# Patient Record
Sex: FEMALE | Race: WHITE | NOT HISPANIC OR LATINO | Employment: FULL TIME | URBAN - METROPOLITAN AREA
[De-identification: names, ages, dates, MRNs, and addresses within clinical notes are randomized per-mention and may not be internally consistent; named-entity substitution may affect disease eponyms.]

---

## 2018-01-28 ENCOUNTER — HOSPITAL ENCOUNTER (EMERGENCY)
Facility: HOSPITAL | Age: 48
Discharge: HOME | End: 2018-01-28
Payer: MEDICAID

## 2018-01-28 VITALS
RESPIRATION RATE: 20 BRPM | TEMPERATURE: 98 F | HEART RATE: 67 BPM | OXYGEN SATURATION: 96 % | DIASTOLIC BLOOD PRESSURE: 66 MMHG | WEIGHT: 120 LBS | SYSTOLIC BLOOD PRESSURE: 135 MMHG

## 2018-01-28 DIAGNOSIS — H10.9 CONJUNCTIVITIS OF LEFT EYE, UNSPECIFIED CONJUNCTIVITIS TYPE: Primary | ICD-10-CM

## 2018-01-28 PROCEDURE — 25000003 PHARM REV CODE 250: Performed by: NURSE PRACTITIONER

## 2018-01-28 PROCEDURE — 99283 EMERGENCY DEPT VISIT LOW MDM: CPT

## 2018-01-28 RX ORDER — PROPARACAINE HYDROCHLORIDE 5 MG/ML
1 SOLUTION/ DROPS OPHTHALMIC
Status: COMPLETED | OUTPATIENT
Start: 2018-01-28 | End: 2018-01-28

## 2018-01-28 RX ORDER — TOBRAMYCIN 3 MG/ML
1 SOLUTION/ DROPS OPHTHALMIC EVERY 4 HOURS
Qty: 5 ML | Refills: 0 | Status: SHIPPED | OUTPATIENT
Start: 2018-01-28 | End: 2019-09-03

## 2018-01-28 RX ADMIN — FLUORESCEIN SODIUM 1 STRIP: 1 STRIP OPHTHALMIC at 08:01

## 2018-01-28 RX ADMIN — PROPARACAINE HYDROCHLORIDE 1 DROP: 5 SOLUTION/ DROPS OPHTHALMIC at 08:01

## 2018-01-29 NOTE — ED PROVIDER NOTES
Encounter Date: 1/28/2018       History     Chief Complaint   Patient presents with    Eye Pain     left eye pain that started today     50-year-old female presents to emergency room with left eye pain since yesterday.  Patient reports irritation to the left and increase watering.  Denies any injuries.  States she woke up with redness of the sclera and eye watering.  Denies any blurred vision or vision loss.          Review of patient's allergies indicates:  No Known Allergies  History reviewed. No pertinent past medical history.  Past Surgical History:   Procedure Laterality Date    TUBAL LIGATION       History reviewed. No pertinent family history.  Social History   Substance Use Topics    Smoking status: Current Every Day Smoker     Packs/day: 0.50    Smokeless tobacco: Not on file    Alcohol use No     Review of Systems   Constitutional: Negative for fever.   HENT: Negative for sore throat.    Eyes: Positive for pain, discharge, redness and itching. Negative for photophobia and visual disturbance.   Respiratory: Negative for shortness of breath.    Cardiovascular: Negative for chest pain.   Gastrointestinal: Negative for nausea.   Genitourinary: Negative for dysuria.   Musculoskeletal: Negative for back pain.   Skin: Negative for rash.   Neurological: Negative for weakness.   Hematological: Does not bruise/bleed easily.   All other systems reviewed and are negative.      Physical Exam     Initial Vitals [01/28/18 1924]   BP Pulse Resp Temp SpO2   128/74 74 20 98.4 °F (36.9 °C) 100 %      MAP       92         Physical Exam    Nursing note and vitals reviewed.  Constitutional: She appears well-developed and well-nourished. No distress.   HENT:   Head: Normocephalic.   Eyes: Pupils are equal, round, and reactive to light. Right eye exhibits no discharge. Left eye exhibits discharge. Left eye exhibits no exudate. No foreign body present in the left eye. Right conjunctiva is not injected. Left conjunctiva is  injected. Right eye exhibits no nystagmus. Left eye exhibits no nystagmus.   Slit lamp exam:       The left eye shows no corneal abrasion, no foreign body, no fluorescein uptake and no anterior chamber bulge.   Left ocular pressure is 14   Neck: Normal range of motion. Neck supple.   Cardiovascular: Normal rate and regular rhythm.   Pulmonary/Chest: No respiratory distress.   Abdominal: She exhibits no distension and no abdominal bruit. There is no tenderness. No hernia.   Neurological: She is alert and oriented to person, place, and time.   Skin: Skin is warm and dry. Capillary refill takes less than 2 seconds.   Psychiatric: She has a normal mood and affect. Her behavior is normal. Judgment and thought content normal.         ED Course   Procedures  Labs Reviewed - No data to display          Medical Decision Making:   Initial Assessment:   50-year-old female presents to emergency room with left eye pain since yesterday.  Patient reports irritation to the left and increase watering.  Denies any injuries.  States she woke up with redness of the sclera and eye watering.  Denies any blurred vision or vision loss.  I examined the patient's eye using proparacaine and fluorescein no visible foreign body or abrasion.  Ocular pressure was 14 in the left eye.  Differential Diagnosis:   Foreign body, corneal abrasion, conjunctivitis  ED Management:  I'll discharge patient with antibiotic eyedrops.  Instructed to follow-up with ophthalmologist if irritation continue.  Patient verbalized understanding.                   ED Course      Clinical Impression:   The encounter diagnosis was Conjunctivitis of left eye, unspecified conjunctivitis type.                           Gemma Whipple NP  02/02/18 2000

## 2019-08-09 ENCOUNTER — TELEPHONE (OUTPATIENT)
Dept: SURGERY | Facility: CLINIC | Age: 49
End: 2019-08-09

## 2019-08-14 ENCOUNTER — TELEPHONE (OUTPATIENT)
Dept: SURGERY | Facility: CLINIC | Age: 49
End: 2019-08-14

## 2019-08-16 ENCOUNTER — TELEPHONE (OUTPATIENT)
Dept: SURGERY | Facility: CLINIC | Age: 49
End: 2019-08-16

## 2019-08-21 ENCOUNTER — TELEPHONE (OUTPATIENT)
Dept: SURGERY | Facility: CLINIC | Age: 49
End: 2019-08-21

## 2019-08-21 NOTE — TELEPHONE ENCOUNTER
Spoke to Aayush aguirre referring doctors office to let her know we have not been able to reach the pt. She verified the 2 numbers I have. She provided a number for the Pt's mother Amanda [phone]. Left voicemail for mother as well. Sent letter to home address.

## 2019-08-30 NOTE — PROGRESS NOTES
ADDENDUM 09/10/2019     Patient's estradiol levels are not consistent with menopause. I don't suspect Faslodex is altering levels significantly since she hasn't had a dose since April per her report. Discussed with patient and will change to pre-menopausal therapy - Ribociclib 600 mg daily days 1-21 every 28 days with Arimidex/ovarian suppression.  Patient will discontinue Ibrance/Arimidex. She won't start Ribo until I see her back next week.     History:     Reason For Consultation:   Metastatic ER positive breast cancer.     Referring Provider:   Eula Gutierrez  No address on file    Records Obtained: Records of the patients history including those obtained from the referring provider were reviewed and summarized in detail.    HPI:   Angeles Wright presents for consultation breast cancer. She is at least perimenopausal - she's not real clear about when her last period was, but thinks it was over a year ago. Her oncologic history is detailed below. Today, she notes that she recently moved to [place]. She has not seen an oncologist since April. Last dose of Fulvestrant was April; remained on Ibrance but with irregular dosing. Hasn't taken in past two weeks. No new pains. Significant fatigue.     Oncologic History:  Presentation  - 1/2019 - presented for palpable mass left breast. Of note, patient had prior abnormal mammograms in 2016 for both breasts and was called back for additional images but didn't return.    - 1/2019 - Diagnostic mammogram and US showed 4.5 cm left breast mass with abnormal LN; 1.3 cm right breast mass  - 1/28/19 - Biopsy left breast mass - Grade 2 IDC, estrogen receptor 96%, progesterone receptor 95%, Her2 elaine neg, Ki67 30%.   - 2/2019 CT scans showed known left breast mass with axillary adenopathy, metastatic disease in chest and bones (ribs, sternum, vertebra, pelvis)  - 3/7/19 started Ibrance/Fulvsetrant. Dose reduced of Ibrance in 4/2019.   - 3/14/19 - 3/27/19 3000 cGt T4-T8     -  4/4/19 - Genetics Gene[facility] Breast/Gyn panel negative  [facility] oncology consultation on 9/3/19        Past Medical   Past Medical History:   Diagnosis Date    Breast cancer    There is no problem list on file for this patient.    Social History   Social History     Socioeconomic History    Marital status: Unknown     Spouse name: Not on file    Number of children: Not on file    Years of education: Not on file    Highest education level: Not on file   Occupational History    Not on file   Social Needs    Financial resource strain: Not on file    Food insecurity:     Worry: Not on file     Inability: Not on file    Transportation needs:     Medical: Not on file     Non-medical: Not on file   Tobacco Use    Smoking status: Current Every Day Smoker     Packs/day: 0.50     Years: 25.00     Pack years: 12.50     Start date: 9/3/1994    Smokeless tobacco: Never Used   Substance and Sexual Activity    Alcohol use: Yes     Comment: occasionally    Drug use: Not Currently    Sexual activity: Yes     Partners: Male     Birth control/protection: None   Lifestyle    Physical activity:     Days per week: Not on file     Minutes per session: Not on file    Stress: Not on file   Relationships    Social connections:     Talks on phone: Not on file     Gets together: Not on file     Attends Druze service: Not on file     Active member of club or organization: Not on file     Attends meetings of clubs or organizations: Not on file     Relationship status: Not on file   Other Topics Concern    Not on file   Social History Narrative    Not on file     Family History  Family History   Problem Relation Age of Onset    Lung cancer Mother     Heart attack Father     Suicide Brother     No Known Problems Maternal Aunt     No Known Problems Maternal Uncle     No Known Problems Paternal Aunt     No Known Problems Paternal Uncle     No Known Problems Maternal Grandmother     No Known Problems Maternal  "Grandfather     No Known Problems Paternal Grandmother     No Known Problems Paternal Grandfather     No Known Problems Son     No Known Problems Son     No Known Problems Daughter      Medications    Current Outpatient Medications:     palbociclib (IBRANCE) 100 mg Cap, Take 100 mg by mouth., Disp: , Rfl:   Allergies  Review of patient's allergies indicates:  No Known Allergies  Review of Systems  Review of Systems   Constitutional: Positive for fatigue. Negative for activity change, appetite change, chills, fever and unexpected weight change.   HENT: Negative for congestion, hearing loss, nosebleeds, sinus pressure and trouble swallowing.    Eyes: Negative for pain, discharge and itching.   Respiratory: Negative for cough, chest tightness and shortness of breath.    Cardiovascular: Negative for chest pain, palpitations and leg swelling.   Gastrointestinal: Negative for abdominal distention, abdominal pain, blood in stool, diarrhea, nausea, rectal pain and vomiting.   Endocrine: Negative for heat intolerance and polydipsia.   Genitourinary: Negative for difficulty urinating, dysuria, flank pain, frequency, hematuria and urgency.   Musculoskeletal: Negative for arthralgias, back pain and neck pain.   Skin: Negative for color change, pallor and rash.   Neurological: Negative for dizziness, numbness and headaches.   Hematological: Negative for adenopathy. Does not bruise/bleed easily.   Psychiatric/Behavioral: Negative for confusion and decreased concentration. The patient is not nervous/anxious.         Objective     Objective:     Vitals:    09/03/19 0837   BP: 115/61   BP Location: Right arm   Patient Position: Sitting   Pulse: 68   Resp: 20   Temp: 98.2 °F (36.8 °C)   TempSrc: Oral   Weight: 56.6 kg (124 lb 12.5 oz)   Height: 5' 4" (1.626 m)     Body surface area is 1.6 meters squared.  Physical Exam   Constitutional: She is oriented to person, place, and time. She appears well-developed and well-nourished. " No distress.   HENT:   Head: Normocephalic and atraumatic.   Mouth/Throat: Oropharynx is clear and moist and mucous membranes are normal. No oral lesions.   Eyes: Conjunctivae and EOM are normal. Right eye exhibits no discharge. Left eye exhibits no discharge. No scleral icterus.   Neck: Normal range of motion. Neck supple. No thyroid mass and no thyromegaly present.   Cardiovascular: Normal rate, regular rhythm, S1 normal, S2 normal and normal heart sounds.   Pulmonary/Chest: Effort normal and breath sounds normal. No respiratory distress. She has no wheezes. She has no rhonchi. She has no rales. Chest wall is not dull to percussion.   Large left breast mass, superior to nipple, 8 cm x 7 cm. + freely mobile axillary LN  Right mediport   Abdominal: Soft. Normal appearance and bowel sounds are normal. There is no hepatosplenomegaly. There is no tenderness.   Lymphadenopathy:     She has no cervical adenopathy.     She has axillary adenopathy.        Right: No supraclavicular adenopathy present.        Left: No supraclavicular adenopathy present.   Neurological: She is alert and oriented to person, place, and time. She has normal strength.   Skin: Skin is warm, dry and intact. No pallor.   Psychiatric: Her behavior is normal. Thought content normal.         Labs and Imaging  No results found for this or any previous visit.        Breast pathology and imaging as above.     Assessment     Assessment:     1. Stage IV (T3N2M2) invasive ductal carcinoma of left breast,  quadrant, ER 96%, DE 94%, Her2 neg, Grade 2, Ki67 30%   * Genetics negative  * 2/2019 Presented as de alem metastatic disease with bone mets and asymptomatic lung mets  * 3/7/19 started Ibrance/Fulvsetrant. Dose reduced of Ibrance in 4/2019 due to neutropenia. Non-compliance starting April 2019. Last dose of fulvestrant 4/2019.   * 3/14/19 - 3/27/19 3000 cGy T4-T8    * We discussed the prognosis for her breast cancer, particularly in terms of risks of local  and distant recurrences. We reviewed treatment recommendations as detailed below.    * She's had non-compliance since her diagnosis, thus I think we need to restage and restart treatment. If scans with progression, I wouldn't call her an Ibrance failure since she hasn't been taking it or Faslodex regularly.     2. Bone mets   * Start Zometa - eval'd by dentist. Needs teeth pulled, but they are not painful. Will plan to dose every 3 months.     3. Lung mets      Plan:     1. STRATA  2. Rescan for new baselines.   3. Change Faslodex to Arimidex (hopeful for better compliance) as long as menopausal labs c/w menopause  4. Restart Ibrance 100 mg days 1-21 every 28 days. (day 1 - 9/3  5. Zometa with next visit through port      Follow-up in 2 weeks with repeat labs - mid-cycle labs, Zometa and scan review. I asked the patient to call for any questions, concerns, or new symptoms.    I spent 60 minutes with patient and family with 50 spent face to face counseling on diagnosis, goals of therapy, prognosis.     Advance Care Planning     Power of   I initiated the process of advance care planning today and explained the importance of this process to the patient.  I introduced the concept of advance directives to the patient, as well. Then the patient received detailed information about the importance of designating a Health Care Power of  (HCPOA). She was also instructed to communicate with this person about their wishes for future healthcare, should she become sick and lose decision-making capacity. The patient has not previously appointed a HCPOA. After our discussion, the patient has decided to complete a HCPOA and will return to form.

## 2019-09-03 ENCOUNTER — OFFICE VISIT (OUTPATIENT)
Dept: HEMATOLOGY/ONCOLOGY | Facility: CLINIC | Age: 49
End: 2019-09-03
Payer: MEDICAID

## 2019-09-03 ENCOUNTER — TELEPHONE (OUTPATIENT)
Dept: HEMATOLOGY/ONCOLOGY | Facility: CLINIC | Age: 49
End: 2019-09-03

## 2019-09-03 ENCOUNTER — LAB VISIT (OUTPATIENT)
Dept: LAB | Facility: HOSPITAL | Age: 49
End: 2019-09-03
Attending: INTERNAL MEDICINE
Payer: MEDICAID

## 2019-09-03 VITALS
RESPIRATION RATE: 20 BRPM | BODY MASS INDEX: 21.3 KG/M2 | HEIGHT: 64 IN | WEIGHT: 124.75 LBS | HEART RATE: 68 BPM | DIASTOLIC BLOOD PRESSURE: 61 MMHG | SYSTOLIC BLOOD PRESSURE: 115 MMHG | TEMPERATURE: 98 F

## 2019-09-03 DIAGNOSIS — C50.112 MALIGNANT NEOPLASM OF CENTRAL PORTION OF LEFT BREAST IN FEMALE, ESTROGEN RECEPTOR POSITIVE: Primary | ICD-10-CM

## 2019-09-03 DIAGNOSIS — Z17.0 MALIGNANT NEOPLASM OF CENTRAL PORTION OF LEFT BREAST IN FEMALE, ESTROGEN RECEPTOR POSITIVE: Primary | ICD-10-CM

## 2019-09-03 DIAGNOSIS — C78.00 MALIGNANT NEOPLASM METASTATIC TO LUNG, UNSPECIFIED LATERALITY: ICD-10-CM

## 2019-09-03 DIAGNOSIS — C50.112 MALIGNANT NEOPLASM OF CENTRAL PORTION OF LEFT BREAST IN FEMALE, ESTROGEN RECEPTOR POSITIVE: ICD-10-CM

## 2019-09-03 DIAGNOSIS — C79.51 BONE METASTASES: ICD-10-CM

## 2019-09-03 DIAGNOSIS — Z17.0 MALIGNANT NEOPLASM OF CENTRAL PORTION OF LEFT BREAST IN FEMALE, ESTROGEN RECEPTOR POSITIVE: ICD-10-CM

## 2019-09-03 PROCEDURE — 99205 PR OFFICE/OUTPT VISIT, NEW, LEVL V, 60-74 MIN: ICD-10-PCS | Mod: S$PBB,,, | Performed by: INTERNAL MEDICINE

## 2019-09-03 PROCEDURE — 99999 PR PBB SHADOW E&M-EST. PATIENT-LVL III: ICD-10-PCS | Mod: PBBFAC,,, | Performed by: INTERNAL MEDICINE

## 2019-09-03 PROCEDURE — 99999 PR PBB SHADOW E&M-EST. PATIENT-LVL III: CPT | Mod: PBBFAC,,, | Performed by: INTERNAL MEDICINE

## 2019-09-03 PROCEDURE — 99213 OFFICE O/P EST LOW 20 MIN: CPT | Mod: PBBFAC | Performed by: INTERNAL MEDICINE

## 2019-09-03 PROCEDURE — 99205 OFFICE O/P NEW HI 60 MIN: CPT | Mod: S$PBB,,, | Performed by: INTERNAL MEDICINE

## 2019-09-03 RX ORDER — SODIUM CHLORIDE 0.9 % (FLUSH) 0.9 %
10 SYRINGE (ML) INJECTION
Status: CANCELLED | OUTPATIENT
Start: 2019-09-17

## 2019-09-03 RX ORDER — ANASTROZOLE 1 MG/1
1 TABLET ORAL DAILY
Qty: 90 TABLET | Refills: 1 | Status: SHIPPED | OUTPATIENT
Start: 2019-09-03 | End: 2019-09-10

## 2019-09-03 RX ORDER — HEPARIN 100 UNIT/ML
500 SYRINGE INTRAVENOUS
Status: CANCELLED | OUTPATIENT
Start: 2019-09-17

## 2019-09-03 NOTE — TELEPHONE ENCOUNTER
FERNY faxed to Glenny Rivero.     ----- Message from Melania Mcgovern MD sent at 9/3/2019  8:52 AM CDT -----  Neena peterson

## 2019-09-03 NOTE — Clinical Note
1. MD in 2 weeks with CT scans/bone scan a few days before - same day labs - cbc, cmp, mg, phos, Zometa2. Labs today - cbc, cmp, fsh, estradiol, ca 15-3

## 2019-09-03 NOTE — PLAN OF CARE
START ON PATHWAY REGIMEN - Breast    [id number]        Anastrozole (Arimidex(R))       Palbociclib (Ibrance(R))           Additional Orders: Avoid concomitant use of strong and moderate CYP3A   inhibitors and inducers. If interaction unavoidable, see palbociclib prescribing   information for recommended dosing adjustments. CBC recommended on day 1 of   each cycle, and day 14 of first two cycles, then as clinically indicated.       Ref: Rod et al. Lancet Oncol. 2015; 16: 25.  [Extrapolated; custom request]    **Always confirm dose/schedule in your pharmacy ordering system**    Patient Characteristics:  Distant Metastases or Locoregional Recurrent Disease - Unresected, HER2   Negative/Unknown/Equivocal, ER Positive, Endocrine Therapy, Postmenopausal,   First Line, No Prior Endocrine Therapy Adjuvantly, Candidate for a CDK4/6   Inhibitor  Therapeutic Status: Distant Metastases  BRCA Mutation Status: Absent  ER Status: Positive (+)  HER2 Status: Negative (-)  CT Status: Positive (+)  Menopausal Status: Postmenopausal  Line of Therapy: First Line  Previous Therapy: No Prior Endocrine Therapy Adjuvantly  Intent of Therapy:  Non-Curative / Palliative Intent, Discussed with Patient

## 2019-09-04 ENCOUNTER — TELEPHONE (OUTPATIENT)
Dept: HEMATOLOGY/ONCOLOGY | Facility: CLINIC | Age: 49
End: 2019-09-04

## 2019-09-04 NOTE — TELEPHONE ENCOUNTER
Called patient to give her the dates for the appointments that are scheduled for Monday 9/16 & 9/18. Also gave the address for the lab location in [place] on Friday 9/6.    Mailed appt  Slips.        ----- Message from Magdalena Sparks sent at 9/3/2019  4:27 PM CDT -----  Pt thought labs were for another time not today- r/s pt to [place] lab on Friday per pt request    ----- Message -----  From: Christina Hilton  Sent: 9/3/2019   4:23 PM  To: Staff Froilan Nichols,  Did you talk to patient about labs?      ----- Message -----  From: Melania Mcgovern MD  Sent: 9/3/2019  11:05 AM  To: Christina Hilton, Magdalena Nichols - looks like she didn't stay for labs today - can we get her rescheduled for labs?    OK to do on [facility] since she lives there.     Thanks, Melania

## 2019-09-06 LAB
ALBUMIN SERPL BCP-MCNC: 4.1 G/DL (ref 3.5–5.2)
ALP SERPL-CCNC: 74 U/L (ref 55–135)
ALT SERPL W/O P-5'-P-CCNC: 8 U/L (ref 10–44)
ANION GAP SERPL CALC-SCNC: 10 MMOL/L (ref 8–16)
AST SERPL-CCNC: 14 U/L (ref 10–40)
BILIRUB SERPL-MCNC: 0.4 MG/DL (ref 0.1–1)
BUN SERPL-MCNC: 10 MG/DL (ref 6–20)
CALCIUM SERPL-MCNC: 9.2 MG/DL (ref 8.7–10.5)
CHLORIDE SERPL-SCNC: 105 MMOL/L (ref 95–110)
CO2 SERPL-SCNC: 25 MMOL/L (ref 23–29)
CREAT SERPL-MCNC: 0.8 MG/DL (ref 0.5–1.4)
ERYTHROCYTE [DISTWIDTH] IN BLOOD BY AUTOMATED COUNT: 13.8 % (ref 11.5–14.5)
EST. GFR  (AFRICAN AMERICAN): >60 ML/MIN/1.73 M^2
EST. GFR  (NON AFRICAN AMERICAN): >60 ML/MIN/1.73 M^2
ESTRADIOL SERPL-MCNC: 229 PG/ML
FSH SERPL-ACNC: 23.6 MIU/ML
GLUCOSE SERPL-MCNC: 92 MG/DL (ref 70–110)
HCT VFR BLD AUTO: 47.2 % (ref 37–48.5)
HGB BLD-MCNC: 15.5 G/DL (ref 12–16)
IMM GRANULOCYTES # BLD AUTO: ABNORMAL K/UL (ref 0–0.04)
MCH RBC QN AUTO: 32 PG (ref 27–31)
MCHC RBC AUTO-ENTMCNC: 32.8 G/DL (ref 32–36)
MCV RBC AUTO: 98 FL (ref 82–98)
NEUTROPHILS # BLD AUTO: 3.4 K/UL (ref 1.8–7.7)
PLATELET # BLD AUTO: 251 K/UL (ref 150–350)
PMV BLD AUTO: 9.6 FL (ref 9.2–12.9)
POTASSIUM SERPL-SCNC: 4 MMOL/L (ref 3.5–5.1)
PROT SERPL-MCNC: 6.8 G/DL (ref 6–8.4)
RBC # BLD AUTO: 4.84 M/UL (ref 4–5.4)
SODIUM SERPL-SCNC: 140 MMOL/L (ref 136–145)
WBC # BLD AUTO: 4.94 K/UL (ref 3.9–12.7)

## 2019-09-06 PROCEDURE — 83001 ASSAY OF GONADOTROPIN (FSH): CPT

## 2019-09-06 PROCEDURE — 85027 COMPLETE CBC AUTOMATED: CPT | Mod: PO

## 2019-09-06 PROCEDURE — 36415 COLL VENOUS BLD VENIPUNCTURE: CPT

## 2019-09-06 PROCEDURE — 86300 IMMUNOASSAY TUMOR CA 15-3: CPT

## 2019-09-06 PROCEDURE — 82670 ASSAY OF TOTAL ESTRADIOL: CPT

## 2019-09-06 PROCEDURE — 80053 COMPREHEN METABOLIC PANEL: CPT

## 2019-09-09 LAB — CANCER AG15-3 SERPL-ACNC: 488 U/ML (ref 0–31)

## 2019-09-10 RX ORDER — TAMOXIFEN CITRATE 20 MG/1
20 TABLET ORAL DAILY
Qty: 90 TABLET | Refills: 1 | Status: SHIPPED | OUTPATIENT
Start: 2019-09-10 | End: 2019-09-18

## 2019-09-10 NOTE — PLAN OF CARE
DISCONTINUE ON PATHWAY REGIMEN - Breast    [id number]        Anastrozole (Arimidex(R))       Palbociclib (Ibrance(R))           Additional Orders: Avoid concomitant use of strong and moderate CYP3A   inhibitors and inducers. If interaction unavoidable, see palbociclib prescribing   information for recommended dosing adjustments. CBC recommended on day 1 of   each cycle, and day 14 of first two cycles, then as clinically indicated.       Ref: Rod et al. Lancet Oncol. 2015; 16: 25.  [Extrapolated; custom request]    **Always confirm dose/schedule in your pharmacy ordering system**    REASON: Other Reason  PRIOR TREATMENT: [id number]: Palbociclib 125 mg D1-21 + Anastrozole 1 mg D1-28 q28   Days Until Progression or Unacceptable Toxicity  TREATMENT RESPONSE: N/A - Adjuvant Therapy    START OFF PATHWAY REGIMEN - Breast            Letrozole (Femara(R))       Ribociclib (Kisqali(R))           Additional Orders: Monitor QTcF and electrolytes - see PI for details.   Do not initiate ribociclib in patients with QTcF ? 450 msec. In patients with   baseline hepatic impairment (Child-Velasquez class B or C): reduce initial ribociclib   dose to 400 mg/day. See prescribing information for ribociclib dose   adjustments/treatment interruptions due to drug interactions and/or toxicity.   Ref: Gus et al; N Engl J Med 2016;375:1738-48., Ribociclib [prescribing   information]. [place], [place]: [employer]; 2017    **Always confirm dose/schedule in your pharmacy ordering system**    Administration Notes: Ribo/monroe    Patient Characteristics:  Distant Metastases or Locoregional Recurrent Disease - Unresected, HER2   Negative/Unknown/Equivocal, ER Positive, Endocrine Therapy, Premenopausal, First   Line, Candidate for a CDK4/6 Inhibitor  Therapeutic Status: Distant Metastases  BRCA Mutation Status: Absent  ER Status: Positive (+)  HER2 Status: Negative (-)  OH Status: Positive (+)  Menopausal Status: Premenopausal  Line of Therapy: First  Line  Intent of Therapy:  Curative Intent, Discussed with Patient

## 2019-09-11 ENCOUNTER — TELEPHONE (OUTPATIENT)
Dept: PHARMACY | Facility: CLINIC | Age: 49
End: 2019-09-11

## 2019-09-11 NOTE — TELEPHONE ENCOUNTER
Informed Patient  that [facility] received prescription for Kisqali and prior authorization is required.  [facility] will be back in touch once insurance determination is received.

## 2019-09-12 NOTE — TELEPHONE ENCOUNTER
DOCUMENTATION ONLY:  Prior authorization for Kisqali 600mg/day #63/28 approved from 09/12/2019 to 09/12/2020  Case ID: [id number]    Co-pay: $0.00    Patient Assistance IS NOT required. Sending to clinical pharmacist for  and shipment. - HUSSEIN

## 2019-09-16 ENCOUNTER — HOSPITAL ENCOUNTER (OUTPATIENT)
Dept: RADIOLOGY | Facility: HOSPITAL | Age: 49
Discharge: HOME | End: 2019-09-16
Attending: INTERNAL MEDICINE
Payer: MEDICAID

## 2019-09-16 ENCOUNTER — HOSPITAL ENCOUNTER (OUTPATIENT)
Dept: CARDIOLOGY | Facility: CLINIC | Age: 49
Discharge: HOME | End: 2019-09-16
Payer: MEDICAID

## 2019-09-16 DIAGNOSIS — C50.112 MALIGNANT NEOPLASM OF CENTRAL PORTION OF LEFT BREAST IN FEMALE, ESTROGEN RECEPTOR POSITIVE: ICD-10-CM

## 2019-09-16 DIAGNOSIS — Z17.0 MALIGNANT NEOPLASM OF CENTRAL PORTION OF LEFT BREAST IN FEMALE, ESTROGEN RECEPTOR POSITIVE: ICD-10-CM

## 2019-09-16 PROCEDURE — 74177 CT ABD & PELVIS W/CONTRAST: CPT | Mod: 26,,, | Performed by: RADIOLOGY

## 2019-09-16 PROCEDURE — 74177 CT CHEST ABDOMEN PELVIS WITH CONTRAST (XPD): ICD-10-PCS | Mod: 26,,, | Performed by: RADIOLOGY

## 2019-09-16 PROCEDURE — 93010 ELECTROCARDIOGRAM REPORT: CPT | Mod: S$PBB,,, | Performed by: INTERNAL MEDICINE

## 2019-09-16 PROCEDURE — 78306 BONE IMAGING WHOLE BODY: CPT | Mod: 26,,, | Performed by: RADIOLOGY

## 2019-09-16 PROCEDURE — 71260 CT CHEST ABDOMEN PELVIS WITH CONTRAST (XPD): ICD-10-PCS | Mod: 26,,, | Performed by: RADIOLOGY

## 2019-09-16 PROCEDURE — 93005 ELECTROCARDIOGRAM TRACING: CPT | Mod: PBBFAC | Performed by: INTERNAL MEDICINE

## 2019-09-16 PROCEDURE — 93010 EKG 12-LEAD: ICD-10-PCS | Mod: S$PBB,,, | Performed by: INTERNAL MEDICINE

## 2019-09-16 PROCEDURE — 74177 CT ABD & PELVIS W/CONTRAST: CPT | Mod: TC

## 2019-09-16 PROCEDURE — 71260 CT THORAX DX C+: CPT | Mod: 26,,, | Performed by: RADIOLOGY

## 2019-09-16 PROCEDURE — A9503 TC99M MEDRONATE: HCPCS

## 2019-09-16 PROCEDURE — 78306 NM BONE SCAN WHOLE BODY: ICD-10-PCS | Mod: 26,,, | Performed by: RADIOLOGY

## 2019-09-16 PROCEDURE — 25500020 PHARM REV CODE 255: Performed by: INTERNAL MEDICINE

## 2019-09-16 RX ADMIN — IOHEXOL 15 ML: 350 INJECTION, SOLUTION INTRAVENOUS at 11:09

## 2019-09-16 RX ADMIN — IOHEXOL 75 ML: 350 INJECTION, SOLUTION INTRAVENOUS at 12:09

## 2019-09-16 NOTE — TELEPHONE ENCOUNTER
The patient returned call in regards to initial Kisqali 600 mg consult on . Kisqali 600mg will be shipped on  to arrive at patient's home on  via [employer]. $ 0 copay. Patient will start Kisqali on . Address confirmed. Confirmed 2 patient identifiers - name and . Therapy Appropriate. She was previously on Ibrance and tolerated well with minimal side effects.    Patient was counseled on the administration directions for Kisqali 600 mg:  -Take 3 tablets (600 mg total) by mouth once daily for 21 days, then 7 days off. Take at same time each day, preferably in the morning.  -Reviewed PO chemo handling precautions.  -Store at room temp.    Patient was counseled on the following possible side effects, which include, but are not limited to:   Peripheral edema, fatigue (fine at baseline - exhausted today d/t several appts), skin rash (HC1% prn - avoid applying to open skin/wounds), pruritis, N/V (no antiemetic), diarrhea/constipation (Imodium/docusate respectively), increased risk of infection (importance of lab monitoring). Patient will receive HC1% cream for rash with first fill only.    DDIs: Medication list reviewed, patient only takes Kisqali - no other medications. Discussed DDI with tamoxifen; Dr. Mcgovern will likely switch tamoxifen to an AI.    Patient was given 2 patient education handouts on how to handle oral chemotherapy and specific recommendations- do's and don'ts. We reviewed [facility] background including hours, RPh on call, [employer] shipping - no signature required, refill process - no auto refills, Welcome Packet, insurance benefits explanation upon request, charting system and communication with provider's offices.    Patient confirmed understanding. Patient did not have additional questions.  Patient will start Kisqali on . Consultation included: indication; goals of treatment; administration; storage and handling; side effects; how to handle side effects; the importance of compliance; how to  handle missed doses; the importance of laboratory monitoring; the importance of keeping all follow up appointments.  Patient understands to report any medication changes to [facility] and provider. All questions answered and addressed to patients satisfaction. I will f/u with patient in 1 week from start, [facility] to contact patient in 3 weeks for refills.

## 2019-09-17 NOTE — PROGRESS NOTES
History:     Reason For Consultation:   Metastatic ER positive breast cancer.     HPI:   Angeles Wright presents for follow up of metastatic breast cancer. She has been noncompliant with therapy and just recently transferred care. Labs showed her to be pre-menopausal thus we are changing therapy to Ribo/Zoladex and Arimidex. Scans earlier this week detailed below. She feels well today without complaints.        Oncologic History:  Presentation  - 1/2019 - presented for palpable mass left breast. Of note, patient had prior abnormal mammograms in 2016 for both breasts and was called back for additional images but didn't return.    - 1/2019 - Diagnostic mammogram and US showed 4.5 cm left breast mass with abnormal LN; 1.3 cm right breast mass  - 1/28/19 - Biopsy left breast mass - Grade 2 IDC, estrogen receptor 96%, progesterone receptor 95%, Her2 elaine neg, Ki67 30%.   - 2/2019 CT scans showed known left breast mass with axillary adenopathy, metastatic disease in chest and bones (ribs, sternum, vertebra, pelvis)  - 3/7/19 started Ibrance/Fulvsetrant. Dose reduced of Ibrance in 4/2019.   - 3/14/19 - 3/27/19 3000 cGt T4-T8     - 4/4/19 - Genetics [facility] Breast/Gyn panel negative  [facility] Medical oncology consultation on 9/3/19   - 9/2019 - Labs show pre-menopausal. Change therapy to Ribociclib, Zoladex and Arimidex.    - 9/2019 new baseline scans with diffuse osseous metastatic disease throughout spine, sternum, ribs, iliac wings, femoral bones. T5 with possible extension into spinal canal; left femur lesion at risk of pathologic fracture    Medications    Current Outpatient Medications:     anastrozole (ARIMIDEX) 1 mg Tab, Take 1 mg by mouth once daily., Disp: , Rfl:     ondansetron (ZOFRAN) 8 MG tablet, Take 1 tablet (8 mg total) by mouth every 8 (eight) hours as needed for Nausea., Disp: 30 tablet, Rfl: 2    ribociclib (KISQALI) 600 mg/day (200 mg x 3) Tab, Take 3 tablets (600 mg) by mouth once daily For 21 days,  then off for 7 days., Disp: 63 tablet, Rfl: 2  Allergies  Review of patient's allergies indicates:  No Known Allergies  Review of Systems  Review of Systems   Constitutional: Positive for fatigue. Negative for activity change, appetite change, chills, fever and unexpected weight change.   HENT: Negative for congestion, hearing loss, nosebleeds, sinus pressure and trouble swallowing.    Eyes: Negative for pain, discharge and itching.   Respiratory: Negative for cough, chest tightness and shortness of breath.    Cardiovascular: Negative for chest pain, palpitations and leg swelling.   Gastrointestinal: Negative for abdominal distention, abdominal pain, blood in stool, diarrhea, nausea, rectal pain and vomiting.   Endocrine: Negative for heat intolerance and polydipsia.   Genitourinary: Negative for difficulty urinating, dysuria, flank pain, frequency, hematuria and urgency.   Musculoskeletal: Negative for arthralgias, back pain and neck pain.   Skin: Negative for color change, pallor and rash.   Neurological: Negative for dizziness, numbness and headaches.   Hematological: Negative for adenopathy. Does not bruise/bleed easily.   Psychiatric/Behavioral: Negative for confusion and decreased concentration. The patient is not nervous/anxious.         Objective     Objective:     Vitals:    09/18/19 1036   BP: 128/79   BP Location: Right arm   Patient Position: Sitting   Pulse: 75   Resp: 20   Temp: 98.2 °F (36.8 °C)   TempSrc: Oral   Weight: 55.8 kg (123 lb 0.3 oz)     Body surface area is 1.59 meters squared.  Physical Exam   Constitutional: She is oriented to person, place, and time. She appears well-developed and well-nourished. No distress.   HENT:   Head: Normocephalic and atraumatic.   Mouth/Throat: Oropharynx is clear and moist and mucous membranes are normal. No oral lesions.   Eyes: Conjunctivae and EOM are normal. Right eye exhibits no discharge. Left eye exhibits no discharge. No scleral icterus.   Neck: Normal  range of motion. Neck supple. No thyroid mass and no thyromegaly present.   Cardiovascular: Normal rate, regular rhythm, S1 normal, S2 normal and normal heart sounds.   Pulmonary/Chest: Effort normal and breath sounds normal. No respiratory distress. She has no wheezes. She has no rhonchi. She has no rales. Chest wall is not dull to percussion.   Large left breast mass, superior to nipple, 8 cm x 7 cm. + freely mobile axillary LN  Right mediport   Abdominal: Soft. Normal appearance and bowel sounds are normal. There is no hepatosplenomegaly. There is no tenderness.   Lymphadenopathy:     She has no cervical adenopathy.     She has axillary adenopathy.        Right: No supraclavicular adenopathy present.        Left: No supraclavicular adenopathy present.   Neurological: She is alert and oriented to person, place, and time. She has normal strength.   Skin: Skin is warm, dry and intact. No pallor.   Psychiatric: Her behavior is normal. Thought content normal.         Labs and Imaging  Results for orders placed or performed in visit on 09/18/19   CBC Oncology   Result Value Ref Range    WBC 3.66 (L) 3.90 - 12.70 K/uL    RBC 4.94 4.00 - 5.40 M/uL    Hemoglobin 15.7 12.0 - 16.0 g/dL    Hematocrit 46.9 37.0 - 48.5 %    Mean Corpuscular Volume 95 82 - 98 fL    Mean Corpuscular Hemoglobin 31.8 (H) 27.0 - 31.0 pg    Mean Corpuscular Hemoglobin Conc 33.5 32.0 - 36.0 g/dL    RDW 14.5 11.5 - 14.5 %    Platelets 250 150 - 350 K/uL    MPV 9.2 9.2 - 12.9 fL    Gran # (ANC) 2.2 1.8 - 7.7 K/uL    Immature Grans (Abs) 0.01 0.00 - 0.04 K/uL     I have personally reviewed imaging results and agree with assessment as detailed below in dictation. Discussed with radiologist as well.     CT Chest Abdomen Pelvis With Contrast  Narrative: EXAMINATION:  CT CHEST ABDOMEN PELVIS WITH CONTRAST (XPD)    CLINICAL HISTORY:  breast cancer; Malignant neoplasm of central portion of left female breast    TECHNIQUE:  Low dose axial images, sagittal and  coronal reformations were obtained from the thoracic inlet to the pubic symphysis following the IV administration of 75 mL of Omnipaque 350 and the oral administration of 30 ml of Omnipaque 350.    COMPARISON:  None    FINDINGS:  Neck base:Remarkable.    Chest wall/axilla:Right-sided chest port with catheter tip in the SVC.  Several soft tissue opacities within both breasts.  The largest is within the left breast demonstrating internal hypodensity, possibly necrosis.  This measures 3.7 cm (series 2, image 33).    Lungs/pleura/tabitha: Several bilateral pulmonary soft tissue nodules, largest within the left fissure, measuring 0.9 cm.  Innumerable pleural based nodules/thickening, predominantly involving the diaphragmatic surface.  There do appear to be some small lung nodules though several are pleural based.  No pleural fluid.  No pneumothorax.    Heart/pericardium/mediastinum: Normal in size.  No pericardial effusion.    Abdominal wall: Unremarkable.    Liver: Normal in size and contour.  No focal lesion.    Gallbladder/bile ducts: No radiopaque gallstone.  No intra or extrahepatic biliary ductal dilatation.    Spleen and pancreas are unremarkable.    Adrenal glands: 0.6 cm nodule in the right adrenal gland, indeterminate.  Left adrenal is unremarkable.    Kidneys/ureters: Normal in size and location.  1.1 cm soft tissue nodule abutting the interpolar region the left kidney.  No hydronephrosis or ureteral dilatation.    Urinary bladder: Distended with smooth contour.    Reproductive: Postsurgical changes of tubal ligation.  Uterus is anteverted.    Bowel/mesentery: No evidence of bowel obstruction or inflammation.  Irregular thickening of the right ascending colon (series 2, image 142), possibly related to under distention. Colon Mass cannot be excluded, clinical correlation with colonoscopy.  The appendix is not definitely identified though no convincing soft tissue stranding to indicate  appendicitis..    Peritoneal/retroperitoneum: No free intraperitoneal air or fluid.    Vasculature: 3 vessels left aortic arch.  Aorta is normal in course and caliber without aneurysmal dilatation.  Celiac, superior and inferior mesenteric arteries are patent.  Portal, splenic and superior mesenteric veins are patent.    Bones: Innumerable lytic and blastic lesions throughout the spine, sternum, ribs, iliac wings and femoral bones.  Noting lytic lesion involving the posterior cortex of T5 vertebral body with possible extension into the spinal canal and lesion in the left femur neck, placing patient at risk of pathological fracture.  Impression: In this patient with history of breast cancer, there are several soft tissue opacities within both breasts.  Largest within the left breast, possibly representing patient primary cancer.  There are several pulmonary soft tissue opacities, pleural based nodules and several osseous lytic lesions, concerning for metastases.    0.6 cm nodule in the right adrenal gland, indeterminate.  Attention on follow-up.    Soft tissue opacity abutting the interpolar region of the left kidney.  This could represent exophytic renal lesion, renal cyst with proteinaceous or hemorrhagic content, or retroperitoneal metastases.  Attention on follow-up.    Irregular thickening of the right ascending colon, possibly related to under distension.  Colon mass cannot be entirely excluded.  Clinical correlation with colonoscopy.    Several osseous metastases involving the left femur neck, placing patient at risk of pathologic fracture and posterior aspect of T5 abutting/impinging upon the spinal canal.  Orthopedics consultation is recommended.    Additional findings as above.    COMMUNICATION  This critical result was discovered/received at 01:55 pm.  The critical information above was relayed directly by Dr. Jeong By telephone to Magdalena Whatley RN on 09/16/2019 at 2:10 pm.    Electronically signed by  resident: Gianfranco Jeong  Date:    09/16/2019  Time:    13:23    Electronically signed by: Trent Bello MD  Date:    09/16/2019  Time:    15:09  NM Bone Scan Whole Body  Narrative: EXAMINATION:  NM BONE SCAN WHOLE BODY    CLINICAL HISTORY:  Malignant neoplasm of central portion of left female breastdiagnosis associated with order;    TECHNIQUE:  Exam was performed with 19.1mCi of technetium-99m labeled MDP.    COMPARISON:  None.    FINDINGS:  There is increased uptake in the lateral T6 and T10 ribs, posterior left T8 rib, right ischium, left ilium and femoral neck, and sternum, corresponding to lucent lesions on CT.  Physiologic  activity is present.  Impression: Multiple foci of increased uptake in the axial skeleton, corresponding to lucent lesions on CT 09/16/2019 and concerning for diffuse metastatic disease.    I, Trent Martínez MD, attest that I reviewed and interpreted the images.    Electronically signed by resident: Cami Gomez  Date:    09/16/2019  Time:    14:38    Electronically signed by: Trent Martínez  Date:    09/16/2019  Time:    14:58        Assessment     Assessment:     1. Stage IV (T3N2M2) invasive ductal carcinoma of left breast,  quadrant, ER 96%, MA 94%, Her2 neg, Grade 2, Ki67 30%   * Genetics negative  * 2/2019 Presented as de alem metastatic disease with bone mets and asymptomatic lung mets  * 3/7/19 outside oncologist - started Ibrance/Fulvsetrant. Dose reduced of Ibrance in 4/2019 due to neutropenia. Non-compliance starting April 2019. Last dose of fulvestrant 4/2019.   * 3/14/19 - 3/27/19 3000 cGy T4-T8    * 9/2019 presented to [facility] as new patient. She's had non-compliance since her diagnosis. Labs showed pre-menopausal (not felt to be abnormal due to Faslodex since it had been 4-5 months since last dose)   * 9/2019 new baseline scans with diffuse osseous metastatic disease throughout spine, sternum, ribs, iliac wings, femoral bones. T5 with possible extension into spinal canal; left  femur lesion at risk of pathologic fracture   * 9/2019: Start Ribociclib, Zoladex and Arimidex. Reviewed side effects of ribociclib including cytopenias, n/v/d/c, QT prolongation among others. Patient asked questions which I answered and voiced understanding.     2. Bone mets   * Start Zometa - eval'd by dentist.   * Refer to ortho due to increased fracture risk of femur due to metastatic involvement.    * Has received prior RT to T5 lesion. Discussed signs/symptoms of cord compression which she would need to seek emergent attention should they develop.     3. Lung mets  4. Mild leukopenia. Monitor.       Plan:     1. STRATA results pending  2. Refer to ortho  due to increased fracture risk of femur due to metastatic involvement  3. Start Zoladex, Ribociclib and Arimidex 9/18/19.   4. Zometa today and then again in mid-December.       Follow-up in 2 weeks with repeat labs - mid-cycle labs. I asked the patient to call for any questions, concerns, or new symptoms.  Understands goals are palliative.       ACP done.     Future Appointments   Date Time Provider Department Center   9/18/2019 11:30 AM Melania Mcgovern MD [facility] HEM ONC [facility]   9/18/2019  1:30 PM NOM, CHEMO [facility]   10/30/2019  2:00 PM Lorne Johnson MD [facility] [place]

## 2019-09-18 ENCOUNTER — OFFICE VISIT (OUTPATIENT)
Dept: HEMATOLOGY/ONCOLOGY | Facility: CLINIC | Age: 49
End: 2019-09-18
Payer: MEDICAID

## 2019-09-18 ENCOUNTER — INFUSION (OUTPATIENT)
Dept: INFUSION THERAPY | Facility: HOSPITAL | Age: 49
End: 2019-09-18
Attending: INTERNAL MEDICINE
Payer: MEDICAID

## 2019-09-18 VITALS
RESPIRATION RATE: 18 BRPM | TEMPERATURE: 98 F | SYSTOLIC BLOOD PRESSURE: 126 MMHG | HEART RATE: 59 BPM | DIASTOLIC BLOOD PRESSURE: 68 MMHG

## 2019-09-18 VITALS
DIASTOLIC BLOOD PRESSURE: 79 MMHG | BODY MASS INDEX: 21.12 KG/M2 | TEMPERATURE: 98 F | RESPIRATION RATE: 20 BRPM | HEART RATE: 75 BPM | WEIGHT: 123 LBS | SYSTOLIC BLOOD PRESSURE: 128 MMHG

## 2019-09-18 DIAGNOSIS — Z17.0 MALIGNANT NEOPLASM OF CENTRAL PORTION OF LEFT BREAST IN FEMALE, ESTROGEN RECEPTOR POSITIVE: Primary | ICD-10-CM

## 2019-09-18 DIAGNOSIS — C50.112 MALIGNANT NEOPLASM OF CENTRAL PORTION OF LEFT BREAST IN FEMALE, ESTROGEN RECEPTOR POSITIVE: Primary | ICD-10-CM

## 2019-09-18 DIAGNOSIS — C78.00 MALIGNANT NEOPLASM METASTATIC TO LUNG, UNSPECIFIED LATERALITY: ICD-10-CM

## 2019-09-18 DIAGNOSIS — D72.819 LEUKOPENIA, UNSPECIFIED TYPE: ICD-10-CM

## 2019-09-18 DIAGNOSIS — C79.51 BONE METASTASES: ICD-10-CM

## 2019-09-18 DIAGNOSIS — Z79.899 ENCOUNTER FOR LONG-TERM (CURRENT) USE OF HIGH-RISK MEDICATION: ICD-10-CM

## 2019-09-18 PROCEDURE — 96365 THER/PROPH/DIAG IV INF INIT: CPT

## 2019-09-18 PROCEDURE — 99215 PR OFFICE/OUTPT VISIT, EST, LEVL V, 40-54 MIN: ICD-10-PCS | Mod: S$PBB,,, | Performed by: INTERNAL MEDICINE

## 2019-09-18 PROCEDURE — 99213 OFFICE O/P EST LOW 20 MIN: CPT | Mod: PBBFAC,25 | Performed by: INTERNAL MEDICINE

## 2019-09-18 PROCEDURE — 99999 PR PBB SHADOW E&M-EST. PATIENT-LVL III: ICD-10-PCS | Mod: PBBFAC,,, | Performed by: INTERNAL MEDICINE

## 2019-09-18 PROCEDURE — 63600175 PHARM REV CODE 636 W HCPCS: Performed by: INTERNAL MEDICINE

## 2019-09-18 PROCEDURE — 99215 OFFICE O/P EST HI 40 MIN: CPT | Mod: S$PBB,,, | Performed by: INTERNAL MEDICINE

## 2019-09-18 PROCEDURE — 96402 CHEMO HORMON ANTINEOPL SQ/IM: CPT

## 2019-09-18 PROCEDURE — 99999 PR PBB SHADOW E&M-EST. PATIENT-LVL III: CPT | Mod: PBBFAC,,, | Performed by: INTERNAL MEDICINE

## 2019-09-18 RX ORDER — ONDANSETRON HYDROCHLORIDE 8 MG/1
8 TABLET, FILM COATED ORAL EVERY 8 HOURS PRN
Qty: 30 TABLET | Refills: 2 | Status: SHIPPED | OUTPATIENT
Start: 2019-09-18 | End: 2020-09-17

## 2019-09-18 RX ORDER — ANASTROZOLE 1 MG/1
1 TABLET ORAL DAILY
COMMUNITY
End: 2020-05-27 | Stop reason: SDUPTHER

## 2019-09-18 RX ORDER — SODIUM CHLORIDE 0.9 % (FLUSH) 0.9 %
10 SYRINGE (ML) INJECTION
Status: CANCELLED | OUTPATIENT
Start: 2019-10-01

## 2019-09-18 RX ORDER — SODIUM CHLORIDE 0.9 % (FLUSH) 0.9 %
10 SYRINGE (ML) INJECTION
Status: DISCONTINUED | OUTPATIENT
Start: 2019-09-18 | End: 2019-09-18 | Stop reason: HOSPADM

## 2019-09-18 RX ORDER — HEPARIN 100 UNIT/ML
500 SYRINGE INTRAVENOUS
Status: CANCELLED | OUTPATIENT
Start: 2019-10-01

## 2019-09-18 RX ADMIN — GOSERELIN ACETATE 3.6 MG: 3.6 IMPLANT SUBCUTANEOUS at 01:09

## 2019-09-18 RX ADMIN — SODIUM CHLORIDE 4 MG: 9 INJECTION, SOLUTION INTRAVENOUS at 01:09

## 2019-09-18 NOTE — Clinical Note
- Refer to ortho for high risk for fracture- Zoladex and Zometa today in infusion- will you confirm that they'll give both- MD in 2 with cbc, cmp- MD in 4 weeks with cbc, cmp, ca 15-3, Zoladex

## 2019-09-18 NOTE — PLAN OF CARE
Problem: Adult Inpatient Plan of Care  Goal: Plan of Care Review  Outcome: Outcome(s) achieved Date Met: 09/18/19  Patient in clinic today for Zoladex injection. Injection administered SQ into abdominal tissue. Injection tolerated well. Patient denies any pain or discomfort. Educated on medication side effects (headaches, hot flashes, & fatigue) and when to seek medical attention.     Patient also received first dose of Zometa. Patient denies any recent dental work or any future plans. Patient instructed to take a Calcium & Vit. D supplement. VS stable. Port flushed, heparin locked, and de-accessed prior to discharge. AVS provided. Discharged home.

## 2019-09-18 NOTE — DISCHARGE INSTRUCTIONS
oral calcium supplements of 500 mg and a multiple vitamin  containing 400 international units of vitamin D daily.

## 2019-09-26 ENCOUNTER — TELEPHONE (OUTPATIENT)
Dept: HEMATOLOGY/ONCOLOGY | Facility: CLINIC | Age: 49
End: 2019-09-26

## 2019-10-07 NOTE — TELEPHONE ENCOUNTER
The patient contacted [facility] in regards to review management of missed doses for Kisqali/Arimidex - additionally completed a 7 day touchbase. The patient confirmed initiating on 9/23 - adjusting refill activity accordingly.    Administration: Patient confirms taking medication as prescribed: 3 tablets once daily around 10am-12pm. Proper chemo handling procedures are not followed. Medication is stored appropriately at room temp.  Adherence: Patient confirms one missed doses of her Kisqali and Arimidex yesterday. She went fishing overnight and slept through the majority of yesterday (including dose). She does not currently use any adherence tools. Recommended she use a phone alarm reminder for a consistent time of day to assist with adherence - patient will try this.  Side effects/disease state management: Patient denies any notable side effects such as nausea, diarrhea, constipation or lower energy levels. She has noticed small bumps on her shoulder and arms that resemble pimples. Not pruritic. She confirmed recently switching laundry detergents - may be related to that. Discussed Kisqali may cause a rash - may apply HC1% cream prn avoiding open wounds or broken skin. She has also noticed some light vaginal spotting. Recently started menopause - recommended she monitor and alert provider if worsens or does not resolve.    The patient overall seems to be tolerating Kisqali well. RPh will continue to f/u every three cycles or as clinically appropriate. Therapy appropriate. Encouraged patient to contact [facility] with any questions/concerns.

## 2019-10-08 DIAGNOSIS — Z17.0 MALIGNANT NEOPLASM OF CENTRAL PORTION OF LEFT BREAST IN FEMALE, ESTROGEN RECEPTOR POSITIVE: ICD-10-CM

## 2019-10-08 DIAGNOSIS — C50.112 MALIGNANT NEOPLASM OF CENTRAL PORTION OF LEFT BREAST IN FEMALE, ESTROGEN RECEPTOR POSITIVE: ICD-10-CM

## 2019-10-15 ENCOUNTER — INFUSION (OUTPATIENT)
Dept: INFUSION THERAPY | Facility: HOSPITAL | Age: 49
End: 2019-10-15
Attending: INTERNAL MEDICINE
Payer: MEDICAID

## 2019-10-15 ENCOUNTER — OFFICE VISIT (OUTPATIENT)
Dept: HEMATOLOGY/ONCOLOGY | Facility: CLINIC | Age: 49
End: 2019-10-15
Payer: MEDICAID

## 2019-10-15 ENCOUNTER — TELEPHONE (OUTPATIENT)
Dept: PHARMACY | Facility: CLINIC | Age: 49
End: 2019-10-15

## 2019-10-15 VITALS
RESPIRATION RATE: 18 BRPM | WEIGHT: 121.25 LBS | HEART RATE: 77 BPM | TEMPERATURE: 99 F | DIASTOLIC BLOOD PRESSURE: 67 MMHG | HEIGHT: 64 IN | OXYGEN SATURATION: 98 % | SYSTOLIC BLOOD PRESSURE: 125 MMHG | BODY MASS INDEX: 20.7 KG/M2

## 2019-10-15 DIAGNOSIS — Z17.0 MALIGNANT NEOPLASM OF CENTRAL PORTION OF LEFT BREAST IN FEMALE, ESTROGEN RECEPTOR POSITIVE: Primary | ICD-10-CM

## 2019-10-15 DIAGNOSIS — C50.112 MALIGNANT NEOPLASM OF CENTRAL PORTION OF LEFT BREAST IN FEMALE, ESTROGEN RECEPTOR POSITIVE: Primary | ICD-10-CM

## 2019-10-15 DIAGNOSIS — C79.51 BONE METASTASES: ICD-10-CM

## 2019-10-15 DIAGNOSIS — C78.00 MALIGNANT NEOPLASM METASTATIC TO LUNG, UNSPECIFIED LATERALITY: ICD-10-CM

## 2019-10-15 LAB
ALBUMIN SERPL BCP-MCNC: 4 G/DL (ref 3.5–5.2)
ALP SERPL-CCNC: 82 U/L (ref 55–135)
ALT SERPL W/O P-5'-P-CCNC: 9 U/L (ref 10–44)
ANION GAP SERPL CALC-SCNC: 9 MMOL/L (ref 8–16)
AST SERPL-CCNC: 15 U/L (ref 10–40)
BILIRUB SERPL-MCNC: 0.4 MG/DL (ref 0.1–1)
BUN SERPL-MCNC: 5 MG/DL (ref 6–20)
CALCIUM SERPL-MCNC: 8.6 MG/DL (ref 8.7–10.5)
CHLORIDE SERPL-SCNC: 107 MMOL/L (ref 95–110)
CO2 SERPL-SCNC: 24 MMOL/L (ref 23–29)
CREAT SERPL-MCNC: 0.8 MG/DL (ref 0.5–1.4)
ERYTHROCYTE [DISTWIDTH] IN BLOOD BY AUTOMATED COUNT: 16.5 % (ref 11.5–14.5)
EST. GFR  (AFRICAN AMERICAN): >60 ML/MIN/1.73 M^2
EST. GFR  (NON AFRICAN AMERICAN): >60 ML/MIN/1.73 M^2
GLUCOSE SERPL-MCNC: 94 MG/DL (ref 70–110)
HCT VFR BLD AUTO: 40.9 % (ref 37–48.5)
HGB BLD-MCNC: 13.9 G/DL (ref 12–16)
IMM GRANULOCYTES # BLD AUTO: 0.02 K/UL (ref 0–0.04)
MCH RBC QN AUTO: 32.9 PG (ref 27–31)
MCHC RBC AUTO-ENTMCNC: 34 G/DL (ref 32–36)
MCV RBC AUTO: 97 FL (ref 82–98)
NEUTROPHILS # BLD AUTO: 1 K/UL (ref 1.8–7.7)
PLATELET # BLD AUTO: 209 K/UL (ref 150–350)
PMV BLD AUTO: 9.3 FL (ref 9.2–12.9)
POTASSIUM SERPL-SCNC: 3.8 MMOL/L (ref 3.5–5.1)
PROT SERPL-MCNC: 6.8 G/DL (ref 6–8.4)
RBC # BLD AUTO: 4.22 M/UL (ref 4–5.4)
SODIUM SERPL-SCNC: 140 MMOL/L (ref 136–145)
WBC # BLD AUTO: 2 K/UL (ref 3.9–12.7)

## 2019-10-15 PROCEDURE — 63600175 PHARM REV CODE 636 W HCPCS: Mod: JG | Performed by: INTERNAL MEDICINE

## 2019-10-15 PROCEDURE — 36415 COLL VENOUS BLD VENIPUNCTURE: CPT

## 2019-10-15 PROCEDURE — A4216 STERILE WATER/SALINE, 10 ML: HCPCS | Performed by: INTERNAL MEDICINE

## 2019-10-15 PROCEDURE — 63600175 PHARM REV CODE 636 W HCPCS: Performed by: INTERNAL MEDICINE

## 2019-10-15 PROCEDURE — 96402 CHEMO HORMON ANTINEOPL SQ/IM: CPT

## 2019-10-15 PROCEDURE — 99213 OFFICE O/P EST LOW 20 MIN: CPT | Mod: PBBFAC,25 | Performed by: INTERNAL MEDICINE

## 2019-10-15 PROCEDURE — 99215 PR OFFICE/OUTPT VISIT, EST, LEVL V, 40-54 MIN: ICD-10-PCS | Mod: S$PBB,,, | Performed by: INTERNAL MEDICINE

## 2019-10-15 PROCEDURE — 86300 IMMUNOASSAY TUMOR CA 15-3: CPT

## 2019-10-15 PROCEDURE — 99999 PR PBB SHADOW E&M-EST. PATIENT-LVL III: ICD-10-PCS | Mod: PBBFAC,,, | Performed by: INTERNAL MEDICINE

## 2019-10-15 PROCEDURE — 25000003 PHARM REV CODE 250: Performed by: INTERNAL MEDICINE

## 2019-10-15 PROCEDURE — 36593 DECLOT VASCULAR DEVICE: CPT

## 2019-10-15 PROCEDURE — 80053 COMPREHEN METABOLIC PANEL: CPT

## 2019-10-15 PROCEDURE — 99999 PR PBB SHADOW E&M-EST. PATIENT-LVL III: CPT | Mod: PBBFAC,,, | Performed by: INTERNAL MEDICINE

## 2019-10-15 PROCEDURE — 85027 COMPLETE CBC AUTOMATED: CPT

## 2019-10-15 PROCEDURE — 99215 OFFICE O/P EST HI 40 MIN: CPT | Mod: S$PBB,,, | Performed by: INTERNAL MEDICINE

## 2019-10-15 RX ORDER — SODIUM CHLORIDE 0.9 % (FLUSH) 0.9 %
10 SYRINGE (ML) INJECTION
Status: CANCELLED | OUTPATIENT
Start: 2020-01-01

## 2019-10-15 RX ORDER — HEPARIN 100 UNIT/ML
500 SYRINGE INTRAVENOUS
Status: CANCELLED | OUTPATIENT
Start: 2020-01-01

## 2019-10-15 RX ORDER — SODIUM CHLORIDE 0.9 % (FLUSH) 0.9 %
10 SYRINGE (ML) INJECTION
Status: DISCONTINUED | OUTPATIENT
Start: 2019-10-15 | End: 2019-10-15 | Stop reason: HOSPADM

## 2019-10-15 RX ORDER — HEPARIN 100 UNIT/ML
500 SYRINGE INTRAVENOUS
Status: DISCONTINUED | OUTPATIENT
Start: 2019-10-15 | End: 2019-10-15 | Stop reason: HOSPADM

## 2019-10-15 RX ADMIN — Medication 10 ML: at 10:10

## 2019-10-15 RX ADMIN — HEPARIN 500 UNITS: 100 SYRINGE at 10:10

## 2019-10-15 RX ADMIN — GOSERELIN ACETATE 3.6 MG: 3.6 IMPLANT SUBCUTANEOUS at 12:10

## 2019-10-15 RX ADMIN — ALTEPLASE 2 MG: 2.2 INJECTION, POWDER, LYOPHILIZED, FOR SOLUTION INTRAVENOUS at 11:10

## 2019-10-15 NOTE — PROGRESS NOTES
History:     Reason For Consultation:   Metastatic ER positive breast cancer.     HPI:   Angeles Wright presents for follow up of metastatic breast cancer. She has been noncompliant with therapy and transferred care to [facility] in 9/2019. Labs showed her to be pre-menopausal thus we changed therapy to Ribo/Zoladex and Arimidex. She has missed a few doses of Ribo. Tolerating Zoladex and Arimidex well. No new bony pain. Ambulating without difficulty. No difficulty urinating. Nehemias accompanies her today.      Oncologic History:  Presentation  - 1/2019 - presented for palpable mass left breast. Of note, patient had prior abnormal mammograms in 2016 for both breasts and was called back for additional images but didn't return.    - 1/2019 - Diagnostic mammogram and US showed 4.5 cm left breast mass with abnormal LN; 1.3 cm right breast mass  - 1/28/19 - Biopsy left breast mass - Grade 2 IDC, estrogen receptor 96%, progesterone receptor 95%, Her2 elaine neg, Ki67 30%.   - 2/2019 CT scans showed known left breast mass with axillary adenopathy, metastatic disease in chest and bones (ribs, sternum, vertebra, pelvis)  - 3/7/19 started Ibrance/Fulvsetrant. Dose reduced of Ibrance in 4/2019.   - 3/14/19 - 3/27/19 3000 cGt T4-T8     - 4/4/19 - Genetics GeneDx Breast/Gyn panel negative  [facility] oncology consultation on 9/3/19   - 9/2019 - Labs show pre-menopausal. Change therapy to Ribociclib, Zoladex and Arimidex.    - 9/2019 new baseline scans with diffuse osseous metastatic disease throughout spine, sternum, ribs, iliac wings, femoral bones. T5 with possible extension into spinal canal; left femur lesion at risk of pathologic fracture    Medications    Current Outpatient Medications:     anastrozole (ARIMIDEX) 1 mg Tab, Take 1 mg by mouth once daily., Disp: , Rfl:     ribociclib (KISQALI) 600 mg/day (200 mg x 3) Tab, Take 3 tablets (600 mg) by mouth once daily For 21 days, then off for 7 days., Disp: 63 tablet, Rfl: 2     "ondansetron (ZOFRAN) 8 MG tablet, Take 1 tablet (8 mg total) by mouth every 8 (eight) hours as needed for Nausea. (Patient not taking: Reported on 10/15/2019), Disp: 30 tablet, Rfl: 2  No current facility-administered medications for this visit.   Allergies  Review of patient's allergies indicates:  No Known Allergies  Review of Systems  Review of Systems   Constitutional: Positive for fatigue. Negative for activity change, appetite change, chills, fever and unexpected weight change.   HENT: Negative for congestion, hearing loss, nosebleeds, sinus pressure and trouble swallowing.    Eyes: Negative for pain, discharge and itching.   Respiratory: Negative for cough, chest tightness and shortness of breath.    Cardiovascular: Negative for chest pain, palpitations and leg swelling.   Gastrointestinal: Negative for abdominal distention, abdominal pain, blood in stool, diarrhea, nausea, rectal pain and vomiting.   Endocrine: Negative for heat intolerance and polydipsia.   Genitourinary: Negative for difficulty urinating, dysuria, flank pain, frequency, hematuria and urgency.   Musculoskeletal: Negative for arthralgias, back pain and neck pain.   Skin: Negative for color change, pallor and rash.   Neurological: Negative for dizziness, numbness and headaches.   Hematological: Negative for adenopathy. Does not bruise/bleed easily.   Psychiatric/Behavioral: Negative for confusion and decreased concentration. The patient is not nervous/anxious.         Objective     Objective:     Vitals:    10/15/19 1124   BP: 125/67   BP Location: Right arm   Patient Position: Sitting   BP Method: Large (Automatic)   Pulse: 77   Resp: 18   Temp: 98.5 °F (36.9 °C)   TempSrc: Oral   SpO2: 98%   Weight: 55 kg (121 lb 4.1 oz)   Height: 5' 4" (1.626 m)     Body surface area is 1.58 meters squared.  Physical Exam   Constitutional: She is oriented to person, place, and time. She appears well-developed and well-nourished. No distress.   HENT: "   Head: Normocephalic and atraumatic.   Mouth/Throat: Oropharynx is clear and moist and mucous membranes are normal. No oral lesions.   Eyes: Conjunctivae and EOM are normal. Right eye exhibits no discharge. Left eye exhibits no discharge. No scleral icterus.   Neck: Normal range of motion. Neck supple. No thyroid mass and no thyromegaly present.   Cardiovascular: Normal rate, regular rhythm, S1 normal, S2 normal and normal heart sounds.   Pulmonary/Chest: Effort normal and breath sounds normal. No respiratory distress. She has no wheezes. She has no rhonchi. She has no rales. Chest wall is not dull to percussion.   Large left breast mass, superior to nipple, 5 cm x 7 cm. + freely mobile axillary LN  Right mediport   Abdominal: Soft. Normal appearance and bowel sounds are normal. There is no hepatosplenomegaly. There is no tenderness.   Lymphadenopathy:     She has no cervical adenopathy.     She has axillary adenopathy.        Right: No supraclavicular adenopathy present.        Left: No supraclavicular adenopathy present.   Neurological: She is alert and oriented to person, place, and time. She has normal strength.   Skin: Skin is warm, dry and intact. No pallor.   Psychiatric: Her behavior is normal. Thought content normal.         Labs and Imaging  Results for orders placed or performed in visit on 10/15/19   CBC Oncology   Result Value Ref Range    WBC 2.00 (L) 3.90 - 12.70 K/uL    RBC 4.22 4.00 - 5.40 M/uL    Hemoglobin 13.9 12.0 - 16.0 g/dL    Hematocrit 40.9 37.0 - 48.5 %    Mean Corpuscular Volume 97 82 - 98 fL    Mean Corpuscular Hemoglobin 32.9 (H) 27.0 - 31.0 pg    Mean Corpuscular Hemoglobin Conc 34.0 32.0 - 36.0 g/dL    RDW 16.5 (H) 11.5 - 14.5 %    Platelets 209 150 - 350 K/uL    MPV 9.3 9.2 - 12.9 fL    Gran # (ANC) 1.0 (L) 1.8 - 7.7 K/uL    Immature Grans (Abs) 0.02 0.00 - 0.04 K/uL   Comprehensive metabolic panel   Result Value Ref Range    Sodium 140 136 - 145 mmol/L    Potassium 3.8 3.5 - 5.1  mmol/L    Chloride 107 95 - 110 mmol/L    CO2 24 23 - 29 mmol/L    Glucose 94 70 - 110 mg/dL    BUN, Bld 5 (L) 6 - 20 mg/dL    Creatinine 0.8 0.5 - 1.4 mg/dL    Calcium 8.6 (L) 8.7 - 10.5 mg/dL    Total Protein 6.8 6.0 - 8.4 g/dL    Albumin 4.0 3.5 - 5.2 g/dL    Total Bilirubin 0.4 0.1 - 1.0 mg/dL    Alkaline Phosphatase 82 55 - 135 U/L    AST 15 10 - 40 U/L    ALT 9 (L) 10 - 44 U/L    Anion Gap 9 8 - 16 mmol/L    eGFR if African American >60.0 >60 mL/min/1.73 m^2    eGFR if non African American >60.0 >60 mL/min/1.73 m^2         Assessment     Assessment:     1. Stage IV (T3N2M2) invasive ductal carcinoma of left breast,  quadrant, ER 96%, IA 94%, Her2 neg, Grade 2, Ki67 30%   * Genetics negative  * 2/2019 Presented as de alem metastatic disease with bone mets and asymptomatic lung mets  * 3/7/19 outside oncologist - started Ibrance/Fulvsetrant. Dose reduced of Ibrance in 4/2019 due to neutropenia. Non-compliance starting April 2019. Last dose of fulvestrant 4/2019.   * 3/14/19 - 3/27/19 3000 cGy T4-T8    * 9/2019 presented to [facility] as new patient. She's had non-compliance since her diagnosis. Labs showed pre-menopausal (not felt to be abnormal due to Faslodex since it had been 4-5 months since last dose)   * 9/2019 new baseline scans with diffuse osseous metastatic disease throughout spine, sternum, ribs, iliac wings, femoral bones. T5 with possible extension into spinal canal; left femur lesion at risk of pathologic fracture. Referred to ortho   * 9/2019: Start Ribociclib, Zoladex and Arimidex.   * Cycle 2 Ribo/Zoladex/Arimidex due to startt 10/21. Will get Zoladex today.     2. Bone mets   * Start Zometa - eval'd by dentist.   * Refer to ortho due to increased fracture risk of femur due to metastatic involvement. Appt 10/30   * Has received prior RT to T5 lesion. Discussed signs/symptoms of cord compression which she would need to seek emergent attention should they develop.     3. Lung mets  4. Drug related  neutropenia.     * Expected; monitor.       Plan:     1. Ortho appt later this month  2. Continue Zoladex, Ribociclib and Arimidex; cycle 2 due 10/21.   3. Zometa due mid-December.   4. RTC in 5 weeks for cycle 3.       Follow-up in 5 weeks to start cycle 3 ribo and receive zoladex.  I asked the patient to call for any questions, concerns, or new symptoms.    Understands goals are palliative. Discussed goals of care and complications of spinal disease and femoral disease with letitia today. He asked questions which I answered.       ACP done.     Future Appointments   Date Time Provider Department Center   10/15/2019  1:00 PM INJECTION[facility]   10/30/2019  2:00 PM Lorne Johnson MD [facility] SPORTS [place]   11/19/2019 10:30 AM INJECTION[facility]   11/19/2019 11:30 AM Melania Mcgovern MD [facility] HEM ONC [facility]   11/19/2019  1:00 PM INJECTION[facility]

## 2019-10-15 NOTE — NURSING
Patient's PAC accessed.  Flushed easily but no blood return noted.  Activase applied.  Labs drawn peripherally to L.AC.  Patient ambulated off unit to see MD then will return for port check and possible injection.

## 2019-10-15 NOTE — TELEPHONE ENCOUNTER
Refill call regarding Kisqali at [facility]. Will prepare for shipment on 10/17 to arrive 10/18 to [place] address with pt consent. Copay 0.00. Patient has not reported any new allergies/ side effects. Pt taking medication(s) as directed with no questions or concerns for RPH. Also no new medications. and Address verified.   ~cycle resumes on 10/20

## 2019-10-15 NOTE — NURSING
Patient's PAC giving good blood return without issue after Activase instillation.  Zoladex given in ABD.  Patient tolerated well.

## 2019-10-17 LAB — CANCER AG15-3 SERPL-ACNC: 625 U/ML (ref 0–31)

## 2019-10-21 ENCOUNTER — DOCUMENTATION ONLY (OUTPATIENT)
Dept: HEMATOLOGY/ONCOLOGY | Facility: CLINIC | Age: 49
End: 2019-10-21

## 2019-10-29 ENCOUNTER — TELEPHONE (OUTPATIENT)
Dept: SPORTS MEDICINE | Facility: CLINIC | Age: 49
End: 2019-10-29

## 2019-10-31 ENCOUNTER — TELEPHONE (OUTPATIENT)
Dept: SPORTS MEDICINE | Facility: CLINIC | Age: 49
End: 2019-10-31

## 2019-10-31 ENCOUNTER — TELEPHONE (OUTPATIENT)
Dept: HEMATOLOGY/ONCOLOGY | Facility: CLINIC | Age: 49
End: 2019-10-31

## 2019-10-31 NOTE — TELEPHONE ENCOUNTER
Returned call to pt.   Pt stated that she was scheduled to see sports medicine in error and needs to be scheduled with orthopedics to evaluate spot on femur per Dr. Mcgovern.   Informed pt would fwd message to  to get set up.   Pt stated gets transportation on Mondays and Tuesdays.   Pt verbalized understanding.     Message fwd.         ----- Message from Stacey Rawls sent at 10/31/2019  4:25 PM CDT -----  Contact: Pt   Pt called to speak with nurse have some question   Callback#[phone]   Thank You  ROMARIO Rawls

## 2019-10-31 NOTE — TELEPHONE ENCOUNTER
Patient wanted to be seen for bone mass in femur. I referred patient to [facility].    ----- Message from Aleida Fletcher MA sent at 10/31/2019 12:35 PM CDT -----  Contact: Self       ----- Message -----  From: Justus Dover  Sent: 10/31/2019   8:57 AM CDT  To: Alex WHALEN Staff    Pt would like a call back in regards to her appt.     [phone]

## 2019-11-01 DIAGNOSIS — C79.51 BONE METASTASES: Primary | ICD-10-CM

## 2019-11-04 ENCOUNTER — TELEPHONE (OUTPATIENT)
Dept: HEMATOLOGY/ONCOLOGY | Facility: CLINIC | Age: 49
End: 2019-11-04

## 2019-11-04 DIAGNOSIS — C79.51 BONE METASTASES: Primary | ICD-10-CM

## 2019-11-04 NOTE — TELEPHONE ENCOUNTER
Referral faxed to Dr. Frederic Rahman's office with attached relevant medical records.       Amita Rivera LPN  P Staff Froilan Nichols -     Our Oncological Orthopedic Surgeon, Dr. Soham Beavers, began teaching at [employer] full time at the beginning of this year and no longer practices at [facility]. Referrals are being faxed to Dr. Frederic Rahman/[facility] until another Oncological Orthopedic Surgeon joins our staff.     Dr Rahman's office requests that, in order to obtain soonest appt, please fax referral to:   [facility] Orthopedic Department     P: [phone]   F: [phone]     ATTN: Beth/Dr. Frederic Rahman   FOR: Patient's Name,  and BEST contact number     Please attach relevant medical records to include:   Last Clinical Note   Last Xray/MRI/CT or imaging reports   Last Pathology Report     Patient will receive a call from Dr. Rahman's office to schedule an appt upon receipt and review of referral.     Patient will need to obtain imaging disk with most recent studies to present at time of appt. Patient can call above number to check on status of appt, once referral has been sent.     Dr. Lon Woods/[facility]   also sees Oncological Orthopedic patients. Adult and Pediatric   Contact number: [phone]     Please keep these instructions for reference and call me if you have further questions.   Thank you,   Amita   Ortho Clinic Triage  [facility]   P. [phone]    F. [phone]

## 2019-11-12 ENCOUNTER — TELEPHONE (OUTPATIENT)
Dept: PHARMACY | Facility: CLINIC | Age: 49
End: 2019-11-12

## 2019-11-12 NOTE — TELEPHONE ENCOUNTER
Refill call regarding Kisqali at [facility]. Will prepare for shipment on  to arrive 11/15 with pt consent. Copay 0.00. Patient has not reported any new allergies/ side effects. Pt taking medication(s) as directed with no questions or concerns for RPH. Also no new medications. and Address verified.   ~cycle resumes

## 2019-11-18 ENCOUNTER — TELEPHONE (OUTPATIENT)
Dept: HEMATOLOGY/ONCOLOGY | Facility: CLINIC | Age: 49
End: 2019-11-18

## 2019-11-19 ENCOUNTER — TELEPHONE (OUTPATIENT)
Dept: HEMATOLOGY/ONCOLOGY | Facility: CLINIC | Age: 49
End: 2019-11-19

## 2019-11-19 NOTE — TELEPHONE ENCOUNTER
Returned call to pt.   Assisted pt with r/s appt- pt requesting appt next week.   Pt verbalized understanding.        ----- Message from Ana Malone sent at 11/19/2019 10:28 AM CST -----  Contact: Pt/[phone]  Patient is requesting a call back in regards to rescheduling Appt     Please call    Phone [phone]

## 2019-11-22 ENCOUNTER — TELEPHONE (OUTPATIENT)
Dept: HEMATOLOGY/ONCOLOGY | Facility: CLINIC | Age: 49
End: 2019-11-22

## 2019-11-25 ENCOUNTER — OFFICE VISIT (OUTPATIENT)
Dept: HEMATOLOGY/ONCOLOGY | Facility: CLINIC | Age: 49
End: 2019-11-25
Payer: MEDICAID

## 2019-11-25 ENCOUNTER — INFUSION (OUTPATIENT)
Dept: INFUSION THERAPY | Facility: HOSPITAL | Age: 49
End: 2019-11-25
Attending: INTERNAL MEDICINE
Payer: MEDICAID

## 2019-11-25 VITALS
DIASTOLIC BLOOD PRESSURE: 67 MMHG | SYSTOLIC BLOOD PRESSURE: 148 MMHG | RESPIRATION RATE: 18 BRPM | HEIGHT: 64 IN | WEIGHT: 118.63 LBS | OXYGEN SATURATION: 98 % | TEMPERATURE: 98 F | BODY MASS INDEX: 20.25 KG/M2 | HEART RATE: 79 BPM

## 2019-11-25 VITALS
SYSTOLIC BLOOD PRESSURE: 141 MMHG | HEART RATE: 77 BPM | DIASTOLIC BLOOD PRESSURE: 64 MMHG | RESPIRATION RATE: 18 BRPM | TEMPERATURE: 98 F

## 2019-11-25 DIAGNOSIS — C50.112 MALIGNANT NEOPLASM OF CENTRAL PORTION OF LEFT BREAST IN FEMALE, ESTROGEN RECEPTOR POSITIVE: Primary | ICD-10-CM

## 2019-11-25 DIAGNOSIS — C78.00 MALIGNANT NEOPLASM METASTATIC TO LUNG, UNSPECIFIED LATERALITY: ICD-10-CM

## 2019-11-25 DIAGNOSIS — Z17.0 MALIGNANT NEOPLASM OF CENTRAL PORTION OF LEFT BREAST IN FEMALE, ESTROGEN RECEPTOR POSITIVE: Primary | ICD-10-CM

## 2019-11-25 DIAGNOSIS — C79.51 BONE METASTASES: ICD-10-CM

## 2019-11-25 LAB
ALBUMIN SERPL BCP-MCNC: 4.2 G/DL (ref 3.5–5.2)
ALP SERPL-CCNC: 85 U/L (ref 55–135)
ALT SERPL W/O P-5'-P-CCNC: 11 U/L (ref 10–44)
ANION GAP SERPL CALC-SCNC: 10 MMOL/L (ref 8–16)
AST SERPL-CCNC: 17 U/L (ref 10–40)
BILIRUB SERPL-MCNC: 0.3 MG/DL (ref 0.1–1)
BUN SERPL-MCNC: 7 MG/DL (ref 6–20)
CALCIUM SERPL-MCNC: 8.8 MG/DL (ref 8.7–10.5)
CHLORIDE SERPL-SCNC: 106 MMOL/L (ref 95–110)
CO2 SERPL-SCNC: 23 MMOL/L (ref 23–29)
CREAT SERPL-MCNC: 0.8 MG/DL (ref 0.5–1.4)
ERYTHROCYTE [DISTWIDTH] IN BLOOD BY AUTOMATED COUNT: 21.8 % (ref 11.5–14.5)
EST. GFR  (AFRICAN AMERICAN): >60 ML/MIN/1.73 M^2
EST. GFR  (NON AFRICAN AMERICAN): >60 ML/MIN/1.73 M^2
GLUCOSE SERPL-MCNC: 91 MG/DL (ref 70–110)
HCT VFR BLD AUTO: 40.1 % (ref 37–48.5)
HGB BLD-MCNC: 13.7 G/DL (ref 12–16)
IMM GRANULOCYTES # BLD AUTO: 0 K/UL (ref 0–0.04)
MCH RBC QN AUTO: 34.6 PG (ref 27–31)
MCHC RBC AUTO-ENTMCNC: 34.2 G/DL (ref 32–36)
MCV RBC AUTO: 101 FL (ref 82–98)
NEUTROPHILS # BLD AUTO: 2.1 K/UL (ref 1.8–7.7)
PLATELET # BLD AUTO: 229 K/UL (ref 150–350)
PMV BLD AUTO: 8.8 FL (ref 9.2–12.9)
POTASSIUM SERPL-SCNC: 3.8 MMOL/L (ref 3.5–5.1)
PROT SERPL-MCNC: 7 G/DL (ref 6–8.4)
RBC # BLD AUTO: 3.96 M/UL (ref 4–5.4)
SODIUM SERPL-SCNC: 139 MMOL/L (ref 136–145)
WBC # BLD AUTO: 4.03 K/UL (ref 3.9–12.7)

## 2019-11-25 PROCEDURE — 63600175 PHARM REV CODE 636 W HCPCS: Performed by: INTERNAL MEDICINE

## 2019-11-25 PROCEDURE — 96402 CHEMO HORMON ANTINEOPL SQ/IM: CPT

## 2019-11-25 PROCEDURE — 99999 PR PBB SHADOW E&M-EST. PATIENT-LVL III: CPT | Mod: PBBFAC,,, | Performed by: INTERNAL MEDICINE

## 2019-11-25 PROCEDURE — 99213 OFFICE O/P EST LOW 20 MIN: CPT | Mod: PBBFAC,25 | Performed by: INTERNAL MEDICINE

## 2019-11-25 PROCEDURE — 99214 OFFICE O/P EST MOD 30 MIN: CPT | Mod: S$PBB,,, | Performed by: INTERNAL MEDICINE

## 2019-11-25 PROCEDURE — 86300 IMMUNOASSAY TUMOR CA 15-3: CPT

## 2019-11-25 PROCEDURE — 80053 COMPREHEN METABOLIC PANEL: CPT

## 2019-11-25 PROCEDURE — 25000003 PHARM REV CODE 250: Performed by: INTERNAL MEDICINE

## 2019-11-25 PROCEDURE — A4216 STERILE WATER/SALINE, 10 ML: HCPCS | Performed by: INTERNAL MEDICINE

## 2019-11-25 PROCEDURE — 99214 PR OFFICE/OUTPT VISIT, EST, LEVL IV, 30-39 MIN: ICD-10-PCS | Mod: S$PBB,,, | Performed by: INTERNAL MEDICINE

## 2019-11-25 PROCEDURE — 36591 DRAW BLOOD OFF VENOUS DEVICE: CPT

## 2019-11-25 PROCEDURE — 99999 PR PBB SHADOW E&M-EST. PATIENT-LVL III: ICD-10-PCS | Mod: PBBFAC,,, | Performed by: INTERNAL MEDICINE

## 2019-11-25 PROCEDURE — 85027 COMPLETE CBC AUTOMATED: CPT

## 2019-11-25 RX ORDER — HEPARIN 100 UNIT/ML
500 SYRINGE INTRAVENOUS
Status: CANCELLED | OUTPATIENT
Start: 2020-01-01

## 2019-11-25 RX ORDER — SODIUM CHLORIDE 0.9 % (FLUSH) 0.9 %
10 SYRINGE (ML) INJECTION
Status: CANCELLED | OUTPATIENT
Start: 2020-01-01

## 2019-11-25 RX ORDER — HEPARIN 100 UNIT/ML
500 SYRINGE INTRAVENOUS
Status: COMPLETED | OUTPATIENT
Start: 2019-11-25 | End: 2019-11-25

## 2019-11-25 RX ORDER — SODIUM CHLORIDE 0.9 % (FLUSH) 0.9 %
10 SYRINGE (ML) INJECTION
Status: DISCONTINUED | OUTPATIENT
Start: 2019-11-25 | End: 2019-11-25 | Stop reason: HOSPADM

## 2019-11-25 RX ADMIN — HEPARIN 500 UNITS: 100 SYRINGE at 03:11

## 2019-11-25 RX ADMIN — GOSERELIN ACETATE 3.6 MG: 3.6 IMPLANT SUBCUTANEOUS at 03:11

## 2019-11-25 RX ADMIN — SODIUM CHLORIDE, PRESERVATIVE FREE 10 ML: 5 INJECTION INTRAVENOUS at 03:11

## 2019-11-25 NOTE — PROGRESS NOTES
History:     Reason For Consultation:   Metastatic ER positive breast cancer.     HPI:   Angeles Wright presents for follow up of metastatic breast cancer. She has been noncompliant with [facility] and [facility]. Labs showed her to be pre-menopausal thus we changed therapy to Ribo/Zoladex and Arimidex. Tolerating Zoladex and Arimidex well. No new concerns. She missed her ortho appt. Pain controlled.  No urinary or bowel symptoms. No weakness.      Oncologic History:  Presentation  - 1/2019 - presented for palpable mass left breast. Of note, patient had prior abnormal mammograms in 2016 for both breasts and was called back for additional images but didn't return.    - 1/2019 - Diagnostic mammogram and US showed 4.5 cm left breast mass with abnormal LN; 1.3 cm right breast mass  - 1/28/19 - Biopsy left breast mass - Grade 2 IDC, estrogen receptor 96%, progesterone receptor 95%, Her2 elaine neg, Ki67 30%.   - 2/2019 CT scans showed known left breast mass with axillary adenopathy, metastatic disease in chest and bones (ribs, sternum, vertebra, pelvis)  - 3/7/19 started Ibrance/Fulvsetrant. Dose reduced of Ibrance in 4/2019.   - 3/14/19 - 3/27/19 3000 cGt T4-T8     - 4/4/19 - Genetics GeneDx Breast/Gyn panel negative  [facility] oncology consultation on 9/3/19   - 9/2019 - Labs show pre-menopausal. Change therapy to Ribociclib, Zoladex and Arimidex.    - 9/2019 new baseline scans with diffuse osseous metastatic disease throughout spine, sternum, ribs, iliac wings, femoral bones. T5 with possible extension into spinal canal; left femur lesion at risk of pathologic fracture    Medications    Current Outpatient Medications:     anastrozole (ARIMIDEX) 1 mg Tab, Take 1 mg by mouth once daily., Disp: , Rfl:     ribociclib (KISQALI) 600 mg/day (200 mg x 3) Tab, Take 3 tablets (600 mg) by mouth once daily For 21 days, then off for 7 days., Disp: 63 tablet, Rfl: 2    ondansetron  "(ZOFRAN) 8 MG tablet, Take 1 tablet (8 mg total) by mouth every 8 (eight) hours as needed for Nausea. (Patient not taking: Reported on 10/15/2019), Disp: 30 tablet, Rfl: 2  No current facility-administered medications for this visit.     Facility-Administered Medications Ordered in Other Visits:     sodium chloride 0.9% flush 10 mL, 10 mL, Intravenous, PRN, Melania Mcgovern MD, 10 mL at 11/25/19 3971  Allergies  Review of patient's allergies indicates:  No Known Allergies  Review of Systems  Review of Systems   Constitutional: Positive for fatigue. Negative for activity change, appetite change, chills, fever and unexpected weight change.   HENT: Negative for congestion, hearing loss, nosebleeds, sinus pressure and trouble swallowing.    Eyes: Negative for pain, discharge and itching.   Respiratory: Negative for cough, chest tightness and shortness of breath.    Cardiovascular: Negative for chest pain, palpitations and leg swelling.   Gastrointestinal: Negative for abdominal distention, abdominal pain, blood in stool, diarrhea, nausea, rectal pain and vomiting.   Endocrine: Negative for heat intolerance and polydipsia.   Genitourinary: Negative for difficulty urinating, dysuria, flank pain, frequency, hematuria and urgency.   Musculoskeletal: Negative for arthralgias, back pain and neck pain.   Skin: Negative for color change, pallor and rash.   Neurological: Negative for dizziness, numbness and headaches.   Hematological: Negative for adenopathy. Does not bruise/bleed easily.   Psychiatric/Behavioral: Negative for confusion and decreased concentration. The patient is not nervous/anxious.         Objective     Objective:     Vitals:    11/25/19 1602   BP: (!) 148/67   BP Location: Right arm   Patient Position: Sitting   BP Method: Large (Automatic)   Pulse: 79   Resp: 18   Temp: 98.2 °F (36.8 °C)   TempSrc: Oral   SpO2: 98%   Weight: 53.8 kg (118 lb 9.7 oz)   Height: 5' 4" (1.626 m)     Body surface area is 1.56 " meters squared.  Physical Exam   Constitutional: She is oriented to person, place, and time. She appears well-developed and well-nourished. No distress.   HENT:   Head: Normocephalic and atraumatic.   Mouth/Throat: Oropharynx is clear and moist and mucous membranes are normal. No oral lesions.   Eyes: Conjunctivae and EOM are normal. Right eye exhibits no discharge. Left eye exhibits no discharge. No scleral icterus.   Neck: Normal range of motion. Neck supple. No thyroid mass and no thyromegaly present.   Cardiovascular: Normal rate, regular rhythm, S1 normal, S2 normal and normal heart sounds.   Pulmonary/Chest: Effort normal and breath sounds normal. No respiratory distress. She has no wheezes. She has no rhonchi. She has no rales. Chest wall is not dull to percussion.   Large left breast mass, superior to nipple, 5 cm x 7 cm. + freely mobile axillary LN  Right mediport   Abdominal: Soft. Normal appearance and bowel sounds are normal. There is no hepatosplenomegaly. There is no tenderness.   Lymphadenopathy:     She has no cervical adenopathy.     She has axillary adenopathy.        Right: No supraclavicular adenopathy present.        Left: No supraclavicular adenopathy present.   Neurological: She is alert and oriented to person, place, and time. She has normal strength.   Skin: Skin is warm, dry and intact. No pallor.   Psychiatric: Her behavior is normal. Thought content normal.         Labs and Imaging  Results for orders placed or performed in visit on 11/25/19   CBC Oncology   Result Value Ref Range    WBC 4.03 3.90 - 12.70 K/uL    RBC 3.96 (L) 4.00 - 5.40 M/uL    Hemoglobin 13.7 12.0 - 16.0 g/dL    Hematocrit 40.1 37.0 - 48.5 %    Mean Corpuscular Volume 101 (H) 82 - 98 fL    Mean Corpuscular Hemoglobin 34.6 (H) 27.0 - 31.0 pg    Mean Corpuscular Hemoglobin Conc 34.2 32.0 - 36.0 g/dL    RDW 21.8 (H) 11.5 - 14.5 %    Platelets 229 150 - 350 K/uL    MPV 8.8 (L) 9.2 - 12.9 fL    Gran # (ANC) 2.1 1.8 - 7.7  K/uL    Immature Grans (Abs) 0.00 0.00 - 0.04 K/uL         Assessment     Assessment:     1. Stage IV (T3N2M2) invasive ductal carcinoma of left breast,  quadrant, ER 96%, NJ 94%, Her2 neg, Grade 2, Ki67 30%   * Genetics negative  * 2/2019 Presented as de alem metastatic disease with bone mets and asymptomatic lung mets  * 3/7/19 outside oncologist - started Ibrance/Fulvsetrant. Dose reduced of Ibrance in 4/2019 due to neutropenia. Non-compliance starting April 2019. Last dose of fulvestrant 4/2019.   * 3/14/19 - 3/27/19 3000 cGy T4-T8    * 9/2019 presented to [facility] as new patient. She's had non-compliance since her diagnosis. Labs showed pre-menopausal (not felt to be abnormal due to Faslodex since it had been 4-5 months since last dose)   * 9/2019 new baseline scans with diffuse osseous metastatic disease throughout spine, sternum, ribs, iliac wings, femoral bones. T5 with possible extension into spinal canal; left femur lesion at risk of pathologic fracture. Referred to ortho   * 9/2019: Start Ribociclib, Zoladex and Arimidex.   * Cycle 3 Ribo/Zoladex/Arimidex due to start 11/25. Zoladex today. Tolerating well.     2. Bone mets   * Continue Zometa - eval'd by dentist.   * Refer to ortho due to increased fracture risk of femur due to metastatic involvement. Appt 10/30 but patient didn't show up.    * Has received prior RT to T5 lesion. Discussed signs/symptoms of cord compression which she would need to seek emergent attention should they develop.     3. Lung mets  4. Drug related neutropenia.     * Expected; monitor.       Plan:     1. Reschedule ortho appt - concern for risk for pathologic fracture - patient didn't show up for scheduled appt on 10/30  2. Continue Zoladex, Ribociclib and Arimidex; cycle 3 due 11/25.   3. Zometa due mid-December.   4. RTC in 4 weeks with MD visit, cbc, cmp, ca 15-3, mg, phos, zoladex, Zometa for cycle 4 Ribo/Arimidex with CT scans, bone scan a few days before.        I asked the  patient to call for any questions, concerns, or new symptoms.    ACP done.     No future appointments.

## 2019-11-25 NOTE — NURSING
1530  Pt here for lab draw, Zoladex injection, no complaints or concerns at present; OK per MD Mcgovern to give Zoladex prior to MD visit at 1600; labs drawn, pt tolerated well; injection to RUAQ, tolerated well; pt instructed to contact MD for any needs or concerns; declined AVS, verbalized understanding of all discussed and to report for MD visit at 1600.

## 2019-11-25 NOTE — Clinical Note
RTC in 4 weeks with MD visit, cbc, cmp, ca 15-3, mg, phos, zoladex, Zometa for cycle 4 Ribo/Arimidex with CT scans, bone scan a few days before.  Please reach out to orthopedics to reschedule - she is high risk for pathologic fracture of femur.

## 2019-11-26 ENCOUNTER — TELEPHONE (OUTPATIENT)
Dept: HEMATOLOGY/ONCOLOGY | Facility: CLINIC | Age: 49
End: 2019-11-26

## 2019-11-26 NOTE — TELEPHONE ENCOUNTER
Spoke with PT. Scheduled Ortho appointment. Also provided appointment details for other appointments.

## 2019-11-26 NOTE — TELEPHONE ENCOUNTER
----- Message from Chava Hallman sent at 11/26/2019  1:38 PM CST -----  Contact: Patient  Returning a call     Caller name:: Pt    Who Left Message for Patient:: Deb    Communication preference:: [phone]    Additional info::

## 2019-11-26 NOTE — TELEPHONE ENCOUNTER
----- Message from Deb Alarcon sent at 11/25/2019  4:58 PM CST -----  Regarding: Bone & CT  Message   Received: Today   Message Contents   MD Shanice Rizo         RTC in 4 weeks with MD visit, cbc, cmp, ca 15-3, mg, phos, zoladex, Zometa for cycle 4 Ribo/Arimidex with CT scans, bone scan a few days before.     Please reach out to orthopedics to reschedule - she is high risk for pathologic fracture of femur.

## 2019-11-28 LAB — CANCER AG15-3 SERPL-ACNC: 284 U/ML (ref 0–31)

## 2019-12-11 ENCOUNTER — TELEPHONE (OUTPATIENT)
Dept: PHARMACY | Facility: CLINIC | Age: 49
End: 2019-12-11

## 2019-12-16 NOTE — TELEPHONE ENCOUNTER
The patient contacted [facility] in regards to Kisqali refill. She estimated she has less than one week on hand. Will ship 12/18 for her to receive 12/19. $0 copay, address confirmed. No changes in other medications, allergies, health conditions and one missed dose of Kisqali. She stated her fiance typically reminds her to take Kisqali but she missed due to forgetfulness. Reviewed a medication alarm reminder may be helpful in the future for adherence. She verbalized understanding.

## 2019-12-20 DIAGNOSIS — C79.51 BONE METASTASES: ICD-10-CM

## 2019-12-20 DIAGNOSIS — D72.819 LEUKOPENIA, UNSPECIFIED TYPE: ICD-10-CM

## 2019-12-20 DIAGNOSIS — C50.112 MALIGNANT NEOPLASM OF CENTRAL PORTION OF LEFT BREAST IN FEMALE, ESTROGEN RECEPTOR POSITIVE: Primary | ICD-10-CM

## 2019-12-20 DIAGNOSIS — Z17.0 MALIGNANT NEOPLASM OF CENTRAL PORTION OF LEFT BREAST IN FEMALE, ESTROGEN RECEPTOR POSITIVE: Primary | ICD-10-CM

## 2019-12-30 ENCOUNTER — TELEPHONE (OUTPATIENT)
Dept: PHARMACY | Facility: CLINIC | Age: 49
End: 2019-12-30

## 2020-01-08 DIAGNOSIS — Z17.0 MALIGNANT NEOPLASM OF CENTRAL PORTION OF LEFT BREAST IN FEMALE, ESTROGEN RECEPTOR POSITIVE: ICD-10-CM

## 2020-01-08 DIAGNOSIS — C50.112 MALIGNANT NEOPLASM OF CENTRAL PORTION OF LEFT BREAST IN FEMALE, ESTROGEN RECEPTOR POSITIVE: ICD-10-CM

## 2020-01-09 ENCOUNTER — TELEPHONE (OUTPATIENT)
Dept: HEMATOLOGY/ONCOLOGY | Facility: CLINIC | Age: 50
End: 2020-01-09

## 2020-01-09 NOTE — TELEPHONE ENCOUNTER
Call to pt.  Pt unavailable.   Left message for pt informing of 1/20 appts and that rx will be refilled at this visit.   Callback number provided if any questions/concerns.     ----- Message from Melania Mcgovern MD sent at 1/8/2020  8:18 PM CST -----  Regarding: Needs appt  Please schedule patient for an appointment with me, cbc, cmp, ca 15-3, and Zoladex within next two weeks.     Magdalena -     Will you let her know that I cannot refill her medication without seeing her? She missed her appt in December and needs to be receiving Zoladex for the medication to work.    Thanks, Melania

## 2020-01-10 NOTE — TELEPHONE ENCOUNTER
"Kisqali follow-up:  Patient reached for follow-up.  Confirmed that patient is aware of her appointments on 1/20/2020.  Notified patient that we are not able to refill the medication until she sees Dr. Mcgovern on 1/20/2020.  We will arrange refill after she sees provider and new prescription is sent.    Dosing, how taking: Taking around lunch time every day with food.  Denies missed doses but thinks she may have "a week or two" at home.  Based on last refill, should only have enough through 1/19/2020.    Storage: Cool, dry place at room temperature.     Handling: Handles the pills directly, but washes hands after to remove all residue of the medication.    Side effects: Denies any issues with side effects such as swelling, fatigue,  HA, rash, N/V/D.  She reports feeling "great".    Recent infections: No visits to the urgent care or ER, but does currently have a "cold".  No fever.  No bleeding or bruising.    Pain: No pain at this time.    Appetite: Good - eating 3 meals daily.      Energy, fatigue: Stable.  Has not see any changes or decline to her activity level.      Health, mood, QOL: She feels "great".   Does not seem to have any concerns or reservations about treatment.      ED/UC visits: None     Next clinical follow up: 3 months.      Medication reconciliation complete.  No new medications.  Patient continues to take only Kisqali and ondansetron as needed.      Labs from 11/25/19 reviewed - WBC 4.03, ANC 2.1 and platelets 229K.  Scr 0.8, BUN 7 and eGFR >60 mL/min/1.73m2, LFTs all within normal limits.  No dose adjustment necessary.  Patient did miss appointments and labs in December 2019 and has been rescheduled for 1/20/2020.      "

## 2020-01-20 ENCOUNTER — TELEPHONE (OUTPATIENT)
Dept: HEMATOLOGY/ONCOLOGY | Facility: CLINIC | Age: 50
End: 2020-01-20

## 2020-01-20 NOTE — TELEPHONE ENCOUNTER
Call attempt #1: LVM for patient to see if she has rescheduled her appt with Dr. Mcgovern. Refill for Chelsie denies as MD would like patient to reschedule appt before providing additional refills.

## 2020-01-20 NOTE — TELEPHONE ENCOUNTER
Call to pt x2.   Pt unavailable- call went straight to .  Left  for pt to call office back if needed assistance r/s appts.     Call to pt emergency contact.   Emergency contact unavailable- call went straight to .

## 2020-01-23 NOTE — TELEPHONE ENCOUNTER
Call attempt #2: LVM to encourage patient to reschedule her appt with Dr. Mcgovern. Refill for Kisqali denied as MD would like patient to reschedule appt before providing additional refills.

## 2020-01-24 NOTE — TELEPHONE ENCOUNTER
Call attempt #3: LVM again with patient to discuss rescheduling her appt with Dr. Mcgovern in order for her to refill the Kisqali.

## 2020-02-04 ENCOUNTER — OFFICE VISIT (OUTPATIENT)
Dept: ORTHOPEDICS | Facility: CLINIC | Age: 50
End: 2020-02-04
Payer: MEDICAID

## 2020-02-04 ENCOUNTER — TELEPHONE (OUTPATIENT)
Dept: ORTHOPEDICS | Facility: CLINIC | Age: 50
End: 2020-02-04

## 2020-02-04 VITALS — HEIGHT: 64 IN | WEIGHT: 118 LBS | BODY MASS INDEX: 20.14 KG/M2

## 2020-02-04 DIAGNOSIS — C79.51 BONE METASTASES: ICD-10-CM

## 2020-02-04 PROCEDURE — 99213 OFFICE O/P EST LOW 20 MIN: CPT | Mod: PBBFAC,PN | Performed by: ORTHOPAEDIC SURGERY

## 2020-02-04 PROCEDURE — 99204 OFFICE O/P NEW MOD 45 MIN: CPT | Mod: S$PBB,,, | Performed by: ORTHOPAEDIC SURGERY

## 2020-02-04 PROCEDURE — 99999 PR PBB SHADOW E&M-EST. PATIENT-LVL III: CPT | Mod: PBBFAC,,, | Performed by: ORTHOPAEDIC SURGERY

## 2020-02-04 PROCEDURE — 99204 PR OFFICE/OUTPT VISIT, NEW, LEVL IV, 45-59 MIN: ICD-10-PCS | Mod: S$PBB,,, | Performed by: ORTHOPAEDIC SURGERY

## 2020-02-04 PROCEDURE — 99999 PR PBB SHADOW E&M-EST. PATIENT-LVL III: ICD-10-PCS | Mod: PBBFAC,,, | Performed by: ORTHOPAEDIC SURGERY

## 2020-02-04 NOTE — LETTER
February 4, 2020      Melania Mcgovern MD  [address] [facility] [address]           [facility]  [address] , [place] [address]  [facility] [address]  Phone: [phone]  Fax: [phone]          Patient: Angeles Wright   MR Number: [id number]   YOB: 1970   Date of Visit: 2/4/2020       Dear Dr. Melania Mcgovern:    Thank you for referring Angeles Wright to me for evaluation. Attached you will find relevant portions of my assessment and plan of care.    If you have questions, please do not hesitate to call me. I look forward to following Angeles Wright along with you.    Sincerely,    Edmond Alexandre MD    Enclosure  CC:  No Recipients    If you would like to receive this communication electronically, please contact [email or web address] or [phone] to request more information on [employer] access.    For providers and/or their staff who would like to refer a patient to [facility], please contact us through our one-stop-shop provider referral line, [facility], at [phone].    If you feel you have received this communication in error or would no longer like to receive these types of communications, please e-mail [email or web address]

## 2020-02-04 NOTE — PROGRESS NOTES
Subjective:      Patient ID: Angeles Wright is a 49 y.o. female.    Chief Complaint: Pain of the Left Femur    HPI  The patient is referred by Oncology because of imaging findings.  She has a history of metastatic breast cancer being treated with radiation and chemotherapy.  The patient denies ever having had pain with weight-bearing in her lower extremities.  She indicates that since she initiated chemotherapy her primary tumor has shrunken and that in general she feels well.            Review of Systems   Constitution: Negative for fever and weight loss.   HENT: Negative for congestion.    Eyes: Negative for visual disturbance.   Cardiovascular: Negative for chest pain.   Respiratory: Negative for shortness of breath.    Hematologic/Lymphatic: Negative for bleeding problem. Does not bruise/bleed easily.   Skin: Negative for poor wound healing.   Gastrointestinal: Negative for abdominal pain.   Genitourinary: Negative for dysuria.   Neurological: Negative for seizures.   Psychiatric/Behavioral: Negative for altered mental status.   Allergic/Immunologic: Negative for persistent infections.         Objective:      Ortho/SPM Exam      Left hip    The patient is not in acute distress.   Body habitus is:normal.   Sclerae normal  The patient walks without a limp.   Respiratory distress:  none  The skin over the hip is:intact.   There is no local tenderness.   Range of motion- Flexion full, External rotation full, internal rotation full.  Resisted SLR negative.  Pain with rotation negative  Sciatic tension findings negative.  Shortening/lengthening compared to the contralateral side absent.  Pulses DP present, PT present.  Motor normal 5/5 strength in all tested muscle groups.   Sensory normal.      I reviewed the relevant radiographic images for the patient's condition:  I reviewed bone scan and CT scan of the relevant area. There is an area of subtle uptake on bone scan in the left proximal femur. CT scan shows small  lytic areas in the subcapital area of both hips without cortical thinning              Assessment:       Encounter Diagnosis   Name Primary?    Bone metastases         Given the patient's subjective status and physical findings, the metastatic disease appears to be responding to the current regimen.  There is no objective evidence to suggest imminent fracture, but this could develop in the future.          Plan:       Angeles was seen today for pain.    Diagnoses and all orders for this visit:    Bone metastases  -     Ambulatory referral/consult to Orthopedics          I explained my diagnostic impression and the reasoning behind it in detail, using layman's terms.     Given the fact that the patient lives several hours away, I explained that she should be observant for any increasing pain with weight-bearing.  If this occurs she should get x-rays at the nearest available facility and contact me for further evaluation and care. I further emphasize thatit would be desirable to fix any impending fracture prophylactically rather than the fracture.     In the absence of such symptoms, no further intervention would be recommended.

## 2020-02-04 NOTE — TELEPHONE ENCOUNTER
----- Message from Maday Wright sent at 2/4/2020  1:08 PM CST -----  Contact: self / [phone]  Will be 15 minutes late can you still see her?  Please advise

## 2020-05-01 ENCOUNTER — TELEPHONE (OUTPATIENT)
Dept: HEMATOLOGY/ONCOLOGY | Facility: CLINIC | Age: 50
End: 2020-05-01

## 2020-05-01 NOTE — TELEPHONE ENCOUNTER
----- Message from Chava Hallman sent at 5/1/2020  9:25 AM CDT -----  Contact: Patient  Scheduling request    Scheduling appt :: f/u     Treating provider:: Froilan    Do you feel you need to be seen today:: No    Contact:: [phone]    Additional info:: Pt is staying at the [facility], ask to speak with Ms Alexander. Any appt date Mon- Thurs is fine, except Thurs after 12 and/or Fridays. If unable to reach Catherine, leave appt schedule on voicemail.

## 2020-05-14 ENCOUNTER — TELEPHONE (OUTPATIENT)
Dept: HEMATOLOGY/ONCOLOGY | Facility: CLINIC | Age: 50
End: 2020-05-14

## 2020-05-14 NOTE — TELEPHONE ENCOUNTER
Message fwd to .         ----- Message from Tonya Graf sent at 5/14/2020  1:53 PM CDT -----  Contact: Pt   Pt is requesting an appt as soon as possible     Pt can be reached at [phone]

## 2020-05-15 ENCOUNTER — TELEPHONE (OUTPATIENT)
Dept: HEMATOLOGY/ONCOLOGY | Facility: CLINIC | Age: 50
End: 2020-05-15

## 2020-05-15 NOTE — PROGRESS NOTES
History:     Reason For Consultation:   Metastatic ER positive breast cancer.     HPI:   Angeles Wright presents for follow up of metastatic breast cancer. She has been noncompliant with therapy initially at [facility] and transferred care to [facility] in 9/2019. Labs showed her to be pre-menopausal thus we changed therapy to Ribo/Zoladex and Arimidex. Unfortunately she continues to be non-compliant and has been off all therapy recently. I last saw her 6 months ago.  Her last injection of zoladex was in late November.  She denies any pain.  She has experienced hot flashes and has not had a period in a very long time but is unable to quantify how long.  She is living in the [facility] right now. Saw ortho.    Oncologic History:  Presentation  - 1/2019 - presented for palpable mass left breast. Of note, patient had prior abnormal mammograms in 2016 for both breasts and was called back for additional images but didn't return.    - 1/2019 - Diagnostic mammogram and US showed 4.5 cm left breast mass with abnormal LN; 1.3 cm right breast mass  - 1/28/19 - Biopsy left breast mass - Grade 2 IDC, estrogen receptor 96%, progesterone receptor 95%, Her2 elaine neg, Ki67 30%.   - 2/2019 CT scans showed known left breast mass with axillary adenopathy, metastatic disease in chest and bones (ribs, sternum, vertebra, pelvis)  - 3/7/19 started Ibrance/Fulvsetrant. Dose reduced of Ibrance in 4/2019.   - 3/14/19 - 3/27/19 3000 cGt T4-T8     - 4/4/19 - Genetics GeneDx Breast/Gyn panel negative  [facility] oncology consultation on 9/3/19   - 9/2019 - Labs show pre-menopausal. Change therapy to Ribociclib, Zoladex and Arimidex.    - 9/2019 new baseline scans with diffuse osseous metastatic disease throughout spine, sternum, ribs, iliac wings, femoral bones. T5 with possible extension into spinal canal; left femur lesion at risk of pathologic fracture    Medications    Current Outpatient Medications:     anastrozole (ARIMIDEX) 1 mg Tab, Take 1 mg by  mouth once daily., Disp: , Rfl:     ondansetron (ZOFRAN) 8 MG tablet, Take 1 tablet (8 mg total) by mouth every 8 (eight) hours as needed for Nausea. (Patient not taking: Reported on 10/15/2019), Disp: 30 tablet, Rfl: 2    ribociclib (KISQALI) 600 mg/day (200 mg x 3) Tab, Take 3 tablets (600 mg) by mouth once daily For 21 days, then off for 7 days., Disp: 63 tablet, Rfl: 2  Allergies  Review of patient's allergies indicates:  No Known Allergies  Review of Systems  Review of Systems   Constitutional: Negative for activity change, appetite change, chills, fatigue, fever and unexpected weight change.   HENT: Negative for congestion, hearing loss, nosebleeds, sinus pressure and trouble swallowing.    Eyes: Negative for pain, discharge and itching.   Respiratory: Negative for cough, chest tightness and shortness of breath.    Cardiovascular: Negative for chest pain, palpitations and leg swelling.   Gastrointestinal: Negative for abdominal distention, abdominal pain, blood in stool, diarrhea, nausea, rectal pain and vomiting.   Endocrine: Negative for heat intolerance and polydipsia.   Genitourinary: Negative for difficulty urinating, dysuria, flank pain, frequency, hematuria and urgency.   Musculoskeletal: Negative for arthralgias, back pain and neck pain.   Skin: Negative for color change, pallor and rash.   Neurological: Negative for dizziness, numbness and headaches.   Hematological: Negative for adenopathy. Does not bruise/bleed easily.   Psychiatric/Behavioral: Negative for confusion and decreased concentration. The patient is not nervous/anxious.         Objective     Objective:     There were no vitals filed for this visit.  There is no height or weight on file to calculate BSA.  Physical Exam   Constitutional: She is oriented to person, place, and time. She appears well-developed and well-nourished. No distress.   HENT:   Head: Normocephalic and atraumatic.   Mouth/Throat: Oropharynx is clear and moist and  mucous membranes are normal. No oral lesions.   Eyes: Conjunctivae and EOM are normal. Right eye exhibits no discharge. Left eye exhibits no discharge. No scleral icterus.   Neck: Normal range of motion. Neck supple. No thyroid mass and no thyromegaly present.   Cardiovascular: Normal rate, regular rhythm, S1 normal, S2 normal and normal heart sounds.   Pulmonary/Chest: Effort normal and breath sounds normal. No respiratory distress. She has no wheezes. She has no rhonchi. She has no rales. Chest wall is not dull to percussion.   Large left breast mass, superior to nipple, 5 cm x 5 cm. No palpable node  Right mediport   Abdominal: Soft. Normal appearance and bowel sounds are normal. There is no hepatosplenomegaly. There is no tenderness.   Lymphadenopathy:     She has no cervical adenopathy.     She has no axillary adenopathy.        Right: No supraclavicular adenopathy present.        Left: No supraclavicular adenopathy present.   Neurological: She is alert and oriented to person, place, and time. She has normal strength.   Skin: Skin is warm, dry and intact. No pallor.   Psychiatric: Her behavior is normal. Thought content normal.         Labs and Imaging  Results for orders placed or performed in visit on 11/25/19   CBC Oncology   Result Value Ref Range    WBC 4.03 3.90 - 12.70 K/uL    RBC 3.96 (L) 4.00 - 5.40 M/uL    Hemoglobin 13.7 12.0 - 16.0 g/dL    Hematocrit 40.1 37.0 - 48.5 %    Mean Corpuscular Volume 101 (H) 82 - 98 fL    Mean Corpuscular Hemoglobin 34.6 (H) 27.0 - 31.0 pg    Mean Corpuscular Hemoglobin Conc 34.2 32.0 - 36.0 g/dL    RDW 21.8 (H) 11.5 - 14.5 %    Platelets 229 150 - 350 K/uL    MPV 8.8 (L) 9.2 - 12.9 fL    Gran # (ANC) 2.1 1.8 - 7.7 K/uL    Immature Grans (Abs) 0.00 0.00 - 0.04 K/uL   Comprehensive metabolic panel   Result Value Ref Range    Sodium 139 136 - 145 mmol/L    Potassium 3.8 3.5 - 5.1 mmol/L    Chloride 106 95 - 110 mmol/L    CO2 23 23 - 29 mmol/L    Glucose 91 70 - 110 mg/dL     BUN, Bld 7 6 - 20 mg/dL    Creatinine 0.8 0.5 - 1.4 mg/dL    Calcium 8.8 8.7 - 10.5 mg/dL    Total Protein 7.0 6.0 - 8.4 g/dL    Albumin 4.2 3.5 - 5.2 g/dL    Total Bilirubin 0.3 0.1 - 1.0 mg/dL    Alkaline Phosphatase 85 55 - 135 U/L    AST 17 10 - 40 U/L    ALT 11 10 - 44 U/L    Anion Gap 10 8 - 16 mmol/L    eGFR if African American >60.0 >60 mL/min/1.73 m^2    eGFR if non African American >60.0 >60 mL/min/1.73 m^2   Cancer antigen 15-3   Result Value Ref Range    CA 15-3 284 (H) 0 - 31 U/mL         Assessment     Assessment:     1. Stage IV (T3N2M2) invasive ductal carcinoma of left breast,  quadrant, ER 96%, NC 94%, Her2 neg, Grade 2, Ki67 30%   * Genetics negative  * 2/2019 Presented as de alem metastatic disease with bone mets and asymptomatic lung mets  * 3/7/19 outside oncologist - started Ibrance/Fulvsetrant. Dose reduced of Ibrance in 4/2019 due to neutropenia. Non-compliance starting April 2019. Last dose of fulvestrant 4/2019.   * 3/14/19 - 3/27/19 3000 cGy T4-T8    * 9/2019 presented to [facility] as new patient. She's had non-compliance since her diagnosis. Labs showed pre-menopausal (not felt to be abnormal due to Faslodex since it had been 4-5 months since last dose)   * 9/2019 new baseline scans with diffuse osseous metastatic disease throughout spine, sternum, ribs, iliac wings, femoral bones. T5 with possible extension into spinal canal; left femur lesion at risk of pathologic fracture. Referred to ortho - saw them 2/2020 and not felt to have imminent fracture   * 9/2019: Started Ribociclib, Zoladex and Arimidex but was then non-compliant. Only took for two months.     * 5/18/2020 - Represented -need to re-image; eval menopausal status.     2. Bone mets   * Hold zometa as having dental disease/jaw pain. Seeing dentist again.    * Refer to ortho due to increased fracture risk of femur due to metastatic involvement. Appt 10/30 but patient didn't show up; saw them 2/2020 and not felt to have imminent  fracture   * Has received prior RT to T5 lesion. Discussed signs/symptoms of cord compression which she would need to seek emergent attention should they develop.     3. Lung mets  4. Drug related neutropenia.     * check labs today - off medication.       Plan:     1. Imaging ASAP with CT C/A/P and bone scan  2. Labs today including menopausal labs. She hasn't had a zoladex injection since November. Will go on and give zoladex today after her labs are done.   3. RTC in 1.5 - 2 weeks to finalize treatment plan based on menopausal labs and imaging. She has not progressed on any therapy, but simply has had non-compliance.         Future Appointments   Date Time Provider Department Center   5/18/2020  7:00 AM COVID TESTING, [facility] HEMONC [facility] HEM ONC [facility]   5/18/2020  7:30 AM Melania Mcgovern MD [facility] HEM ONC [facility]   5/18/2020  8:30 AM INJECTION, [facility] INFUSION [facility] CHEMO Arie Cisneros

## 2020-05-18 ENCOUNTER — OFFICE VISIT (OUTPATIENT)
Dept: HEMATOLOGY/ONCOLOGY | Facility: CLINIC | Age: 50
End: 2020-05-18
Payer: MEDICAID

## 2020-05-18 ENCOUNTER — INFUSION (OUTPATIENT)
Dept: INFUSION THERAPY | Facility: HOSPITAL | Age: 50
End: 2020-05-18
Attending: INTERNAL MEDICINE
Payer: MEDICAID

## 2020-05-18 ENCOUNTER — CLINICAL SUPPORT (OUTPATIENT)
Dept: HEMATOLOGY/ONCOLOGY | Facility: CLINIC | Age: 50
End: 2020-05-18
Payer: MEDICAID

## 2020-05-18 ENCOUNTER — TELEPHONE (OUTPATIENT)
Dept: HEMATOLOGY/ONCOLOGY | Facility: CLINIC | Age: 50
End: 2020-05-18

## 2020-05-18 VITALS
HEIGHT: 64 IN | RESPIRATION RATE: 18 BRPM | OXYGEN SATURATION: 98 % | SYSTOLIC BLOOD PRESSURE: 135 MMHG | WEIGHT: 125 LBS | BODY MASS INDEX: 21.34 KG/M2 | HEART RATE: 64 BPM | DIASTOLIC BLOOD PRESSURE: 68 MMHG

## 2020-05-18 DIAGNOSIS — C50.112 MALIGNANT NEOPLASM OF CENTRAL PORTION OF LEFT BREAST IN FEMALE, ESTROGEN RECEPTOR POSITIVE: Primary | ICD-10-CM

## 2020-05-18 DIAGNOSIS — Z17.0 MALIGNANT NEOPLASM OF CENTRAL PORTION OF LEFT BREAST IN FEMALE, ESTROGEN RECEPTOR POSITIVE: Primary | ICD-10-CM

## 2020-05-18 DIAGNOSIS — C78.00 MALIGNANT NEOPLASM METASTATIC TO LUNG, UNSPECIFIED LATERALITY: ICD-10-CM

## 2020-05-18 DIAGNOSIS — Z51.11 ENCOUNTER FOR CHEMOTHERAPY MANAGEMENT: Primary | ICD-10-CM

## 2020-05-18 DIAGNOSIS — C79.51 BONE METASTASES: ICD-10-CM

## 2020-05-18 DIAGNOSIS — Z13.9 SCREENING FOR UNSPECIFIED CONDITION: ICD-10-CM

## 2020-05-18 LAB
ALBUMIN SERPL BCP-MCNC: 3.7 G/DL (ref 3.5–5.2)
ALP SERPL-CCNC: 84 U/L (ref 55–135)
ALT SERPL W/O P-5'-P-CCNC: 21 U/L (ref 10–44)
ANION GAP SERPL CALC-SCNC: 7 MMOL/L (ref 8–16)
AST SERPL-CCNC: 21 U/L (ref 10–40)
BILIRUB SERPL-MCNC: 0.3 MG/DL (ref 0.1–1)
BUN SERPL-MCNC: 11 MG/DL (ref 6–20)
CALCIUM SERPL-MCNC: 8.5 MG/DL (ref 8.7–10.5)
CHLORIDE SERPL-SCNC: 106 MMOL/L (ref 95–110)
CO2 SERPL-SCNC: 27 MMOL/L (ref 23–29)
CREAT SERPL-MCNC: 0.7 MG/DL (ref 0.5–1.4)
ERYTHROCYTE [DISTWIDTH] IN BLOOD BY AUTOMATED COUNT: 13 % (ref 11.5–14.5)
EST. GFR  (AFRICAN AMERICAN): >60 ML/MIN/1.73 M^2
EST. GFR  (NON AFRICAN AMERICAN): >60 ML/MIN/1.73 M^2
ESTRADIOL SERPL-MCNC: 76 PG/ML
GLUCOSE SERPL-MCNC: 93 MG/DL (ref 70–110)
HCT VFR BLD AUTO: 40.8 % (ref 37–48.5)
HGB BLD-MCNC: 13.2 G/DL (ref 12–16)
IMM GRANULOCYTES # BLD AUTO: 0.01 K/UL (ref 0–0.04)
LH SERPL-ACNC: 27.1 MIU/ML
MCH RBC QN AUTO: 32.2 PG (ref 27–31)
MCHC RBC AUTO-ENTMCNC: 32.4 G/DL (ref 32–36)
MCV RBC AUTO: 100 FL (ref 82–98)
NEUTROPHILS # BLD AUTO: 3.3 K/UL (ref 1.8–7.7)
PLATELET # BLD AUTO: 280 K/UL (ref 150–350)
PMV BLD AUTO: 9.4 FL (ref 9.2–12.9)
POTASSIUM SERPL-SCNC: 4.2 MMOL/L (ref 3.5–5.1)
PROT SERPL-MCNC: 6.2 G/DL (ref 6–8.4)
RBC # BLD AUTO: 4.1 M/UL (ref 4–5.4)
SARS-COV-2 RDRP RESP QL NAA+PROBE: NEGATIVE
SODIUM SERPL-SCNC: 140 MMOL/L (ref 136–145)
WBC # BLD AUTO: 5.6 K/UL (ref 3.9–12.7)

## 2020-05-18 PROCEDURE — 36415 COLL VENOUS BLD VENIPUNCTURE: CPT

## 2020-05-18 PROCEDURE — 80053 COMPREHEN METABOLIC PANEL: CPT

## 2020-05-18 PROCEDURE — 96402 CHEMO HORMON ANTINEOPL SQ/IM: CPT

## 2020-05-18 PROCEDURE — 99213 OFFICE O/P EST LOW 20 MIN: CPT | Mod: PBBFAC,25 | Performed by: INTERNAL MEDICINE

## 2020-05-18 PROCEDURE — 63600175 PHARM REV CODE 636 W HCPCS: Mod: JG | Performed by: INTERNAL MEDICINE

## 2020-05-18 PROCEDURE — 82670 ASSAY OF TOTAL ESTRADIOL: CPT

## 2020-05-18 PROCEDURE — 83002 ASSAY OF GONADOTROPIN (LH): CPT

## 2020-05-18 PROCEDURE — 99214 PR OFFICE/OUTPT VISIT, EST, LEVL IV, 30-39 MIN: ICD-10-PCS | Mod: S$PBB,,, | Performed by: INTERNAL MEDICINE

## 2020-05-18 PROCEDURE — 96523 IRRIG DRUG DELIVERY DEVICE: CPT

## 2020-05-18 PROCEDURE — 99999 PR PBB SHADOW E&M-EST. PATIENT-LVL III: ICD-10-PCS | Mod: PBBFAC,,, | Performed by: INTERNAL MEDICINE

## 2020-05-18 PROCEDURE — 99999 PR PBB SHADOW E&M-EST. PATIENT-LVL III: CPT | Mod: PBBFAC,,, | Performed by: INTERNAL MEDICINE

## 2020-05-18 PROCEDURE — 86300 IMMUNOASSAY TUMOR CA 15-3: CPT

## 2020-05-18 PROCEDURE — A4216 STERILE WATER/SALINE, 10 ML: HCPCS | Performed by: INTERNAL MEDICINE

## 2020-05-18 PROCEDURE — U0002 COVID-19 LAB TEST NON-CDC: HCPCS

## 2020-05-18 PROCEDURE — 99214 OFFICE O/P EST MOD 30 MIN: CPT | Mod: S$PBB,,, | Performed by: INTERNAL MEDICINE

## 2020-05-18 PROCEDURE — 85027 COMPLETE CBC AUTOMATED: CPT

## 2020-05-18 PROCEDURE — 25000003 PHARM REV CODE 250: Performed by: INTERNAL MEDICINE

## 2020-05-18 RX ORDER — SODIUM CHLORIDE 0.9 % (FLUSH) 0.9 %
10 SYRINGE (ML) INJECTION
Status: CANCELLED | OUTPATIENT
Start: 2020-05-18

## 2020-05-18 RX ORDER — HEPARIN 100 UNIT/ML
500 SYRINGE INTRAVENOUS
Status: CANCELLED | OUTPATIENT
Start: 2020-05-18

## 2020-05-18 RX ORDER — SODIUM CHLORIDE 0.9 % (FLUSH) 0.9 %
10 SYRINGE (ML) INJECTION
Status: COMPLETED | OUTPATIENT
Start: 2020-05-18 | End: 2020-05-18

## 2020-05-18 RX ORDER — HEPARIN 100 UNIT/ML
500 SYRINGE INTRAVENOUS
Status: COMPLETED | OUTPATIENT
Start: 2020-05-18 | End: 2020-05-18

## 2020-05-18 RX ADMIN — GOSERELIN ACETATE 3.6 MG: 3.6 IMPLANT SUBCUTANEOUS at 09:05

## 2020-05-18 RX ADMIN — HEPARIN 500 UNITS: 100 SYRINGE at 09:05

## 2020-05-18 RX ADMIN — Medication 10 ML: at 09:05

## 2020-05-18 NOTE — Clinical Note
Labs today - cbc, cmp, ca 15-3, lh,fsh,estradiolCT C/A/P and bone scan ASAP with appt with me in 1.5 to 2 weeks. Please go on and schedule for patient now since she has minimal access to phone/mail, etc

## 2020-05-18 NOTE — NURSING
Pt arrived from [facility] for zoladex and labs from port.  Zoladex given SQ to RLA.  Port accessed for labs, no blood return, flushes easily.  Port flushed and de accessed.  Labs drawn from [facility] and sent to lab.  Pt discharged to home.

## 2020-05-18 NOTE — TELEPHONE ENCOUNTER
----- Message from Ricardo Reza DNP sent at 5/18/2020  8:38 AM CDT -----  The patient's COVID test came back negative, and she can therefore proceed with her appointments as scheduled unless otherwise contraindicated.  This result and accompanying note were forwarded to Dr. Mcgovern's staff, and it appears that the patient has already checked in for today's injection appointment.

## 2020-05-18 NOTE — PROGRESS NOTES
The patient's COVID test came back negative, and she can therefore proceed with her appointments as scheduled unless otherwise contraindicated.  This result and accompanying note were forwarded to Dr. Mcgovern's staff, and it appears that the patient has already checked in for today's injection appointment.

## 2020-05-19 LAB — CANCER AG15-3 SERPL-ACNC: 55 U/ML (ref 0–31)

## 2020-05-26 ENCOUNTER — HOSPITAL ENCOUNTER (OUTPATIENT)
Dept: RADIOLOGY | Facility: HOSPITAL | Age: 50
Discharge: HOME | End: 2020-05-26
Attending: INTERNAL MEDICINE
Payer: MEDICAID

## 2020-05-26 ENCOUNTER — TELEPHONE (OUTPATIENT)
Dept: HEMATOLOGY/ONCOLOGY | Facility: CLINIC | Age: 50
End: 2020-05-26

## 2020-05-26 DIAGNOSIS — C50.112 MALIGNANT NEOPLASM OF CENTRAL PORTION OF LEFT BREAST IN FEMALE, ESTROGEN RECEPTOR POSITIVE: ICD-10-CM

## 2020-05-26 DIAGNOSIS — C78.00 MALIGNANT NEOPLASM METASTATIC TO LUNG, UNSPECIFIED LATERALITY: ICD-10-CM

## 2020-05-26 DIAGNOSIS — Z17.0 MALIGNANT NEOPLASM OF CENTRAL PORTION OF LEFT BREAST IN FEMALE, ESTROGEN RECEPTOR POSITIVE: ICD-10-CM

## 2020-05-26 DIAGNOSIS — C79.51 BONE METASTASES: ICD-10-CM

## 2020-05-26 PROCEDURE — 74177 CT ABD & PELVIS W/CONTRAST: CPT | Mod: TC

## 2020-05-26 PROCEDURE — 74177 CT ABD & PELVIS W/CONTRAST: CPT | Mod: 26,,, | Performed by: RADIOLOGY

## 2020-05-26 PROCEDURE — 71260 CT CHEST ABDOMEN PELVIS WITH CONTRAST (XPD): ICD-10-PCS | Mod: 26,,, | Performed by: RADIOLOGY

## 2020-05-26 PROCEDURE — A9503 TC99M MEDRONATE: HCPCS

## 2020-05-26 PROCEDURE — 74177 CT CHEST ABDOMEN PELVIS WITH CONTRAST (XPD): ICD-10-PCS | Mod: 26,,, | Performed by: RADIOLOGY

## 2020-05-26 PROCEDURE — 25500020 PHARM REV CODE 255: Performed by: INTERNAL MEDICINE

## 2020-05-26 PROCEDURE — 78306 NM BONE SCAN WHOLE BODY: ICD-10-PCS | Mod: 26,,, | Performed by: RADIOLOGY

## 2020-05-26 PROCEDURE — 78306 BONE IMAGING WHOLE BODY: CPT | Mod: 26,,, | Performed by: RADIOLOGY

## 2020-05-26 PROCEDURE — 71260 CT THORAX DX C+: CPT | Mod: 26,,, | Performed by: RADIOLOGY

## 2020-05-26 RX ADMIN — IOHEXOL 15 ML: 350 INJECTION, SOLUTION INTRAVENOUS at 09:05

## 2020-05-26 RX ADMIN — IOHEXOL 15 ML: 350 INJECTION, SOLUTION INTRAVENOUS at 10:05

## 2020-05-26 RX ADMIN — IOHEXOL 75 ML: 350 INJECTION, SOLUTION INTRAVENOUS at 11:05

## 2020-05-26 NOTE — PROGRESS NOTES
History:     Reason For Consultation:   Metastatic ER positive breast cancer.     HPI:   Angeles Wright presents for follow up of metastatic breast cancer. She has been noncompliant with therapy initially at [facility] and transferred care to [facility] in 9/2019. Labs showed her to be pre-menopausal thus we changed therapy to Ribo/Zoladex and Arimidex but she unfortunately was noncompliant and off therapy from November to May 2020.     5/27/2020 - CT scans with improved disease. Labs not consistent with post-menopause. Feeling well. No significant pain.     Oncologic History:  Presentation  - 1/2019 - presented for palpable mass left breast. Of note, patient had prior abnormal mammograms in 2016 for both breasts and was called back for additional images but didn't return.    - 1/2019 - Diagnostic mammogram and US showed 4.5 cm left breast mass with abnormal LN; 1.3 cm right breast mass  - 1/28/19 - Biopsy left breast mass - Grade 2 IDC, estrogen receptor 96%, progesterone receptor 95%, Her2 elaine neg, Ki67 30%.   - 2/2019 CT scans showed known left breast mass with axillary adenopathy, metastatic disease in chest and bones (ribs, sternum, vertebra, pelvis)  - 3/7/19 started Ibrance/Fulvsetrant. Dose reduced of Ibrance in 4/2019.   - 3/14/19 - 3/27/19 3000 cGt T4-T8     - 4/4/19 - Genetics [facility] Breast/Gyn panel negative  [facility] Medical oncology consultation on 9/3/19   - 9/2019 - Labs show pre-menopausal. Change therapy to Ribociclib, Zoladex and Arimidex.    - 9/2019 new baseline scans with diffuse osseous metastatic disease throughout spine, sternum, ribs, iliac wings, femoral bones. T5 with possible extension into spinal canal; left femur lesion at risk of pathologic fracture.    - 5/2020 - non-complaince - off treatment from 11/2019 - 5/2020. Saw ortho though and surgery not felt to be needed at time of consult (2/2020). CT scans with improved disease. Labs still perimenopausal. Continued  Arimidex/ribociclib/zoladex.    Medications    Current Outpatient Medications:     anastrozole (ARIMIDEX) 1 mg Tab, Take 1 tablet (1 mg total) by mouth once daily., Disp: 90 tablet, Rfl: 1    ondansetron (ZOFRAN) 8 MG tablet, Take 1 tablet (8 mg total) by mouth every 8 (eight) hours as needed for Nausea., Disp: 30 tablet, Rfl: 2    ribociclib (KISQALI) 600 mg/day (200 mg x 3) Tab, Take 600 mg by mouth once daily., Disp: 63 tablet, Rfl: 6  No current facility-administered medications for this visit.   Allergies  Review of patient's allergies indicates:  No Known Allergies  Review of Systems  Review of Systems   Constitutional: Negative for activity change, appetite change, chills, fatigue, fever and unexpected weight change.   HENT: Negative for congestion, hearing loss, nosebleeds, sinus pressure and trouble swallowing.    Eyes: Negative for pain, discharge and itching.   Respiratory: Negative for cough, chest tightness and shortness of breath.    Cardiovascular: Negative for chest pain, palpitations and leg swelling.   Gastrointestinal: Negative for abdominal distention, abdominal pain, blood in stool, diarrhea, nausea, rectal pain and vomiting.   Endocrine: Negative for heat intolerance and polydipsia.   Genitourinary: Negative for difficulty urinating, dysuria, flank pain, frequency, hematuria and urgency.   Musculoskeletal: Negative for arthralgias, back pain and neck pain.   Skin: Negative for color change, pallor and rash.   Neurological: Negative for dizziness, numbness and headaches.   Hematological: Negative for adenopathy. Does not bruise/bleed easily.   Psychiatric/Behavioral: Negative for confusion and decreased concentration. The patient is not nervous/anxious.         Objective     Objective:     Vitals:    05/27/20 0756   BP: (!) 92/52   BP Location: Right arm   Patient Position: Sitting   BP Method: Large (Automatic)   Pulse: 63   Resp: 18   SpO2: 100%   Weight: 58.1 kg (128 lb 1.4 oz)   Height:  "5' 4" (1.626 m)     Body surface area is 1.62 meters squared.  Physical Exam   Constitutional: She is oriented to person, place, and time. She appears well-developed and well-nourished. No distress.   HENT:   Head: Normocephalic and atraumatic.   Mouth/Throat: Oropharynx is clear and moist and mucous membranes are normal. No oral lesions.   Eyes: Conjunctivae and EOM are normal. Right eye exhibits no discharge. Left eye exhibits no discharge. No scleral icterus.   Neck: Normal range of motion. Neck supple. No thyroid mass and no thyromegaly present.   Cardiovascular: Normal rate, regular rhythm, S1 normal, S2 normal and normal heart sounds.   Pulmonary/Chest: Effort normal and breath sounds normal. No respiratory distress. She has no wheezes. She has no rhonchi. She has no rales. Chest wall is not dull to percussion.   Large left breast mass, superior to nipple, 5 cm x 5 cm. No palpable node  Right mediport   Abdominal: Soft. Normal appearance and bowel sounds are normal. There is no hepatosplenomegaly. There is no tenderness.   Lymphadenopathy:     She has no cervical adenopathy.     She has no axillary adenopathy.        Right: No supraclavicular adenopathy present.        Left: No supraclavicular adenopathy present.   Neurological: She is alert and oriented to person, place, and time. She has normal strength.   Skin: Skin is warm, dry and intact. No pallor.   Psychiatric: Her behavior is normal. Thought content normal.         Labs and Imaging  Results for orders placed or performed in visit on 05/18/20   CBC Oncology   Result Value Ref Range    WBC 5.60 3.90 - 12.70 K/uL    RBC 4.10 4.00 - 5.40 M/uL    Hemoglobin 13.2 12.0 - 16.0 g/dL    Hematocrit 40.8 37.0 - 48.5 %    Mean Corpuscular Volume 100 (H) 82 - 98 fL    Mean Corpuscular Hemoglobin 32.2 (H) 27.0 - 31.0 pg    Mean Corpuscular Hemoglobin Conc 32.4 32.0 - 36.0 g/dL    RDW 13.0 11.5 - 14.5 %    Platelets 280 150 - 350 K/uL    MPV 9.4 9.2 - 12.9 fL    Gran " # (ANC) 3.3 1.8 - 7.7 K/uL    Immature Grans (Abs) 0.01 0.00 - 0.04 K/uL   Comprehensive metabolic panel   Result Value Ref Range    Sodium 140 136 - 145 mmol/L    Potassium 4.2 3.5 - 5.1 mmol/L    Chloride 106 95 - 110 mmol/L    CO2 27 23 - 29 mmol/L    Glucose 93 70 - 110 mg/dL    BUN, Bld 11 6 - 20 mg/dL    Creatinine 0.7 0.5 - 1.4 mg/dL    Calcium 8.5 (L) 8.7 - 10.5 mg/dL    Total Protein 6.2 6.0 - 8.4 g/dL    Albumin 3.7 3.5 - 5.2 g/dL    Total Bilirubin 0.3 0.1 - 1.0 mg/dL    Alkaline Phosphatase 84 55 - 135 U/L    AST 21 10 - 40 U/L    ALT 21 10 - 44 U/L    Anion Gap 7 (L) 8 - 16 mmol/L    eGFR if African American >60.0 >60 mL/min/1.73 m^2    eGFR if non African American >60.0 >60 mL/min/1.73 m^2   Cancer antigen 15-3   Result Value Ref Range    CA 15-3 55 (H) 0 - 31 U/mL   Estradiol   Result Value Ref Range    Estradiol 76 See Text pg/mL   Luteinizing hormone   Result Value Ref Range    LH 27.1 See Text mIU/mL     I have personally reviewed imaging results and agree with assessment as detailed below in dictation.     NM Bone Scan Whole Body  Narrative: EXAMINATION:  NM BONE SCAN WHOLE BODY    CLINICAL HISTORY:  diagnosis associated with order;  Malignant neoplasm of central portion of left female breast    TECHNIQUE:  Approximately 2-3 hours following the IV administration of 24.9 mCi of Tc-99m-MDP, anterior and posterior delayed whole body images were acquired.  Lateral spot images of the skull and thorax were also acquired.    COMPARISON:  NM bone scan whole body 09/16/2019    FINDINGS:  There is increased uptake in the posterior left 8th rib, right ischium, left femoral neck, and sternum similar to prior exam.  Previously described uptake involving the lateral ribs has decreased since prior exam.  There is new focal uptake within the left aspect of the mandibular body and left lateral tibial plateau.    There is normal uptake in the genitourinary system and soft tissues.  Impression: Multiple foci of  increased uptake in the axial skeleton, similar to the prior exam on 09/16/2019 and compatible with diffuse metastatic disease.    New uptake in the mandible and left knee are equivocal for new metastatic lesions vs. medication related osteonecrosis of the jaw and degenerative uptake, respectively.    I, Trent Martínez MD, attest that I reviewed and interpreted the images.    Electronically signed by resident: Zoë Graham  Date:    05/26/2020  Time:    13:32    Electronically signed by: Trent Martínez  Date:    05/26/2020  Time:    13:59  CT Chest Abdomen Pelvis With Contrast  Narrative: EXAMINATION:  CT CHEST ABDOMEN PELVIS WITH CONTRAST (XPD)    CLINICAL HISTORY:  breast ca; met; Malignant neoplasm of central portion of left female breast    TECHNIQUE:  Low dose axial images, sagittal and coronal reformations were obtained from the thoracic inlet to the pubic symphysis following the IV administration of 75 mL of Omnipaque 350 and oral contrast (15 cc of Omni 350) diluted in water was also given to the patient.    COMPARISON:  CT scan of the chest abdomen and pelvis 09/16/2019    FINDINGS:  CT scan of the chest:    There is a right-sided subclavian port that extends into the superior vena cava.  There are again several small soft tissue nodules in the breasts bilaterally.  The largest mass in the left breast measures approximately 1.8 cm in its maximum diameter (previously 3.7 cm).  This mass has significantly decreased in size.  There is some spiculation associated with this mass.  The left axillary and large node seen on the previous examination (image 31 series 2) now has significantly decreased in size and measures less than 1 cm in diameter (image 35 series 2).  There is no significant supraclavicular adenopathy.  Uniform and symmetric enhancement of the thyroid gland bilaterally.    There is no significant mediastinal adenopathy.  The ascending aorta and the descending thoracic aorta demonstrate no aneurysmal  dilatation.  Normal 3 vessel aortic arch without significant atherosclerotic disease.  Heart size is within normal limits.  There is no pericardial effusion.    There are multiple scattered soft tissue pulmonary nodules bilaterally, more pronounced in the lower lung zones.  These nodules have significantly decreased in size when compared to the previous study.  Most of these nodules measure less than 7 mm in diameter on the current study.  There is no pulmonary consolidations or pleural effusions.    The metastatic lesion involving the T5 vertebral body with erosion of the posterior cortex is again visualized and remains without significant change from the previous study.  Encroachment on the spinal canal not excluded.  Also lytic lesion involving the T4 and C7 and osteoblastic lesion involving the T7 on the right and a lytic lesion involving T10, L2 noted.  There is also multiple rib metastatic lesions and sternal lesions there were seen on the previous study.  The iliac bone lesion mainly on the left are again visualized and remain without significant change.  The lesion in the neck of the left femur also stable and unchanged when compared to the previous study.    CT scan of the abdomen and pelvis:    Liver is normal in its position and contour and size.  No focal hepatic mass lesions.  There are no filling defects in the portal vein.  No intrahepatic biliary ductal dilatation.    The spleen and the pancreas and the gallbladder demonstrate no significant abnormality.  The tiny nodule in the right adrenal gland seen on the previous study is barely visualized on the current study and likely much smaller.    Both kidneys enhance symmetrically and are within normal limits.  Normal concentration and excretion of the contrast from the kidneys.  There is no hydronephrosis or renal calculi.  The 1 cm nodule adjacent to the midpole of the left kidney anteriorly as significantly decreased in size when compared to the  previous study.    No gross abnormalities of the urinary bladder.  There is an anteverted uterus.  No adnexal masses are noted.    No gross abnormalities of the bowel.  The slight irregularity in the ascending colon seen on the previous study is not visualized on the current study.  There is a much better distension of the ascending colon on the current examination.    There is no mesenteric or retroperitoneal adenopathy.  No iliac or any significant inguinal adenopathy.  Impression: 1. Significant decrease in the size of the breast nodules, especially the left breast mass that now measures approximately 1.8 cm (previously 3.7 cm).  2. Significant decrease in size of the multiple bilateral pulmonary nodules.  3. Extensive osseous metastatic disease stable and unchanged when compared to the previous study.  4. Significant decrease in size of the perinephric nodule anterior to the left kidney when compared with the previous study.  5. Rest of the findings as above.    Electronically signed by: Arden Sheriff MD  Date:    05/26/2020  Time:    13:44        Assessment     Assessment:     1. Stage IV (T3N2M2) invasive ductal carcinoma of left breast,  quadrant, ER 96%, MT 94%, Her2 neg, Grade 2, Ki67 30%   * Genetics negative  * 2/2019 Presented as de alem metastatic disease with bone mets and asymptomatic lung mets  * 3/7/19 outside oncologist - started Ibrance/Fulvsetrant. Dose reduced of Ibrance in 4/2019 due to neutropenia. Non-compliance starting April 2019. Last dose of fulvestrant 4/2019.   * 3/14/19 - 3/27/19 3000 cGy T4-T8    * 9/2019 presented to [facility] as new patient. She's had non-compliance since her diagnosis. Labs showed pre-menopausal (not felt to be abnormal due to Faslodex since it had been 4-5 months since last dose)   * 9/2019 new baseline scans with diffuse osseous metastatic disease throughout spine, sternum, ribs, iliac wings, femoral bones. T5 with possible extension into spinal canal; left femur  lesion at risk of pathologic fracture. Referred to ortho - saw them 2/2020 and not felt to have imminent fracture   * 9/2019: Started Ribociclib, Zoladex and Arimidex but was then non-compliant. Only took for two months.     * 5/18/2020 - Represented -Imaging with improved disease despite non-compliance. Labs not yet menopausal. Continue Zoladex/Ribo/Arimidex.     2. Bone mets   * No zometa due to prior jaw pain   * Referred to ortho due to increased fracture risk of femur due to metastatic involvement. Appt 10/30 but patient didn't show up; saw them 2/2020 and not felt to have imminent fracture   * Has received prior RT to T5 lesion. Discussed signs/symptoms of cord compression which she would need to seek emergent attention should they develop. Also knows to notify us or ortho if worsening leg pain as she would be a candidate for prophy fixation for femur.    3. Lung mets  4. 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 No Zometa. Asymptomatic. Has dentist    Plan:     1. Continue Zoladex (will give 3 mo dose next visit due to her non-compliance), Arimidex and ribociclib. She will restart ribo in 1 wk thus I will see her back in 5 wks for the start of the next cycle.     MD in 5 wks with cbc, cmp, ca 15-3  Zoladex 6/15 (3 mo dose)    No future appointments.

## 2020-05-27 ENCOUNTER — OFFICE VISIT (OUTPATIENT)
Dept: HEMATOLOGY/ONCOLOGY | Facility: CLINIC | Age: 50
End: 2020-05-27
Payer: MEDICAID

## 2020-05-27 ENCOUNTER — TELEPHONE (OUTPATIENT)
Dept: PHARMACY | Facility: CLINIC | Age: 50
End: 2020-05-27

## 2020-05-27 VITALS
DIASTOLIC BLOOD PRESSURE: 52 MMHG | HEART RATE: 63 BPM | SYSTOLIC BLOOD PRESSURE: 92 MMHG | BODY MASS INDEX: 21.86 KG/M2 | RESPIRATION RATE: 18 BRPM | HEIGHT: 64 IN | OXYGEN SATURATION: 100 % | WEIGHT: 128.06 LBS

## 2020-05-27 DIAGNOSIS — C50.112 MALIGNANT NEOPLASM OF CENTRAL PORTION OF LEFT BREAST IN FEMALE, ESTROGEN RECEPTOR POSITIVE: Primary | ICD-10-CM

## 2020-05-27 DIAGNOSIS — Z17.0 MALIGNANT NEOPLASM OF CENTRAL PORTION OF LEFT BREAST IN FEMALE, ESTROGEN RECEPTOR POSITIVE: Primary | ICD-10-CM

## 2020-05-27 DIAGNOSIS — C79.51 BONE METASTASES: ICD-10-CM

## 2020-05-27 DIAGNOSIS — C78.00 MALIGNANT NEOPLASM METASTATIC TO LUNG, UNSPECIFIED LATERALITY: ICD-10-CM

## 2020-05-27 PROCEDURE — 99215 OFFICE O/P EST HI 40 MIN: CPT | Mod: S$PBB,,, | Performed by: INTERNAL MEDICINE

## 2020-05-27 PROCEDURE — 99213 OFFICE O/P EST LOW 20 MIN: CPT | Mod: PBBFAC | Performed by: INTERNAL MEDICINE

## 2020-05-27 PROCEDURE — 99999 PR PBB SHADOW E&M-EST. PATIENT-LVL III: CPT | Mod: PBBFAC,,, | Performed by: INTERNAL MEDICINE

## 2020-05-27 PROCEDURE — 99215 PR OFFICE/OUTPT VISIT, EST, LEVL V, 40-54 MIN: ICD-10-PCS | Mod: S$PBB,,, | Performed by: INTERNAL MEDICINE

## 2020-05-27 PROCEDURE — 99999 PR PBB SHADOW E&M-EST. PATIENT-LVL III: ICD-10-PCS | Mod: PBBFAC,,, | Performed by: INTERNAL MEDICINE

## 2020-05-27 RX ORDER — ANASTROZOLE 1 MG/1
1 TABLET ORAL DAILY
Qty: 90 TABLET | Refills: 1 | Status: SHIPPED | OUTPATIENT
Start: 2020-05-27 | End: 2020-05-27

## 2020-05-27 RX ORDER — ANASTROZOLE 1 MG/1
1 TABLET ORAL DAILY
Qty: 90 TABLET | Refills: 1 | Status: SHIPPED | OUTPATIENT
Start: 2020-05-27 | End: 2020-08-05 | Stop reason: SDUPTHER

## 2020-05-27 NOTE — TELEPHONE ENCOUNTER
No answer/VM to inform patient that [facility] received prescription for Kisqali and benefits investigation is required.  [facility] will be back in touch once insurance determination is received.

## 2020-05-28 ENCOUNTER — TELEPHONE (OUTPATIENT)
Dept: PHARMACY | Facility: CLINIC | Age: 50
End: 2020-05-28

## 2020-05-28 ENCOUNTER — TELEPHONE (OUTPATIENT)
Dept: HEMATOLOGY/ONCOLOGY | Facility: CLINIC | Age: 50
End: 2020-05-28

## 2020-05-28 NOTE — TELEPHONE ENCOUNTER
Initial Kisqali 600 mg consult completed on  . Kisqali 600 mg will be shipped on  to arrive at patient's home on  via [employer]. $ 0.00 copay. Patient will start Kisqali on . Address confirmed. Confirmed 2 patient identifiers - name and . Therapy Appropriate.      Patient was counseled on the administration directions for Kisqali 600 mg:  -Take 3 tablets (600 mg total) by mouth once daily for 21 days, then 7 days off. Take at same time each day, preferably in the morning.   -Do not chew, crush, or break the tablets.   If possible, patient was instructed tip the tablets from the RX bottle to the cap, and take directly from the cap to the mouth.  Patient may handle the medication with their hands if they wear with a latex or nitrile glove and wash their hands before and after handling the tablets.    Declined consultation on side effects of Kisqali. Patient is not new to therapy as she was on Kisqali and anastrozole in the past, but was non-adherent. After appt on , confirmed that she will restart. She states that she did not experience any significant side effects while on Kisqali and declined discussing potential side effects at this time. Aware of the importance of her labs and follow up appts with MD. Aware to reach out to [facility] for any questions or concerns.     DDIs: Medication list reviewed. Patient is not really using Zofran.     Patient was given 2 patient education handouts on how to handle oral chemotherapy and specific recommendations- do's and don'ts. Instructed the patient that if they have any remaining oral chemotherapy, not to flush down the toilet or throw away in the trash; The patient or caregiver should return the unused oral chemotherapy to either the clinic or to myself in the Pharmacy where the oral chemotherapy can be disposed of properly.     Patient confirmed understanding. Patient did not have additional questions.       Consultation included: indication; goals of  treatment; administration; storage and handling; side effects; how to handle side effects; the importance of compliance; how to handle missed doses; the importance of laboratory monitoring; the importance of keeping all follow up appointments.  Patient understands to report any medication changes to [facility] and provider. All questions answered and addressed to patients satisfaction. I will f/u with patient in 1 week from start, [facility] to contact patient in 3 weeks for refills.

## 2020-05-28 NOTE — TELEPHONE ENCOUNTER
----- Message from Wendy Lopez, PharmD sent at 5/28/2020 12:57 PM CDT -----  Regarding: Kisqali consultation  Good afternoon,    I was able to reach out to Ms. Wright today concerning her Kisqali. She declined consultation as she has been on the medication in the past. We will ship the medication today for her to receive tomorrow. I confirmed that she has already picked up the anastrozole from [facility] pharmacy as well. Plans to start Kisqali on 5/29 after she receives it.     Thank you,    Wendy Lopez, PharmD  [facility]   [phone]

## 2020-06-05 ENCOUNTER — TELEPHONE (OUTPATIENT)
Dept: HEMATOLOGY/ONCOLOGY | Facility: CLINIC | Age: 50
End: 2020-06-05

## 2020-06-15 ENCOUNTER — INFUSION (OUTPATIENT)
Dept: INFUSION THERAPY | Facility: HOSPITAL | Age: 50
End: 2020-06-15
Attending: INTERNAL MEDICINE
Payer: MEDICAID

## 2020-06-15 DIAGNOSIS — C50.112 MALIGNANT NEOPLASM OF CENTRAL PORTION OF LEFT BREAST IN FEMALE, ESTROGEN RECEPTOR POSITIVE: Primary | ICD-10-CM

## 2020-06-15 DIAGNOSIS — Z17.0 MALIGNANT NEOPLASM OF CENTRAL PORTION OF LEFT BREAST IN FEMALE, ESTROGEN RECEPTOR POSITIVE: Primary | ICD-10-CM

## 2020-06-15 PROCEDURE — 96402 CHEMO HORMON ANTINEOPL SQ/IM: CPT

## 2020-06-15 PROCEDURE — 63600175 PHARM REV CODE 636 W HCPCS: Mod: JG | Performed by: INTERNAL MEDICINE

## 2020-06-15 RX ADMIN — GOSERELIN ACETATE 3.6 MG: 3.6 IMPLANT SUBCUTANEOUS at 09:06

## 2020-06-22 ENCOUNTER — TELEPHONE (OUTPATIENT)
Dept: PHARMACY | Facility: CLINIC | Age: 50
End: 2020-06-22

## 2020-06-26 ENCOUNTER — TELEPHONE (OUTPATIENT)
Dept: HEMATOLOGY/ONCOLOGY | Facility: CLINIC | Age: 50
End: 2020-06-26

## 2020-06-26 NOTE — TELEPHONE ENCOUNTER
Noted  ----- Message from Linda Qiu RN sent at 6/26/2020  1:17 PM CDT -----  Hi   Can you help with getting this patient scheduled in [place]?    Linda  ----- Message -----  From: Fabienne Smith RN  Sent: 6/26/2020   9:49 AM CDT  To: [facility] Navigation      ----- Message -----  From: Wendy Echavarria  Sent: 6/26/2020   9:40 AM CDT  To: Froilan Velázquez ([facility]) Staff    Called to be scheduled with hem/onc in [facility].       She can be reached at [phone]    Thanks  KB

## 2020-06-26 NOTE — TELEPHONE ENCOUNTER
Noted    ----- Message from Amy Pritchard RN sent at 6/26/2020  1:34 PM CDT -----  Regarding: referral  This patient would like to be scheduled in [place].    Thanks  Amy

## 2020-07-07 NOTE — PROGRESS NOTES
History:     Reason For Consultation:   Metastatic ER positive breast cancer.     HPI:   Angeles Wright presents for follow up of metastatic breast cancer. She was noncompliant with therapy initially at [facility] and transferred care to [facility] in 9/2019. Labs showed her to be pre-menopausal thus we changed therapy to Ribo/Zoladex and Arimidex but she unfortunately was noncompliant and off therapy from November to May 2020. In 5.2020 scans showed improvement thus she restarted Arimidex/ribociclib/zoladex.    7/8/20: Moved to [place]. She's been on and off of Ribociclib - missed about 1 wk. Last took them Saturday. No pain. Walking well. No menopausal symptoms.     Oncologic History:  Presentation  - 1/2019 - presented for palpable mass left breast. Of note, patient had prior abnormal mammograms in 2016 for both breasts and was called back for additional images but didn't return.    - 1/2019 - Diagnostic mammogram and US showed 4.5 cm left breast mass with abnormal LN; 1.3 cm right breast mass  - 1/28/19 - Biopsy left breast mass - Grade 2 IDC, estrogen receptor 96%, progesterone receptor 95%, Her2 elaine neg, Ki67 30%.   - 2/2019 CT scans showed known left breast mass with axillary adenopathy, metastatic disease in chest and bones (ribs, sternum, vertebra, pelvis)  - 3/7/19 started Ibrance/Fulvsetrant. Dose reduced of Ibrance in 4/2019.   - 3/14/19 - 3/27/19 3000 cGt T4-T8     - 4/4/19 - Genetics GeneDx Breast/Gyn panel negative  [facility] oncology consultation on 9/3/19   - 9/2019 - Labs show pre-menopausal. Change therapy to Ribociclib, Zoladex and Arimidex.    - 9/2019 new baseline scans with diffuse osseous metastatic disease throughout spine, sternum, ribs, iliac wings, femoral bones. T5 with possible extension into spinal canal; left femur lesion at risk of pathologic fracture.    - 5/2020 - non-complaince - off treatment from 11/2019 - 5/2020. Saw ortho though and surgery not felt to be needed at time of consult  (2/2020). CT scans with improved disease. Labs still perimenopausal. Continued Arimidex/ribociclib/zoladex.    Medications    Current Outpatient Medications:     anastrozole (ARIMIDEX) 1 mg Tab, Take 1 tablet (1 mg total) by mouth once daily., Disp: 90 tablet, Rfl: 1    ondansetron (ZOFRAN) 8 MG tablet, Take 1 tablet (8 mg total) by mouth every 8 (eight) hours as needed for Nausea., Disp: 30 tablet, Rfl: 2    ribociclib (KISQALI) 600 mg/day (200 mg x 3) Tab, Take 600 mg by mouth once daily., Disp: 63 tablet, Rfl: 6  Allergies  Review of patient's allergies indicates:  No Known Allergies  Review of Systems  Review of Systems   Constitutional: Negative for activity change, appetite change, chills, fatigue, fever and unexpected weight change.   HENT: Negative for congestion, hearing loss, nosebleeds, sinus pressure and trouble swallowing.    Eyes: Negative for pain, discharge and itching.   Respiratory: Negative for cough, chest tightness and shortness of breath.    Cardiovascular: Negative for chest pain, palpitations and leg swelling.   Gastrointestinal: Negative for abdominal distention, abdominal pain, blood in stool, diarrhea, nausea, rectal pain and vomiting.   Endocrine: Negative for heat intolerance and polydipsia.   Genitourinary: Negative for difficulty urinating, dysuria, flank pain, frequency, hematuria and urgency.   Musculoskeletal: Negative for arthralgias, back pain and neck pain.   Skin: Negative for color change, pallor and rash.   Neurological: Negative for dizziness, numbness and headaches.   Hematological: Negative for adenopathy. Does not bruise/bleed easily.   Psychiatric/Behavioral: Negative for confusion and decreased concentration. The patient is not nervous/anxious.         Objective     Objective:     Vitals:    07/08/20 0916   BP: (!) 99/58   BP Location: Right arm   Patient Position: Sitting   Pulse: 73   Resp: 12   Temp: 98.1 °F (36.7 °C)   TempSrc: Temporal   SpO2: 99%   Weight: 59 kg  "(130 lb 1.1 oz)   Height: 5' 4" (1.626 m)     Body surface area is 1.63 meters squared.  Physical Exam  Constitutional:       General: She is not in acute distress.     Appearance: Normal appearance. She is well-developed.   HENT:      Head: Normocephalic and atraumatic.      Mouth/Throat:      Mouth: No oral lesions.   Eyes:      General: No scleral icterus.        Right eye: No discharge.         Left eye: No discharge.      Conjunctiva/sclera: Conjunctivae normal.   Neck:      Musculoskeletal: Normal range of motion and neck supple.      Thyroid: No thyroid mass or thyromegaly.   Cardiovascular:      Rate and Rhythm: Normal rate and regular rhythm.      Heart sounds: Normal heart sounds, S1 normal and S2 normal.   Pulmonary:      Effort: Pulmonary effort is normal. No respiratory distress.      Breath sounds: Normal breath sounds. No wheezing, rhonchi or rales.      Comments: No breast exam today  Chest:      Chest wall: There is no dullness to percussion.   Abdominal:      General: Bowel sounds are normal.      Palpations: Abdomen is soft.      Tenderness: There is no abdominal tenderness.   Lymphadenopathy:      Cervical: No cervical adenopathy.      Upper Body:      Right upper body: No supraclavicular adenopathy.      Left upper body: No supraclavicular adenopathy.   Skin:     General: Skin is warm and dry.      Coloration: Skin is not pale.   Neurological:      Mental Status: She is alert and oriented to person, place, and time.   Psychiatric:         Behavior: Behavior normal.         Thought Content: Thought content normal.           Labs and Imaging  Results for orders placed or performed in visit on 07/08/20   CBC Oncology   Result Value Ref Range    WBC 3.33 (L) 3.90 - 12.70 K/uL    RBC 4.19 4.00 - 5.40 M/uL    Hemoglobin 13.7 12.0 - 16.0 g/dL    Hematocrit 41.8 37.0 - 48.5 %    Mean Corpuscular Volume 100 (H) 82 - 98 fL    Mean Corpuscular Hemoglobin 32.7 (H) 27.0 - 31.0 pg    Mean Corpuscular " Hemoglobin Conc 32.8 32.0 - 36.0 g/dL    RDW 13.8 11.5 - 14.5 %    Platelets 327 150 - 350 K/uL    MPV 8.6 (L) 9.2 - 12.9 fL    Gran # (ANC) 1.6 (L) 1.8 - 7.7 K/uL    Immature Grans (Abs) 0.01 0.00 - 0.04 K/uL   Comprehensive metabolic panel   Result Value Ref Range    Sodium 139 136 - 145 mmol/L    Potassium 4.3 3.5 - 5.1 mmol/L    Chloride 107 95 - 110 mmol/L    CO2 28 22 - 31 mmol/L    Glucose 122 (H) 70 - 110 mg/dL    BUN, Bld 17 7 - 18 mg/dL    Creatinine 0.83 0.50 - 1.40 mg/dL    Calcium 9.5 8.4 - 10.2 mg/dL    Total Protein 7.3 6.0 - 8.4 g/dL    Albumin 4.6 3.5 - 5.2 g/dL    Total Bilirubin 0.4 0.2 - 1.3 mg/dL    Alkaline Phosphatase 75 38 - 145 U/L    AST 21 14 - 36 U/L    ALT 10 0 - 35 U/L    Anion Gap 4 (L) 8 - 16 mmol/L    eGFR if African American >60 >60 mL/min/1.73 m^2    eGFR if non African American >60 >60 mL/min/1.73 m^2   Cancer antigen 15-3   Result Value Ref Range    CA 15-3 164.0 (H) 0.0 - 35.0 U/mL         Assessment     Assessment:     1. Malignant neoplasm of central portion of left breast in female, estrogen receptor positive    2. Bone metastases    3. Malignant neoplasm metastatic to lung, unspecified laterality    4. Encounter for long-term (current) use of high-risk medication        1. Stage IV (T3N2M2) invasive ductal carcinoma of left breast,  quadrant, ER 96%, [place] 94%, Her2 neg, Grade 2, Ki67 30%   * See Onc history above.    * Continue Zoladex/Ribo/Arimidex. Contin     2. Bone mets   * No zometa due to prior jaw pain   * Referred to ortho due to increased fracture risk of femur due to metastatic involvement. Appt 10/30 but patient didn't show up; saw them 2/2020 and not felt to have imminent fracture   * Has received prior RT to T5 lesion. Discussed signs/symptoms of cord compression which she would need to seek emergent attention should they develop. Also knows to notify us or ortho if worsening leg pain as she would be a candidate for prophy fixation for femur.    3. Lung mets  4.  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 No Zometa. Asymptomatic. Has dentist    Plan:     1. Continue Zoladex monthly (3 month dose not approved), Arimidex and ribociclib.   1. 7/9/20 - start new cycle of ribo.  2. Tumor marker more elevated - too early to really tell since she just restarted therapy one month ago.     MD in 4 wks with cbc, cmp, ca 15-3  Zoladex 7/13 - will work on getting zoladex on same day as my appointments, but I don't want to delay a restart of ribo in order to do this quite yet. Will do in future.     Future Appointments   Date Time Provider Department Center   7/8/2020  9:30 AM Melania Mcgovern [facility]

## 2020-07-08 ENCOUNTER — INFUSION (OUTPATIENT)
Dept: INFUSION THERAPY | Facility: HOSPITAL | Age: 50
End: 2020-07-08
Attending: INTERNAL MEDICINE
Payer: MEDICAID

## 2020-07-08 ENCOUNTER — OFFICE VISIT (OUTPATIENT)
Dept: HEMATOLOGY/ONCOLOGY | Facility: CLINIC | Age: 50
End: 2020-07-08
Payer: MEDICAID

## 2020-07-08 VITALS
DIASTOLIC BLOOD PRESSURE: 58 MMHG | OXYGEN SATURATION: 99 % | BODY MASS INDEX: 22.2 KG/M2 | HEIGHT: 64 IN | HEART RATE: 73 BPM | SYSTOLIC BLOOD PRESSURE: 99 MMHG | TEMPERATURE: 98 F | RESPIRATION RATE: 12 BRPM | WEIGHT: 130.06 LBS

## 2020-07-08 VITALS
HEART RATE: 73 BPM | DIASTOLIC BLOOD PRESSURE: 58 MMHG | TEMPERATURE: 98 F | OXYGEN SATURATION: 99 % | SYSTOLIC BLOOD PRESSURE: 99 MMHG | RESPIRATION RATE: 12 BRPM

## 2020-07-08 DIAGNOSIS — C79.51 BONE METASTASES: ICD-10-CM

## 2020-07-08 DIAGNOSIS — Z17.0 MALIGNANT NEOPLASM OF CENTRAL PORTION OF LEFT BREAST IN FEMALE, ESTROGEN RECEPTOR POSITIVE: Primary | ICD-10-CM

## 2020-07-08 DIAGNOSIS — Z79.899 ENCOUNTER FOR LONG-TERM (CURRENT) USE OF HIGH-RISK MEDICATION: ICD-10-CM

## 2020-07-08 DIAGNOSIS — C78.00 MALIGNANT NEOPLASM METASTATIC TO LUNG, UNSPECIFIED LATERALITY: ICD-10-CM

## 2020-07-08 DIAGNOSIS — C50.112 MALIGNANT NEOPLASM OF CENTRAL PORTION OF LEFT BREAST IN FEMALE, ESTROGEN RECEPTOR POSITIVE: Primary | ICD-10-CM

## 2020-07-08 PROCEDURE — 63600175 PHARM REV CODE 636 W HCPCS: Mod: JG,PN | Performed by: INTERNAL MEDICINE

## 2020-07-08 PROCEDURE — 96402 CHEMO HORMON ANTINEOPL SQ/IM: CPT | Mod: PN

## 2020-07-08 PROCEDURE — 99213 OFFICE O/P EST LOW 20 MIN: CPT | Mod: PBBFAC,PN,25 | Performed by: INTERNAL MEDICINE

## 2020-07-08 PROCEDURE — 99214 PR OFFICE/OUTPT VISIT, EST, LEVL IV, 30-39 MIN: ICD-10-PCS | Mod: S$PBB,,, | Performed by: INTERNAL MEDICINE

## 2020-07-08 PROCEDURE — 99999 PR PBB SHADOW E&M-EST. PATIENT-LVL III: CPT | Mod: PBBFAC,,, | Performed by: INTERNAL MEDICINE

## 2020-07-08 PROCEDURE — 99999 PR PBB SHADOW E&M-EST. PATIENT-LVL III: ICD-10-PCS | Mod: PBBFAC,,, | Performed by: INTERNAL MEDICINE

## 2020-07-08 PROCEDURE — 99214 OFFICE O/P EST MOD 30 MIN: CPT | Mod: S$PBB,,, | Performed by: INTERNAL MEDICINE

## 2020-07-08 RX ADMIN — GOSERELIN ACETATE 3.6 MG: 3.6 IMPLANT SUBCUTANEOUS at 09:07

## 2020-07-21 ENCOUNTER — TELEPHONE (OUTPATIENT)
Dept: PHARMACY | Facility: CLINIC | Age: 50
End: 2020-07-21

## 2020-07-21 NOTE — TELEPHONE ENCOUNTER
RX incoming call regarding Kisqali @ [facility]. Shipping out  for  arrival with patients consent. Copay of $0.00 @ 004. Address and  confirmed. Patient has 21 tablets on hand at this time. Patient has not started any new medications, has had no missed doses and no side effects present. Patient is currently taking the medication as directed by doctors instruction. Patient states they do not have any questions or concerns at this time.

## 2020-08-04 NOTE — PROGRESS NOTES
History:     Reason For Consultation:   Metastatic ER positive breast cancer.     HPI:   Angeles Wright presents for follow up of metastatic breast cancer. She was noncompliant with [facility] and transferred [place] to [facility] [place]2019. Labs showed her to be pre-menopausal thus we changed therapy to Ribo/Zoladex and Arimidex but she unfortunately was noncompliant and off therapy from November to May 2020. In 5/2020 scans showed improvement thus she restarted Arimidex/ribociclib/zoladex.    8/5/20: Continues on Ribociclib but not completely aware of how she's been taking it - she is on the second week of a blister pack. Perhaps skipped the off week. Hasn't been on Arimidex - lost the bottle.  Feeing well without pain. + hot flashes.     Oncologic History:  Presentation  - 1/2019 - presented for palpable mass left breast. Of note, patient had prior abnormal mammograms in 2016 for both breasts and was called back for additional images but didn't return.    - 1/2019 - Diagnostic mammogram and US showed 4.5 cm left breast mass with abnormal LN; 1.3 cm right breast mass  - 1/28/19 - Biopsy left breast mass - Grade 2 IDC, estrogen receptor 96%, progesterone receptor 95%, Her2 elaine neg, Ki67 30%.   - 2/2019 CT scans showed known left breast mass with axillary adenopathy, metastatic disease in chest and bones (ribs, sternum, vertebra, pelvis)  - 3/7/19 started Ibrance/Fulvsetrant. Dose reduced of Ibrance in 4/2019.   - 3/14/19 - 3/27/19 3000 cGt T4-T8     - 4/4/19 - Genetics GeneDx Breast/Gyn panel negative  [facility] oncology consultation on 9/3/19   - 9/2019 - Labs show pre-menopausal. Change therapy to Ribociclib, Zoladex and Arimidex.    - 9/2019 new baseline scans with diffuse osseous metastatic disease throughout spine, sternum, ribs, iliac wings, femoral bones. T5 with possible extension into spinal canal; left femur lesion at risk of pathologic fracture.    - 5/2020 - non-complaince - off treatment  from 11/2019 - 5/2020. Saw ortho though and surgery not felt to be needed at time of consult (2/2020). CT scans with improved disease. Labs still perimenopausal. Continued Arimidex/ribociclib/zoladex.    Medications    Current Outpatient Medications:     anastrozole (ARIMIDEX) 1 mg Tab, Take 1 tablet (1 mg total) by mouth once daily., Disp: 90 tablet, Rfl: 1    ondansetron (ZOFRAN) 8 MG tablet, Take 1 tablet (8 mg total) by mouth every 8 (eight) hours as needed for Nausea., Disp: 30 tablet, Rfl: 2    ribociclib (KISQALI) 600 mg/day (200 mg x 3) Tab, Take 600 mg by mouth once daily., Disp: 63 tablet, Rfl: 6  Allergies  Review of patient's allergies indicates:  No Known Allergies  Review of Systems  Review of Systems   Constitutional: Negative for activity change, appetite change, chills, fatigue, fever and unexpected weight change.   HENT: Negative for congestion, hearing loss, nosebleeds, sinus pressure and trouble swallowing.    Eyes: Negative for pain, discharge and itching.   Respiratory: Negative for cough, chest tightness and shortness of breath.    Cardiovascular: Negative for chest pain, palpitations and leg swelling.   Gastrointestinal: Negative for abdominal distention, abdominal pain, blood in stool, diarrhea, nausea, rectal pain and vomiting.   Endocrine: Negative for heat intolerance and polydipsia.   Genitourinary: Negative for difficulty urinating, dysuria, flank pain, frequency, hematuria and urgency.   Musculoskeletal: Negative for arthralgias, back pain and neck pain.   Skin: Negative for color change, pallor and rash.   Neurological: Negative for dizziness, numbness and headaches.   Hematological: Negative for adenopathy. Does not bruise/bleed easily.   Psychiatric/Behavioral: Negative for confusion and decreased concentration. The patient is not nervous/anxious.         Objective     Objective:     Vitals:    08/05/20 1012   BP: 106/67   BP Location: Right arm   Patient Position: Sitting   BP  "Method: Medium (Automatic)   Pulse: (!) 56   Resp: 20   Temp: 97 °F (36.1 °C)   TempSrc: Temporal   SpO2: 99%   Weight: 59.4 kg (130 lb 15.3 oz)   Height: 5' 4" (1.626 m)     Body surface area is 1.64 meters squared.  Physical Exam  Constitutional:       General: She is not in acute distress.     Appearance: Normal appearance. She is well-developed.   HENT:      Head: Normocephalic and atraumatic.      Mouth/Throat:      Mouth: No oral lesions.   Eyes:      General: No scleral icterus.        Right eye: No discharge.         Left eye: No discharge.      Conjunctiva/sclera: Conjunctivae normal.   Neck:      Musculoskeletal: Normal range of motion and neck supple.      Thyroid: No thyroid mass or thyromegaly.   Cardiovascular:      Rate and Rhythm: Normal rate and regular rhythm.      Heart sounds: Normal heart sounds, S1 normal and S2 normal.   Pulmonary:      Effort: Pulmonary effort is normal. No respiratory distress.      Breath sounds: Normal breath sounds. No wheezing, rhonchi or rales.      Comments: Left breast 3.5 cm x 3.5 cm mass superior to nipple, no skin changes  Chest:      Chest wall: There is no dullness to percussion.   Abdominal:      General: Bowel sounds are normal.      Palpations: Abdomen is soft.      Tenderness: There is no abdominal tenderness.   Lymphadenopathy:      Cervical: No cervical adenopathy.      Upper Body:      Right upper body: No supraclavicular adenopathy.      Left upper body: No supraclavicular adenopathy.   Skin:     General: Skin is warm and dry.      Coloration: Skin is not pale.   Neurological:      Mental Status: She is alert and oriented to person, place, and time.   Psychiatric:         Behavior: Behavior normal.         Thought Content: Thought content normal.           Labs and Imaging  Results for orders placed or performed in visit on 07/08/20   CBC Oncology   Result Value Ref Range    WBC 3.33 (L) 3.90 - 12.70 K/uL    RBC 4.19 4.00 - 5.40 M/uL    Hemoglobin 13.7 " 12.0 - 16.0 g/dL    Hematocrit 41.8 37.0 - 48.5 %    Mean Corpuscular Volume 100 (H) 82 - 98 fL    Mean Corpuscular Hemoglobin 32.7 (H) 27.0 - 31.0 pg    Mean Corpuscular Hemoglobin Conc 32.8 32.0 - 36.0 g/dL    RDW 13.8 11.5 - 14.5 %    Platelets 327 150 - 350 K/uL    MPV 8.6 (L) 9.2 - 12.9 fL    Gran # (ANC) 1.6 (L) 1.8 - 7.7 K/uL    Immature Grans (Abs) 0.01 0.00 - 0.04 K/uL   Comprehensive metabolic panel   Result Value Ref Range    Sodium 139 136 - 145 mmol/L    Potassium 4.3 3.5 - 5.1 mmol/L    Chloride 107 95 - 110 mmol/L    CO2 28 22 - 31 mmol/L    Glucose 122 (H) 70 - 110 mg/dL    BUN, Bld 17 7 - 18 mg/dL    Creatinine 0.83 0.50 - 1.40 mg/dL    Calcium 9.5 8.4 - 10.2 mg/dL    Total Protein 7.3 6.0 - 8.4 g/dL    Albumin 4.6 3.5 - 5.2 g/dL    Total Bilirubin 0.4 0.2 - 1.3 mg/dL    Alkaline Phosphatase 75 38 - 145 U/L    AST 21 14 - 36 U/L    ALT 10 0 - 35 U/L    Anion Gap 4 (L) 8 - 16 mmol/L    eGFR if African American >60 >60 mL/min/1.73 m^2    eGFR if non African American >60 >60 mL/min/1.73 m^2   Cancer antigen 15-3   Result Value Ref Range    CA 15-3 164.0 (H) 0.0 - 35.0 U/mL         Assessment     Assessment:     1. Malignant neoplasm of central portion of left breast in female, estrogen receptor positive    2. Bone metastases    3. Malignant neoplasm metastatic to lung, unspecified laterality        1. Stage IV (T3N2M2) invasive ductal carcinoma of left breast,  quadrant, ER 96%, [place] 94%, Her2 neg, Grade 2, Ki67 30%   * See Onc history above.    * Continue Zoladex/Ribo/Arimidex. Re-educated to how to take today. Tumor is decreasing on exam.     2. Bone mets   * No zometa due to prior jaw pain   * Referred to ortho due to increased fracture risk of femur due to metastatic involvement. Appt 10/30 but patient didn't show up; saw them 2/2020 and not felt to have imminent fracture   * Has received prior RT to T5 lesion. Discussed signs/symptoms of cord compression which she would need to seek emergent  attention should they develop. Also knows to notify us or ortho if worsening leg pain as she would be a candidate for prophy fixation for femur.    3. Lung mets  4. 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 No Zometa. Asymptomatic. Has dentist    Plan:     1. Continue Zoladex monthly Arimidex and ribociclib.   2. Unclear of when new cycle of ribo will start; When she gets home, she will call to let me know how many more pills in this blister pack that she has and then we can plan our future appts.  - Encouraged compliance with both pills.   3. Tumor marker more elevated last time- too early to really tell since she just restarted therapy one month ago.    MD in 4 wks with cbc, cmp, ca 15-3, zoladex      Future Appointments   Date Time Provider Department Center   8/5/2020 11:00 AM INJECTION SCHEDULE, [facility]   9/2/2020  9:35 AM LAB, [facility]   9/2/2020 10:30 AM Melania Mcgovern MD [facility]   9/2/2020 11:15 AM INJECTION SCHEDULE, [facility] INFUSION [facility] INFUS [facility]

## 2020-08-05 ENCOUNTER — OFFICE VISIT (OUTPATIENT)
Dept: HEMATOLOGY/ONCOLOGY | Facility: CLINIC | Age: 50
End: 2020-08-05
Payer: MEDICAID

## 2020-08-05 ENCOUNTER — INFUSION (OUTPATIENT)
Dept: INFUSION THERAPY | Facility: HOSPITAL | Age: 50
End: 2020-08-05
Attending: INTERNAL MEDICINE
Payer: MEDICAID

## 2020-08-05 VITALS
BODY MASS INDEX: 22.35 KG/M2 | TEMPERATURE: 97 F | HEIGHT: 64 IN | HEART RATE: 56 BPM | OXYGEN SATURATION: 99 % | RESPIRATION RATE: 20 BRPM | SYSTOLIC BLOOD PRESSURE: 106 MMHG | WEIGHT: 130.94 LBS | DIASTOLIC BLOOD PRESSURE: 67 MMHG

## 2020-08-05 VITALS
TEMPERATURE: 97 F | RESPIRATION RATE: 20 BRPM | SYSTOLIC BLOOD PRESSURE: 106 MMHG | DIASTOLIC BLOOD PRESSURE: 67 MMHG | HEART RATE: 56 BPM

## 2020-08-05 DIAGNOSIS — Z17.0 MALIGNANT NEOPLASM OF CENTRAL PORTION OF LEFT BREAST IN FEMALE, ESTROGEN RECEPTOR POSITIVE: Primary | ICD-10-CM

## 2020-08-05 DIAGNOSIS — C79.51 BONE METASTASES: ICD-10-CM

## 2020-08-05 DIAGNOSIS — C50.112 MALIGNANT NEOPLASM OF CENTRAL PORTION OF LEFT BREAST IN FEMALE, ESTROGEN RECEPTOR POSITIVE: Primary | ICD-10-CM

## 2020-08-05 DIAGNOSIS — C78.00 MALIGNANT NEOPLASM METASTATIC TO LUNG, UNSPECIFIED LATERALITY: ICD-10-CM

## 2020-08-05 PROCEDURE — 99999 PR PBB SHADOW E&M-EST. PATIENT-LVL III: CPT | Mod: PBBFAC,,, | Performed by: INTERNAL MEDICINE

## 2020-08-05 PROCEDURE — 99999 PR PBB SHADOW E&M-EST. PATIENT-LVL III: ICD-10-PCS | Mod: PBBFAC,,, | Performed by: INTERNAL MEDICINE

## 2020-08-05 PROCEDURE — 99213 OFFICE O/P EST LOW 20 MIN: CPT | Mod: PBBFAC,PN | Performed by: INTERNAL MEDICINE

## 2020-08-05 PROCEDURE — 96402 CHEMO HORMON ANTINEOPL SQ/IM: CPT | Mod: PN

## 2020-08-05 PROCEDURE — 99215 PR OFFICE/OUTPT VISIT, EST, LEVL V, 40-54 MIN: ICD-10-PCS | Mod: S$PBB,,, | Performed by: INTERNAL MEDICINE

## 2020-08-05 PROCEDURE — 63600175 PHARM REV CODE 636 W HCPCS: Mod: JG,PN | Performed by: INTERNAL MEDICINE

## 2020-08-05 PROCEDURE — 99215 OFFICE O/P EST HI 40 MIN: CPT | Mod: S$PBB,,, | Performed by: INTERNAL MEDICINE

## 2020-08-05 RX ORDER — ANASTROZOLE 1 MG/1
1 TABLET ORAL DAILY
Qty: 90 TABLET | Refills: 1 | Status: SHIPPED | OUTPATIENT
Start: 2020-08-05 | End: 2021-11-15 | Stop reason: SDUPTHER

## 2020-08-05 RX ADMIN — GOSERELIN ACETATE 3.6 MG: 3.6 IMPLANT SUBCUTANEOUS at 11:08

## 2020-08-06 ENCOUNTER — TELEPHONE (OUTPATIENT)
Dept: HEMATOLOGY/ONCOLOGY | Facility: CLINIC | Age: 50
End: 2020-08-06

## 2020-08-06 NOTE — TELEPHONE ENCOUNTER
S/w Angeles re: Kisqali rx. States she started the pack on 7/26.  She is currently on the 2nd week of the pack.  She is due to complete the pack on 8/15.  Will let Dr. Mcgovern know and get recommendations for if f/u labs/MD visit should change.  Pt states ok to give apt information to Aleyda if she is not available

## 2020-08-06 NOTE — TELEPHONE ENCOUNTER
----- Message from Magdalena Sparks sent at 8/6/2020 11:26 AM CDT -----  Contact: Patient    ----- Message -----  From: Chava Hallman  Sent: 8/6/2020  11:21 AM CDT  To: Froilan Velázquez ([facility]) Staff    Patient Advice/Staff Message     Caller name/title: Pt    Provider: Froilan    Reason for call:  Calling to follow up with the doctor, was told to call the office today and speak with the doctor to let her know where she is with her medication.     Do you feel you need to be seen today:: No      Communication preference:: [phone]    Additional info::

## 2020-08-07 NOTE — TELEPHONE ENCOUNTER
S/w Aleyda (with pt's verbal permission)at the home she lives in.  Per Dr. Mcgovern, pt is to keep scheduled f/u apts in September.  When she finishes current dose of Kisqali on 8/15, she is not to start another pack until she sees Dr. Mcgovern with labs on Sept 2nd.  Aleyda verb agreement with this plan.  Also discussed pt may need to be roomed early for MD visit due to she will possibly be dropped off for appointment.

## 2020-08-17 ENCOUNTER — TELEPHONE (OUTPATIENT)
Dept: PHARMACY | Facility: CLINIC | Age: 50
End: 2020-08-17

## 2020-08-25 NOTE — TELEPHONE ENCOUNTER
Spoke with Mrs. Wright regarding ribociclib refill. She reports no changes in allergies, medical conditions or medications. She reports not missing any doses and having about a week left on hand. Confirmed 2 patient identifiers and shipping address.

## 2020-08-27 ENCOUNTER — TELEPHONE (OUTPATIENT)
Dept: PHARMACY | Facility: CLINIC | Age: 50
End: 2020-08-27

## 2020-08-27 NOTE — TELEPHONE ENCOUNTER
Call Attempt #1.  Called patient for Kisqali follow-up.  Patient was a lunch and was not available to take the call at the time.  Left message with  at the home where patient resides.  She will call back when available.

## 2020-08-28 NOTE — TELEPHONE ENCOUNTER
"Kisqali follow-up:  Patient returned call for follow-up of ongoing Kisqali treatment.    Dosing, how taking Kisqali: Taking 3 - 200mg with each dose x 21 days followed by 7 days off.  Taking before bed.  No missed doses.  Storage: Room temperature.    Handling: Washing hands immediately after each dose.  Aware of appropriate handling.  Side effects: Denies any side effects.  Reports some fatigue, but she "pushes through it" and does not feel its necessarily from Kisqali.  Declined to review full list of side effects.  Recent infections: No signs of infection (fever, chills, flu-like symptoms).  No unusual bruising or bleeding.  No visits to the urgent care/ER.  Pain: 0/10  Appetite: Good - eating 3 meals/day.  Gained a little weight, but stable now.  Drinks plenty of water.   Energy, fatigue: She feels more active than she used to be.  Reports "I just feel better".  Continues all ADLs independently.   Health, mood, QOL: Feels really well today.  Pleased that she tolerates Kisqali well.  Rates 2/10 (0 being best).  Has not missed planned activities due to therapy or disease.  Will see Dr. Mcgovern on 9/2/2020 with repeat labs.  Next clinical follow up: 3 months.  Medication list reviewed. No new allergies or health conditions. No DDIs to review.    Labs reviewed from: 8/5/2020 - WBC slightly decreased, but ANC normal, no anemia.  Platelets normal.  Renal function normal, liver function normal.  CA 15-3 decreased from 164 (7/8/2020) to 160 (8/5/2020).  Tolerates well.  Therapy appropriate.           "

## 2020-09-01 ENCOUNTER — TELEPHONE (OUTPATIENT)
Dept: PHARMACY | Facility: CLINIC | Age: 50
End: 2020-09-01

## 2020-09-01 NOTE — PROGRESS NOTES
History:     Reason For Follow Up:   Metastatic ER positive breast cancer.     HPI:   Angeles Wright presents for follow up of metastatic breast cancer. She was noncompliant with therapy initially at [facility] and transferred care to [facility] in 9/2019. Labs showed her to be pre-menopausal thus we changed therapy to Ribo/Zoladex and Arimidex but she unfortunately was noncompliant and off therapy from November to May 2020. In 5/2020 scans showed improvement thus she restarted Arimidex/ribociclib/zoladex.    9/2/2020 - Doing well, on off week of treatment. Will restart Friday. No new pains. Tumor marker decreasing. Hot flashes tolerable. Zoladex due today.   Overall much improved     Oncologic History:  Presentation  - 1/2019 - presented for palpable mass left breast. Of note, patient had prior abnormal mammograms in 2016 for both breasts and was called back for additional images but didn't return.    - 1/2019 - Diagnostic mammogram and US showed 4.5 cm left breast mass with abnormal LN; 1.3 cm right breast mass  - 1/28/19 - Biopsy left breast mass - Grade 2 IDC, estrogen receptor 96%, progesterone receptor 95%, Her2 elaine neg, Ki67 30%.   - 2/2019 CT scans showed known left breast mass with axillary adenopathy, metastatic disease in chest and bones (ribs, sternum, vertebra, pelvis)  - 3/7/19 started Ibrance/Fulvsetrant. Dose reduced of Ibrance in 4/2019.   - 3/14/19 - 3/27/19 3000 cGt T4-T8     - 4/4/19 - Genetics GeneDx Breast/Gyn panel negative  [facility] oncology consultation on 9/3/19   - 9/2019 - Labs show pre-menopausal. Change therapy to Ribociclib, Zoladex and Arimidex.    - 9/2019 new baseline scans with diffuse osseous metastatic disease throughout spine, sternum, ribs, iliac wings, femoral bones. T5 with possible extension into spinal canal; left femur lesion at risk of pathologic fracture.    - 5/2020 - non-complaince - off treatment from 11/2019 - 5/2020. Saw ortho though and surgery not felt to be needed at  time of consult (2/2020). CT scans with improved disease. Labs still perimenopausal. Continued Arimidex/ribociclib/zoladex.    Medications    Current Outpatient Medications:     anastrozole (ARIMIDEX) 1 mg Tab, Take 1 tablet (1 mg total) by mouth once daily., Disp: 90 tablet, Rfl: 1    ondansetron (ZOFRAN) 8 MG tablet, Take 1 tablet (8 mg total) by mouth every 8 (eight) hours as needed for Nausea., Disp: 30 tablet, Rfl: 2    ribociclib (KISQALI) 600 mg/day (200 mg x 3) Tab, Take 600 mg by mouth once daily., Disp: 63 tablet, Rfl: 6  Allergies  Review of patient's allergies indicates:  No Known Allergies  Review of Systems  Review of Systems   Constitutional: Negative for activity change, appetite change, chills, fatigue, fever and unexpected weight change.   HENT: Negative for congestion, hearing loss, nosebleeds, sinus pressure and trouble swallowing.    Eyes: Negative for pain, discharge and itching.   Respiratory: Negative for cough, chest tightness and shortness of breath.    Cardiovascular: Negative for chest pain, palpitations and leg swelling.   Gastrointestinal: Negative for abdominal distention, abdominal pain, blood in stool, diarrhea, nausea, rectal pain and vomiting.   Endocrine: Negative for heat intolerance and polydipsia.   Genitourinary: Negative for difficulty urinating, dysuria, flank pain, frequency, hematuria and urgency.   Musculoskeletal: Negative for arthralgias, back pain and neck pain.   Skin: Negative for color change, pallor and rash.   Neurological: Negative for dizziness, numbness and headaches.   Hematological: Negative for adenopathy. Does not bruise/bleed easily.   Psychiatric/Behavioral: Negative for confusion and decreased concentration. The patient is not nervous/anxious.         Objective     Objective:     Vitals:    09/02/20 1009   BP: 117/64   BP Location: Left arm   Patient Position: Sitting   Pulse: 88   Temp: 98.2 °F (36.8 °C)   TempSrc: Oral   SpO2: 98%   Weight: 58.5 kg  "(128 lb 15.5 oz)   Height: 5' 4" (1.626 m)     Body surface area is 1.63 meters squared.  Physical Exam  Constitutional:       General: She is not in acute distress.     Appearance: Normal appearance. She is well-developed.   HENT:      Head: Normocephalic and atraumatic.      Mouth/Throat:      Mouth: No oral lesions.   Eyes:      General: No scleral icterus.        Right eye: No discharge.         Left eye: No discharge.      Conjunctiva/sclera: Conjunctivae normal.   Neck:      Musculoskeletal: Normal range of motion and neck supple.      Thyroid: No thyroid mass or thyromegaly.   Cardiovascular:      Rate and Rhythm: Normal rate and regular rhythm.      Heart sounds: Normal heart sounds, S1 normal and S2 normal.   Pulmonary:      Effort: Pulmonary effort is normal. No respiratory distress.      Breath sounds: Normal breath sounds. No wheezing, rhonchi or rales.      Comments: Left breast 3.5 cm x 2.5 cm mass superior to nipple, no skin changes  Chest:      Chest wall: There is no dullness to percussion.   Abdominal:      General: Bowel sounds are normal.      Palpations: Abdomen is soft.      Tenderness: There is no abdominal tenderness.   Lymphadenopathy:      Cervical: No cervical adenopathy.      Upper Body:      Right upper body: No supraclavicular adenopathy.      Left upper body: No supraclavicular adenopathy.   Skin:     General: Skin is warm and dry.      Coloration: Skin is not pale.   Neurological:      Mental Status: She is alert and oriented to person, place, and time.   Psychiatric:         Behavior: Behavior normal.         Thought Content: Thought content normal.           Labs and Imaging  Results for orders placed or performed in visit on 09/02/20   CBC Oncology   Result Value Ref Range    WBC 5.38 3.90 - 12.70 K/uL    RBC 4.23 4.00 - 5.40 M/uL    Hemoglobin 14.1 12.0 - 16.0 g/dL    Hematocrit 41.9 37.0 - 48.5 %    Mean Corpuscular Volume 99 (H) 82 - 98 fL    Mean Corpuscular Hemoglobin 33.3 (H) " 27.0 - 31.0 pg    Mean Corpuscular Hemoglobin Conc 33.7 32.0 - 36.0 g/dL    RDW 15.0 (H) 11.5 - 14.5 %    Platelets 327 150 - 350 K/uL    MPV 8.5 (L) 9.2 - 12.9 fL    Gran # (ANC) 3.0 1.8 - 7.7 K/uL    Immature Grans (Abs) 0.02 0.00 - 0.04 K/uL   Comprehensive metabolic panel   Result Value Ref Range    Sodium 142 136 - 145 mmol/L    Potassium 4.3 3.5 - 5.1 mmol/L    Chloride 107 95 - 110 mmol/L    CO2 28 22 - 31 mmol/L    Glucose 98 70 - 110 mg/dL    BUN, Bld 17 7 - 18 mg/dL    Creatinine 0.91 0.50 - 1.40 mg/dL    Calcium 9.6 8.4 - 10.2 mg/dL    Total Protein 7.2 6.0 - 8.4 g/dL    Albumin 4.5 3.5 - 5.2 g/dL    Total Bilirubin 0.3 0.2 - 1.3 mg/dL    Alkaline Phosphatase 80 38 - 145 U/L    AST 24 14 - 36 U/L    ALT 11 0 - 35 U/L    Anion Gap 7 (L) 8 - 16 mmol/L    eGFR if African American >60 >60 mL/min/1.73 m^2    eGFR if non African American >60 >60 mL/min/1.73 m^2   Cancer antigen 15-3   Result Value Ref Range    CA 15-3 121.0 (H) 0.0 - 35.0 U/mL         Assessment     Assessment:     1. Malignant neoplasm of central portion of left breast in female, estrogen receptor positive    2. Bone metastases    3. Malignant neoplasm metastatic to lung, unspecified laterality        1. Stage IV (T3N2M2) invasive ductal carcinoma of left breast,  quadrant, ER 96%, AZ 94%, Her2 neg, Grade 2, Ki67 30%   * See Onc history above.    * Continue Zoladex/Ribo/Arimidex. Tumor is decreasing on exam.     2. Bone mets   * No zometa due to prior jaw pain   * Referred to ortho due to increased fracture risk of femur due to metastatic involvement. Appt 10/30 but patient didn't show up; saw them 2/2020 and not felt to have imminent fracture   * Has received prior RT to T5 lesion. Discussed signs/symptoms of cord compression which she would need to seek emergent attention should they develop. Also knows to notify us or ortho if worsening leg pain as she would be a candidate for prophy fixation for femur.    3. Lung mets  4. Possible  osteonecrosis of jaw. No Zometa. Asymptomatic. Has dentist    Plan:     1. Continue Zoladex monthly Arimidex and ribociclib. Start new cycle 9/4  2. Tumor marker improving now.   3. Repeat imaging in 1 mo (tumor marker improving but before the last month, she had some confusion with how to take medication.)    MD in 4 wks with Dr Aviles cbc, cmp, ca 15-3, zoladex and CT scans      Future Appointments   Date Time Provider Department Center   9/2/2020 11:15 AM INJECTION SCHEDULE, [facility]   9/30/2020 11:15 AM INJECTION SCHEDULE, [facility]

## 2020-09-02 ENCOUNTER — INFUSION (OUTPATIENT)
Dept: INFUSION THERAPY | Facility: HOSPITAL | Age: 50
End: 2020-09-02
Attending: INTERNAL MEDICINE
Payer: MEDICAID

## 2020-09-02 ENCOUNTER — OFFICE VISIT (OUTPATIENT)
Dept: HEMATOLOGY/ONCOLOGY | Facility: CLINIC | Age: 50
End: 2020-09-02
Payer: MEDICAID

## 2020-09-02 VITALS
HEIGHT: 64 IN | SYSTOLIC BLOOD PRESSURE: 117 MMHG | WEIGHT: 129 LBS | BODY MASS INDEX: 22.02 KG/M2 | OXYGEN SATURATION: 98 % | TEMPERATURE: 98 F | DIASTOLIC BLOOD PRESSURE: 64 MMHG | HEART RATE: 88 BPM

## 2020-09-02 VITALS
HEART RATE: 88 BPM | RESPIRATION RATE: 20 BRPM | TEMPERATURE: 98 F | SYSTOLIC BLOOD PRESSURE: 117 MMHG | DIASTOLIC BLOOD PRESSURE: 64 MMHG

## 2020-09-02 DIAGNOSIS — C50.112 MALIGNANT NEOPLASM OF CENTRAL PORTION OF LEFT BREAST IN FEMALE, ESTROGEN RECEPTOR POSITIVE: Primary | ICD-10-CM

## 2020-09-02 DIAGNOSIS — C79.51 BONE METASTASES: ICD-10-CM

## 2020-09-02 DIAGNOSIS — Z17.0 MALIGNANT NEOPLASM OF CENTRAL PORTION OF LEFT BREAST IN FEMALE, ESTROGEN RECEPTOR POSITIVE: Primary | ICD-10-CM

## 2020-09-02 DIAGNOSIS — C78.00 MALIGNANT NEOPLASM METASTATIC TO LUNG, UNSPECIFIED LATERALITY: ICD-10-CM

## 2020-09-02 PROCEDURE — 99214 OFFICE O/P EST MOD 30 MIN: CPT | Mod: S$PBB,,, | Performed by: INTERNAL MEDICINE

## 2020-09-02 PROCEDURE — 99214 OFFICE O/P EST MOD 30 MIN: CPT | Mod: PBBFAC,25,PN | Performed by: INTERNAL MEDICINE

## 2020-09-02 PROCEDURE — 25000003 PHARM REV CODE 250: Mod: PN | Performed by: INTERNAL MEDICINE

## 2020-09-02 PROCEDURE — 99214 PR OFFICE/OUTPT VISIT, EST, LEVL IV, 30-39 MIN: ICD-10-PCS | Mod: S$PBB,,, | Performed by: INTERNAL MEDICINE

## 2020-09-02 PROCEDURE — 96402 CHEMO HORMON ANTINEOPL SQ/IM: CPT | Mod: PN

## 2020-09-02 PROCEDURE — 63600175 PHARM REV CODE 636 W HCPCS: Mod: JG,PN | Performed by: INTERNAL MEDICINE

## 2020-09-02 PROCEDURE — A4216 STERILE WATER/SALINE, 10 ML: HCPCS | Mod: PN | Performed by: INTERNAL MEDICINE

## 2020-09-02 PROCEDURE — 99999 PR PBB SHADOW E&M-EST. PATIENT-LVL IV: CPT | Mod: PBBFAC,,, | Performed by: INTERNAL MEDICINE

## 2020-09-02 PROCEDURE — 99999 PR PBB SHADOW E&M-EST. PATIENT-LVL IV: ICD-10-PCS | Mod: PBBFAC,,, | Performed by: INTERNAL MEDICINE

## 2020-09-02 RX ORDER — SODIUM CHLORIDE 0.9 % (FLUSH) 0.9 %
10 SYRINGE (ML) INJECTION
Status: CANCELLED | OUTPATIENT
Start: 2020-09-02

## 2020-09-02 RX ORDER — HEPARIN 100 UNIT/ML
500 SYRINGE INTRAVENOUS
Status: CANCELLED | OUTPATIENT
Start: 2020-09-02

## 2020-09-02 RX ORDER — HEPARIN 100 UNIT/ML
500 SYRINGE INTRAVENOUS
Status: COMPLETED | OUTPATIENT
Start: 2020-09-02 | End: 2020-09-02

## 2020-09-02 RX ORDER — SODIUM CHLORIDE 0.9 % (FLUSH) 0.9 %
10 SYRINGE (ML) INJECTION
Status: COMPLETED | OUTPATIENT
Start: 2020-09-02 | End: 2020-09-02

## 2020-09-02 RX ADMIN — GOSERELIN ACETATE 3.6 MG: 3.6 IMPLANT SUBCUTANEOUS at 11:09

## 2020-09-02 RX ADMIN — HEPARIN 500 UNITS: 100 SYRINGE at 11:09

## 2020-09-02 RX ADMIN — Medication 10 ML: at 11:09

## 2020-09-02 NOTE — Clinical Note
MD in 4 wks with cbc, cmp, ca 15-3, zoladex and CT scans - Please set up with Dr Thomas. Please move her injection to Friday when she sees Dr Thomas.

## 2020-09-22 ENCOUNTER — TELEPHONE (OUTPATIENT)
Dept: PHARMACY | Facility: CLINIC | Age: 50
End: 2020-09-22

## 2020-09-28 ENCOUNTER — HOSPITAL ENCOUNTER (OUTPATIENT)
Dept: RADIOLOGY | Facility: HOSPITAL | Age: 50
Discharge: HOME | End: 2020-09-28
Attending: INTERNAL MEDICINE
Payer: MEDICAID

## 2020-09-28 DIAGNOSIS — Z17.0 MALIGNANT NEOPLASM OF CENTRAL PORTION OF LEFT BREAST IN FEMALE, ESTROGEN RECEPTOR POSITIVE: ICD-10-CM

## 2020-09-28 DIAGNOSIS — C50.112 MALIGNANT NEOPLASM OF CENTRAL PORTION OF LEFT BREAST IN FEMALE, ESTROGEN RECEPTOR POSITIVE: ICD-10-CM

## 2020-09-28 DIAGNOSIS — C79.51 BONE METASTASES: ICD-10-CM

## 2020-09-28 DIAGNOSIS — C78.00 MALIGNANT NEOPLASM METASTATIC TO LUNG, UNSPECIFIED LATERALITY: ICD-10-CM

## 2020-09-28 PROCEDURE — 74177 CT ABD & PELVIS W/CONTRAST: CPT | Mod: TC,PO

## 2020-09-28 PROCEDURE — 74177 CT ABD & PELVIS W/CONTRAST: CPT | Mod: 26,,, | Performed by: RADIOLOGY

## 2020-09-28 PROCEDURE — 74177 CT CHEST ABDOMEN PELVIS WITH CONTRAST (XPD): ICD-10-PCS | Mod: 26,,, | Performed by: RADIOLOGY

## 2020-09-28 PROCEDURE — 71260 CT CHEST ABDOMEN PELVIS WITH CONTRAST (XPD): ICD-10-PCS | Mod: 26,,, | Performed by: RADIOLOGY

## 2020-09-28 PROCEDURE — 25500020 PHARM REV CODE 255: Mod: PO | Performed by: INTERNAL MEDICINE

## 2020-09-28 PROCEDURE — A9698 NON-RAD CONTRAST MATERIALNOC: HCPCS | Mod: PO | Performed by: INTERNAL MEDICINE

## 2020-09-28 PROCEDURE — 71260 CT THORAX DX C+: CPT | Mod: 26,,, | Performed by: RADIOLOGY

## 2020-09-28 RX ADMIN — IOHEXOL 75 ML: 350 INJECTION, SOLUTION INTRAVENOUS at 09:09

## 2020-09-28 RX ADMIN — IOHEXOL 1000 ML: 9 SOLUTION ORAL at 09:09

## 2020-10-02 ENCOUNTER — INFUSION (OUTPATIENT)
Dept: INFUSION THERAPY | Facility: HOSPITAL | Age: 50
End: 2020-10-02
Attending: INTERNAL MEDICINE
Payer: MEDICAID

## 2020-10-02 ENCOUNTER — OFFICE VISIT (OUTPATIENT)
Dept: HEMATOLOGY/ONCOLOGY | Facility: CLINIC | Age: 50
End: 2020-10-02
Payer: MEDICAID

## 2020-10-02 VITALS
OXYGEN SATURATION: 100 % | WEIGHT: 132.69 LBS | TEMPERATURE: 98 F | DIASTOLIC BLOOD PRESSURE: 48 MMHG | HEART RATE: 76 BPM | BODY MASS INDEX: 22.78 KG/M2 | SYSTOLIC BLOOD PRESSURE: 106 MMHG

## 2020-10-02 VITALS
WEIGHT: 132.69 LBS | TEMPERATURE: 98 F | DIASTOLIC BLOOD PRESSURE: 48 MMHG | RESPIRATION RATE: 18 BRPM | SYSTOLIC BLOOD PRESSURE: 106 MMHG | HEIGHT: 64 IN | HEART RATE: 76 BPM | BODY MASS INDEX: 22.65 KG/M2

## 2020-10-02 DIAGNOSIS — C78.00 MALIGNANT NEOPLASM METASTATIC TO LUNG, UNSPECIFIED LATERALITY: ICD-10-CM

## 2020-10-02 DIAGNOSIS — Z17.0 MALIGNANT NEOPLASM OF CENTRAL PORTION OF LEFT BREAST IN FEMALE, ESTROGEN RECEPTOR POSITIVE: Primary | ICD-10-CM

## 2020-10-02 DIAGNOSIS — C50.112 MALIGNANT NEOPLASM OF CENTRAL PORTION OF LEFT BREAST IN FEMALE, ESTROGEN RECEPTOR POSITIVE: Primary | ICD-10-CM

## 2020-10-02 DIAGNOSIS — R53.0 NEOPLASTIC MALIGNANT RELATED FATIGUE: ICD-10-CM

## 2020-10-02 DIAGNOSIS — R74.01 TRANSAMINITIS: ICD-10-CM

## 2020-10-02 DIAGNOSIS — C79.51 BONE METASTASES: ICD-10-CM

## 2020-10-02 PROCEDURE — 99999 PR PBB SHADOW E&M-EST. PATIENT-LVL III: CPT | Mod: PBBFAC,,, | Performed by: INTERNAL MEDICINE

## 2020-10-02 PROCEDURE — 63600175 PHARM REV CODE 636 W HCPCS: Mod: JG,PN | Performed by: INTERNAL MEDICINE

## 2020-10-02 PROCEDURE — 99215 OFFICE O/P EST HI 40 MIN: CPT | Mod: S$PBB,,, | Performed by: INTERNAL MEDICINE

## 2020-10-02 PROCEDURE — 99213 OFFICE O/P EST LOW 20 MIN: CPT | Mod: PBBFAC,PN | Performed by: INTERNAL MEDICINE

## 2020-10-02 PROCEDURE — 99215 PR OFFICE/OUTPT VISIT, EST, LEVL V, 40-54 MIN: ICD-10-PCS | Mod: S$PBB,,, | Performed by: INTERNAL MEDICINE

## 2020-10-02 PROCEDURE — 99999 PR PBB SHADOW E&M-EST. PATIENT-LVL III: ICD-10-PCS | Mod: PBBFAC,,, | Performed by: INTERNAL MEDICINE

## 2020-10-02 PROCEDURE — 96402 CHEMO HORMON ANTINEOPL SQ/IM: CPT | Mod: PN

## 2020-10-02 RX ADMIN — GOSERELIN ACETATE 3.6 MG: 3.6 IMPLANT SUBCUTANEOUS at 12:10

## 2020-10-02 NOTE — Clinical Note
Ok for zoladex.   Need to schedule bone scan ( has been ordered)   Need a referral to a dentist for clearance prior to zometa.

## 2020-10-02 NOTE — PROGRESS NOTES
PROGRESS NOTE    Subjective:       Patient ID: Angeles Wright is a 50 y.o. female.  MRN: [id number]  : 1970    Chief Complaint: Metastatic ER positive breast cancer.     History of Present Illness:   Angeles Wright is a 50 y.o. female who presents with metastatic breast cancer. She was following up with Dr. Mcgovern and is transferring care to me as Dr. Mcgovern is leaving.     As previously documented by Dr. Mcgovern  she was [facility]compliant with [facility] and transferred care to [facility] [place] [place] in 2019. Labs showed her to be pre-menopausal thus we changed therapy to Ribociclib /Zoladex and Arimidex but she unfortunately was noncompliant and off therapy from November to May 2020. In 2020 scans showed improvement thus she restarted Arimidex/ribociclib/zoladex.     10/2/2020.   She presents today to discuss her symptoms, labs and further recommendations.  She reports tolerating treatment well.  She does report intermittent joint pains, especially in her feet, not too bothersome and self-limited.  She denies any fatigue or hot flashes.  She denies any pain today.  She denies any nausea, vomiting, diarrhea or constipation   This is her week off of Ribociclib and she will start tonight.      Oncology History:  Per Dr. Mcgovern's notes   - 2019 - presented for palpable mass left breast. Of note, patient had prior abnormal mammograms in 2016 for both breasts and was called back for additional images but didn't return.    - 2019 - Diagnostic mammogram and US showed 4.5 cm left breast mass with abnormal LN; 1.3 cm right breast mass  - 19 - Biopsy left breast mass - Grade 2 IDC, estrogen receptor 96%, progesterone receptor 95%, Her2 elaine neg, Ki67 30%.   - 2019 CT scans showed known left breast mass with axillary adenopathy, metastatic disease in chest and bones (ribs, sternum, vertebra, pelvis)  - 3/7/19 started Ibrance/Fulvsetrant. Dose reduced of Ibrance in  4/2019.              - 3/14/19 - 3/27/19 3000 cGt T4-T8                              - 4/4/19 - Genetics GeneDx Breast/Gyn panel negative  [facility] oncology consultation on 9/3/19              - 9/2019 - Labs show pre-menopausal. Change therapy to Ribociclib, Zoladex and Arimidex.               - 9/2019 new baseline scans with diffuse osseous metastatic disease throughout spine, sternum, ribs, iliac wings, femoral bones. T5 with possible extension into spinal canal; left femur lesion at risk of pathologic fracture.               - 5/2020 - non-complaince - off treatment from 11/2019 - 5/2020. Saw ortho though and surgery not felt to be needed at time of consult (2/2020). CT scans with improved disease. Labs still perimenopausal. Continued Arimidex/ribociclib/zoladex.     Oncology History   Malignant neoplasm of central portion of left breast in female, estrogen receptor positive   9/3/2019 Initial Diagnosis    Malignant neoplasm of central portion of left breast in female, estrogen receptor positive     9/3/2019 - 9/3/2019 Chemotherapy    Treatment Summary   Plan Name: OP ANASTROZOLE PALBOCICLIB Q4W  Treatment Goal: Palliative  Status: Inactive  Start Date: 9/3/2019  End Date: 9/3/2019  Provider: Melania Mcgovern MD  Chemotherapy: [No matching medication found in this treatment plan]     9/18/2019 Cancer Staged    Staging form: Breast, AJCC 8th Edition  - Clinical: Stage IV (cT4b, cM1, ER+, CA+, HER2-)         History:  Past Medical History:   Diagnosis Date    Breast cancer       Past Surgical History:   Procedure Laterality Date    TUBAL LIGATION       Family History   Problem Relation Age of Onset    Lung cancer Mother     Heart attack Father     Suicide Brother     No Known Problems Maternal Aunt     No Known Problems Maternal Uncle     No Known Problems Paternal Aunt     No Known Problems Paternal Uncle     No Known Problems Maternal Grandmother     No Known Problems Maternal Grandfather      No Known Problems Paternal Grandmother     No Known Problems Paternal Grandfather     No Known Problems Son     No Known Problems Son     No Known Problems Daughter      Social History     Tobacco Use    Smoking status: Current Every Day Smoker     Packs/day: 0.50     Years: 25.00     Pack years: 12.50     Start date: 9/3/1994    Smokeless tobacco: Never Used   Substance and Sexual Activity    Alcohol use: Yes     Comment: occasionally    Drug use: Not Currently    Sexual activity: Yes     Partners: Male     Birth control/protection: None        ROS:   Review of Systems   Constitutional: Positive for malaise/fatigue (mild). Negative for fever and weight loss.   HENT: Negative for congestion, hearing loss, nosebleeds and sore throat.    Eyes: Negative for double vision and photophobia.   Respiratory: Negative for cough, hemoptysis, sputum production, shortness of breath and wheezing.    Cardiovascular: Negative for chest pain, palpitations, orthopnea and leg swelling.   Gastrointestinal: Negative for abdominal pain, blood in stool, constipation, diarrhea, heartburn, nausea and vomiting.   Genitourinary: Negative for dysuria, hematuria and urgency.   Musculoskeletal: Positive for joint pain (feet, in am). Negative for back pain and myalgias.   Skin: Negative for itching and rash.   Neurological: Negative for dizziness, tingling, seizures, weakness and headaches.   Endo/Heme/Allergies: Negative for polydipsia. Does not bruise/bleed easily.   Psychiatric/Behavioral: Negative for depression and memory loss. The patient is not nervous/anxious and does not have insomnia.         Objective:     Vitals:    10/02/20 1100   BP: (!) 106/48   Pulse: 76   Temp: 97.5 °F (36.4 °C)   TempSrc: Temporal   SpO2: 100%   Weight: 60.2 kg (132 lb 11.5 oz)   PainSc: 0-No pain     Wt Readings from Last 10 Encounters:   10/02/20 60.2 kg (132 lb 11.5 oz)   09/02/20 58.5 kg (128 lb 15.5 oz)   08/05/20 59.4 kg (130 lb 15.3 oz)    07/08/20 59 kg (130 lb 1.1 oz)   05/27/20 58.1 kg (128 lb 1.4 oz)   05/18/20 56.7 kg (125 lb)   02/04/20 53.5 kg (118 lb)   11/25/19 53.8 kg (118 lb 9.7 oz)   10/15/19 55 kg (121 lb 4.1 oz)   09/18/19 55.8 kg (123 lb 0.3 oz)       Physical Examination:   Physical Exam   Constitutional: She is oriented to person, place, and time and well-developed, well-nourished, and in no distress. No distress.   HENT:   Head: Normocephalic.   Mouth/Throat: Oropharynx is clear and moist. No oropharyngeal exudate.   Eyes: Conjunctivae and EOM are normal. No scleral icterus.   Neck: Neck supple. No thyromegaly present.   Cardiovascular: Normal rate, regular rhythm and normal heart sounds.   No murmur heard.  Pulmonary/Chest: Effort normal. No stridor. No respiratory distress. She has no wheezes. She has no rales. She exhibits no tenderness.   Abdominal: Soft. Bowel sounds are normal. She exhibits no distension and no mass. There is no abdominal tenderness. There is no rebound.   Musculoskeletal: Normal range of motion.         General: No tenderness, deformity or edema.   Lymphadenopathy:     She has no cervical adenopathy.   Neurological: She is alert and oriented to person, place, and time. No cranial nerve deficit. Gait normal. Coordination normal.   Skin: Skin is warm and dry. No rash noted. She is not diaphoretic. No erythema.   Psychiatric: Memory, affect and judgment normal.   Nursing note and vitals reviewed.       Diagnostic Tests:  Significant Imaging: I have reviewed and interpreted all pertinent imaging results/findings.  CT Chest Without Contrast No results found for this or any previous visit.  CT Chest With ContrastNo results found for this or any previous visit.  CT Abdomen/Pelvis Without Contrast No results found for this or any previous visit.   CT Abdomen/Pelvis With Contrast No results found for this or any previous visit.  CT Abdomen/Pelvis With Without Contrast No results found for this or any previous visit.    PET Scan No results found for this or any previous visit.    Laboratory Data:  All pertinent labs have been reviewed.  Labs:   Lab Results   Component Value Date    WBC 5.10 09/28/2020    RBC 4.30 09/28/2020    HGB 13.8 09/28/2020    HCT 41.1 09/28/2020    MCV 96 09/28/2020     09/28/2020     09/28/2020     09/28/2020    K 4.5 09/28/2020    BUN 12 09/28/2020    CREATININE 0.9 09/28/2020    AST 46 (H) 09/28/2020    ALT 87 (H) 09/28/2020    BILITOT 0.5 09/28/2020       Assessment/Plan:   Malignant neoplasm of central portion of left breast in female, estrogen receptor positive  Bone metastases  Malignant neoplasm metastatic to lung, unspecified laterality   Stage IV (T3N2M2) invasive ductal carcinoma of left breast,  quadrant, ER 96%, [place] 94%, Her2 neg, Grade 2, Ki67 30%  Recent tumor marker is declining.  CT scans of the chest abdomen and pelvis show stable disease.  Bone scan is pending.  Continue current therapy with Zoladex ribociclib and Arimidex.  Continue till disease progression.    She has not been on Zometa due to poor dental health and possible osteonecrosis of the jaw.  She is agreeable to see a dentist to see if she can have that dental work required to be eligible for Zometa.   Bone metastasis appears stable on recent CT scan, we will also review bone scan once it is done.  History of prior RT to T5 lesion is noted.  She is completely asymptomatic at this time.    Transaminitis  Mild transaminitis is new.  She is instructed to avoid Tylenol, alcohol, over-the-counter or herbal medications.  We will continue to monitor.  No evidence of hepatic metastasis on recent scans.    Neoplastic malignant related fatigue  She is instructed to stay active to contract fatigue     ECOG SCORE    1 - Restricted in strenuous activity-ambulatory and able to carry out work of a light nature           Discussion:   No follow-ups on file.    Plan was discussed with the patient at length, and she  verbalized understanding. Angeles was given an opportunity to ask questions that were answered to her satisfaction, and she was advised to call in the interval if any problems or questions arise.    Electronically signed by Shelby Thomas MD

## 2020-10-07 ENCOUNTER — TELEPHONE (OUTPATIENT)
Dept: HEMATOLOGY/ONCOLOGY | Facility: CLINIC | Age: 50
End: 2020-10-07

## 2020-10-07 DIAGNOSIS — C50.112 MALIGNANT NEOPLASM OF CENTRAL PORTION OF LEFT BREAST IN FEMALE, ESTROGEN RECEPTOR POSITIVE: Primary | ICD-10-CM

## 2020-10-07 DIAGNOSIS — Z13.84 ENCOUNTER FOR SCREENING FOR DENTAL DISORDERS: ICD-10-CM

## 2020-10-07 DIAGNOSIS — Z17.0 MALIGNANT NEOPLASM OF CENTRAL PORTION OF LEFT BREAST IN FEMALE, ESTROGEN RECEPTOR POSITIVE: Primary | ICD-10-CM

## 2020-10-07 DIAGNOSIS — C79.51 BONE METASTASES: ICD-10-CM

## 2020-10-07 NOTE — TELEPHONE ENCOUNTER
S/w pt re: dental referral sent to [facility].  They are to call later today or tomorrow to set up dental apt for clearance to start zometa.  Requested they call office to notify of day of apt.  Discussed other upcoming apt dates/times.   Verb agreement with plan.

## 2020-10-07 NOTE — TELEPHONE ENCOUNTER
4th call attempt for Kisqali refill. Left message with  for call back. [facility] inactive.     [facility] has been attempting to reach Ms. Wright regarding prescription refill for Kisqali.     After numerous unsuccessful attempts and a portal message, we are sending a postcard to the address on file. At this point in time, no further contact will be made from [facility] until patient responds to our mail correspondence.    inBasket sent to prescriber.

## 2020-10-08 ENCOUNTER — TELEPHONE (OUTPATIENT)
Dept: HEMATOLOGY/ONCOLOGY | Facility: CLINIC | Age: 50
End: 2020-10-08

## 2020-10-08 NOTE — TELEPHONE ENCOUNTER
----- Message from Fox Qiu sent at 10/8/2020 10:10 AM CDT -----  Regarding: advice  Contact: nurse  Type: Needs Medical Advice  Who Called:  Aleyda with [facility]   Symptoms (please be specific):    How long has patient had these symptoms:   Pharmacy name and phone #:    Best Call Back Number:  [phone]  Additional Information: the office called stating the patient has a dental appointment 01/06/2021.

## 2020-10-08 NOTE — TELEPHONE ENCOUNTER
noted   [Itching] : itching [Skin Rash] : skin rash [Negative] : Heme/Lymph [de-identified] : tick bite

## 2020-10-12 ENCOUNTER — TELEPHONE (OUTPATIENT)
Dept: HEMATOLOGY/ONCOLOGY | Facility: CLINIC | Age: 50
End: 2020-10-12

## 2020-10-12 NOTE — TELEPHONE ENCOUNTER
----- Message from Remedios Johnston sent at 10/12/2020  4:19 PM CDT -----  Type:  Patient Returning Call    Who Called:  Patient  Who Left Message for Patient:  Payal  Does the patient know what this is regarding?:  yes  Best Call Back Number:    Additional Information:  No response on messenger.

## 2020-10-14 ENCOUNTER — HOSPITAL ENCOUNTER (OUTPATIENT)
Dept: RADIOLOGY | Facility: HOSPITAL | Age: 50
Discharge: HOME | End: 2020-10-14
Attending: INTERNAL MEDICINE
Payer: MEDICAID

## 2020-10-14 DIAGNOSIS — C50.112 MALIGNANT NEOPLASM OF CENTRAL PORTION OF LEFT BREAST IN FEMALE, ESTROGEN RECEPTOR POSITIVE: ICD-10-CM

## 2020-10-14 DIAGNOSIS — Z17.0 MALIGNANT NEOPLASM OF CENTRAL PORTION OF LEFT BREAST IN FEMALE, ESTROGEN RECEPTOR POSITIVE: ICD-10-CM

## 2020-10-14 DIAGNOSIS — C78.00 MALIGNANT NEOPLASM METASTATIC TO LUNG, UNSPECIFIED LATERALITY: ICD-10-CM

## 2020-10-14 DIAGNOSIS — C79.51 BONE METASTASES: ICD-10-CM

## 2020-10-14 PROCEDURE — 78306 NM BONE SCAN WHOLE BODY: ICD-10-PCS | Mod: 26,,, | Performed by: RADIOLOGY

## 2020-10-14 PROCEDURE — A9503 TC99M MEDRONATE: HCPCS | Mod: PO

## 2020-10-14 PROCEDURE — 78306 BONE IMAGING WHOLE BODY: CPT | Mod: 26,,, | Performed by: RADIOLOGY

## 2020-10-30 ENCOUNTER — INFUSION (OUTPATIENT)
Dept: INFUSION THERAPY | Facility: HOSPITAL | Age: 50
End: 2020-10-30
Attending: INTERNAL MEDICINE
Payer: MEDICAID

## 2020-10-30 ENCOUNTER — LAB VISIT (OUTPATIENT)
Dept: LAB | Facility: HOSPITAL | Age: 50
End: 2020-10-30
Attending: INTERNAL MEDICINE
Payer: MEDICAID

## 2020-10-30 ENCOUNTER — TELEPHONE (OUTPATIENT)
Dept: HEMATOLOGY/ONCOLOGY | Facility: CLINIC | Age: 50
End: 2020-10-30

## 2020-10-30 ENCOUNTER — OFFICE VISIT (OUTPATIENT)
Dept: HEMATOLOGY/ONCOLOGY | Facility: CLINIC | Age: 50
End: 2020-10-30
Payer: MEDICAID

## 2020-10-30 VITALS
WEIGHT: 132.25 LBS | HEIGHT: 64 IN | RESPIRATION RATE: 18 BRPM | DIASTOLIC BLOOD PRESSURE: 77 MMHG | OXYGEN SATURATION: 99 % | HEART RATE: 66 BPM | TEMPERATURE: 98 F | BODY MASS INDEX: 22.58 KG/M2 | SYSTOLIC BLOOD PRESSURE: 127 MMHG

## 2020-10-30 VITALS
HEART RATE: 66 BPM | SYSTOLIC BLOOD PRESSURE: 127 MMHG | TEMPERATURE: 98 F | DIASTOLIC BLOOD PRESSURE: 77 MMHG | RESPIRATION RATE: 18 BRPM

## 2020-10-30 DIAGNOSIS — C79.51 BONE METASTASES: ICD-10-CM

## 2020-10-30 DIAGNOSIS — Z17.0 MALIGNANT NEOPLASM OF CENTRAL PORTION OF LEFT BREAST IN FEMALE, ESTROGEN RECEPTOR POSITIVE: Primary | ICD-10-CM

## 2020-10-30 DIAGNOSIS — C50.112 MALIGNANT NEOPLASM OF CENTRAL PORTION OF LEFT BREAST IN FEMALE, ESTROGEN RECEPTOR POSITIVE: Primary | ICD-10-CM

## 2020-10-30 DIAGNOSIS — R74.01 TRANSAMINITIS: ICD-10-CM

## 2020-10-30 DIAGNOSIS — C78.00 MALIGNANT NEOPLASM METASTATIC TO LUNG, UNSPECIFIED LATERALITY: ICD-10-CM

## 2020-10-30 DIAGNOSIS — Z17.0 MALIGNANT NEOPLASM OF CENTRAL PORTION OF LEFT BREAST IN FEMALE, ESTROGEN RECEPTOR POSITIVE: ICD-10-CM

## 2020-10-30 DIAGNOSIS — C50.112 MALIGNANT NEOPLASM OF CENTRAL PORTION OF LEFT BREAST IN FEMALE, ESTROGEN RECEPTOR POSITIVE: ICD-10-CM

## 2020-10-30 LAB
ALBUMIN SERPL BCP-MCNC: 4.6 G/DL (ref 3.5–5.2)
ALP SERPL-CCNC: 105 U/L (ref 55–135)
ALT SERPL W/O P-5'-P-CCNC: 157 U/L (ref 10–44)
ANION GAP SERPL CALC-SCNC: 12 MMOL/L (ref 8–16)
AST SERPL-CCNC: 76 U/L (ref 10–40)
BILIRUB SERPL-MCNC: 0.5 MG/DL (ref 0.1–1)
BUN SERPL-MCNC: 11 MG/DL (ref 6–20)
CALCIUM SERPL-MCNC: 9.1 MG/DL (ref 8.7–10.5)
CHLORIDE SERPL-SCNC: 105 MMOL/L (ref 95–110)
CO2 SERPL-SCNC: 23 MMOL/L (ref 23–29)
CREAT SERPL-MCNC: 0.9 MG/DL (ref 0.5–1.4)
ERYTHROCYTE [DISTWIDTH] IN BLOOD BY AUTOMATED COUNT: 14 % (ref 11.5–14.5)
EST. GFR  (AFRICAN AMERICAN): >60 ML/MIN/1.73 M^2
EST. GFR  (NON AFRICAN AMERICAN): >60 ML/MIN/1.73 M^2
GLUCOSE SERPL-MCNC: 102 MG/DL (ref 70–110)
HCT VFR BLD AUTO: 41.8 % (ref 37–48.5)
HGB BLD-MCNC: 14 G/DL (ref 12–16)
IMM GRANULOCYTES # BLD AUTO: 0.01 K/UL (ref 0–0.04)
MCH RBC QN AUTO: 32.7 PG (ref 27–31)
MCHC RBC AUTO-ENTMCNC: 33.5 G/DL (ref 32–36)
MCV RBC AUTO: 98 FL (ref 82–98)
NEUTROPHILS # BLD AUTO: 3 K/UL (ref 1.8–7.7)
PLATELET # BLD AUTO: 238 K/UL (ref 150–350)
PMV BLD AUTO: 8.2 FL (ref 9.2–12.9)
POTASSIUM SERPL-SCNC: 4.2 MMOL/L (ref 3.5–5.1)
PROT SERPL-MCNC: 7 G/DL (ref 6–8.4)
RBC # BLD AUTO: 4.28 M/UL (ref 4–5.4)
SODIUM SERPL-SCNC: 140 MMOL/L (ref 136–145)
WBC # BLD AUTO: 4.94 K/UL (ref 3.9–12.7)

## 2020-10-30 PROCEDURE — 63600175 PHARM REV CODE 636 W HCPCS: Mod: PN | Performed by: INTERNAL MEDICINE

## 2020-10-30 PROCEDURE — A4216 STERILE WATER/SALINE, 10 ML: HCPCS | Mod: PN | Performed by: INTERNAL MEDICINE

## 2020-10-30 PROCEDURE — 63600175 PHARM REV CODE 636 W HCPCS: Mod: JG,PN | Performed by: INTERNAL MEDICINE

## 2020-10-30 PROCEDURE — 86300 IMMUNOASSAY TUMOR CA 15-3: CPT

## 2020-10-30 PROCEDURE — 80053 COMPREHEN METABOLIC PANEL: CPT | Mod: PO

## 2020-10-30 PROCEDURE — 99999 PR PBB SHADOW E&M-EST. PATIENT-LVL III: CPT | Mod: PBBFAC,,, | Performed by: INTERNAL MEDICINE

## 2020-10-30 PROCEDURE — 99215 OFFICE O/P EST HI 40 MIN: CPT | Mod: S$PBB,,, | Performed by: INTERNAL MEDICINE

## 2020-10-30 PROCEDURE — 85027 COMPLETE CBC AUTOMATED: CPT | Mod: PO

## 2020-10-30 PROCEDURE — 36415 COLL VENOUS BLD VENIPUNCTURE: CPT | Mod: PO

## 2020-10-30 PROCEDURE — 25000003 PHARM REV CODE 250: Mod: PN | Performed by: INTERNAL MEDICINE

## 2020-10-30 PROCEDURE — 96402 CHEMO HORMON ANTINEOPL SQ/IM: CPT | Mod: PN

## 2020-10-30 PROCEDURE — 99999 PR PBB SHADOW E&M-EST. PATIENT-LVL III: ICD-10-PCS | Mod: PBBFAC,,, | Performed by: INTERNAL MEDICINE

## 2020-10-30 PROCEDURE — 99215 PR OFFICE/OUTPT VISIT, EST, LEVL V, 40-54 MIN: ICD-10-PCS | Mod: S$PBB,,, | Performed by: INTERNAL MEDICINE

## 2020-10-30 PROCEDURE — 99213 OFFICE O/P EST LOW 20 MIN: CPT | Mod: PBBFAC,PN,25 | Performed by: INTERNAL MEDICINE

## 2020-10-30 RX ORDER — HEPARIN 100 UNIT/ML
500 SYRINGE INTRAVENOUS
Status: CANCELLED | OUTPATIENT
Start: 2020-10-30

## 2020-10-30 RX ORDER — HEPARIN 100 UNIT/ML
500 SYRINGE INTRAVENOUS
Status: COMPLETED | OUTPATIENT
Start: 2020-10-30 | End: 2020-10-30

## 2020-10-30 RX ORDER — SODIUM CHLORIDE 0.9 % (FLUSH) 0.9 %
10 SYRINGE (ML) INJECTION
Status: CANCELLED | OUTPATIENT
Start: 2020-10-30

## 2020-10-30 RX ORDER — SODIUM CHLORIDE 0.9 % (FLUSH) 0.9 %
10 SYRINGE (ML) INJECTION
Status: COMPLETED | OUTPATIENT
Start: 2020-10-30 | End: 2020-10-30

## 2020-10-30 RX ADMIN — Medication 10 ML: at 12:10

## 2020-10-30 RX ADMIN — GOSERELIN ACETATE 3.6 MG: 3.6 IMPLANT SUBCUTANEOUS at 12:10

## 2020-10-30 RX ADMIN — HEPARIN 500 UNITS: 100 SYRINGE at 12:10

## 2020-10-30 NOTE — PROGRESS NOTES
PROGRESS NOTE    Subjective:       Patient ID: Angeles Wright is a 50 y.o. female.  MRN: [id number]  : 1970    Chief Complaint: Metastatic ER positive breast cancer    History of Present Illness:   Angeles Wright is a 50 y.o. female who presents with metastatic breast cancer.      As previously documented by Dr. Mcgovern  she was noncompliant with therapy initially at [facility] and transferred care to [facility] in 2019. Labs showed her to be pre-menopausal thus we changed therapy to Ribociclib /Zoladex and Arimidex but she unfortunately was noncompliant and off therapy from November to May 2020. In 2020 scans showed improvement thus she restarted Arimidex/ribociclib/zoladex.     10/30/2020.   She presents today to discuss her symptoms, labs and further recommendations.  She reports tolerating treatment well.   she has started her most recent cycle of ribociclib approximately 2 weeks ago.  She has stable and intermittent joint pains.  She denies any fatigue.  She denies any diarrhea, nausea or vomiting.  She reports stable weight, appetite and performance status.         Oncology History:  Per Dr. Mcgovern's notes   - 2019 - presented for palpable mass left breast. Of note, patient had prior abnormal mammograms in 2016 for both breasts and was called back for additional images but didn't return.    - 2019 - Diagnostic mammogram and US showed 4.5 cm left breast mass with abnormal LN; 1.3 cm right breast mass  - 19 - Biopsy left breast mass - Grade 2 IDC, estrogen receptor 96%, progesterone receptor 95%, Her2 elaine neg, Ki67 30%.   - 2019 CT scans showed known left breast mass with axillary adenopathy, metastatic disease in chest and bones (ribs, sternum, vertebra, pelvis)  - 3/7/19 started Ibrance/Fulvsetrant. Dose reduced of Ibrance in 2019.              - 3/14/19 - 3/27/19 3000 cGt T4-T8                              - 19 - Genetics GeneDx Breast/Gyn panel  negative  [facility] onc[facility] consultation on 9/3/19              - 9/2019 - Labs show pre-menopausal. Change therapy to Ribociclib, Zoladex and Arimidex.               - 9/2019 new baseline scans with diffuse osseous metastatic disease throughout spine, sternum, ribs, iliac wings, femoral bones. T5 with possible extension into spinal canal; left femur lesion at risk of pathologic fracture.               - 5/2020 - non-complaince - off treatment from 11/2019 - 5/2020. Saw ortho though and surgery not felt to be needed at time of consult (2/2020). CT scans with improved disease. Labs still perimenopausal. Continued Arimidex/ribociclib/zoladex.       Oncology History:  Oncology History   Malignant neoplasm of central portion of left breast in female, estrogen receptor positive   9/3/2019 Initial Diagnosis    Malignant neoplasm of central portion of left breast in female, estrogen receptor positive     9/3/2019 - 9/3/2019 Chemotherapy    Treatment Summary   Plan Name: OP ANASTROZOLE PALBOCICLIB Q4W  Treatment Goal: Palliative  Status: Inactive  Start Date: 9/3/2019  End Date: 9/3/2019  Provider: Melania Mcgovern MD  Chemotherapy: [No matching medication found in this treatment plan]     9/18/2019 Cancer Staged    Staging form: Breast, AJCC 8th Edition  - Clinical: Stage IV (cT4b, cM1, ER+, ME+, HER2-)         History:  Past Medical History:   Diagnosis Date    Breast cancer       Past Surgical History:   Procedure Laterality Date    TUBAL LIGATION       Family History   Problem Relation Age of Onset    Lung cancer Mother     Heart attack Father     Suicide Brother     No Known Problems Maternal Aunt     No Known Problems Maternal Uncle     No Known Problems Paternal Aunt     No Known Problems Paternal Uncle     No Known Problems Maternal Grandmother     No Known Problems Maternal Grandfather     No Known Problems Paternal Grandmother     No Known Problems Paternal Grandfather     No Known Problems  "Son     No Known Problems Son     No Known Problems Daughter      Social History     Tobacco Use    Smoking status: Current Every Day Smoker     Packs/day: 0.50     Years: 25.00     Pack years: 12.50     Start date: 9/3/1994    Smokeless tobacco: Never Used   Substance and Sexual Activity    Alcohol use: Yes     Comment: occasionally    Drug use: Not Currently    Sexual activity: Yes     Partners: Male     Birth control/protection: None        ROS:   Review of Systems   Constitutional: Positive for malaise/fatigue. Negative for fever and weight loss.   HENT: Negative for congestion, hearing loss, nosebleeds and sore throat.    Eyes: Negative for double vision and photophobia.   Respiratory: Negative for cough, hemoptysis, sputum production, shortness of breath and wheezing.    Cardiovascular: Negative for chest pain, palpitations, orthopnea and leg swelling.   Gastrointestinal: Negative for abdominal pain, blood in stool, constipation, diarrhea, heartburn, nausea and vomiting.   Genitourinary: Negative for dysuria, hematuria and urgency.   Musculoskeletal: Positive for joint pain. Negative for back pain and myalgias.   Skin: Negative for itching and rash.   Neurological: Negative for dizziness, tingling, seizures, weakness and headaches.   Endo/Heme/Allergies: Negative for polydipsia. Does not bruise/bleed easily.   Psychiatric/Behavioral: Negative for depression and memory loss. The patient is not nervous/anxious and does not have insomnia.         Objective:     Vitals:    10/30/20 1139   BP: 127/77   Pulse: 66   Resp: 18   Temp: 98.4 °F (36.9 °C)   TempSrc: Temporal   SpO2: 99%   Weight: 60 kg (132 lb 4.4 oz)   Height: 5' 4" (1.626 m)   PainSc: 0-No pain     Wt Readings from Last 10 Encounters:   10/30/20 60 kg (132 lb 4.4 oz)   10/02/20 60.2 kg (132 lb 11.5 oz)   10/02/20 60.2 kg (132 lb 11.5 oz)   09/02/20 58.5 kg (128 lb 15.5 oz)   08/05/20 59.4 kg (130 lb 15.3 oz)   07/08/20 59 kg (130 lb 1.1 oz) "   05/27/20 58.1 kg (128 lb 1.4 oz)   05/18/20 56.7 kg (125 lb)   02/04/20 53.5 kg (118 lb)   11/25/19 53.8 kg (118 lb 9.7 oz)       Physical Examination:   Physical Exam   Constitutional: She is oriented to person, place, and time and well-developed, well-nourished, and in no distress. No distress.   HENT:   Head: Normocephalic.   Mouth/Throat: Oropharynx is clear and moist. No oropharyngeal exudate.   Eyes: Conjunctivae and EOM are normal. No scleral icterus.   Neck: Neck supple. No thyromegaly present.   Cardiovascular: Normal rate, regular rhythm and normal heart sounds.   No murmur heard.  Pulmonary/Chest: Effort normal. No stridor. No respiratory distress. She has no wheezes. She has no rales. She exhibits no tenderness.   Abdominal: Soft. Bowel sounds are normal. She exhibits no distension and no mass. There is no abdominal tenderness. There is no rebound.   Musculoskeletal: Normal range of motion.         General: No tenderness, deformity or edema.   Lymphadenopathy:     She has no cervical adenopathy.   Neurological: She is alert and oriented to person, place, and time. No cranial nerve deficit. Gait normal. Coordination normal.   Skin: Skin is warm and dry. No rash noted. She is not diaphoretic. No erythema.   +stable  left breast mass   Psychiatric: Memory, affect and judgment normal.   Nursing note and vitals reviewed.       Diagnostic Tests:  Significant Imaging: I have reviewed and interpreted all pertinent imaging results/findings.  CT Chest Without Contrast No results found for this or any previous visit.  CT Chest With ContrastNo results found for this or any previous visit.  CT Abdomen/Pelvis Without Contrast No results found for this or any previous visit.   CT Abdomen/Pelvis With Contrast No results found for this or any previous visit.  CT Abdomen/Pelvis With Without Contrast No results found for this or any previous visit.   PET Scan No results found for this or any previous visit.    Laboratory  Data:  All pertinent labs have been reviewed.  Labs:   Lab Results   Component Value Date    WBC 4.94 10/30/2020    RBC 4.28 10/30/2020    HGB 14.0 10/30/2020    HCT 41.8 10/30/2020    MCV 98 10/30/2020     10/30/2020     10/30/2020     10/30/2020    K 4.2 10/30/2020    BUN 11 10/30/2020    CREATININE 0.9 10/30/2020    AST 76 (H) 10/30/2020     (H) 10/30/2020    BILITOT 0.5 10/30/2020       Assessment/Plan:   Malignant neoplasm of central portion of left breast in female, estrogen receptor positive  Bone metastases  Malignant neoplasm metastatic to lung, unspecified laterality   Stage IV (T3N2M2) invasive ductal carcinoma of left breast,  quadrant, ER 96%, LA 94%, Her2 neg, Grade 2, Ki67 30%  Recent tumor marker is declining.  CT scans of the chest abdomen and pelvis show stable disease.  Bone scan shows stable disease and some areas of improvement.   Continue current therapy with Zoladex ribociclib and Arimidex.  Continue till disease progression.     She has not been on Zometa due to poor dental health and possible osteonecrosis of the jaw. She declined a follow up dental visit. Given stable bone scan, continue to hold at this time.       Transaminitis  Grade 1 transaminitis is noted.  Likely secondary to ribociclib.  I will repeat her LFTs prior to next cycle and hold Ribociclib if she has persistent elevation.      ECOG SCORE    1 - Restricted in strenuous activity-ambulatory and able to carry out work of a light nature           Discussion:   No follow-ups on file.    Plan was discussed with the patient at length, and she verbalized understanding. Angeles was given an opportunity to ask questions that were answered to her satisfaction, and she was advised to call in the interval if any problems or questions arise.    Electronically signed by Shelby Thomas MD

## 2020-10-30 NOTE — TELEPHONE ENCOUNTER
S/w Aleyda at [facility].  Discussed the pt's Kisqali Rx was delivered on 10/15 and she probably started that date and it should run through to 11/5.  She has a 7 day rest period.  She will need to get labs on 11/12 ([facility] location) and see Dr. Thomas in clinic on 11/13.   Discussed she should accept next delivery but DO NOT START until after she sees the doc.  Verb understanding and will assist pt with timeline of meds/labs/apt.

## 2020-11-03 LAB — CANCER AG15-3 SERPL-ACNC: 86 U/ML (ref 0–31)

## 2020-11-12 ENCOUNTER — LAB VISIT (OUTPATIENT)
Dept: LAB | Facility: HOSPITAL | Age: 50
End: 2020-11-12
Attending: INTERNAL MEDICINE
Payer: MEDICAID

## 2020-11-12 DIAGNOSIS — C50.112 MALIGNANT NEOPLASM OF CENTRAL PORTION OF LEFT BREAST IN FEMALE, ESTROGEN RECEPTOR POSITIVE: ICD-10-CM

## 2020-11-12 DIAGNOSIS — Z17.0 MALIGNANT NEOPLASM OF CENTRAL PORTION OF LEFT BREAST IN FEMALE, ESTROGEN RECEPTOR POSITIVE: ICD-10-CM

## 2020-11-12 LAB
ALBUMIN SERPL BCP-MCNC: 4.1 G/DL (ref 3.5–5.2)
ALP SERPL-CCNC: 113 U/L (ref 55–135)
ALT SERPL W/O P-5'-P-CCNC: 168 U/L (ref 10–44)
ANION GAP SERPL CALC-SCNC: 8 MMOL/L (ref 8–16)
AST SERPL-CCNC: 90 U/L (ref 10–40)
BASOPHILS # BLD AUTO: 0.1 K/UL (ref 0–0.2)
BASOPHILS NFR BLD: 1.9 % (ref 0–1.9)
BILIRUB SERPL-MCNC: 0.3 MG/DL (ref 0.1–1)
BUN SERPL-MCNC: 13 MG/DL (ref 6–20)
CALCIUM SERPL-MCNC: 9.4 MG/DL (ref 8.7–10.5)
CHLORIDE SERPL-SCNC: 105 MMOL/L (ref 95–110)
CO2 SERPL-SCNC: 25 MMOL/L (ref 23–29)
CREAT SERPL-MCNC: 0.9 MG/DL (ref 0.5–1.4)
DIFFERENTIAL METHOD: ABNORMAL
EOSINOPHIL # BLD AUTO: 0.1 K/UL (ref 0–0.5)
EOSINOPHIL NFR BLD: 0.9 % (ref 0–8)
ERYTHROCYTE [DISTWIDTH] IN BLOOD BY AUTOMATED COUNT: 13.6 % (ref 11.5–14.5)
EST. GFR  (AFRICAN AMERICAN): >60 ML/MIN/1.73 M^2
EST. GFR  (NON AFRICAN AMERICAN): >60 ML/MIN/1.73 M^2
GLUCOSE SERPL-MCNC: 90 MG/DL (ref 70–110)
HCT VFR BLD AUTO: 40.1 % (ref 37–48.5)
HGB BLD-MCNC: 13.6 G/DL (ref 12–16)
IMM GRANULOCYTES # BLD AUTO: 0.01 K/UL (ref 0–0.04)
IMM GRANULOCYTES NFR BLD AUTO: 0.2 % (ref 0–0.5)
LYMPHOCYTES # BLD AUTO: 1.5 K/UL (ref 1–4.8)
LYMPHOCYTES NFR BLD: 27.7 % (ref 18–48)
MCH RBC QN AUTO: 33.3 PG (ref 27–31)
MCHC RBC AUTO-ENTMCNC: 33.9 G/DL (ref 32–36)
MCV RBC AUTO: 98 FL (ref 82–98)
MONOCYTES # BLD AUTO: 0.7 K/UL (ref 0.3–1)
MONOCYTES NFR BLD: 12.2 % (ref 4–15)
NEUTROPHILS # BLD AUTO: 3.1 K/UL (ref 1.8–7.7)
NEUTROPHILS NFR BLD: 57.3 % (ref 38–73)
NRBC BLD-RTO: 0 /100 WBC
PLATELET # BLD AUTO: 240 K/UL (ref 150–350)
PMV BLD AUTO: 8.7 FL (ref 9.2–12.9)
POTASSIUM SERPL-SCNC: 4.8 MMOL/L (ref 3.5–5.1)
PROT SERPL-MCNC: 7 G/DL (ref 6–8.4)
RBC # BLD AUTO: 4.09 M/UL (ref 4–5.4)
SODIUM SERPL-SCNC: 138 MMOL/L (ref 136–145)
WBC # BLD AUTO: 5.34 K/UL (ref 3.9–12.7)

## 2020-11-12 PROCEDURE — 36415 COLL VENOUS BLD VENIPUNCTURE: CPT | Mod: PO

## 2020-11-12 PROCEDURE — 80053 COMPREHEN METABOLIC PANEL: CPT

## 2020-11-12 PROCEDURE — 85025 COMPLETE CBC W/AUTO DIFF WBC: CPT | Mod: PO

## 2020-11-13 ENCOUNTER — OFFICE VISIT (OUTPATIENT)
Dept: HEMATOLOGY/ONCOLOGY | Facility: CLINIC | Age: 50
End: 2020-11-13
Payer: MEDICAID

## 2020-11-13 VITALS
TEMPERATURE: 98 F | OXYGEN SATURATION: 99 % | SYSTOLIC BLOOD PRESSURE: 108 MMHG | BODY MASS INDEX: 22.43 KG/M2 | HEIGHT: 64 IN | HEART RATE: 75 BPM | WEIGHT: 131.38 LBS | DIASTOLIC BLOOD PRESSURE: 65 MMHG

## 2020-11-13 DIAGNOSIS — R74.01 TRANSAMINITIS: ICD-10-CM

## 2020-11-13 DIAGNOSIS — C78.00 MALIGNANT NEOPLASM METASTATIC TO LUNG, UNSPECIFIED LATERALITY: ICD-10-CM

## 2020-11-13 DIAGNOSIS — Z17.0 MALIGNANT NEOPLASM OF CENTRAL PORTION OF LEFT BREAST IN FEMALE, ESTROGEN RECEPTOR POSITIVE: Primary | ICD-10-CM

## 2020-11-13 DIAGNOSIS — J00 ACUTE RHINITIS: ICD-10-CM

## 2020-11-13 DIAGNOSIS — C79.51 BONE METASTASES: ICD-10-CM

## 2020-11-13 DIAGNOSIS — C50.112 MALIGNANT NEOPLASM OF CENTRAL PORTION OF LEFT BREAST IN FEMALE, ESTROGEN RECEPTOR POSITIVE: Primary | ICD-10-CM

## 2020-11-13 PROCEDURE — 99215 OFFICE O/P EST HI 40 MIN: CPT | Mod: S$PBB,,, | Performed by: INTERNAL MEDICINE

## 2020-11-13 PROCEDURE — 99999 PR PBB SHADOW E&M-EST. PATIENT-LVL III: CPT | Mod: PBBFAC,,, | Performed by: INTERNAL MEDICINE

## 2020-11-13 PROCEDURE — 99213 OFFICE O/P EST LOW 20 MIN: CPT | Mod: PBBFAC,PN | Performed by: INTERNAL MEDICINE

## 2020-11-13 PROCEDURE — 99999 PR PBB SHADOW E&M-EST. PATIENT-LVL III: ICD-10-PCS | Mod: PBBFAC,,, | Performed by: INTERNAL MEDICINE

## 2020-11-13 PROCEDURE — 99215 PR OFFICE/OUTPT VISIT, EST, LEVL V, 40-54 MIN: ICD-10-PCS | Mod: S$PBB,,, | Performed by: INTERNAL MEDICINE

## 2020-11-13 RX ORDER — GUAIFENESIN 600 MG/1
600 TABLET, EXTENDED RELEASE ORAL 2 TIMES DAILY
Qty: 14 TABLET | Refills: 2 | Status: SHIPPED | OUTPATIENT
Start: 2020-11-13 | End: 2021-03-05

## 2020-11-13 NOTE — PROGRESS NOTES
PROGRESS NOTE    Subjective:       Patient ID: Angeles Wright is a 50 y.o. female.  MRN: [id number]  : 1970    Chief Complaint: Metastatic ER positive breast cancer    History of Present Illness:   Angeles Wright is a 50 y.o. female who presents with metastatic breast cancer.      As previously documented by Dr. Mcgovern  she was noncompliant with therapy initially at [facility] and transferred care to [facility] in 2019. Labs showed her to be pre-menopausal thus we changed therapy to Ribociclib /Zoladex and Arimidex but she unfortunately was noncompliant and off therapy from November to May 2020. In 2020 scans showed improvement thus she restarted Arimidex/ribociclib/zoladex.     20  She presents today to discuss her symptoms, labs and further recommendations.  She reports tolerating treatment well.  she has been off of Ribociclib for 1 week and is due to start her treatment today.      She continues tolerating treatment well.  She reports sinus congestion and rhinorrhea for the past few days.  She denies any fever, chills, chest pain or shortness of breath.  She denies any COVID exposure.  She has stable appetite and performance status.  She denies any pain today.    Recent labs are discussed.  Her LFTs have remained elevated.  I will hold treatment for 2 weeks.    Oncology History:  Per Dr. Mcgovern's notes   - 2019 - presented for palpable mass left breast. Of note, patient had prior abnormal mammograms in 2016 for both breasts and was called back for additional images but didn't return.    - 2019 - Diagnostic mammogram and US showed 4.5 cm left breast mass with abnormal LN; 1.3 cm right breast mass  - 19 - Biopsy left breast mass - Grade 2 IDC, estrogen receptor 96%, progesterone receptor 95%, Her2 elaine neg, Ki67 30%.   - 2019 CT scans showed known left breast mass with axillary adenopathy, metastatic disease in chest and bones (ribs, sternum,  vertebra, pelvis)  - 3/7/19 started Ibrance/Fulvsetrant. Dose reduced of Ibrance in 4/2019.              - 3/14/19 - 3/27/19 3000 cGt T4-T8                              - 4/4/19 - Genetics GeneDx Breast/Gyn panel negative  [facility] oncology consultation on 9/3/19              - 9/2019 - Labs show pre-menopausal. Change therapy to Ribociclib, Zoladex and Arimidex.               - 9/2019 new baseline scans with diffuse osseous metastatic disease throughout spine, sternum, ribs, iliac wings, femoral bones. T5 with possible extension into spinal canal; left femur lesion at risk of pathologic fracture.               - 5/2020 - non-complaince - off treatment from 11/2019 - 5/2020. Saw ortho though and surgery not felt to be needed at time of consult (2/2020). CT scans with improved disease. Labs still perimenopausal. Continued Arimidex/ribociclib/zoladex.  Oncology History   Malignant neoplasm of central portion of left breast in female, estrogen receptor positive   9/3/2019 Initial Diagnosis    Malignant neoplasm of central portion of left breast in female, estrogen receptor positive     9/3/2019 - 9/3/2019 Chemotherapy    Treatment Summary   Plan Name: OP ANASTROZOLE PALBOCICLIB Q4W  Treatment Goal: Palliative  Status: Inactive  Start Date: 9/3/2019  End Date: 9/3/2019  Provider: Melania Mcgovern MD  Chemotherapy: [No matching medication found in this treatment plan]     9/18/2019 Cancer Staged    Staging form: Breast, AJCC 8th Edition  - Clinical: Stage IV (cT4b, cM1, ER+, MO+, HER2-)         History:  Past Medical History:   Diagnosis Date    Breast cancer       Past Surgical History:   Procedure Laterality Date    TUBAL LIGATION       Family History   Problem Relation Age of Onset    Lung cancer Mother     Heart attack Father     Suicide Brother     No Known Problems Maternal Aunt     No Known Problems Maternal Uncle     No Known Problems Paternal Aunt     No Known Problems Paternal Uncle     No  "Known Problems Maternal Grandmother     No Known Problems Maternal Grandfather     No Known Problems Paternal Grandmother     No Known Problems Paternal Grandfather     No Known Problems Son     No Known Problems Son     No Known Problems Daughter      Social History     Tobacco Use    Smoking status: Current Every Day Smoker     Packs/day: 0.50     Years: 25.00     Pack years: 12.50     Start date: 9/3/1994    Smokeless tobacco: Never Used   Substance and Sexual Activity    Alcohol use: Yes     Comment: occasionally    Drug use: Not Currently    Sexual activity: Yes     Partners: Male     Birth control/protection: None        ROS:   Review of Systems   Constitutional: Positive for malaise/fatigue. Negative for fever and weight loss.   HENT: Positive for congestion. Negative for hearing loss, nosebleeds and sore throat.    Eyes: Negative for double vision and photophobia.   Respiratory: Negative for cough, hemoptysis, sputum production, shortness of breath and wheezing.    Cardiovascular: Negative for chest pain, palpitations, orthopnea and leg swelling.   Gastrointestinal: Negative for abdominal pain, blood in stool, constipation, diarrhea, heartburn, nausea and vomiting.   Genitourinary: Negative for dysuria, hematuria and urgency.   Musculoskeletal: Negative for back pain, joint pain and myalgias.   Skin: Negative for itching and rash.   Neurological: Negative for dizziness, tingling, seizures, weakness and headaches.   Endo/Heme/Allergies: Negative for polydipsia. Does not bruise/bleed easily.   Psychiatric/Behavioral: Negative for depression and memory loss. The patient is not nervous/anxious and does not have insomnia.         Objective:     Vitals:    11/13/20 1453   BP: 108/65   Pulse: 75   Temp: 98 °F (36.7 °C)   SpO2: 99%   Weight: 59.6 kg (131 lb 6.3 oz)   Height: 5' 4" (1.626 m)   PainSc: 0-No pain     Wt Readings from Last 10 Encounters:   11/13/20 59.6 kg (131 lb 6.3 oz)   10/30/20 60 kg " (132 lb 4.4 oz)   10/02/20 60.2 kg (132 lb 11.5 oz)   10/02/20 60.2 kg (132 lb 11.5 oz)   09/02/20 58.5 kg (128 lb 15.5 oz)   08/05/20 59.4 kg (130 lb 15.3 oz)   07/08/20 59 kg (130 lb 1.1 oz)   05/27/20 58.1 kg (128 lb 1.4 oz)   05/18/20 56.7 kg (125 lb)   02/04/20 53.5 kg (118 lb)       Physical Examination:   Physical Exam   Constitutional: She is oriented to person, place, and time and well-developed, well-nourished, and in no distress. No distress.   HENT:   Head: Normocephalic.   Mouth/Throat: Oropharynx is clear and moist. No oropharyngeal exudate.   Eyes: Conjunctivae and EOM are normal. No scleral icterus.   Neck: Neck supple. No thyromegaly present.   Cardiovascular: Normal rate, regular rhythm and normal heart sounds.   No murmur heard.  Pulmonary/Chest: Effort normal. No stridor. No respiratory distress. She has no wheezes. She has no rales. She exhibits no tenderness.   Abdominal: Soft. Bowel sounds are normal. She exhibits no distension and no mass. There is no abdominal tenderness. There is no rebound.   Musculoskeletal: Normal range of motion.         General: No tenderness, deformity or edema.   Lymphadenopathy:     She has no cervical adenopathy.   Neurological: She is alert and oriented to person, place, and time. No cranial nerve deficit. Gait normal. Coordination normal.   Skin: Skin is warm and dry. No rash noted. She is not diaphoretic. No erythema.   Psychiatric: Memory, affect and judgment normal.   Nursing note and vitals reviewed.       Diagnostic Tests:  Significant Imaging: I have reviewed and interpreted all pertinent imaging results/findings.  CT Chest Without Contrast No results found for this or any previous visit.  CT Chest With ContrastNo results found for this or any previous visit.  CT Abdomen/Pelvis Without Contrast No results found for this or any previous visit.   CT Abdomen/Pelvis With Contrast No results found for this or any previous visit.  CT Abdomen/Pelvis With Without  Contrast No results found for this or any previous visit.   PET Scan No results found for this or any previous visit.    Laboratory Data:  All pertinent labs have been reviewed.  Labs:   Lab Results   Component Value Date    WBC 5.34 11/12/2020    RBC 4.09 11/12/2020    HGB 13.6 11/12/2020    HCT 40.1 11/12/2020    MCV 98 11/12/2020     11/12/2020    GLU 90 11/12/2020     11/12/2020    K 4.8 11/12/2020    BUN 13 11/12/2020    CREATININE 0.9 11/12/2020    AST 90 (H) 11/12/2020     (H) 11/12/2020    BILITOT 0.3 11/12/2020       Assessment/Plan:   Malignant neoplasm of central portion of left breast in female, estrogen receptor positive  Bone metastases  Malignant neoplasm metastatic to lung, unspecified laterality   Stage IV (T3N2M2) invasive ductal carcinoma of left breast,  quadrant, ER 96%, NJ 94%, Her2 neg, Grade 2, Ki67 30%    Recent tumor marker is stable after initial decline.  CT scans of the chest abdomen and pelvis show stable disease at the end of September.  Bone scan shows stable disease and some areas of improvement.   Continue current therapy with Zoladex ribociclib and Arimidex.  See below for dose adjustment.  Continue till disease progression.     She has not been on Zometa due to poor dental health and possible osteonecrosis of the jaw. She declined a follow up dental visit. Given stable bone scan, continue to hold at this time.      Transaminitis  She is due to start ribociclib today.  She now has grade 2 transaminitis and as per guidelines, I will hold her acyclovir for an additional 2 weeks.  If her LFTs return to normal, I will resume at full dose.  If she has recurrent problems, we will reduce her dose.    Acute rhinitis  Trial of Claritin and Mucinex were discussed.  Report for any worsening symptoms.         ECOG SCORE    1 - Restricted in strenuous activity-ambulatory and able to carry out work of a light nature           Discussion:   Follow up in about 2 weeks (around  11/27/2020) for MD, Lab Results.    Plan was discussed with the patient at length, and she verbalized understanding. Angeles was given an opportunity to ask questions that were answered to her satisfaction, and she was advised to call in the interval if any problems or questions arise.    Electronically signed by Shelby Thomas MD

## 2020-11-13 NOTE — Clinical Note
Hold kisqali for 2 weeks due to increased LFTs. See me in 2 weeks with labs and restart as tolerated.

## 2020-11-18 ENCOUNTER — SPECIALTY PHARMACY (OUTPATIENT)
Dept: PHARMACY | Facility: CLINIC | Age: 50
End: 2020-11-18

## 2020-11-18 DIAGNOSIS — C50.112 MALIGNANT NEOPLASM OF CENTRAL PORTION OF LEFT BREAST IN FEMALE, ESTROGEN RECEPTOR POSITIVE: Primary | ICD-10-CM

## 2020-11-18 DIAGNOSIS — Z17.0 MALIGNANT NEOPLASM OF CENTRAL PORTION OF LEFT BREAST IN FEMALE, ESTROGEN RECEPTOR POSITIVE: Primary | ICD-10-CM

## 2020-11-18 NOTE — TELEPHONE ENCOUNTER
Specialty Pharmacy - Clinical Reassessment    Specialty Medication Orders Linked to Encounter      Most Recent Value   Medication #1  ribociclib (KISQALI) 600 mg/day (200 mg x 3) Tab (Order#[id number], Rx#[id number])        Subjective    Angeles Wright is a 50 y.o. female, who is followed by the specialty pharmacy service for management and education.    Recent Encounters     Date Type Provider Description    11/18/2020 Specialty Pharmacy Wendy Lopez, PharmD Follow-up Clinical Reassessment        Clinical call attempts since last clinical assessment   No call attempts found.     Today she received follow up education for her specialty medication(s).    Current Outpatient Medications   Medication Sig    anastrozole (ARIMIDEX) 1 mg Tab Take 1 tablet (1 mg total) by mouth once daily.    guaiFENesin (MUCINEX) 600 mg 12 hr tablet Take 1 tablet (600 mg total) by mouth 2 (two) times daily.    ribociclib (KISQALI) 600 mg/day (200 mg x 3) Tab Take 600 mg by mouth once daily.   Last reviewed on 11/18/2020  1:01 PM by Wendy Lopez, PharmD    Review of patient's allergies indicates:  No Known AllergiesLast reviewed on  11/18/2020 1:01 PM by Wendy Lopez    Drug Interactions    Drug interactions evaluated: yes  Clinically relevant drug interactions identified: no  Provided the patient with educational material regarding drug interactions: not applicable       Medication Adherence    Patient reported X missed doses in the last month: 0  Any gaps in refill history greater than 2 weeks in the last 3 months: no  Demonstrates understanding of importance of adherence: yes  Informant: patient  Reliability of informant: reliable  Provider-estimated medication adherence level: good  Reasons for non-adherence: no problems identified  Adherence tools used: patient uses a pill box to manage medications  Support network for adherence: family member, healthcare provider  Confirmed plan for next specialty medication refill:  "delivery by pharmacy  Refills needed for supportive medications: not needed       Adverse Effects    *All other systems reviewed and are negative       Assessment Questions - Documented Responses      Most Recent Value   Assessment   Medication Reconciliation completed for patient  Yes   During the past 4 weeks, has patient missed any activities due to condition or medication?  No   During the past 4 weeks, did patient have any of the following urgent care visits?  None   Goals of Therapy Status  Achieving   Welcome packet contents reviewed and discussed with patient?  No   Assesment completed?  Yes   Plan  Therapy continued   Do you need to open a clinical intervention (i-vent)?  No   Do you want to schedule first shipment?  No   Medication #1 Assessment Info   Patient status  Existing medication   Is this medication appropriate for the patient?  Yes   Is this medication effective?  Yes          Objective    She has a past medical history of Breast cancer.    Tried/failed medications: Ibrance/Fulvsetrant    BP Readings from Last 4 Encounters:   11/13/20 108/65   10/30/20 127/77   10/30/20 127/77   10/02/20 (!) 106/48     Ht Readings from Last 4 Encounters:   11/13/20 5' 4" (1.626 m)   10/30/20 5' 4" (1.626 m)   10/02/20 5' 4" (1.626 m)   09/02/20 5' 4" (1.626 m)     Wt Readings from Last 4 Encounters:   11/13/20 59.6 kg (131 lb 6.3 oz)   10/30/20 60 kg (132 lb 4.4 oz)   10/02/20 60.2 kg (132 lb 11.5 oz)   10/02/20 60.2 kg (132 lb 11.5 oz)     Recent Labs   Lab Result Units 11/12/20  1024 10/30/20  1033 09/28/20  0843 09/02/20  0942   RBC M/uL 4.09 4.28 4.30 4.23   Hemoglobin g/dL 13.6 14.0 13.8 14.1   Hematocrit % 40.1 41.8 41.1 41.9   WBC K/uL 5.34 4.94 5.10 5.38   Gran # (ANC) K/uL 3.1 3.0 3.2 3.0   Gran % % 57.3  --   --   --    Platelets K/uL 240 238 232 327   Sodium mmol/L 138 140 141 142   Potassium mmol/L 4.8 4.2 4.5 4.3   Chloride mmol/L 105 105 105 107   Glucose mg/dL 90 102 104 98   BUN mg/dL 13 11 12 17 "   Creatinine mg/dL 0.9 0.9 0.9 0.91   Calcium mg/dL 9.4 9.1 9.4 9.6   Total Protein g/dL 7.0 7.0 7.1 7.2   Albumin g/dL 4.1 4.6 4.5 4.5   Total Bilirubin mg/dL 0.3 0.5 0.5 0.3   Alkaline Phosphatase U/L 113 105 102 80   AST U/L 90 H 76 H 46 H 24   ALT U/L 168 H 157 H 87 H 11     The goals of cancer treatment include:  · Achieving remission of cancer, if possible  · Reducing tumor size and spread of cancer, if remission is not possible  · Minimizing pain and symptoms of the cancer  · Preventing infection and other complications of treatment  · Promoting adequate nutrition  · Encouraging proper hydration  · Improving or maintaining quality of life  · Maintaining optimal therapy adherence  · Minimizing and managing side effects    Goals of Therapy Status: Achieving    Assessment/Plan  Patient plans to continue therapy without changes      Indication, dosage, appropriateness, effectiveness, safety and convenience of her specialty medication(s) were reviewed today.     Patient Counseling    Counseled the patient on the following: doses and administration discussed, safe handling, storage, and disposal discussed, possible adverse effects and management discussed, possible drug and prescription drug interactions discussed, possible drug and OTC drug and food interactions discussed, lab monitoring and follow-up discussed, therapeutic rationale discussed, cost of medications and cost implications discussed, adherence and missed doses discussed, pharmacy contact information discussed       Angeles Wright is a 50 y.o. female, who is followed by the specialty pharmacy service for management and education of her Kisqali.  She has been on therapy with Kisqali and Arimidex for 26 months.  I have reviewed her electronic medical record and current medication list and determined that specialty medication adjustment is not needed at this time.    Patient has not experienced adverse events.  She is adherent reporting 0 missed doses  since last review.  Adherence has been encouraged with the following mechanism(s): pill box.  She is meeting goals of therapy and will continue treatment.    Follow Up Review 11/18/2020   Missed activities? No   Urgent Care? None   Some recent data might be hidden       Therapy is appropriate to continue.    Therapy is effective: Yes  Recommendations: none at this time.  Review Method: Chart Review and Patient Contact    Patient confirmed correct dose, storage, handling and administration of Kisqali. Overall tolerating medication well with no current issues. Medications and allergies reviewed. No DDI. Chart notes indicate that patient has stable disease and some areas of improvement. Labs reviewed from 11/12/20 - no dosage adjustment needed for Kisqali at this time. She confirmed that she has labs again next week. LFTs are elevated. Holding acyclovir and MD is following up with labs to confirm improvement before restarting at full dose or reduced dose.     Wendy Lopez PharmD  [facility]  [address] [place] [address]  Phone: [phone]  Fax: [phone]      Tasks added this encounter   No tasks added.   Tasks due within next 3 months   11/18/2020 - Clinical - Follow Up Assesement (90 day)  12/3/2020 - Refill Call     Wendy Lopez PharmD  [facility]  [address] [place] [id number]  Phone: [phone]  Fax: [phone]

## 2020-11-27 ENCOUNTER — TELEPHONE (OUTPATIENT)
Dept: HEMATOLOGY/ONCOLOGY | Facility: CLINIC | Age: 50
End: 2020-11-27

## 2020-12-01 ENCOUNTER — TELEPHONE (OUTPATIENT)
Dept: HEMATOLOGY/ONCOLOGY | Facility: CLINIC | Age: 50
End: 2020-12-01

## 2020-12-01 NOTE — TELEPHONE ENCOUNTER
I was unable to schedule the appointment due to not having privileges, but I told the pt's caregiver that I would send a message to her nurse and get back with them as soon as I could.

## 2020-12-01 NOTE — TELEPHONE ENCOUNTER
----- Message from Seema Fam sent at 12/1/2020 12:26 PM CST -----  Regarding: appointment  Contact: Siomara/[phone]  Type: Needs Medical Advice  Who Called:  Siomara/[phone]    Additional Information: Needs to get patient rescheduled from her appointment from 11/27/20. Please call with availability,.

## 2020-12-05 ENCOUNTER — SPECIALTY PHARMACY (OUTPATIENT)
Dept: PHARMACY | Facility: CLINIC | Age: 50
End: 2020-12-05

## 2020-12-11 ENCOUNTER — INFUSION (OUTPATIENT)
Dept: INFUSION THERAPY | Facility: HOSPITAL | Age: 50
End: 2020-12-11
Attending: INTERNAL MEDICINE
Payer: MEDICAID

## 2020-12-11 ENCOUNTER — OFFICE VISIT (OUTPATIENT)
Dept: HEMATOLOGY/ONCOLOGY | Facility: CLINIC | Age: 50
End: 2020-12-11
Payer: MEDICAID

## 2020-12-11 VITALS
RESPIRATION RATE: 18 BRPM | HEIGHT: 64 IN | TEMPERATURE: 99 F | BODY MASS INDEX: 22.65 KG/M2 | WEIGHT: 132.69 LBS | OXYGEN SATURATION: 98 % | HEART RATE: 57 BPM | DIASTOLIC BLOOD PRESSURE: 72 MMHG | SYSTOLIC BLOOD PRESSURE: 111 MMHG

## 2020-12-11 VITALS
HEART RATE: 57 BPM | SYSTOLIC BLOOD PRESSURE: 111 MMHG | DIASTOLIC BLOOD PRESSURE: 72 MMHG | RESPIRATION RATE: 18 BRPM | TEMPERATURE: 99 F

## 2020-12-11 DIAGNOSIS — C78.00 MALIGNANT NEOPLASM METASTATIC TO LUNG, UNSPECIFIED LATERALITY: ICD-10-CM

## 2020-12-11 DIAGNOSIS — C79.51 BONE METASTASES: ICD-10-CM

## 2020-12-11 DIAGNOSIS — Z12.89 ENCOUNTER FOR SCREENING FOR MALIGNANT NEOPLASM OF OTHER SITES: ICD-10-CM

## 2020-12-11 DIAGNOSIS — R74.01 TRANSAMINITIS: ICD-10-CM

## 2020-12-11 DIAGNOSIS — Z17.0 MALIGNANT NEOPLASM OF CENTRAL PORTION OF LEFT BREAST IN FEMALE, ESTROGEN RECEPTOR POSITIVE: Primary | ICD-10-CM

## 2020-12-11 DIAGNOSIS — C50.112 MALIGNANT NEOPLASM OF CENTRAL PORTION OF LEFT BREAST IN FEMALE, ESTROGEN RECEPTOR POSITIVE: Primary | ICD-10-CM

## 2020-12-11 PROCEDURE — A4216 STERILE WATER/SALINE, 10 ML: HCPCS | Mod: PN | Performed by: INTERNAL MEDICINE

## 2020-12-11 PROCEDURE — 99213 OFFICE O/P EST LOW 20 MIN: CPT | Mod: PBBFAC,25,PN | Performed by: INTERNAL MEDICINE

## 2020-12-11 PROCEDURE — 25000003 PHARM REV CODE 250: Mod: PN | Performed by: INTERNAL MEDICINE

## 2020-12-11 PROCEDURE — 63600175 PHARM REV CODE 636 W HCPCS: Mod: JG,PN | Performed by: INTERNAL MEDICINE

## 2020-12-11 PROCEDURE — 99215 PR OFFICE/OUTPT VISIT, EST, LEVL V, 40-54 MIN: ICD-10-PCS | Mod: S$PBB,,, | Performed by: INTERNAL MEDICINE

## 2020-12-11 PROCEDURE — 63600175 PHARM REV CODE 636 W HCPCS: Mod: PN | Performed by: INTERNAL MEDICINE

## 2020-12-11 PROCEDURE — 99999 PR PBB SHADOW E&M-EST. PATIENT-LVL III: CPT | Mod: PBBFAC,,, | Performed by: INTERNAL MEDICINE

## 2020-12-11 PROCEDURE — 96402 CHEMO HORMON ANTINEOPL SQ/IM: CPT | Mod: PN

## 2020-12-11 PROCEDURE — 99999 PR PBB SHADOW E&M-EST. PATIENT-LVL III: ICD-10-PCS | Mod: PBBFAC,,, | Performed by: INTERNAL MEDICINE

## 2020-12-11 PROCEDURE — 99215 OFFICE O/P EST HI 40 MIN: CPT | Mod: S$PBB,,, | Performed by: INTERNAL MEDICINE

## 2020-12-11 RX ORDER — SODIUM CHLORIDE 0.9 % (FLUSH) 0.9 %
10 SYRINGE (ML) INJECTION
Status: CANCELLED | OUTPATIENT
Start: 2020-12-11

## 2020-12-11 RX ORDER — HEPARIN 100 UNIT/ML
500 SYRINGE INTRAVENOUS
Status: CANCELLED | OUTPATIENT
Start: 2020-12-11

## 2020-12-11 RX ORDER — SODIUM CHLORIDE 0.9 % (FLUSH) 0.9 %
10 SYRINGE (ML) INJECTION
Status: COMPLETED | OUTPATIENT
Start: 2020-12-11 | End: 2020-12-11

## 2020-12-11 RX ORDER — HEPARIN 100 UNIT/ML
500 SYRINGE INTRAVENOUS
Status: COMPLETED | OUTPATIENT
Start: 2020-12-11 | End: 2020-12-11

## 2020-12-11 RX ADMIN — Medication 10 ML: at 11:12

## 2020-12-11 RX ADMIN — GOSERELIN ACETATE 3.6 MG: 3.6 IMPLANT SUBCUTANEOUS at 11:12

## 2020-12-11 RX ADMIN — HEPARIN 500 UNITS: 100 SYRINGE at 11:12

## 2020-12-11 NOTE — Clinical Note
Her LFTs are continually rising. Please schedule ct abdomen to rule out metastatic disease. I will call the patient. RTC in one week to discuss results.

## 2020-12-11 NOTE — PROGRESS NOTES
PROGRESS NOTE    Subjective:       Patient ID: Angeles Wright is a 50 y.o. female.  MRN: [id number]  : 1970    Chief Complaint: Metastatic ER positive breast cancer       History of Present Illness:   Angeles Wright is a 50 y.o. female who presents with metastatic breast cancer.      As previously documented by Dr. Mcgovern  she was noncompliant with therapy initially at [facility] and transferred [place] [facility] [place] Labs showed her to be pre-menopausal thus we changed therapy to Ribociclib /Zoladex and Arimidex but she unfortunately was noncompliant and off therapy from November to May 2020. In 2020 scans showed improvement thus she restarted Arimidex/ribociclib/zoladex.      20  She presents today to discuss her symptoms, labs and further recommendations.  She has been off of ribociclib for the past 5 weeks due to transaminitis.  Labs are pending for today.  She has remained on Arimidex 1 mg p.o. daily and continues to tolerated well.     She has stable appetite and performance status.  She denies any pain today.      Oncology History:  Oncology History   Malignant neoplasm of central portion of left breast in female, estrogen receptor positive   9/3/2019 Initial Diagnosis    Malignant neoplasm of central portion of left breast in female, estrogen receptor positive     9/3/2019 - 9/3/2019 Chemotherapy    Treatment Summary   Plan Name: OP ANASTROZOLE PALBOCICLIB Q4W  Treatment Goal: Palliative  Status: Inactive  Start Date: 9/3/2019  End Date: 9/3/2019  Provider: Melania Mcgovern MD  Chemotherapy: [No matching medication found in this treatment plan]     2019 Cancer Staged    Staging form: Breast, AJCC 8th Edition  - Clinical: Stage IV (cT4b, cM1, ER+, TX+, HER2-)         History:  Past Medical History:   Diagnosis Date    Breast cancer       Past Surgical History:   Procedure Laterality Date    TUBAL LIGATION       Family History   Problem  Relation Age of Onset    Lung cancer Mother     Heart attack Father     Suicide Brother     No Known Problems Maternal Aunt     No Known Problems Maternal Uncle     No Known Problems Paternal Aunt     No Known Problems Paternal Uncle     No Known Problems Maternal Grandmother     No Known Problems Maternal Grandfather     No Known Problems Paternal Grandmother     No Known Problems Paternal Grandfather     No Known Problems Son     No Known Problems Son     No Known Problems Daughter      Social History     Tobacco Use    Smoking status: Current Every Day Smoker     Packs/day: 0.50     Years: 25.00     Pack years: 12.50     Start date: 9/3/1994    Smokeless tobacco: Never Used   Substance and Sexual Activity    Alcohol use: Yes     Comment: occasionally    Drug use: Not Currently    Sexual activity: Yes     Partners: Male     Birth control/protection: None        ROS:   Review of Systems   Constitutional: Negative for fever, malaise/fatigue and weight loss.   HENT: Negative for congestion, hearing loss, nosebleeds and sore throat.    Eyes: Negative for double vision and photophobia.   Respiratory: Negative for cough, hemoptysis, sputum production, shortness of breath and wheezing.    Cardiovascular: Negative for chest pain, palpitations, orthopnea and leg swelling.   Gastrointestinal: Negative for abdominal pain, blood in stool, constipation, diarrhea, heartburn, nausea and vomiting.   Genitourinary: Negative for dysuria, hematuria and urgency.   Musculoskeletal: Negative for back pain, joint pain and myalgias.   Skin: Negative for itching and rash.   Neurological: Negative for dizziness, tingling, seizures, weakness and headaches.   Endo/Heme/Allergies: Negative for polydipsia. Does not bruise/bleed easily.   Psychiatric/Behavioral: Negative for depression and memory loss. The patient is not nervous/anxious and does not have insomnia.         Objective:     Vitals:    12/11/20 1024   BP: 111/72  "  Pulse: (!) 57   Resp: 18   Temp: 98.5 °F (36.9 °C)   TempSrc: Temporal   SpO2: 98%   Weight: 60.2 kg (132 lb 11.5 oz)   Height: 5' 4" (1.626 m)   PainSc: 0-No pain     Wt Readings from Last 10 Encounters:   12/11/20 60.2 kg (132 lb 11.5 oz)   11/13/20 59.6 kg (131 lb 6.3 oz)   10/30/20 60 kg (132 lb 4.4 oz)   10/02/20 60.2 kg (132 lb 11.5 oz)   10/02/20 60.2 kg (132 lb 11.5 oz)   09/02/20 58.5 kg (128 lb 15.5 oz)   08/05/20 59.4 kg (130 lb 15.3 oz)   07/08/20 59 kg (130 lb 1.1 oz)   05/27/20 58.1 kg (128 lb 1.4 oz)   05/18/20 56.7 kg (125 lb)       Physical Examination:   Physical Exam   Constitutional: She is oriented to person, place, and time and well-developed, well-nourished, and in no distress. No distress.   HENT:   Head: Normocephalic.   Mouth/Throat: Oropharynx is clear and moist. No oropharyngeal exudate.   Eyes: Conjunctivae and EOM are normal. No scleral icterus.   Neck: Neck supple. No thyromegaly present.   Cardiovascular: Normal rate, regular rhythm and normal heart sounds.   No murmur heard.  Pulmonary/Chest: Effort normal. No stridor. No respiratory distress. She has no wheezes. She has no rales. She exhibits no tenderness.   Abdominal: Soft. Bowel sounds are normal. She exhibits no distension and no mass. There is no abdominal tenderness. There is no rebound.   Musculoskeletal: Normal range of motion.         General: No tenderness, deformity or edema.   Lymphadenopathy:     She has no cervical adenopathy.   Neurological: She is alert and oriented to person, place, and time. No cranial nerve deficit. Gait normal. Coordination normal.   Skin: Skin is warm and dry. No rash noted. She is not diaphoretic. No erythema.   Psychiatric: Memory, affect and judgment normal.   Nursing note and vitals reviewed.       Diagnostic Tests:  Significant Imaging: I have reviewed and interpreted all pertinent imaging results/findings.  CT Chest Without Contrast No results found for this or any previous visit.  CT " Chest With ContrastNo results found for this or any previous visit.  CT Abdomen/Pelvis Without Contrast No results found for this or any previous visit.   CT Abdomen/Pelvis With Contrast No results found for this or any previous visit.  CT Abdomen/Pelvis With Without Contrast No results found for this or any previous visit.   PET Scan No results found for this or any previous visit.    Laboratory Data:  All pertinent labs have been reviewed.  Labs:   Lab Results   Component Value Date    WBC 5.19 12/11/2020    RBC 4.53 12/11/2020    HGB 14.5 12/11/2020    HCT 44.4 12/11/2020    MCV 98 12/11/2020     12/11/2020    GLU 92 12/11/2020     12/11/2020    K 4.4 12/11/2020    BUN 14 12/11/2020    CREATININE 0.71 12/11/2020     (H) 12/11/2020     (H) 12/11/2020    BILITOT 0.5 12/11/2020       Assessment/Plan:   Malignant neoplasm of central portion of left breast in female, estrogen receptor positive  Bone metastases  Malignant neoplasm metastatic to lung, unspecified laterality   Stage IV (T3N2M2) invasive ductal carcinoma of left breast,  quadrant, ER 96%, UT 94%, Her2 neg, Grade 2, Ki67 30%       CT scans of the chest abdomen and pelvis show stable disease at the end of September.  Bone scan shows stable disease and some areas of improvement.   Ribociclib has been on hold for over a month.  LFTs are reviewed today and discussed with the patient.  She continues to have rising AST/ALT.  I do not think that ribociclib is causing her transaminitis.  I will order CT scans to rule out hepatic metastasis.  See below.  She is cleared to receive Zoladex today.  Continue daily Arimidex.   She has not been on Zometa due to poor dental health and possible osteonecrosis of the jaw. She declined a follow up dental visit. Given stable bone scan, continue to hold at this time.    Transaminitis  New onset, grade 2 transaminitis developed in November, soon after recent stable CT scans.  However, after holding  ribociclib for several weeks, LFTs have continued to rise.  I am concerned about hepatic metastasis.  Obtained CT chest abdomen pelvis and see her back in clinic in 1 week to discuss recommendations.    ECOG SCORE    0 - Fully active-able to carry on all pre-disease performance without restriction           Discussion:   Follow up in about 1 week (around 12/18/2020) for MD, Lab Results, Scan Results.    Plan was discussed with the patient at length, and she verbalized understanding. Angeles was given an opportunity to ask questions that were answered to her satisfaction, and she was advised to call in the interval if any problems or questions arise.    Electronically signed by Shelby Thomas MD

## 2020-12-14 ENCOUNTER — TELEPHONE (OUTPATIENT)
Dept: HEMATOLOGY/ONCOLOGY | Facility: CLINIC | Age: 50
End: 2020-12-14

## 2020-12-17 ENCOUNTER — HOSPITAL ENCOUNTER (OUTPATIENT)
Dept: RADIOLOGY | Facility: HOSPITAL | Age: 50
Discharge: HOME | End: 2020-12-17
Attending: INTERNAL MEDICINE
Payer: MEDICAID

## 2020-12-17 DIAGNOSIS — Z12.89 ENCOUNTER FOR SCREENING FOR MALIGNANT NEOPLASM OF OTHER SITES: ICD-10-CM

## 2020-12-17 DIAGNOSIS — C50.112 MALIGNANT NEOPLASM OF CENTRAL PORTION OF LEFT BREAST IN FEMALE, ESTROGEN RECEPTOR POSITIVE: ICD-10-CM

## 2020-12-17 DIAGNOSIS — Z17.0 MALIGNANT NEOPLASM OF CENTRAL PORTION OF LEFT BREAST IN FEMALE, ESTROGEN RECEPTOR POSITIVE: ICD-10-CM

## 2020-12-17 PROCEDURE — 71260 CT THORAX DX C+: CPT | Mod: 26,,, | Performed by: RADIOLOGY

## 2020-12-17 PROCEDURE — 74177 CT CHEST ABDOMEN PELVIS WITH CONTRAST (XPD): ICD-10-PCS | Mod: 26,,, | Performed by: RADIOLOGY

## 2020-12-17 PROCEDURE — 74177 CT ABD & PELVIS W/CONTRAST: CPT | Mod: 26,,, | Performed by: RADIOLOGY

## 2020-12-17 PROCEDURE — A9698 NON-RAD CONTRAST MATERIALNOC: HCPCS | Mod: PO | Performed by: INTERNAL MEDICINE

## 2020-12-17 PROCEDURE — 74177 CT ABD & PELVIS W/CONTRAST: CPT | Mod: TC,PO

## 2020-12-17 PROCEDURE — 71260 CT CHEST ABDOMEN PELVIS WITH CONTRAST (XPD): ICD-10-PCS | Mod: 26,,, | Performed by: RADIOLOGY

## 2020-12-17 PROCEDURE — 25500020 PHARM REV CODE 255: Mod: PO | Performed by: INTERNAL MEDICINE

## 2020-12-17 RX ADMIN — IOHEXOL 75 ML: 350 INJECTION, SOLUTION INTRAVENOUS at 09:12

## 2020-12-17 RX ADMIN — IOHEXOL 1000 ML: 9 SOLUTION ORAL at 09:12

## 2020-12-17 RX ADMIN — IOHEXOL 1000 ML: 350 INJECTION, SOLUTION INTRAVENOUS at 08:12

## 2020-12-18 ENCOUNTER — OFFICE VISIT (OUTPATIENT)
Dept: HEMATOLOGY/ONCOLOGY | Facility: CLINIC | Age: 50
End: 2020-12-18
Payer: MEDICAID

## 2020-12-18 ENCOUNTER — LAB VISIT (OUTPATIENT)
Dept: LAB | Facility: HOSPITAL | Age: 50
End: 2020-12-18
Attending: INTERNAL MEDICINE
Payer: MEDICAID

## 2020-12-18 VITALS
SYSTOLIC BLOOD PRESSURE: 86 MMHG | OXYGEN SATURATION: 99 % | RESPIRATION RATE: 18 BRPM | BODY MASS INDEX: 22.5 KG/M2 | HEART RATE: 70 BPM | DIASTOLIC BLOOD PRESSURE: 61 MMHG | TEMPERATURE: 98 F | WEIGHT: 131.81 LBS | HEIGHT: 64 IN

## 2020-12-18 DIAGNOSIS — C50.112 MALIGNANT NEOPLASM OF CENTRAL PORTION OF LEFT BREAST IN FEMALE, ESTROGEN RECEPTOR POSITIVE: Primary | ICD-10-CM

## 2020-12-18 DIAGNOSIS — R74.01 TRANSAMINITIS: ICD-10-CM

## 2020-12-18 DIAGNOSIS — C79.51 BONE METASTASES: ICD-10-CM

## 2020-12-18 DIAGNOSIS — Z17.0 MALIGNANT NEOPLASM OF CENTRAL PORTION OF LEFT BREAST IN FEMALE, ESTROGEN RECEPTOR POSITIVE: ICD-10-CM

## 2020-12-18 DIAGNOSIS — C78.00 MALIGNANT NEOPLASM METASTATIC TO LUNG, UNSPECIFIED LATERALITY: ICD-10-CM

## 2020-12-18 DIAGNOSIS — C50.112 MALIGNANT NEOPLASM OF CENTRAL PORTION OF LEFT BREAST IN FEMALE, ESTROGEN RECEPTOR POSITIVE: ICD-10-CM

## 2020-12-18 DIAGNOSIS — Z17.0 MALIGNANT NEOPLASM OF CENTRAL PORTION OF LEFT BREAST IN FEMALE, ESTROGEN RECEPTOR POSITIVE: Primary | ICD-10-CM

## 2020-12-18 LAB
ALBUMIN SERPL BCP-MCNC: 4.2 G/DL (ref 3.5–5.2)
ALP SERPL-CCNC: 88 U/L (ref 38–145)
ALT SERPL W/O P-5'-P-CCNC: 416 U/L (ref 0–35)
ANION GAP SERPL CALC-SCNC: 5 MMOL/L (ref 8–16)
AST SERPL-CCNC: 219 U/L (ref 14–36)
BASOPHILS # BLD AUTO: 0.08 K/UL (ref 0–0.2)
BASOPHILS NFR BLD: 1.5 % (ref 0–1.9)
BILIRUB SERPL-MCNC: 0.5 MG/DL (ref 0.2–1.3)
CALCIUM SERPL-MCNC: 9.4 MG/DL (ref 8.4–10.2)
CANCER AG15-3 SERPL-ACNC: 99.3 U/ML (ref 0–35)
CHLORIDE SERPL-SCNC: 105 MMOL/L (ref 95–110)
CO2 SERPL-SCNC: 30 MMOL/L (ref 22–31)
CREAT SERPL-MCNC: 0.83 MG/DL (ref 0.5–1.4)
DIFFERENTIAL METHOD: ABNORMAL
EOSINOPHIL # BLD AUTO: 0.2 K/UL (ref 0–0.5)
EOSINOPHIL NFR BLD: 3 % (ref 0–8)
ERYTHROCYTE [DISTWIDTH] IN BLOOD BY AUTOMATED COUNT: 13.2 % (ref 11.5–14.5)
EST. GFR  (AFRICAN AMERICAN): >60 ML/MIN/1.73 M^2
EST. GFR  (NON AFRICAN AMERICAN): >60 ML/MIN/1.73 M^2
GLUCOSE SERPL-MCNC: 131 MG/DL (ref 70–110)
HCT VFR BLD AUTO: 42.4 % (ref 37–48.5)
HGB BLD-MCNC: 13.8 G/DL (ref 12–16)
IMM GRANULOCYTES # BLD AUTO: 0.01 K/UL (ref 0–0.04)
IMM GRANULOCYTES NFR BLD AUTO: 0.2 % (ref 0–0.5)
LYMPHOCYTES # BLD AUTO: 1.7 K/UL (ref 1–4.8)
LYMPHOCYTES NFR BLD: 31.3 % (ref 18–48)
MCH RBC QN AUTO: 31.7 PG (ref 27–31)
MCHC RBC AUTO-ENTMCNC: 32.5 G/DL (ref 32–36)
MCV RBC AUTO: 97 FL (ref 82–98)
MONOCYTES # BLD AUTO: 0.5 K/UL (ref 0.3–1)
MONOCYTES NFR BLD: 8.7 % (ref 4–15)
NEUTROPHILS # BLD AUTO: 2.9 K/UL (ref 1.8–7.7)
NEUTROPHILS NFR BLD: 55.3 % (ref 38–73)
NRBC BLD-RTO: 0 /100 WBC
PLATELET # BLD AUTO: 170 K/UL (ref 150–350)
PMV BLD AUTO: 9.9 FL (ref 9.2–12.9)
POTASSIUM SERPL-SCNC: 4.3 MMOL/L (ref 3.5–5.1)
PROT SERPL-MCNC: 6.4 G/DL (ref 6–8.4)
RBC # BLD AUTO: 4.36 M/UL (ref 4–5.4)
SODIUM SERPL-SCNC: 140 MMOL/L (ref 136–145)
UUN UR-MCNC: 15 MG/DL (ref 7–18)
WBC # BLD AUTO: 5.28 K/UL (ref 3.9–12.7)

## 2020-12-18 PROCEDURE — 85025 COMPLETE CBC W/AUTO DIFF WBC: CPT | Mod: PN

## 2020-12-18 PROCEDURE — 85025 COMPLETE CBC W/AUTO DIFF WBC: CPT

## 2020-12-18 PROCEDURE — 36415 COLL VENOUS BLD VENIPUNCTURE: CPT | Mod: PN

## 2020-12-18 PROCEDURE — 99215 PR OFFICE/OUTPT VISIT, EST, LEVL V, 40-54 MIN: ICD-10-PCS | Mod: S$PBB,,, | Performed by: INTERNAL MEDICINE

## 2020-12-18 PROCEDURE — 99999 PR PBB SHADOW E&M-EST. PATIENT-LVL IV: CPT | Mod: PBBFAC,,, | Performed by: INTERNAL MEDICINE

## 2020-12-18 PROCEDURE — 80053 COMPREHEN METABOLIC PANEL: CPT | Mod: PN

## 2020-12-18 PROCEDURE — 99214 OFFICE O/P EST MOD 30 MIN: CPT | Mod: PBBFAC,PN | Performed by: INTERNAL MEDICINE

## 2020-12-18 PROCEDURE — 80053 COMPREHEN METABOLIC PANEL: CPT

## 2020-12-18 PROCEDURE — 99999 PR PBB SHADOW E&M-EST. PATIENT-LVL IV: ICD-10-PCS | Mod: PBBFAC,,, | Performed by: INTERNAL MEDICINE

## 2020-12-18 PROCEDURE — 86300 IMMUNOASSAY TUMOR CA 15-3: CPT

## 2020-12-18 PROCEDURE — 86300 IMMUNOASSAY TUMOR CA 15-3: CPT | Mod: PN

## 2020-12-18 PROCEDURE — 99215 OFFICE O/P EST HI 40 MIN: CPT | Mod: S$PBB,,, | Performed by: INTERNAL MEDICINE

## 2020-12-18 NOTE — PROGRESS NOTES
PROGRESS NOTE    Subjective:       Patient ID: Angeles Wright is a 50 y.o. female.  MRN: [id number]  : 1970    Chief Complaint: Metastatic ER positive breast cancer    History of Present Illness:   Angeles Wright is a 50 y.o. female who presents with  metastatic breast cancer.      As previously documented by Dr. Mcgovern  she was noncompliant with therapy initially at [facility] and transferred [place] [facility] [place]. Labs showed her to be pre-menopausal thus we changed therapy to Ribociclib /Zoladex and Arimidex but she unfortunately was noncompliant and off therapy from November to May 2020. In 2020 scans showed improvement thus she restarted Arimidex/ribociclib/zoladex.      20  She presents today to discuss her symptoms, labs and further recommendations.  She has been off of ribociclib for the past 6 weeks due to transaminitis.   She has remained on Arimidex 1 mg p.o. daily and continues to tolerated well.    She has had follow-up CT scans and repeat labs to evaluate her transaminitis and is here to discuss results.  She denies any new complaints other than left femoral discomfort.  She has a known metastatic lesion there that has not been treated with surgery or radiation.  She has seen Dr. Clancy in the past.   She denies any nausea, vomiting, abdominal pain, diarrhea or constipation or other complaints today.   She has stable appetite and performance status.      Oncology History:  Oncology History   Malignant neoplasm of central portion of left breast in female, estrogen receptor positive   9/3/2019 Initial Diagnosis    Malignant neoplasm of central portion of left breast in female, estrogen receptor positive     9/3/2019 - 9/3/2019 Chemotherapy    Treatment Summary   Plan Name: OP ANASTROZOLE PALBOCICLIB Q4W  Treatment Goal: Palliative  Status: Inactive  Start Date: 9/3/2019  End Date: 9/3/2019  Provider: Melania Mcgovern MD  Chemotherapy: [No  matching medication found in this treatment plan]     9/18/2019 Cancer Staged    Staging form: Breast, AJCC 8th Edition  - Clinical: Stage IV (cT4b, cM1, ER+, CA+, HER2-)         History:  Past Medical History:   Diagnosis Date    Breast cancer       Past Surgical History:   Procedure Laterality Date    TUBAL LIGATION       Family History   Problem Relation Age of Onset    Lung cancer Mother     Heart attack Father     Suicide Brother     No Known Problems Maternal Aunt     No Known Problems Maternal Uncle     No Known Problems Paternal Aunt     No Known Problems Paternal Uncle     No Known Problems Maternal Grandmother     No Known Problems Maternal Grandfather     No Known Problems Paternal Grandmother     No Known Problems Paternal Grandfather     No Known Problems Son     No Known Problems Son     No Known Problems Daughter      Social History     Tobacco Use    Smoking status: Current Every Day Smoker     Packs/day: 0.50     Years: 25.00     Pack years: 12.50     Start date: 9/3/1994    Smokeless tobacco: Never Used   Substance and Sexual Activity    Alcohol use: Yes     Comment: occasionally    Drug use: Not Currently    Sexual activity: Yes     Partners: Male     Birth control/protection: None        ROS:   Review of Systems   Constitutional: Negative for fever, malaise/fatigue and weight loss.   HENT: Negative for congestion, hearing loss, nosebleeds and sore throat.    Eyes: Negative for double vision and photophobia.   Respiratory: Negative for cough, hemoptysis, sputum production, shortness of breath and wheezing.    Cardiovascular: Negative for chest pain, palpitations, orthopnea and leg swelling.   Gastrointestinal: Negative for abdominal pain, blood in stool, constipation, diarrhea, heartburn, nausea and vomiting.   Genitourinary: Negative for dysuria, hematuria and urgency.   Musculoskeletal: Positive for joint pain (Left hip and femur). Negative for back pain and myalgias.  "  Skin: Negative for itching and rash.   Neurological: Negative for dizziness, tingling, seizures, weakness and headaches.   Endo/Heme/Allergies: Negative for polydipsia. Does not bruise/bleed easily.   Psychiatric/Behavioral: Negative for depression and memory loss. The patient is not nervous/anxious and does not have insomnia.         Objective:     Vitals:    12/18/20 1520   BP: (!) 86/61   Pulse: 70   Resp: 18   Temp: 97.8 °F (36.6 °C)   TempSrc: Temporal   SpO2: 99%   Weight: 59.8 kg (131 lb 13.4 oz)   Height: 5' 4" (1.626 m)   PainSc:   1     Wt Readings from Last 10 Encounters:   12/18/20 59.8 kg (131 lb 13.4 oz)   12/11/20 60.2 kg (132 lb 11.5 oz)   11/13/20 59.6 kg (131 lb 6.3 oz)   10/30/20 60 kg (132 lb 4.4 oz)   10/02/20 60.2 kg (132 lb 11.5 oz)   10/02/20 60.2 kg (132 lb 11.5 oz)   09/02/20 58.5 kg (128 lb 15.5 oz)   08/05/20 59.4 kg (130 lb 15.3 oz)   07/08/20 59 kg (130 lb 1.1 oz)   05/27/20 58.1 kg (128 lb 1.4 oz)       Physical Examination:   Physical Exam   Constitutional: She is oriented to person, place, and time and well-developed, well-nourished, and in no distress. No distress.   HENT:   Head: Normocephalic.   Mouth/Throat: Oropharynx is clear and moist. No oropharyngeal exudate.   Eyes: Conjunctivae and EOM are normal. No scleral icterus.   Neck: Neck supple. No thyromegaly present.   Cardiovascular: Normal rate, regular rhythm and normal heart sounds.   No murmur heard.  Pulmonary/Chest: Effort normal. No stridor. No respiratory distress. She has no wheezes. She has no rales. She exhibits no tenderness.   Abdominal: Soft. Bowel sounds are normal. She exhibits no distension and no mass. There is no abdominal tenderness. There is no rebound.   Musculoskeletal: Normal range of motion.         General: No tenderness, deformity or edema.   Lymphadenopathy:     She has no cervical adenopathy.   Neurological: She is alert and oriented to person, place, and time. No cranial nerve deficit. Gait " normal. Coordination normal.   Skin: Skin is warm and dry. No rash noted. She is not diaphoretic. No erythema.   Psychiatric: Memory, affect and judgment normal.   Nursing note and vitals reviewed.       Diagnostic Tests:  Significant Imaging: I have reviewed and interpreted all pertinent imaging results/findings.  CT Chest Without Contrast No results found for this or any previous visit.  CT Chest With ContrastNo results found for this or any previous visit.  CT Abdomen/Pelvis Without Contrast No results found for this or any previous visit.   CT Abdomen/Pelvis With Contrast No results found for this or any previous visit.  CT Abdomen/Pelvis With Without Contrast No results found for this or any previous visit.   PET Scan No results found for this or any previous visit.    Laboratory Data:  All pertinent labs have been reviewed.  Labs:   Lab Results   Component Value Date    WBC 5.28 12/18/2020    RBC 4.36 12/18/2020    HGB 13.8 12/18/2020    HCT 42.4 12/18/2020    MCV 97 12/18/2020     12/18/2020     (H) 12/18/2020     12/18/2020    K 4.3 12/18/2020    BUN 15 12/18/2020    CREATININE 0.83 12/18/2020     (H) 12/18/2020     (H) 12/18/2020    BILITOT 0.5 12/18/2020       Assessment/Plan:   Malignant neoplasm of central portion of left breast in female, estrogen receptor positive  Malignant neoplasm metastatic to lung, unspecified laterality   Stage IV (T3N2M2) invasive ductal carcinoma of left breast,  quadrant, ER 96%, [place] 94%, Her2 neg, Grade 2, Ki67 30%     Repeat scans continue to show stable disease, specially no evidence for hepatic metastasis.  Her transaminitis is not related to hepatic metastasis or Ribociclib.   At this time, I will resume dose adjusted treatment with ribociclib 400 mg p.o. day 1-day 21, Q 28 days.  I will see her back in 2 weeks for close monitoring.  Continue Arimidex 1 mg p.o. daily.  Continue Zoladex Q 4 weekly.      Bone metastases    She has not been  on Zometa due to poor dental health and possible osteonecrosis of the jaw.  Recent CT scan shows stable left femoral neck lesion.  It is becoming more symptomatic now and I will refer her back to Dr. Alexandre for evaluation.    -     X-Ray Femur 2 View Bilateral; Future; Expected date: 12/18/2020  -     X-Ray Hip 2 or 3 views Left; Future; Expected date: 12/18/2020  -     X-Ray Hip 2 or 3 views Right; Future; Expected date: 12/18/2020      Transaminitis  Refer to GI for further evaluation.  -     Ambulatory referral/consult to Gastroenterology; Future; Expected date: 12/25/2020       ECOG SCORE    1 - Restricted in strenuous activity-ambulatory and able to carry out work of a light nature           Discussion:   Follow up in about 1 month (around 1/18/2021) for MD, Lab Results.    Plan was discussed with the patient at length, and she verbalized understanding. Angeles was given an opportunity to ask questions that were answered to her satisfaction, and she was advised to call in the interval if any problems or questions arise.    Electronically signed by Shelby Thomas MD

## 2021-01-04 ENCOUNTER — OFFICE VISIT (OUTPATIENT)
Dept: HEMATOLOGY/ONCOLOGY | Facility: CLINIC | Age: 51
End: 2021-01-04
Payer: MEDICAID

## 2021-01-04 ENCOUNTER — LAB VISIT (OUTPATIENT)
Dept: LAB | Facility: HOSPITAL | Age: 51
End: 2021-01-04
Attending: INTERNAL MEDICINE
Payer: MEDICAID

## 2021-01-04 VITALS
HEART RATE: 60 BPM | TEMPERATURE: 98 F | SYSTOLIC BLOOD PRESSURE: 115 MMHG | DIASTOLIC BLOOD PRESSURE: 66 MMHG | RESPIRATION RATE: 18 BRPM | OXYGEN SATURATION: 98 % | WEIGHT: 133.19 LBS | HEIGHT: 64 IN | BODY MASS INDEX: 22.74 KG/M2

## 2021-01-04 DIAGNOSIS — C78.00 MALIGNANT NEOPLASM METASTATIC TO LUNG, UNSPECIFIED LATERALITY: ICD-10-CM

## 2021-01-04 DIAGNOSIS — Z17.0 MALIGNANT NEOPLASM OF CENTRAL PORTION OF LEFT BREAST IN FEMALE, ESTROGEN RECEPTOR POSITIVE: Primary | ICD-10-CM

## 2021-01-04 DIAGNOSIS — C50.112 MALIGNANT NEOPLASM OF CENTRAL PORTION OF LEFT BREAST IN FEMALE, ESTROGEN RECEPTOR POSITIVE: ICD-10-CM

## 2021-01-04 DIAGNOSIS — C50.112 MALIGNANT NEOPLASM OF CENTRAL PORTION OF LEFT BREAST IN FEMALE, ESTROGEN RECEPTOR POSITIVE: Primary | ICD-10-CM

## 2021-01-04 DIAGNOSIS — Z17.0 MALIGNANT NEOPLASM OF CENTRAL PORTION OF LEFT BREAST IN FEMALE, ESTROGEN RECEPTOR POSITIVE: ICD-10-CM

## 2021-01-04 DIAGNOSIS — R74.01 TRANSAMINITIS: ICD-10-CM

## 2021-01-04 DIAGNOSIS — C79.51 BONE METASTASES: ICD-10-CM

## 2021-01-04 LAB
ALBUMIN SERPL BCP-MCNC: 4.4 G/DL (ref 3.5–5.2)
ALP SERPL-CCNC: 105 U/L (ref 38–145)
ALT SERPL W/O P-5'-P-CCNC: 319 U/L (ref 0–35)
ANION GAP SERPL CALC-SCNC: 4 MMOL/L (ref 8–16)
AST SERPL-CCNC: 191 U/L (ref 14–36)
BASOPHILS # BLD AUTO: 0.09 K/UL (ref 0–0.2)
BASOPHILS NFR BLD: 1.8 % (ref 0–1.9)
BILIRUB SERPL-MCNC: 0.5 MG/DL (ref 0.2–1.3)
CALCIUM SERPL-MCNC: 9.6 MG/DL (ref 8.4–10.2)
CANCER AG15-3 SERPL-ACNC: 105 U/ML (ref 0–35)
CHLORIDE SERPL-SCNC: 104 MMOL/L (ref 95–110)
CO2 SERPL-SCNC: 31 MMOL/L (ref 22–31)
CREAT SERPL-MCNC: 0.76 MG/DL (ref 0.5–1.4)
DIFFERENTIAL METHOD: NORMAL
EOSINOPHIL # BLD AUTO: 0.1 K/UL (ref 0–0.5)
EOSINOPHIL NFR BLD: 1.8 % (ref 0–8)
ERYTHROCYTE [DISTWIDTH] IN BLOOD BY AUTOMATED COUNT: 13.3 % (ref 11.5–14.5)
EST. GFR  (AFRICAN AMERICAN): >60 ML/MIN/1.73 M^2
EST. GFR  (NON AFRICAN AMERICAN): >60 ML/MIN/1.73 M^2
GLUCOSE SERPL-MCNC: 97 MG/DL (ref 70–110)
HCT VFR BLD AUTO: 43.6 % (ref 37–48.5)
HGB BLD-MCNC: 14.4 G/DL (ref 12–16)
IMM GRANULOCYTES # BLD AUTO: 0.01 K/UL (ref 0–0.04)
IMM GRANULOCYTES NFR BLD AUTO: 0.2 % (ref 0–0.5)
LYMPHOCYTES # BLD AUTO: 1.8 K/UL (ref 1–4.8)
LYMPHOCYTES NFR BLD: 36.8 % (ref 18–48)
MCH RBC QN AUTO: 31 PG (ref 27–31)
MCHC RBC AUTO-ENTMCNC: 33 G/DL (ref 32–36)
MCV RBC AUTO: 94 FL (ref 82–98)
MONOCYTES # BLD AUTO: 0.4 K/UL (ref 0.3–1)
MONOCYTES NFR BLD: 8 % (ref 4–15)
NEUTROPHILS # BLD AUTO: 2.5 K/UL (ref 1.8–7.7)
NEUTROPHILS NFR BLD: 51.4 % (ref 38–73)
NRBC BLD-RTO: 0 /100 WBC
PLATELET # BLD AUTO: 158 K/UL (ref 150–350)
PMV BLD AUTO: 9.3 FL (ref 9.2–12.9)
POTASSIUM SERPL-SCNC: 4.2 MMOL/L (ref 3.5–5.1)
PROT SERPL-MCNC: 6.8 G/DL (ref 6–8.4)
RBC # BLD AUTO: 4.65 M/UL (ref 4–5.4)
SODIUM SERPL-SCNC: 139 MMOL/L (ref 136–145)
UUN UR-MCNC: 12 MG/DL (ref 7–18)
WBC # BLD AUTO: 4.89 K/UL (ref 3.9–12.7)

## 2021-01-04 PROCEDURE — 99215 PR OFFICE/OUTPT VISIT, EST, LEVL V, 40-54 MIN: ICD-10-PCS | Mod: S$PBB,,, | Performed by: INTERNAL MEDICINE

## 2021-01-04 PROCEDURE — 99215 OFFICE O/P EST HI 40 MIN: CPT | Mod: S$PBB,,, | Performed by: INTERNAL MEDICINE

## 2021-01-04 PROCEDURE — 80053 COMPREHEN METABOLIC PANEL: CPT

## 2021-01-04 PROCEDURE — 99999 PR PBB SHADOW E&M-EST. PATIENT-LVL III: CPT | Mod: PBBFAC,,, | Performed by: INTERNAL MEDICINE

## 2021-01-04 PROCEDURE — 85025 COMPLETE CBC W/AUTO DIFF WBC: CPT | Mod: PN

## 2021-01-04 PROCEDURE — 99999 PR PBB SHADOW E&M-EST. PATIENT-LVL III: ICD-10-PCS | Mod: PBBFAC,,, | Performed by: INTERNAL MEDICINE

## 2021-01-04 PROCEDURE — 99213 OFFICE O/P EST LOW 20 MIN: CPT | Mod: PBBFAC,PN | Performed by: INTERNAL MEDICINE

## 2021-01-04 PROCEDURE — 86300 IMMUNOASSAY TUMOR CA 15-3: CPT | Mod: PN

## 2021-01-04 PROCEDURE — 85025 COMPLETE CBC W/AUTO DIFF WBC: CPT

## 2021-01-04 PROCEDURE — 36415 COLL VENOUS BLD VENIPUNCTURE: CPT | Mod: PN

## 2021-01-04 PROCEDURE — 86300 IMMUNOASSAY TUMOR CA 15-3: CPT

## 2021-01-04 PROCEDURE — 80053 COMPREHEN METABOLIC PANEL: CPT | Mod: PN

## 2021-01-08 ENCOUNTER — TELEPHONE (OUTPATIENT)
Dept: HEMATOLOGY/ONCOLOGY | Facility: CLINIC | Age: 51
End: 2021-01-08

## 2021-01-08 ENCOUNTER — HOSPITAL ENCOUNTER (OUTPATIENT)
Dept: RADIOLOGY | Facility: HOSPITAL | Age: 51
Discharge: HOME | End: 2021-01-08
Attending: INTERNAL MEDICINE
Payer: MEDICAID

## 2021-01-08 ENCOUNTER — INFUSION (OUTPATIENT)
Dept: INFUSION THERAPY | Facility: HOSPITAL | Age: 51
End: 2021-01-08
Attending: INTERNAL MEDICINE
Payer: MEDICAID

## 2021-01-08 VITALS
DIASTOLIC BLOOD PRESSURE: 76 MMHG | RESPIRATION RATE: 20 BRPM | SYSTOLIC BLOOD PRESSURE: 112 MMHG | HEART RATE: 62 BPM | TEMPERATURE: 98 F

## 2021-01-08 DIAGNOSIS — C79.51 BONE METASTASES: ICD-10-CM

## 2021-01-08 DIAGNOSIS — C50.112 MALIGNANT NEOPLASM OF CENTRAL PORTION OF LEFT BREAST IN FEMALE, ESTROGEN RECEPTOR POSITIVE: Primary | ICD-10-CM

## 2021-01-08 DIAGNOSIS — Z17.0 MALIGNANT NEOPLASM OF CENTRAL PORTION OF LEFT BREAST IN FEMALE, ESTROGEN RECEPTOR POSITIVE: Primary | ICD-10-CM

## 2021-01-08 PROCEDURE — 96402 CHEMO HORMON ANTINEOPL SQ/IM: CPT | Mod: PN

## 2021-01-08 PROCEDURE — A4216 STERILE WATER/SALINE, 10 ML: HCPCS | Mod: PN | Performed by: INTERNAL MEDICINE

## 2021-01-08 PROCEDURE — 73521 X-RAY EXAM HIPS BI 2 VIEWS: CPT | Mod: 26,,, | Performed by: RADIOLOGY

## 2021-01-08 PROCEDURE — 73552 X-RAY EXAM OF FEMUR 2/>: CPT | Mod: TC,50,FY,PO

## 2021-01-08 PROCEDURE — 73521 X-RAY EXAM HIPS BI 2 VIEWS: CPT | Mod: TC,FY,PO

## 2021-01-08 PROCEDURE — 73521 XR HIPS BILATERAL 2 VIEW INCL AP PELVIS: ICD-10-PCS | Mod: 26,,, | Performed by: RADIOLOGY

## 2021-01-08 PROCEDURE — 73552 X-RAY EXAM OF FEMUR 2/>: CPT | Mod: 26,50,59, | Performed by: RADIOLOGY

## 2021-01-08 PROCEDURE — 73552 XR FEMUR 2 VIEW BILATERAL: ICD-10-PCS | Mod: 26,50,59, | Performed by: RADIOLOGY

## 2021-01-08 PROCEDURE — 25000003 PHARM REV CODE 250: Mod: PN | Performed by: INTERNAL MEDICINE

## 2021-01-08 PROCEDURE — 63600175 PHARM REV CODE 636 W HCPCS: Mod: JG,PN | Performed by: INTERNAL MEDICINE

## 2021-01-08 RX ORDER — SODIUM CHLORIDE 0.9 % (FLUSH) 0.9 %
10 SYRINGE (ML) INJECTION
Status: DISCONTINUED | OUTPATIENT
Start: 2021-01-08 | End: 2021-01-08 | Stop reason: HOSPADM

## 2021-01-08 RX ORDER — HEPARIN 100 UNIT/ML
500 SYRINGE INTRAVENOUS
Status: CANCELLED | OUTPATIENT
Start: 2021-01-08

## 2021-01-08 RX ORDER — HEPARIN 100 UNIT/ML
500 SYRINGE INTRAVENOUS
Status: DISCONTINUED | OUTPATIENT
Start: 2021-01-08 | End: 2021-01-08 | Stop reason: HOSPADM

## 2021-01-08 RX ORDER — SODIUM CHLORIDE 0.9 % (FLUSH) 0.9 %
10 SYRINGE (ML) INJECTION
Status: CANCELLED | OUTPATIENT
Start: 2021-01-08

## 2021-01-08 RX ADMIN — GOSERELIN ACETATE 3.6 MG: 3.6 IMPLANT SUBCUTANEOUS at 11:01

## 2021-01-08 RX ADMIN — HEPARIN 500 UNITS: 100 SYRINGE at 11:01

## 2021-01-08 RX ADMIN — Medication 10 ML: at 11:01

## 2021-01-11 ENCOUNTER — SPECIALTY PHARMACY (OUTPATIENT)
Dept: PHARMACY | Facility: CLINIC | Age: 51
End: 2021-01-11

## 2021-01-29 ENCOUNTER — TELEPHONE (OUTPATIENT)
Dept: HEMATOLOGY/ONCOLOGY | Facility: CLINIC | Age: 51
End: 2021-01-29

## 2021-02-05 ENCOUNTER — OFFICE VISIT (OUTPATIENT)
Dept: HEMATOLOGY/ONCOLOGY | Facility: CLINIC | Age: 51
End: 2021-02-05
Payer: MEDICAID

## 2021-02-05 VITALS
TEMPERATURE: 99 F | HEIGHT: 64 IN | WEIGHT: 134.06 LBS | DIASTOLIC BLOOD PRESSURE: 71 MMHG | OXYGEN SATURATION: 97 % | SYSTOLIC BLOOD PRESSURE: 109 MMHG | HEART RATE: 62 BPM | BODY MASS INDEX: 22.89 KG/M2

## 2021-02-05 DIAGNOSIS — C50.112 MALIGNANT NEOPLASM OF CENTRAL PORTION OF LEFT BREAST IN FEMALE, ESTROGEN RECEPTOR POSITIVE: Primary | ICD-10-CM

## 2021-02-05 DIAGNOSIS — Z17.0 MALIGNANT NEOPLASM OF CENTRAL PORTION OF LEFT BREAST IN FEMALE, ESTROGEN RECEPTOR POSITIVE: Primary | ICD-10-CM

## 2021-02-05 DIAGNOSIS — C79.51 BONE METASTASES: ICD-10-CM

## 2021-02-05 DIAGNOSIS — R74.01 TRANSAMINITIS: ICD-10-CM

## 2021-02-05 DIAGNOSIS — C78.00 MALIGNANT NEOPLASM METASTATIC TO LUNG, UNSPECIFIED LATERALITY: ICD-10-CM

## 2021-02-05 PROCEDURE — 99213 OFFICE O/P EST LOW 20 MIN: CPT | Mod: PBBFAC,PN | Performed by: INTERNAL MEDICINE

## 2021-02-05 PROCEDURE — 99215 OFFICE O/P EST HI 40 MIN: CPT | Mod: S$PBB,,, | Performed by: INTERNAL MEDICINE

## 2021-02-05 PROCEDURE — 99999 PR PBB SHADOW E&M-EST. PATIENT-LVL III: ICD-10-PCS | Mod: PBBFAC,,, | Performed by: INTERNAL MEDICINE

## 2021-02-05 PROCEDURE — 99999 PR PBB SHADOW E&M-EST. PATIENT-LVL III: CPT | Mod: PBBFAC,,, | Performed by: INTERNAL MEDICINE

## 2021-02-05 PROCEDURE — 99215 PR OFFICE/OUTPT VISIT, EST, LEVL V, 40-54 MIN: ICD-10-PCS | Mod: S$PBB,,, | Performed by: INTERNAL MEDICINE

## 2021-02-08 ENCOUNTER — SPECIALTY PHARMACY (OUTPATIENT)
Dept: PHARMACY | Facility: CLINIC | Age: 51
End: 2021-02-08

## 2021-02-08 ENCOUNTER — TELEPHONE (OUTPATIENT)
Dept: HEMATOLOGY/ONCOLOGY | Facility: CLINIC | Age: 51
End: 2021-02-08

## 2021-02-08 DIAGNOSIS — Z17.0 MALIGNANT NEOPLASM OF CENTRAL PORTION OF LEFT BREAST IN FEMALE, ESTROGEN RECEPTOR POSITIVE: Primary | ICD-10-CM

## 2021-02-08 DIAGNOSIS — C50.112 MALIGNANT NEOPLASM OF CENTRAL PORTION OF LEFT BREAST IN FEMALE, ESTROGEN RECEPTOR POSITIVE: Primary | ICD-10-CM

## 2021-02-11 DIAGNOSIS — Z17.0 MALIGNANT NEOPLASM OF CENTRAL PORTION OF LEFT BREAST IN FEMALE, ESTROGEN RECEPTOR POSITIVE: Primary | ICD-10-CM

## 2021-02-11 DIAGNOSIS — C50.112 MALIGNANT NEOPLASM OF CENTRAL PORTION OF LEFT BREAST IN FEMALE, ESTROGEN RECEPTOR POSITIVE: Primary | ICD-10-CM

## 2021-02-19 ENCOUNTER — SPECIALTY PHARMACY (OUTPATIENT)
Dept: PHARMACY | Facility: CLINIC | Age: 51
End: 2021-02-19

## 2021-03-03 ENCOUNTER — SPECIALTY PHARMACY (OUTPATIENT)
Dept: PHARMACY | Facility: CLINIC | Age: 51
End: 2021-03-03

## 2021-03-03 DIAGNOSIS — C50.112 MALIGNANT NEOPLASM OF CENTRAL PORTION OF LEFT BREAST IN FEMALE, ESTROGEN RECEPTOR POSITIVE: Primary | ICD-10-CM

## 2021-03-03 DIAGNOSIS — Z17.0 MALIGNANT NEOPLASM OF CENTRAL PORTION OF LEFT BREAST IN FEMALE, ESTROGEN RECEPTOR POSITIVE: Primary | ICD-10-CM

## 2021-03-05 ENCOUNTER — OFFICE VISIT (OUTPATIENT)
Dept: HEMATOLOGY/ONCOLOGY | Facility: CLINIC | Age: 51
End: 2021-03-05
Payer: MEDICAID

## 2021-03-05 ENCOUNTER — INFUSION (OUTPATIENT)
Dept: INFUSION THERAPY | Facility: HOSPITAL | Age: 51
End: 2021-03-05
Attending: INTERNAL MEDICINE
Payer: MEDICAID

## 2021-03-05 VITALS
OXYGEN SATURATION: 100 % | TEMPERATURE: 98 F | HEART RATE: 68 BPM | SYSTOLIC BLOOD PRESSURE: 95 MMHG | BODY MASS INDEX: 23.29 KG/M2 | DIASTOLIC BLOOD PRESSURE: 49 MMHG | HEIGHT: 64 IN | WEIGHT: 136.44 LBS | RESPIRATION RATE: 18 BRPM

## 2021-03-05 VITALS
RESPIRATION RATE: 20 BRPM | DIASTOLIC BLOOD PRESSURE: 49 MMHG | SYSTOLIC BLOOD PRESSURE: 95 MMHG | HEART RATE: 68 BPM | TEMPERATURE: 98 F

## 2021-03-05 DIAGNOSIS — C79.51 BONE METASTASES: ICD-10-CM

## 2021-03-05 DIAGNOSIS — Z17.0 MALIGNANT NEOPLASM OF CENTRAL PORTION OF LEFT BREAST IN FEMALE, ESTROGEN RECEPTOR POSITIVE: Primary | ICD-10-CM

## 2021-03-05 DIAGNOSIS — D70.1 CHEMOTHERAPY-INDUCED NEUTROPENIA: ICD-10-CM

## 2021-03-05 DIAGNOSIS — Z12.89 ENCOUNTER FOR SCREENING FOR MALIGNANT NEOPLASM OF OTHER SITES: ICD-10-CM

## 2021-03-05 DIAGNOSIS — R74.01 TRANSAMINITIS: ICD-10-CM

## 2021-03-05 DIAGNOSIS — C50.112 MALIGNANT NEOPLASM OF CENTRAL PORTION OF LEFT BREAST IN FEMALE, ESTROGEN RECEPTOR POSITIVE: Primary | ICD-10-CM

## 2021-03-05 DIAGNOSIS — C78.00 MALIGNANT NEOPLASM METASTATIC TO LUNG, UNSPECIFIED LATERALITY: ICD-10-CM

## 2021-03-05 DIAGNOSIS — T45.1X5A CHEMOTHERAPY-INDUCED NEUTROPENIA: ICD-10-CM

## 2021-03-05 PROCEDURE — 99999 PR PBB SHADOW E&M-EST. PATIENT-LVL III: ICD-10-PCS | Mod: PBBFAC,,, | Performed by: INTERNAL MEDICINE

## 2021-03-05 PROCEDURE — 63600175 PHARM REV CODE 636 W HCPCS: Mod: JG,PN | Performed by: INTERNAL MEDICINE

## 2021-03-05 PROCEDURE — 96402 CHEMO HORMON ANTINEOPL SQ/IM: CPT | Mod: PN

## 2021-03-05 PROCEDURE — 99999 PR PBB SHADOW E&M-EST. PATIENT-LVL III: CPT | Mod: PBBFAC,,, | Performed by: INTERNAL MEDICINE

## 2021-03-05 PROCEDURE — 99215 OFFICE O/P EST HI 40 MIN: CPT | Mod: S$PBB,,, | Performed by: INTERNAL MEDICINE

## 2021-03-05 PROCEDURE — 99213 OFFICE O/P EST LOW 20 MIN: CPT | Mod: PBBFAC,25,PN | Performed by: INTERNAL MEDICINE

## 2021-03-05 PROCEDURE — 99215 PR OFFICE/OUTPT VISIT, EST, LEVL V, 40-54 MIN: ICD-10-PCS | Mod: S$PBB,,, | Performed by: INTERNAL MEDICINE

## 2021-03-05 PROCEDURE — A4216 STERILE WATER/SALINE, 10 ML: HCPCS | Mod: PN | Performed by: INTERNAL MEDICINE

## 2021-03-05 PROCEDURE — 25000003 PHARM REV CODE 250: Mod: PN | Performed by: INTERNAL MEDICINE

## 2021-03-05 RX ORDER — SODIUM CHLORIDE 0.9 % (FLUSH) 0.9 %
10 SYRINGE (ML) INJECTION
Status: CANCELLED | OUTPATIENT
Start: 2021-03-05

## 2021-03-05 RX ORDER — HEPARIN 100 UNIT/ML
500 SYRINGE INTRAVENOUS
Status: DISCONTINUED | OUTPATIENT
Start: 2021-03-05 | End: 2021-03-05 | Stop reason: HOSPADM

## 2021-03-05 RX ORDER — SODIUM CHLORIDE 0.9 % (FLUSH) 0.9 %
10 SYRINGE (ML) INJECTION
Status: DISCONTINUED | OUTPATIENT
Start: 2021-03-05 | End: 2021-03-05 | Stop reason: HOSPADM

## 2021-03-05 RX ORDER — HEPARIN 100 UNIT/ML
500 SYRINGE INTRAVENOUS
Status: CANCELLED | OUTPATIENT
Start: 2021-03-05

## 2021-03-05 RX ADMIN — GOSERELIN ACETATE 3.6 MG: 3.6 IMPLANT SUBCUTANEOUS at 03:03

## 2021-03-05 RX ADMIN — Medication 10 ML: at 03:03

## 2021-03-05 RX ADMIN — HEPARIN 500 UNITS: 100 SYRINGE at 03:03

## 2021-03-09 ENCOUNTER — TELEPHONE (OUTPATIENT)
Dept: HEMATOLOGY/ONCOLOGY | Facility: CLINIC | Age: 51
End: 2021-03-09

## 2021-03-10 ENCOUNTER — TELEPHONE (OUTPATIENT)
Dept: HEMATOLOGY/ONCOLOGY | Facility: CLINIC | Age: 51
End: 2021-03-10

## 2021-03-25 ENCOUNTER — HOSPITAL ENCOUNTER (OUTPATIENT)
Dept: RADIOLOGY | Facility: HOSPITAL | Age: 51
Discharge: HOME | End: 2021-03-25
Attending: INTERNAL MEDICINE
Payer: MEDICAID

## 2021-03-25 DIAGNOSIS — Z12.89 ENCOUNTER FOR SCREENING FOR MALIGNANT NEOPLASM OF OTHER SITES: ICD-10-CM

## 2021-03-25 PROCEDURE — 71260 CT CHEST ABDOMEN PELVIS WITH CONTRAST (XPD): ICD-10-PCS | Mod: 26,,, | Performed by: RADIOLOGY

## 2021-03-25 PROCEDURE — 71260 CT THORAX DX C+: CPT | Mod: 26,,, | Performed by: RADIOLOGY

## 2021-03-25 PROCEDURE — 74177 CT CHEST ABDOMEN PELVIS WITH CONTRAST (XPD): ICD-10-PCS | Mod: 26,,, | Performed by: RADIOLOGY

## 2021-03-25 PROCEDURE — 74177 CT ABD & PELVIS W/CONTRAST: CPT | Mod: 26,,, | Performed by: RADIOLOGY

## 2021-03-25 PROCEDURE — 74177 CT ABD & PELVIS W/CONTRAST: CPT | Mod: TC,PO

## 2021-03-25 PROCEDURE — A9698 NON-RAD CONTRAST MATERIALNOC: HCPCS | Mod: PO | Performed by: INTERNAL MEDICINE

## 2021-03-25 PROCEDURE — 25500020 PHARM REV CODE 255: Mod: PO | Performed by: INTERNAL MEDICINE

## 2021-03-25 RX ADMIN — IOHEXOL 1000 ML: 9 SOLUTION ORAL at 09:03

## 2021-03-25 RX ADMIN — IOHEXOL 75 ML: 350 INJECTION, SOLUTION INTRAVENOUS at 09:03

## 2021-03-29 ENCOUNTER — OFFICE VISIT (OUTPATIENT)
Dept: HEMATOLOGY/ONCOLOGY | Facility: CLINIC | Age: 51
End: 2021-03-29
Payer: MEDICAID

## 2021-03-29 ENCOUNTER — SPECIALTY PHARMACY (OUTPATIENT)
Dept: PHARMACY | Facility: CLINIC | Age: 51
End: 2021-03-29

## 2021-03-29 ENCOUNTER — LAB VISIT (OUTPATIENT)
Dept: LAB | Facility: HOSPITAL | Age: 51
End: 2021-03-29
Attending: INTERNAL MEDICINE
Payer: MEDICAID

## 2021-03-29 VITALS
HEIGHT: 64 IN | RESPIRATION RATE: 18 BRPM | WEIGHT: 135.81 LBS | OXYGEN SATURATION: 99 % | SYSTOLIC BLOOD PRESSURE: 112 MMHG | BODY MASS INDEX: 23.18 KG/M2 | DIASTOLIC BLOOD PRESSURE: 63 MMHG | TEMPERATURE: 99 F | HEART RATE: 64 BPM

## 2021-03-29 DIAGNOSIS — C50.112 MALIGNANT NEOPLASM OF CENTRAL PORTION OF LEFT BREAST IN FEMALE, ESTROGEN RECEPTOR POSITIVE: Primary | ICD-10-CM

## 2021-03-29 DIAGNOSIS — Z17.0 MALIGNANT NEOPLASM OF CENTRAL PORTION OF LEFT BREAST IN FEMALE, ESTROGEN RECEPTOR POSITIVE: Primary | ICD-10-CM

## 2021-03-29 DIAGNOSIS — C50.112 MALIGNANT NEOPLASM OF CENTRAL PORTION OF LEFT BREAST IN FEMALE, ESTROGEN RECEPTOR POSITIVE: ICD-10-CM

## 2021-03-29 DIAGNOSIS — Z17.0 MALIGNANT NEOPLASM OF CENTRAL PORTION OF LEFT BREAST IN FEMALE, ESTROGEN RECEPTOR POSITIVE: ICD-10-CM

## 2021-03-29 DIAGNOSIS — C79.51 BONE METASTASES: ICD-10-CM

## 2021-03-29 DIAGNOSIS — R74.01 TRANSAMINITIS: ICD-10-CM

## 2021-03-29 DIAGNOSIS — C78.00 MALIGNANT NEOPLASM METASTATIC TO LUNG, UNSPECIFIED LATERALITY: ICD-10-CM

## 2021-03-29 LAB
ALBUMIN SERPL BCP-MCNC: 4.1 G/DL (ref 3.5–5.2)
ALP SERPL-CCNC: 102 U/L (ref 38–145)
ALT SERPL W/O P-5'-P-CCNC: 171 U/L (ref 0–35)
ANION GAP SERPL CALC-SCNC: 6 MMOL/L (ref 8–16)
AST SERPL-CCNC: 101 U/L (ref 14–36)
BASOPHILS # BLD AUTO: 0.14 K/UL (ref 0–0.2)
BASOPHILS NFR BLD: 3.1 % (ref 0–1.9)
BILIRUB SERPL-MCNC: 0.5 MG/DL (ref 0.2–1.3)
CALCIUM SERPL-MCNC: 9 MG/DL (ref 8.4–10.2)
CHLORIDE SERPL-SCNC: 105 MMOL/L (ref 95–110)
CO2 SERPL-SCNC: 27 MMOL/L (ref 22–31)
CREAT SERPL-MCNC: 0.82 MG/DL (ref 0.5–1.4)
DIFFERENTIAL METHOD: ABNORMAL
EOSINOPHIL # BLD AUTO: 0.1 K/UL (ref 0–0.5)
EOSINOPHIL NFR BLD: 1.3 % (ref 0–8)
ERYTHROCYTE [DISTWIDTH] IN BLOOD BY AUTOMATED COUNT: 15.8 % (ref 11.5–14.5)
EST. GFR  (AFRICAN AMERICAN): >60 ML/MIN/1.73 M^2
EST. GFR  (NON AFRICAN AMERICAN): >60 ML/MIN/1.73 M^2
GLUCOSE SERPL-MCNC: 94 MG/DL (ref 70–110)
HCT VFR BLD AUTO: 39.3 % (ref 37–48.5)
HGB BLD-MCNC: 12.9 G/DL (ref 12–16)
IMM GRANULOCYTES # BLD AUTO: 0.02 K/UL (ref 0–0.04)
IMM GRANULOCYTES NFR BLD AUTO: 0.4 % (ref 0–0.5)
LYMPHOCYTES # BLD AUTO: 1.8 K/UL (ref 1–4.8)
LYMPHOCYTES NFR BLD: 40.1 % (ref 18–48)
MCH RBC QN AUTO: 30.9 PG (ref 27–31)
MCHC RBC AUTO-ENTMCNC: 32.8 G/DL (ref 32–36)
MCV RBC AUTO: 94 FL (ref 82–98)
MONOCYTES # BLD AUTO: 0.4 K/UL (ref 0.3–1)
MONOCYTES NFR BLD: 9.4 % (ref 4–15)
NEUTROPHILS # BLD AUTO: 2 K/UL (ref 1.8–7.7)
NEUTROPHILS NFR BLD: 45.7 % (ref 38–73)
NRBC BLD-RTO: 0 /100 WBC
PLATELET # BLD AUTO: 278 K/UL (ref 150–450)
PMV BLD AUTO: 8.9 FL (ref 9.2–12.9)
POTASSIUM SERPL-SCNC: 4.1 MMOL/L (ref 3.5–5.1)
PROT SERPL-MCNC: 6.5 G/DL (ref 6–8.4)
RBC # BLD AUTO: 4.18 M/UL (ref 4–5.4)
SODIUM SERPL-SCNC: 138 MMOL/L (ref 136–145)
UUN UR-MCNC: 11 MG/DL (ref 7–18)
WBC # BLD AUTO: 4.46 K/UL (ref 3.9–12.7)

## 2021-03-29 PROCEDURE — 99215 OFFICE O/P EST HI 40 MIN: CPT | Mod: S$PBB,,, | Performed by: INTERNAL MEDICINE

## 2021-03-29 PROCEDURE — 99215 PR OFFICE/OUTPT VISIT, EST, LEVL V, 40-54 MIN: ICD-10-PCS | Mod: S$PBB,,, | Performed by: INTERNAL MEDICINE

## 2021-03-29 PROCEDURE — 99213 OFFICE O/P EST LOW 20 MIN: CPT | Mod: PBBFAC,PN | Performed by: INTERNAL MEDICINE

## 2021-03-29 PROCEDURE — 36415 COLL VENOUS BLD VENIPUNCTURE: CPT | Mod: PN | Performed by: INTERNAL MEDICINE

## 2021-03-29 PROCEDURE — 85025 COMPLETE CBC W/AUTO DIFF WBC: CPT | Performed by: INTERNAL MEDICINE

## 2021-03-29 PROCEDURE — 99999 PR PBB SHADOW E&M-EST. PATIENT-LVL III: ICD-10-PCS | Mod: PBBFAC,,, | Performed by: INTERNAL MEDICINE

## 2021-03-29 PROCEDURE — 80053 COMPREHEN METABOLIC PANEL: CPT | Mod: PN | Performed by: INTERNAL MEDICINE

## 2021-03-29 PROCEDURE — 99999 PR PBB SHADOW E&M-EST. PATIENT-LVL III: CPT | Mod: PBBFAC,,, | Performed by: INTERNAL MEDICINE

## 2021-03-29 PROCEDURE — 80053 COMPREHEN METABOLIC PANEL: CPT | Performed by: INTERNAL MEDICINE

## 2021-03-29 PROCEDURE — 85025 COMPLETE CBC W/AUTO DIFF WBC: CPT | Mod: PN | Performed by: INTERNAL MEDICINE

## 2021-03-30 ENCOUNTER — OFFICE VISIT (OUTPATIENT)
Dept: ORTHOPEDICS | Facility: CLINIC | Age: 51
End: 2021-03-30
Payer: MEDICAID

## 2021-03-30 VITALS — BODY MASS INDEX: 23.18 KG/M2 | HEIGHT: 64 IN | WEIGHT: 135.81 LBS

## 2021-03-30 DIAGNOSIS — C79.51 BONE METASTASES: Primary | ICD-10-CM

## 2021-03-30 PROCEDURE — 99999 PR PBB SHADOW E&M-EST. PATIENT-LVL III: CPT | Mod: PBBFAC,,, | Performed by: ORTHOPAEDIC SURGERY

## 2021-03-30 PROCEDURE — 99213 PR OFFICE/OUTPT VISIT, EST, LEVL III, 20-29 MIN: ICD-10-PCS | Mod: S$PBB,,, | Performed by: ORTHOPAEDIC SURGERY

## 2021-03-30 PROCEDURE — 99213 OFFICE O/P EST LOW 20 MIN: CPT | Mod: S$PBB,,, | Performed by: ORTHOPAEDIC SURGERY

## 2021-03-30 PROCEDURE — 99999 PR PBB SHADOW E&M-EST. PATIENT-LVL III: ICD-10-PCS | Mod: PBBFAC,,, | Performed by: ORTHOPAEDIC SURGERY

## 2021-03-30 PROCEDURE — 99213 OFFICE O/P EST LOW 20 MIN: CPT | Mod: PBBFAC,PN | Performed by: ORTHOPAEDIC SURGERY

## 2021-04-01 ENCOUNTER — INFUSION (OUTPATIENT)
Dept: INFUSION THERAPY | Facility: HOSPITAL | Age: 51
End: 2021-04-01
Attending: INTERNAL MEDICINE
Payer: MEDICAID

## 2021-04-01 VITALS
HEART RATE: 64 BPM | RESPIRATION RATE: 20 BRPM | DIASTOLIC BLOOD PRESSURE: 78 MMHG | SYSTOLIC BLOOD PRESSURE: 114 MMHG | TEMPERATURE: 98 F

## 2021-04-01 DIAGNOSIS — C50.112 MALIGNANT NEOPLASM OF CENTRAL PORTION OF LEFT BREAST IN FEMALE, ESTROGEN RECEPTOR POSITIVE: Primary | ICD-10-CM

## 2021-04-01 DIAGNOSIS — Z17.0 MALIGNANT NEOPLASM OF CENTRAL PORTION OF LEFT BREAST IN FEMALE, ESTROGEN RECEPTOR POSITIVE: Primary | ICD-10-CM

## 2021-04-01 DIAGNOSIS — C79.51 BONE METASTASES: ICD-10-CM

## 2021-04-01 PROCEDURE — 63600175 PHARM REV CODE 636 W HCPCS: Mod: PN | Performed by: INTERNAL MEDICINE

## 2021-04-01 PROCEDURE — 96402 CHEMO HORMON ANTINEOPL SQ/IM: CPT | Mod: PN

## 2021-04-01 PROCEDURE — A4216 STERILE WATER/SALINE, 10 ML: HCPCS | Mod: PN | Performed by: INTERNAL MEDICINE

## 2021-04-01 PROCEDURE — 25000003 PHARM REV CODE 250: Mod: PN | Performed by: INTERNAL MEDICINE

## 2021-04-01 RX ORDER — HEPARIN 100 UNIT/ML
500 SYRINGE INTRAVENOUS
Status: DISCONTINUED | OUTPATIENT
Start: 2021-04-01 | End: 2021-04-01 | Stop reason: HOSPADM

## 2021-04-01 RX ORDER — SODIUM CHLORIDE 0.9 % (FLUSH) 0.9 %
10 SYRINGE (ML) INJECTION
Status: CANCELLED | OUTPATIENT
Start: 2021-04-01

## 2021-04-01 RX ORDER — SODIUM CHLORIDE 0.9 % (FLUSH) 0.9 %
10 SYRINGE (ML) INJECTION
Status: DISCONTINUED | OUTPATIENT
Start: 2021-04-01 | End: 2021-04-01 | Stop reason: HOSPADM

## 2021-04-01 RX ORDER — HEPARIN 100 UNIT/ML
500 SYRINGE INTRAVENOUS
Status: CANCELLED | OUTPATIENT
Start: 2021-04-01

## 2021-04-01 RX ADMIN — GOSERELIN ACETATE 3.6 MG: 3.6 IMPLANT SUBCUTANEOUS at 03:04

## 2021-04-01 RX ADMIN — HEPARIN 500 UNITS: 100 SYRINGE at 03:04

## 2021-04-01 RX ADMIN — Medication 10 ML: at 03:04

## 2021-04-30 ENCOUNTER — SPECIALTY PHARMACY (OUTPATIENT)
Dept: PHARMACY | Facility: CLINIC | Age: 51
End: 2021-04-30

## 2021-04-30 ENCOUNTER — OFFICE VISIT (OUTPATIENT)
Dept: HEMATOLOGY/ONCOLOGY | Facility: CLINIC | Age: 51
End: 2021-04-30
Payer: MEDICAID

## 2021-04-30 ENCOUNTER — INFUSION (OUTPATIENT)
Dept: INFUSION THERAPY | Facility: HOSPITAL | Age: 51
End: 2021-04-30
Attending: INTERNAL MEDICINE
Payer: MEDICAID

## 2021-04-30 VITALS
RESPIRATION RATE: 20 BRPM | DIASTOLIC BLOOD PRESSURE: 75 MMHG | SYSTOLIC BLOOD PRESSURE: 113 MMHG | HEART RATE: 70 BPM | TEMPERATURE: 98 F

## 2021-04-30 VITALS
SYSTOLIC BLOOD PRESSURE: 113 MMHG | BODY MASS INDEX: 23.07 KG/M2 | WEIGHT: 135.13 LBS | RESPIRATION RATE: 20 BRPM | TEMPERATURE: 98 F | OXYGEN SATURATION: 100 % | HEIGHT: 64 IN | HEART RATE: 70 BPM | DIASTOLIC BLOOD PRESSURE: 75 MMHG

## 2021-04-30 DIAGNOSIS — C50.112 MALIGNANT NEOPLASM OF CENTRAL PORTION OF LEFT BREAST IN FEMALE, ESTROGEN RECEPTOR POSITIVE: Primary | ICD-10-CM

## 2021-04-30 DIAGNOSIS — C79.51 BONE METASTASES: ICD-10-CM

## 2021-04-30 DIAGNOSIS — C78.00 MALIGNANT NEOPLASM METASTATIC TO LUNG, UNSPECIFIED LATERALITY: ICD-10-CM

## 2021-04-30 DIAGNOSIS — Z17.0 MALIGNANT NEOPLASM OF CENTRAL PORTION OF LEFT BREAST IN FEMALE, ESTROGEN RECEPTOR POSITIVE: Primary | ICD-10-CM

## 2021-04-30 DIAGNOSIS — R74.01 TRANSAMINITIS: ICD-10-CM

## 2021-04-30 PROCEDURE — 99215 PR OFFICE/OUTPT VISIT, EST, LEVL V, 40-54 MIN: ICD-10-PCS | Mod: S$PBB,,, | Performed by: INTERNAL MEDICINE

## 2021-04-30 PROCEDURE — 99213 OFFICE O/P EST LOW 20 MIN: CPT | Mod: PBBFAC,25,PN | Performed by: INTERNAL MEDICINE

## 2021-04-30 PROCEDURE — 99215 OFFICE O/P EST HI 40 MIN: CPT | Mod: S$PBB,,, | Performed by: INTERNAL MEDICINE

## 2021-04-30 PROCEDURE — 99999 PR PBB SHADOW E&M-EST. PATIENT-LVL III: CPT | Mod: PBBFAC,,, | Performed by: INTERNAL MEDICINE

## 2021-04-30 PROCEDURE — 63600175 PHARM REV CODE 636 W HCPCS: Mod: JG,PN | Performed by: INTERNAL MEDICINE

## 2021-04-30 PROCEDURE — 99999 PR PBB SHADOW E&M-EST. PATIENT-LVL III: ICD-10-PCS | Mod: PBBFAC,,, | Performed by: INTERNAL MEDICINE

## 2021-04-30 PROCEDURE — 96402 CHEMO HORMON ANTINEOPL SQ/IM: CPT | Mod: PN

## 2021-04-30 RX ADMIN — GOSERELIN ACETATE 3.6 MG: 3.6 IMPLANT SUBCUTANEOUS at 03:04

## 2021-05-03 ENCOUNTER — TELEPHONE (OUTPATIENT)
Dept: HEMATOLOGY/ONCOLOGY | Facility: CLINIC | Age: 51
End: 2021-05-03

## 2021-05-25 ENCOUNTER — SPECIALTY PHARMACY (OUTPATIENT)
Dept: PHARMACY | Facility: CLINIC | Age: 51
End: 2021-05-25

## 2021-05-25 DIAGNOSIS — C50.112 MALIGNANT NEOPLASM OF CENTRAL PORTION OF LEFT BREAST IN FEMALE, ESTROGEN RECEPTOR POSITIVE: Primary | ICD-10-CM

## 2021-05-25 DIAGNOSIS — Z17.0 MALIGNANT NEOPLASM OF CENTRAL PORTION OF LEFT BREAST IN FEMALE, ESTROGEN RECEPTOR POSITIVE: Primary | ICD-10-CM

## 2021-05-27 RX ORDER — PHENAZOPYRIDINE HYDROCHLORIDE 200 MG/1
TABLET, FILM COATED ORAL
COMMUNITY
Start: 2021-04-13 | End: 2021-05-28

## 2021-05-28 ENCOUNTER — INFUSION (OUTPATIENT)
Dept: INFUSION THERAPY | Facility: HOSPITAL | Age: 51
End: 2021-05-28
Attending: INTERNAL MEDICINE
Payer: MEDICAID

## 2021-05-28 ENCOUNTER — OFFICE VISIT (OUTPATIENT)
Dept: HEMATOLOGY/ONCOLOGY | Facility: CLINIC | Age: 51
End: 2021-05-28
Payer: MEDICAID

## 2021-05-28 VITALS
DIASTOLIC BLOOD PRESSURE: 60 MMHG | RESPIRATION RATE: 20 BRPM | SYSTOLIC BLOOD PRESSURE: 116 MMHG | TEMPERATURE: 98 F | HEART RATE: 65 BPM

## 2021-05-28 VITALS
HEART RATE: 65 BPM | TEMPERATURE: 98 F | OXYGEN SATURATION: 98 % | DIASTOLIC BLOOD PRESSURE: 60 MMHG | BODY MASS INDEX: 22.93 KG/M2 | WEIGHT: 133.63 LBS | SYSTOLIC BLOOD PRESSURE: 116 MMHG

## 2021-05-28 DIAGNOSIS — Z17.0 MALIGNANT NEOPLASM OF CENTRAL PORTION OF LEFT BREAST IN FEMALE, ESTROGEN RECEPTOR POSITIVE: Primary | ICD-10-CM

## 2021-05-28 DIAGNOSIS — C79.51 BONE METASTASES: ICD-10-CM

## 2021-05-28 DIAGNOSIS — Z12.89 ENCOUNTER FOR SCREENING FOR MALIGNANT NEOPLASM OF OTHER SITES: ICD-10-CM

## 2021-05-28 DIAGNOSIS — C50.112 MALIGNANT NEOPLASM OF CENTRAL PORTION OF LEFT BREAST IN FEMALE, ESTROGEN RECEPTOR POSITIVE: Primary | ICD-10-CM

## 2021-05-28 DIAGNOSIS — C78.00 MALIGNANT NEOPLASM METASTATIC TO LUNG, UNSPECIFIED LATERALITY: ICD-10-CM

## 2021-05-28 DIAGNOSIS — R74.01 TRANSAMINITIS: ICD-10-CM

## 2021-05-28 PROCEDURE — 99215 OFFICE O/P EST HI 40 MIN: CPT | Mod: S$PBB,,, | Performed by: INTERNAL MEDICINE

## 2021-05-28 PROCEDURE — 99215 PR OFFICE/OUTPT VISIT, EST, LEVL V, 40-54 MIN: ICD-10-PCS | Mod: S$PBB,,, | Performed by: INTERNAL MEDICINE

## 2021-05-28 PROCEDURE — 99999 PR PBB SHADOW E&M-EST. PATIENT-LVL III: ICD-10-PCS | Mod: PBBFAC,,, | Performed by: INTERNAL MEDICINE

## 2021-05-28 PROCEDURE — 99213 OFFICE O/P EST LOW 20 MIN: CPT | Mod: PBBFAC,25,PN | Performed by: INTERNAL MEDICINE

## 2021-05-28 PROCEDURE — 96402 CHEMO HORMON ANTINEOPL SQ/IM: CPT | Mod: PN

## 2021-05-28 PROCEDURE — A4216 STERILE WATER/SALINE, 10 ML: HCPCS | Mod: PN | Performed by: INTERNAL MEDICINE

## 2021-05-28 PROCEDURE — 63600175 PHARM REV CODE 636 W HCPCS: Mod: JG,PN | Performed by: INTERNAL MEDICINE

## 2021-05-28 PROCEDURE — 99999 PR PBB SHADOW E&M-EST. PATIENT-LVL III: CPT | Mod: PBBFAC,,, | Performed by: INTERNAL MEDICINE

## 2021-05-28 PROCEDURE — 25000003 PHARM REV CODE 250: Mod: PN | Performed by: INTERNAL MEDICINE

## 2021-05-28 RX ORDER — HEPARIN 100 UNIT/ML
500 SYRINGE INTRAVENOUS
Status: CANCELLED | OUTPATIENT
Start: 2021-05-28

## 2021-05-28 RX ORDER — HEPARIN 100 UNIT/ML
500 SYRINGE INTRAVENOUS
Status: DISCONTINUED | OUTPATIENT
Start: 2021-05-28 | End: 2021-05-28 | Stop reason: HOSPADM

## 2021-05-28 RX ORDER — SODIUM CHLORIDE 0.9 % (FLUSH) 0.9 %
10 SYRINGE (ML) INJECTION
Status: CANCELLED | OUTPATIENT
Start: 2021-05-28

## 2021-05-28 RX ORDER — SODIUM CHLORIDE 0.9 % (FLUSH) 0.9 %
10 SYRINGE (ML) INJECTION
Status: DISCONTINUED | OUTPATIENT
Start: 2021-05-28 | End: 2021-05-28 | Stop reason: HOSPADM

## 2021-05-28 RX ADMIN — HEPARIN 500 UNITS: 100 SYRINGE at 04:05

## 2021-05-28 RX ADMIN — GOSERELIN ACETATE 3.6 MG: 3.6 IMPLANT SUBCUTANEOUS at 04:05

## 2021-05-28 RX ADMIN — Medication 10 ML: at 04:05

## 2021-06-18 ENCOUNTER — SPECIALTY PHARMACY (OUTPATIENT)
Dept: PHARMACY | Facility: CLINIC | Age: 51
End: 2021-06-18

## 2021-06-18 ENCOUNTER — HOSPITAL ENCOUNTER (OUTPATIENT)
Dept: RADIOLOGY | Facility: HOSPITAL | Age: 51
Discharge: HOME | End: 2021-06-18
Attending: INTERNAL MEDICINE
Payer: MEDICAID

## 2021-06-18 DIAGNOSIS — Z12.89 ENCOUNTER FOR SCREENING FOR MALIGNANT NEOPLASM OF OTHER SITES: ICD-10-CM

## 2021-06-18 DIAGNOSIS — Z17.0 MALIGNANT NEOPLASM OF CENTRAL PORTION OF LEFT BREAST IN FEMALE, ESTROGEN RECEPTOR POSITIVE: Primary | ICD-10-CM

## 2021-06-18 DIAGNOSIS — C50.112 MALIGNANT NEOPLASM OF CENTRAL PORTION OF LEFT BREAST IN FEMALE, ESTROGEN RECEPTOR POSITIVE: Primary | ICD-10-CM

## 2021-06-18 PROCEDURE — 71260 CT THORAX DX C+: CPT | Mod: 26,,, | Performed by: RADIOLOGY

## 2021-06-18 PROCEDURE — 74177 CT ABD & PELVIS W/CONTRAST: CPT | Mod: TC,PO

## 2021-06-18 PROCEDURE — A9698 NON-RAD CONTRAST MATERIALNOC: HCPCS | Mod: PO | Performed by: INTERNAL MEDICINE

## 2021-06-18 PROCEDURE — 74177 CT CHEST ABDOMEN PELVIS WITH CONTRAST (XPD): ICD-10-PCS | Mod: 26,,, | Performed by: RADIOLOGY

## 2021-06-18 PROCEDURE — 71260 CT CHEST ABDOMEN PELVIS WITH CONTRAST (XPD): ICD-10-PCS | Mod: 26,,, | Performed by: RADIOLOGY

## 2021-06-18 PROCEDURE — 74177 CT ABD & PELVIS W/CONTRAST: CPT | Mod: 26,,, | Performed by: RADIOLOGY

## 2021-06-18 PROCEDURE — 25500020 PHARM REV CODE 255: Mod: PO | Performed by: INTERNAL MEDICINE

## 2021-06-18 RX ADMIN — IOHEXOL 1000 ML: 9 SOLUTION ORAL at 10:06

## 2021-06-18 RX ADMIN — IOHEXOL 75 ML: 350 INJECTION, SOLUTION INTRAVENOUS at 10:06

## 2021-06-21 ENCOUNTER — TELEPHONE (OUTPATIENT)
Dept: HEMATOLOGY/ONCOLOGY | Facility: CLINIC | Age: 51
End: 2021-06-21

## 2021-06-25 ENCOUNTER — OFFICE VISIT (OUTPATIENT)
Dept: HEMATOLOGY/ONCOLOGY | Facility: CLINIC | Age: 51
End: 2021-06-25
Payer: MEDICAID

## 2021-06-25 ENCOUNTER — TELEPHONE (OUTPATIENT)
Dept: INFUSION THERAPY | Facility: HOSPITAL | Age: 51
End: 2021-06-25

## 2021-06-25 VITALS
BODY MASS INDEX: 23.14 KG/M2 | HEIGHT: 64 IN | OXYGEN SATURATION: 98 % | DIASTOLIC BLOOD PRESSURE: 76 MMHG | SYSTOLIC BLOOD PRESSURE: 116 MMHG | TEMPERATURE: 97 F | HEART RATE: 64 BPM | WEIGHT: 135.56 LBS | RESPIRATION RATE: 18 BRPM

## 2021-06-25 DIAGNOSIS — C79.51 BONE METASTASES: ICD-10-CM

## 2021-06-25 DIAGNOSIS — C50.112 MALIGNANT NEOPLASM OF CENTRAL PORTION OF LEFT BREAST IN FEMALE, ESTROGEN RECEPTOR POSITIVE: Primary | ICD-10-CM

## 2021-06-25 DIAGNOSIS — R74.01 TRANSAMINITIS: ICD-10-CM

## 2021-06-25 DIAGNOSIS — Z17.0 MALIGNANT NEOPLASM OF CENTRAL PORTION OF LEFT BREAST IN FEMALE, ESTROGEN RECEPTOR POSITIVE: Primary | ICD-10-CM

## 2021-06-25 DIAGNOSIS — C78.00 MALIGNANT NEOPLASM METASTATIC TO LUNG, UNSPECIFIED LATERALITY: ICD-10-CM

## 2021-06-25 PROCEDURE — 99999 PR PBB SHADOW E&M-EST. PATIENT-LVL III: ICD-10-PCS | Mod: PBBFAC,,, | Performed by: INTERNAL MEDICINE

## 2021-06-25 PROCEDURE — 99215 OFFICE O/P EST HI 40 MIN: CPT | Mod: S$PBB,,, | Performed by: INTERNAL MEDICINE

## 2021-06-25 PROCEDURE — 99999 PR PBB SHADOW E&M-EST. PATIENT-LVL III: CPT | Mod: PBBFAC,,, | Performed by: INTERNAL MEDICINE

## 2021-06-25 PROCEDURE — 99215 PR OFFICE/OUTPT VISIT, EST, LEVL V, 40-54 MIN: ICD-10-PCS | Mod: S$PBB,,, | Performed by: INTERNAL MEDICINE

## 2021-06-25 PROCEDURE — 99213 OFFICE O/P EST LOW 20 MIN: CPT | Mod: PBBFAC,PN | Performed by: INTERNAL MEDICINE

## 2021-07-20 ENCOUNTER — SPECIALTY PHARMACY (OUTPATIENT)
Dept: PHARMACY | Facility: CLINIC | Age: 51
End: 2021-07-20

## 2021-07-23 ENCOUNTER — INFUSION (OUTPATIENT)
Dept: INFUSION THERAPY | Facility: HOSPITAL | Age: 51
End: 2021-07-23
Attending: INTERNAL MEDICINE
Payer: MEDICAID

## 2021-07-23 ENCOUNTER — OFFICE VISIT (OUTPATIENT)
Dept: HEMATOLOGY/ONCOLOGY | Facility: CLINIC | Age: 51
End: 2021-07-23
Payer: MEDICAID

## 2021-07-23 VITALS
HEART RATE: 65 BPM | OXYGEN SATURATION: 99 % | HEIGHT: 64 IN | WEIGHT: 136.88 LBS | TEMPERATURE: 97 F | DIASTOLIC BLOOD PRESSURE: 66 MMHG | RESPIRATION RATE: 18 BRPM | BODY MASS INDEX: 23.37 KG/M2 | SYSTOLIC BLOOD PRESSURE: 119 MMHG

## 2021-07-23 VITALS
TEMPERATURE: 97 F | HEIGHT: 64 IN | SYSTOLIC BLOOD PRESSURE: 119 MMHG | BODY MASS INDEX: 23.37 KG/M2 | RESPIRATION RATE: 18 BRPM | DIASTOLIC BLOOD PRESSURE: 66 MMHG | OXYGEN SATURATION: 99 % | HEART RATE: 62 BPM | WEIGHT: 136.88 LBS

## 2021-07-23 DIAGNOSIS — C79.51 BONE METASTASES: ICD-10-CM

## 2021-07-23 DIAGNOSIS — D70.1 CHEMOTHERAPY-INDUCED NEUTROPENIA: ICD-10-CM

## 2021-07-23 DIAGNOSIS — C50.112 MALIGNANT NEOPLASM OF CENTRAL PORTION OF LEFT BREAST IN FEMALE, ESTROGEN RECEPTOR POSITIVE: Primary | ICD-10-CM

## 2021-07-23 DIAGNOSIS — T45.1X5A CHEMOTHERAPY-INDUCED NEUTROPENIA: ICD-10-CM

## 2021-07-23 DIAGNOSIS — R74.01 TRANSAMINITIS: ICD-10-CM

## 2021-07-23 DIAGNOSIS — C78.00 MALIGNANT NEOPLASM METASTATIC TO LUNG, UNSPECIFIED LATERALITY: ICD-10-CM

## 2021-07-23 DIAGNOSIS — Z17.0 MALIGNANT NEOPLASM OF CENTRAL PORTION OF LEFT BREAST IN FEMALE, ESTROGEN RECEPTOR POSITIVE: Primary | ICD-10-CM

## 2021-07-23 PROCEDURE — 99215 OFFICE O/P EST HI 40 MIN: CPT | Mod: S$PBB,,, | Performed by: INTERNAL MEDICINE

## 2021-07-23 PROCEDURE — 99215 PR OFFICE/OUTPT VISIT, EST, LEVL V, 40-54 MIN: ICD-10-PCS | Mod: S$PBB,,, | Performed by: INTERNAL MEDICINE

## 2021-07-23 PROCEDURE — 99999 PR PBB SHADOW E&M-EST. PATIENT-LVL III: CPT | Mod: PBBFAC,,, | Performed by: INTERNAL MEDICINE

## 2021-07-23 PROCEDURE — 99213 OFFICE O/P EST LOW 20 MIN: CPT | Mod: PBBFAC,PN,25 | Performed by: INTERNAL MEDICINE

## 2021-07-23 PROCEDURE — A4216 STERILE WATER/SALINE, 10 ML: HCPCS | Mod: PN | Performed by: INTERNAL MEDICINE

## 2021-07-23 PROCEDURE — 99999 PR PBB SHADOW E&M-EST. PATIENT-LVL III: ICD-10-PCS | Mod: PBBFAC,,, | Performed by: INTERNAL MEDICINE

## 2021-07-23 PROCEDURE — 25000003 PHARM REV CODE 250: Mod: PN | Performed by: INTERNAL MEDICINE

## 2021-07-23 PROCEDURE — 96402 CHEMO HORMON ANTINEOPL SQ/IM: CPT | Mod: PN

## 2021-07-23 PROCEDURE — 63600175 PHARM REV CODE 636 W HCPCS: Mod: JG,PN | Performed by: INTERNAL MEDICINE

## 2021-07-23 RX ORDER — HEPARIN 100 UNIT/ML
500 SYRINGE INTRAVENOUS
Status: DISCONTINUED | OUTPATIENT
Start: 2021-07-23 | End: 2021-07-23 | Stop reason: HOSPADM

## 2021-07-23 RX ORDER — SODIUM CHLORIDE 0.9 % (FLUSH) 0.9 %
10 SYRINGE (ML) INJECTION
Status: DISCONTINUED | OUTPATIENT
Start: 2021-07-23 | End: 2021-07-23 | Stop reason: HOSPADM

## 2021-07-23 RX ORDER — HEPARIN 100 UNIT/ML
500 SYRINGE INTRAVENOUS
Status: CANCELLED | OUTPATIENT
Start: 2021-07-23

## 2021-07-23 RX ORDER — SODIUM CHLORIDE 0.9 % (FLUSH) 0.9 %
10 SYRINGE (ML) INJECTION
Status: CANCELLED | OUTPATIENT
Start: 2021-07-23

## 2021-07-23 RX ADMIN — Medication 10 ML: at 09:07

## 2021-07-23 RX ADMIN — HEPARIN 500 UNITS: 100 SYRINGE at 09:07

## 2021-07-23 RX ADMIN — GOSERELIN ACETATE 3.6 MG: 3.6 IMPLANT SUBCUTANEOUS at 09:07

## 2021-07-26 ENCOUNTER — SPECIALTY PHARMACY (OUTPATIENT)
Dept: PHARMACY | Facility: CLINIC | Age: 51
End: 2021-07-26

## 2021-07-26 DIAGNOSIS — C50.112 MALIGNANT NEOPLASM OF CENTRAL PORTION OF LEFT BREAST IN FEMALE, ESTROGEN RECEPTOR POSITIVE: Primary | ICD-10-CM

## 2021-07-26 DIAGNOSIS — Z17.0 MALIGNANT NEOPLASM OF CENTRAL PORTION OF LEFT BREAST IN FEMALE, ESTROGEN RECEPTOR POSITIVE: Primary | ICD-10-CM

## 2021-08-16 ENCOUNTER — TELEPHONE (OUTPATIENT)
Dept: HEMATOLOGY/ONCOLOGY | Facility: CLINIC | Age: 51
End: 2021-08-16

## 2021-08-17 ENCOUNTER — TELEPHONE (OUTPATIENT)
Dept: HEMATOLOGY/ONCOLOGY | Facility: CLINIC | Age: 51
End: 2021-08-17

## 2021-08-19 ENCOUNTER — SPECIALTY PHARMACY (OUTPATIENT)
Dept: PHARMACY | Facility: CLINIC | Age: 51
End: 2021-08-19

## 2021-08-19 DIAGNOSIS — C50.112 MALIGNANT NEOPLASM OF CENTRAL PORTION OF LEFT BREAST IN FEMALE, ESTROGEN RECEPTOR POSITIVE: Primary | ICD-10-CM

## 2021-08-19 DIAGNOSIS — Z17.0 MALIGNANT NEOPLASM OF CENTRAL PORTION OF LEFT BREAST IN FEMALE, ESTROGEN RECEPTOR POSITIVE: Primary | ICD-10-CM

## 2021-08-23 ENCOUNTER — OFFICE VISIT (OUTPATIENT)
Dept: HEMATOLOGY/ONCOLOGY | Facility: CLINIC | Age: 51
End: 2021-08-23
Payer: MEDICAID

## 2021-08-23 ENCOUNTER — INFUSION (OUTPATIENT)
Dept: INFUSION THERAPY | Facility: HOSPITAL | Age: 51
End: 2021-08-23
Attending: INTERNAL MEDICINE
Payer: MEDICAID

## 2021-08-23 VITALS
SYSTOLIC BLOOD PRESSURE: 110 MMHG | BODY MASS INDEX: 23.84 KG/M2 | TEMPERATURE: 98 F | DIASTOLIC BLOOD PRESSURE: 74 MMHG | HEART RATE: 66 BPM | WEIGHT: 138.88 LBS | RESPIRATION RATE: 16 BRPM

## 2021-08-23 VITALS
SYSTOLIC BLOOD PRESSURE: 110 MMHG | HEIGHT: 64 IN | OXYGEN SATURATION: 98 % | WEIGHT: 139.13 LBS | RESPIRATION RATE: 14 BRPM | HEART RATE: 66 BPM | BODY MASS INDEX: 23.75 KG/M2 | DIASTOLIC BLOOD PRESSURE: 74 MMHG | TEMPERATURE: 98 F

## 2021-08-23 DIAGNOSIS — U07.1 COVID-19: ICD-10-CM

## 2021-08-23 DIAGNOSIS — R74.01 TRANSAMINITIS: ICD-10-CM

## 2021-08-23 DIAGNOSIS — C79.51 BONE METASTASES: ICD-10-CM

## 2021-08-23 DIAGNOSIS — T45.1X5A CHEMOTHERAPY-INDUCED NEUTROPENIA: ICD-10-CM

## 2021-08-23 DIAGNOSIS — C78.00 MALIGNANT NEOPLASM METASTATIC TO LUNG, UNSPECIFIED LATERALITY: ICD-10-CM

## 2021-08-23 DIAGNOSIS — Z12.89 ENCOUNTER FOR SCREENING FOR MALIGNANT NEOPLASM OF OTHER SITES: ICD-10-CM

## 2021-08-23 DIAGNOSIS — C50.112 MALIGNANT NEOPLASM OF CENTRAL PORTION OF LEFT BREAST IN FEMALE, ESTROGEN RECEPTOR POSITIVE: Primary | ICD-10-CM

## 2021-08-23 DIAGNOSIS — Z17.0 MALIGNANT NEOPLASM OF CENTRAL PORTION OF LEFT BREAST IN FEMALE, ESTROGEN RECEPTOR POSITIVE: Primary | ICD-10-CM

## 2021-08-23 DIAGNOSIS — D70.1 CHEMOTHERAPY-INDUCED NEUTROPENIA: ICD-10-CM

## 2021-08-23 PROCEDURE — 63600175 PHARM REV CODE 636 W HCPCS: Mod: JG,PN | Performed by: INTERNAL MEDICINE

## 2021-08-23 PROCEDURE — 99215 OFFICE O/P EST HI 40 MIN: CPT | Mod: S$PBB,,, | Performed by: INTERNAL MEDICINE

## 2021-08-23 PROCEDURE — 99215 PR OFFICE/OUTPT VISIT, EST, LEVL V, 40-54 MIN: ICD-10-PCS | Mod: S$PBB,,, | Performed by: INTERNAL MEDICINE

## 2021-08-23 PROCEDURE — 99999 PR PBB SHADOW E&M-EST. PATIENT-LVL III: CPT | Mod: PBBFAC,,, | Performed by: INTERNAL MEDICINE

## 2021-08-23 PROCEDURE — 99999 PR PBB SHADOW E&M-EST. PATIENT-LVL III: ICD-10-PCS | Mod: PBBFAC,,, | Performed by: INTERNAL MEDICINE

## 2021-08-23 PROCEDURE — 99213 OFFICE O/P EST LOW 20 MIN: CPT | Mod: PBBFAC,PN | Performed by: INTERNAL MEDICINE

## 2021-08-23 PROCEDURE — 96402 CHEMO HORMON ANTINEOPL SQ/IM: CPT | Mod: PN

## 2021-08-23 RX ADMIN — GOSERELIN ACETATE 3.6 MG: 3.6 IMPLANT SUBCUTANEOUS at 11:08

## 2021-08-24 ENCOUNTER — TELEPHONE (OUTPATIENT)
Dept: HEMATOLOGY/ONCOLOGY | Facility: CLINIC | Age: 51
End: 2021-08-24

## 2021-08-25 ENCOUNTER — TELEPHONE (OUTPATIENT)
Dept: HEMATOLOGY/ONCOLOGY | Facility: CLINIC | Age: 51
End: 2021-08-25

## 2021-09-15 ENCOUNTER — SPECIALTY PHARMACY (OUTPATIENT)
Dept: PHARMACY | Facility: CLINIC | Age: 51
End: 2021-09-15

## 2021-09-16 ENCOUNTER — HOSPITAL ENCOUNTER (OUTPATIENT)
Dept: RADIOLOGY | Facility: HOSPITAL | Age: 51
Discharge: HOME | End: 2021-09-16
Attending: INTERNAL MEDICINE
Payer: MEDICAID

## 2021-09-16 DIAGNOSIS — Z12.89 ENCOUNTER FOR SCREENING FOR MALIGNANT NEOPLASM OF OTHER SITES: ICD-10-CM

## 2021-09-16 DIAGNOSIS — C79.51 BONE METASTASES: ICD-10-CM

## 2021-09-16 PROCEDURE — 71260 CT CHEST ABDOMEN PELVIS WITH CONTRAST (XPD): ICD-10-PCS | Mod: 26,,, | Performed by: RADIOLOGY

## 2021-09-16 PROCEDURE — 78306 BONE IMAGING WHOLE BODY: CPT | Mod: 26,,, | Performed by: RADIOLOGY

## 2021-09-16 PROCEDURE — A9698 NON-RAD CONTRAST MATERIALNOC: HCPCS | Mod: PO | Performed by: INTERNAL MEDICINE

## 2021-09-16 PROCEDURE — 74177 CT ABD & PELVIS W/CONTRAST: CPT | Mod: 26,,, | Performed by: RADIOLOGY

## 2021-09-16 PROCEDURE — 78306 NM BONE SCAN WHOLE BODY: ICD-10-PCS | Mod: 26,,, | Performed by: RADIOLOGY

## 2021-09-16 PROCEDURE — A9503 TC99M MEDRONATE: HCPCS | Mod: PO

## 2021-09-16 PROCEDURE — 74177 CT ABD & PELVIS W/CONTRAST: CPT | Mod: TC,PO

## 2021-09-16 PROCEDURE — 71260 CT THORAX DX C+: CPT | Mod: TC,PO

## 2021-09-16 PROCEDURE — 78306 BONE IMAGING WHOLE BODY: CPT | Mod: TC,PO

## 2021-09-16 PROCEDURE — 71260 CT THORAX DX C+: CPT | Mod: 26,,, | Performed by: RADIOLOGY

## 2021-09-16 PROCEDURE — 25500020 PHARM REV CODE 255: Mod: PO | Performed by: INTERNAL MEDICINE

## 2021-09-16 PROCEDURE — 74177 CT CHEST ABDOMEN PELVIS WITH CONTRAST (XPD): ICD-10-PCS | Mod: 26,,, | Performed by: RADIOLOGY

## 2021-09-16 RX ADMIN — IOHEXOL 1000 ML: 12 SOLUTION ORAL at 09:09

## 2021-09-16 RX ADMIN — IOHEXOL 75 ML: 350 INJECTION, SOLUTION INTRAVENOUS at 09:09

## 2021-09-20 ENCOUNTER — INFUSION (OUTPATIENT)
Dept: INFUSION THERAPY | Facility: HOSPITAL | Age: 51
End: 2021-09-20
Attending: INTERNAL MEDICINE
Payer: MEDICAID

## 2021-09-20 ENCOUNTER — OFFICE VISIT (OUTPATIENT)
Dept: HEMATOLOGY/ONCOLOGY | Facility: CLINIC | Age: 51
End: 2021-09-20
Payer: MEDICAID

## 2021-09-20 VITALS
WEIGHT: 142.63 LBS | HEART RATE: 76 BPM | DIASTOLIC BLOOD PRESSURE: 74 MMHG | HEART RATE: 77 BPM | BODY MASS INDEX: 24.35 KG/M2 | HEIGHT: 64 IN | SYSTOLIC BLOOD PRESSURE: 110 MMHG | TEMPERATURE: 98 F | OXYGEN SATURATION: 97 % | TEMPERATURE: 98 F | SYSTOLIC BLOOD PRESSURE: 110 MMHG | RESPIRATION RATE: 18 BRPM | RESPIRATION RATE: 18 BRPM | DIASTOLIC BLOOD PRESSURE: 74 MMHG

## 2021-09-20 DIAGNOSIS — C50.112 MALIGNANT NEOPLASM OF CENTRAL PORTION OF LEFT BREAST IN FEMALE, ESTROGEN RECEPTOR POSITIVE: Primary | ICD-10-CM

## 2021-09-20 DIAGNOSIS — Z17.0 MALIGNANT NEOPLASM OF CENTRAL PORTION OF LEFT BREAST IN FEMALE, ESTROGEN RECEPTOR POSITIVE: Primary | ICD-10-CM

## 2021-09-20 DIAGNOSIS — C78.00 MALIGNANT NEOPLASM METASTATIC TO LUNG, UNSPECIFIED LATERALITY: ICD-10-CM

## 2021-09-20 DIAGNOSIS — R74.01 TRANSAMINITIS: ICD-10-CM

## 2021-09-20 DIAGNOSIS — C79.51 BONE METASTASES: ICD-10-CM

## 2021-09-20 PROCEDURE — A4216 STERILE WATER/SALINE, 10 ML: HCPCS | Mod: PN | Performed by: INTERNAL MEDICINE

## 2021-09-20 PROCEDURE — 96402 CHEMO HORMON ANTINEOPL SQ/IM: CPT | Mod: PN

## 2021-09-20 PROCEDURE — 25000003 PHARM REV CODE 250: Mod: PN | Performed by: INTERNAL MEDICINE

## 2021-09-20 PROCEDURE — 63600175 PHARM REV CODE 636 W HCPCS: Mod: JG,PN | Performed by: INTERNAL MEDICINE

## 2021-09-20 PROCEDURE — 99215 PR OFFICE/OUTPT VISIT, EST, LEVL V, 40-54 MIN: ICD-10-PCS | Mod: S$PBB,,, | Performed by: INTERNAL MEDICINE

## 2021-09-20 PROCEDURE — 99213 OFFICE O/P EST LOW 20 MIN: CPT | Mod: PBBFAC,PN | Performed by: INTERNAL MEDICINE

## 2021-09-20 PROCEDURE — 99999 PR PBB SHADOW E&M-EST. PATIENT-LVL III: ICD-10-PCS | Mod: PBBFAC,,, | Performed by: INTERNAL MEDICINE

## 2021-09-20 PROCEDURE — 99215 OFFICE O/P EST HI 40 MIN: CPT | Mod: S$PBB,,, | Performed by: INTERNAL MEDICINE

## 2021-09-20 PROCEDURE — 99999 PR PBB SHADOW E&M-EST. PATIENT-LVL III: CPT | Mod: PBBFAC,,, | Performed by: INTERNAL MEDICINE

## 2021-09-20 RX ORDER — SODIUM CHLORIDE 0.9 % (FLUSH) 0.9 %
10 SYRINGE (ML) INJECTION
Status: DISCONTINUED | OUTPATIENT
Start: 2021-09-20 | End: 2021-09-20 | Stop reason: HOSPADM

## 2021-09-20 RX ORDER — SODIUM CHLORIDE 0.9 % (FLUSH) 0.9 %
10 SYRINGE (ML) INJECTION
Status: CANCELLED | OUTPATIENT
Start: 2021-09-20

## 2021-09-20 RX ORDER — HEPARIN 100 UNIT/ML
500 SYRINGE INTRAVENOUS
Status: DISCONTINUED | OUTPATIENT
Start: 2021-09-20 | End: 2021-09-20 | Stop reason: HOSPADM

## 2021-09-20 RX ORDER — HEPARIN 100 UNIT/ML
500 SYRINGE INTRAVENOUS
Status: CANCELLED | OUTPATIENT
Start: 2021-09-20

## 2021-09-20 RX ADMIN — HEPARIN 500 UNITS: 100 SYRINGE at 12:09

## 2021-09-20 RX ADMIN — GOSERELIN ACETATE 3.6 MG: 3.6 IMPLANT SUBCUTANEOUS at 12:09

## 2021-09-20 RX ADMIN — Medication 10 ML: at 12:09

## 2021-10-13 ENCOUNTER — HOSPITAL ENCOUNTER (OUTPATIENT)
Dept: RADIOLOGY | Facility: HOSPITAL | Age: 51
Discharge: HOME | End: 2021-10-13
Attending: INTERNAL MEDICINE
Payer: MEDICAID

## 2021-10-13 DIAGNOSIS — C50.112 MALIGNANT NEOPLASM OF CENTRAL PORTION OF LEFT BREAST IN FEMALE, ESTROGEN RECEPTOR POSITIVE: ICD-10-CM

## 2021-10-13 DIAGNOSIS — Z17.0 MALIGNANT NEOPLASM OF CENTRAL PORTION OF LEFT BREAST IN FEMALE, ESTROGEN RECEPTOR POSITIVE: ICD-10-CM

## 2021-10-13 PROCEDURE — 76642 ULTRASOUND BREAST LIMITED: CPT | Mod: 26,50,, | Performed by: RADIOLOGY

## 2021-10-13 PROCEDURE — 77062 MAMMO DIGITAL DIAGNOSTIC BILAT WITH TOMO: ICD-10-PCS | Mod: 26,,, | Performed by: RADIOLOGY

## 2021-10-13 PROCEDURE — 76642 US BREAST BILATERAL LIMITED: ICD-10-PCS | Mod: 26,50,, | Performed by: RADIOLOGY

## 2021-10-13 PROCEDURE — 77066 MAMMO DIGITAL DIAGNOSTIC BILAT WITH TOMO: ICD-10-PCS | Mod: 26,,, | Performed by: RADIOLOGY

## 2021-10-13 PROCEDURE — 77066 DX MAMMO INCL CAD BI: CPT | Mod: 26,,, | Performed by: RADIOLOGY

## 2021-10-13 PROCEDURE — 76642 ULTRASOUND BREAST LIMITED: CPT | Mod: TC,50,PO

## 2021-10-13 PROCEDURE — 77062 BREAST TOMOSYNTHESIS BI: CPT | Mod: 26,,, | Performed by: RADIOLOGY

## 2021-10-13 PROCEDURE — 77062 BREAST TOMOSYNTHESIS BI: CPT | Mod: TC,PO

## 2021-10-18 ENCOUNTER — INFUSION (OUTPATIENT)
Dept: INFUSION THERAPY | Facility: HOSPITAL | Age: 51
End: 2021-10-18
Attending: INTERNAL MEDICINE
Payer: MEDICAID

## 2021-10-18 ENCOUNTER — OFFICE VISIT (OUTPATIENT)
Dept: HEMATOLOGY/ONCOLOGY | Facility: CLINIC | Age: 51
End: 2021-10-18
Payer: MEDICAID

## 2021-10-18 ENCOUNTER — DOCUMENTATION ONLY (OUTPATIENT)
Dept: HEMATOLOGY/ONCOLOGY | Facility: CLINIC | Age: 51
End: 2021-10-18

## 2021-10-18 VITALS
WEIGHT: 140.63 LBS | HEIGHT: 64 IN | DIASTOLIC BLOOD PRESSURE: 73 MMHG | RESPIRATION RATE: 18 BRPM | SYSTOLIC BLOOD PRESSURE: 115 MMHG | TEMPERATURE: 97 F | BODY MASS INDEX: 24.01 KG/M2 | OXYGEN SATURATION: 97 % | HEART RATE: 73 BPM

## 2021-10-18 VITALS
DIASTOLIC BLOOD PRESSURE: 73 MMHG | TEMPERATURE: 97 F | WEIGHT: 140.63 LBS | BODY MASS INDEX: 24.14 KG/M2 | SYSTOLIC BLOOD PRESSURE: 115 MMHG | HEART RATE: 73 BPM | RESPIRATION RATE: 18 BRPM

## 2021-10-18 DIAGNOSIS — C79.51 BONE METASTASES: ICD-10-CM

## 2021-10-18 DIAGNOSIS — C78.00 MALIGNANT NEOPLASM METASTATIC TO LUNG, UNSPECIFIED LATERALITY: ICD-10-CM

## 2021-10-18 DIAGNOSIS — C50.112 MALIGNANT NEOPLASM OF CENTRAL PORTION OF LEFT BREAST IN FEMALE, ESTROGEN RECEPTOR POSITIVE: Primary | ICD-10-CM

## 2021-10-18 DIAGNOSIS — R74.01 TRANSAMINITIS: ICD-10-CM

## 2021-10-18 DIAGNOSIS — Z17.0 MALIGNANT NEOPLASM OF CENTRAL PORTION OF LEFT BREAST IN FEMALE, ESTROGEN RECEPTOR POSITIVE: Primary | ICD-10-CM

## 2021-10-18 DIAGNOSIS — T45.1X5A CHEMOTHERAPY-INDUCED NEUTROPENIA: ICD-10-CM

## 2021-10-18 DIAGNOSIS — D70.1 CHEMOTHERAPY-INDUCED NEUTROPENIA: ICD-10-CM

## 2021-10-18 DIAGNOSIS — N63.10 BREAST MASS, RIGHT: ICD-10-CM

## 2021-10-18 PROCEDURE — 99215 PR OFFICE/OUTPT VISIT, EST, LEVL V, 40-54 MIN: ICD-10-PCS | Mod: S$PBB,,, | Performed by: INTERNAL MEDICINE

## 2021-10-18 PROCEDURE — 63600175 PHARM REV CODE 636 W HCPCS: Mod: PN | Performed by: INTERNAL MEDICINE

## 2021-10-18 PROCEDURE — 99213 OFFICE O/P EST LOW 20 MIN: CPT | Mod: PBBFAC,PN | Performed by: INTERNAL MEDICINE

## 2021-10-18 PROCEDURE — 25000003 PHARM REV CODE 250: Mod: PN | Performed by: INTERNAL MEDICINE

## 2021-10-18 PROCEDURE — 99215 OFFICE O/P EST HI 40 MIN: CPT | Mod: S$PBB,,, | Performed by: INTERNAL MEDICINE

## 2021-10-18 PROCEDURE — 96402 CHEMO HORMON ANTINEOPL SQ/IM: CPT | Mod: PN

## 2021-10-18 PROCEDURE — A4216 STERILE WATER/SALINE, 10 ML: HCPCS | Mod: PN | Performed by: INTERNAL MEDICINE

## 2021-10-18 PROCEDURE — 99999 PR PBB SHADOW E&M-EST. PATIENT-LVL III: CPT | Mod: PBBFAC,,, | Performed by: INTERNAL MEDICINE

## 2021-10-18 PROCEDURE — 99999 PR PBB SHADOW E&M-EST. PATIENT-LVL III: ICD-10-PCS | Mod: PBBFAC,,, | Performed by: INTERNAL MEDICINE

## 2021-10-18 RX ORDER — SODIUM CHLORIDE 0.9 % (FLUSH) 0.9 %
10 SYRINGE (ML) INJECTION
Status: CANCELLED | OUTPATIENT
Start: 2021-10-18

## 2021-10-18 RX ORDER — SODIUM CHLORIDE 0.9 % (FLUSH) 0.9 %
10 SYRINGE (ML) INJECTION
Status: DISCONTINUED | OUTPATIENT
Start: 2021-10-18 | End: 2021-10-18 | Stop reason: HOSPADM

## 2021-10-18 RX ORDER — HEPARIN 100 UNIT/ML
500 SYRINGE INTRAVENOUS
Status: DISCONTINUED | OUTPATIENT
Start: 2021-10-18 | End: 2021-10-18 | Stop reason: HOSPADM

## 2021-10-18 RX ORDER — HEPARIN 100 UNIT/ML
500 SYRINGE INTRAVENOUS
Status: CANCELLED | OUTPATIENT
Start: 2021-10-18

## 2021-10-18 RX ADMIN — HEPARIN 500 UNITS: 100 SYRINGE at 12:10

## 2021-10-18 RX ADMIN — GOSERELIN ACETATE 3.6 MG: 3.6 IMPLANT SUBCUTANEOUS at 12:10

## 2021-10-18 RX ADMIN — Medication 10 ML: at 12:10

## 2021-10-19 ENCOUNTER — SPECIALTY PHARMACY (OUTPATIENT)
Dept: PHARMACY | Facility: CLINIC | Age: 51
End: 2021-10-19

## 2021-10-21 PROBLEM — N63.10 BREAST MASS, RIGHT: Status: ACTIVE | Noted: 2021-10-21

## 2021-11-01 ENCOUNTER — TELEPHONE (OUTPATIENT)
Dept: HEMATOLOGY/ONCOLOGY | Facility: CLINIC | Age: 51
End: 2021-11-01
Payer: MEDICAID

## 2021-11-15 ENCOUNTER — OFFICE VISIT (OUTPATIENT)
Dept: HEMATOLOGY/ONCOLOGY | Facility: CLINIC | Age: 51
End: 2021-11-15
Payer: MEDICAID

## 2021-11-15 ENCOUNTER — INFUSION (OUTPATIENT)
Dept: INFUSION THERAPY | Facility: HOSPITAL | Age: 51
End: 2021-11-15
Attending: INTERNAL MEDICINE
Payer: MEDICAID

## 2021-11-15 VITALS
DIASTOLIC BLOOD PRESSURE: 80 MMHG | RESPIRATION RATE: 18 BRPM | HEIGHT: 64 IN | SYSTOLIC BLOOD PRESSURE: 120 MMHG | TEMPERATURE: 98 F | HEART RATE: 75 BPM | BODY MASS INDEX: 24.39 KG/M2 | WEIGHT: 142.88 LBS | OXYGEN SATURATION: 99 %

## 2021-11-15 VITALS
WEIGHT: 142.88 LBS | TEMPERATURE: 98 F | HEART RATE: 75 BPM | RESPIRATION RATE: 18 BRPM | SYSTOLIC BLOOD PRESSURE: 120 MMHG | HEIGHT: 64 IN | OXYGEN SATURATION: 99 % | DIASTOLIC BLOOD PRESSURE: 80 MMHG | BODY MASS INDEX: 24.39 KG/M2

## 2021-11-15 DIAGNOSIS — C50.112 MALIGNANT NEOPLASM OF CENTRAL PORTION OF LEFT BREAST IN FEMALE, ESTROGEN RECEPTOR POSITIVE: Primary | ICD-10-CM

## 2021-11-15 DIAGNOSIS — Z17.0 MALIGNANT NEOPLASM OF CENTRAL PORTION OF LEFT BREAST IN FEMALE, ESTROGEN RECEPTOR POSITIVE: Primary | ICD-10-CM

## 2021-11-15 DIAGNOSIS — R74.01 TRANSAMINITIS: ICD-10-CM

## 2021-11-15 DIAGNOSIS — N63.10 BREAST MASS, RIGHT: ICD-10-CM

## 2021-11-15 DIAGNOSIS — C79.51 BONE METASTASES: ICD-10-CM

## 2021-11-15 DIAGNOSIS — C78.00 MALIGNANT NEOPLASM METASTATIC TO LUNG, UNSPECIFIED LATERALITY: ICD-10-CM

## 2021-11-15 PROCEDURE — 99999 PR PBB SHADOW E&M-EST. PATIENT-LVL III: ICD-10-PCS | Mod: PBBFAC,,, | Performed by: INTERNAL MEDICINE

## 2021-11-15 PROCEDURE — 99213 OFFICE O/P EST LOW 20 MIN: CPT | Mod: PBBFAC,PN | Performed by: INTERNAL MEDICINE

## 2021-11-15 PROCEDURE — 63600175 PHARM REV CODE 636 W HCPCS: Mod: JG,PN | Performed by: INTERNAL MEDICINE

## 2021-11-15 PROCEDURE — 99999 PR PBB SHADOW E&M-EST. PATIENT-LVL III: CPT | Mod: PBBFAC,,, | Performed by: INTERNAL MEDICINE

## 2021-11-15 PROCEDURE — 99215 PR OFFICE/OUTPT VISIT, EST, LEVL V, 40-54 MIN: ICD-10-PCS | Mod: S$PBB,,, | Performed by: INTERNAL MEDICINE

## 2021-11-15 PROCEDURE — 99215 OFFICE O/P EST HI 40 MIN: CPT | Mod: S$PBB,,, | Performed by: INTERNAL MEDICINE

## 2021-11-15 PROCEDURE — 96402 CHEMO HORMON ANTINEOPL SQ/IM: CPT | Mod: PN

## 2021-11-15 RX ORDER — SODIUM CHLORIDE 0.9 % (FLUSH) 0.9 %
10 SYRINGE (ML) INJECTION
Status: DISCONTINUED | OUTPATIENT
Start: 2021-11-15 | End: 2021-11-15 | Stop reason: HOSPADM

## 2021-11-15 RX ORDER — ANASTROZOLE 1 MG/1
1 TABLET ORAL DAILY
Qty: 90 TABLET | Refills: 6 | Status: SHIPPED | OUTPATIENT
Start: 2021-11-15 | End: 2021-12-13 | Stop reason: SDUPTHER

## 2021-11-15 RX ORDER — AMOXICILLIN 875 MG/1
TABLET, FILM COATED ORAL
COMMUNITY
Start: 2021-10-26 | End: 2022-01-18

## 2021-11-15 RX ORDER — HEPARIN 100 UNIT/ML
500 SYRINGE INTRAVENOUS
Status: CANCELLED | OUTPATIENT
Start: 2021-11-15

## 2021-11-15 RX ORDER — SODIUM CHLORIDE 0.9 % (FLUSH) 0.9 %
10 SYRINGE (ML) INJECTION
Status: CANCELLED | OUTPATIENT
Start: 2021-11-15

## 2021-11-15 RX ORDER — HEPARIN 100 UNIT/ML
500 SYRINGE INTRAVENOUS
Status: DISCONTINUED | OUTPATIENT
Start: 2021-11-15 | End: 2021-11-15 | Stop reason: HOSPADM

## 2021-11-15 RX ADMIN — GOSERELIN ACETATE 3.6 MG: 3.6 IMPLANT SUBCUTANEOUS at 10:11

## 2021-11-16 ENCOUNTER — TELEPHONE (OUTPATIENT)
Dept: HEMATOLOGY/ONCOLOGY | Facility: CLINIC | Age: 51
End: 2021-11-16
Payer: MEDICAID

## 2021-11-18 ENCOUNTER — TELEPHONE (OUTPATIENT)
Dept: HEMATOLOGY/ONCOLOGY | Facility: CLINIC | Age: 51
End: 2021-11-18
Payer: MEDICAID

## 2021-11-19 ENCOUNTER — SPECIALTY PHARMACY (OUTPATIENT)
Dept: PHARMACY | Facility: CLINIC | Age: 51
End: 2021-11-19
Payer: MEDICAID

## 2021-11-19 DIAGNOSIS — C50.112 MALIGNANT NEOPLASM OF CENTRAL PORTION OF LEFT BREAST IN FEMALE, ESTROGEN RECEPTOR POSITIVE: Primary | ICD-10-CM

## 2021-11-19 DIAGNOSIS — Z17.0 MALIGNANT NEOPLASM OF CENTRAL PORTION OF LEFT BREAST IN FEMALE, ESTROGEN RECEPTOR POSITIVE: Primary | ICD-10-CM

## 2021-12-06 ENCOUNTER — LAB VISIT (OUTPATIENT)
Dept: LAB | Facility: HOSPITAL | Age: 51
End: 2021-12-06
Attending: INTERNAL MEDICINE
Payer: MEDICAID

## 2021-12-06 DIAGNOSIS — Z17.0 MALIGNANT NEOPLASM OF CENTRAL PORTION OF LEFT BREAST IN FEMALE, ESTROGEN RECEPTOR POSITIVE: ICD-10-CM

## 2021-12-06 DIAGNOSIS — C50.112 MALIGNANT NEOPLASM OF CENTRAL PORTION OF LEFT BREAST IN FEMALE, ESTROGEN RECEPTOR POSITIVE: ICD-10-CM

## 2021-12-06 LAB
ALBUMIN SERPL BCP-MCNC: 3.8 G/DL (ref 3.5–5.2)
ALP SERPL-CCNC: 93 U/L (ref 55–135)
ALT SERPL W/O P-5'-P-CCNC: 22 U/L (ref 10–44)
ANION GAP SERPL CALC-SCNC: 10 MMOL/L (ref 8–16)
AST SERPL-CCNC: 21 U/L (ref 10–40)
BASOPHILS # BLD AUTO: 0.11 K/UL (ref 0–0.2)
BASOPHILS NFR BLD: 2.5 % (ref 0–1.9)
BILIRUB SERPL-MCNC: 0.6 MG/DL (ref 0.1–1)
BUN SERPL-MCNC: 10 MG/DL (ref 6–20)
CALCIUM SERPL-MCNC: 9.4 MG/DL (ref 8.7–10.5)
CHLORIDE SERPL-SCNC: 106 MMOL/L (ref 95–110)
CO2 SERPL-SCNC: 24 MMOL/L (ref 23–29)
CREAT SERPL-MCNC: 0.8 MG/DL (ref 0.5–1.4)
DIFFERENTIAL METHOD: ABNORMAL
EOSINOPHIL # BLD AUTO: 0.2 K/UL (ref 0–0.5)
EOSINOPHIL NFR BLD: 3.5 % (ref 0–8)
ERYTHROCYTE [DISTWIDTH] IN BLOOD BY AUTOMATED COUNT: 12.5 % (ref 11.5–14.5)
EST. GFR  (AFRICAN AMERICAN): >60 ML/MIN/1.73 M^2
EST. GFR  (NON AFRICAN AMERICAN): >60 ML/MIN/1.73 M^2
GLUCOSE SERPL-MCNC: 95 MG/DL (ref 70–110)
HCT VFR BLD AUTO: 42.8 % (ref 37–48.5)
HGB BLD-MCNC: 14.3 G/DL (ref 12–16)
IMM GRANULOCYTES # BLD AUTO: 0 K/UL (ref 0–0.04)
IMM GRANULOCYTES NFR BLD AUTO: 0 % (ref 0–0.5)
LYMPHOCYTES # BLD AUTO: 1.6 K/UL (ref 1–4.8)
LYMPHOCYTES NFR BLD: 35.9 % (ref 18–48)
MCH RBC QN AUTO: 30.9 PG (ref 27–31)
MCHC RBC AUTO-ENTMCNC: 33.4 G/DL (ref 32–36)
MCV RBC AUTO: 92 FL (ref 82–98)
MONOCYTES # BLD AUTO: 0.2 K/UL (ref 0.3–1)
MONOCYTES NFR BLD: 4.2 % (ref 4–15)
NEUTROPHILS # BLD AUTO: 2.3 K/UL (ref 1.8–7.7)
NEUTROPHILS NFR BLD: 53.9 % (ref 38–73)
NRBC BLD-RTO: 0 /100 WBC
PLATELET # BLD AUTO: 248 K/UL (ref 150–450)
PMV BLD AUTO: 9.3 FL (ref 9.2–12.9)
POTASSIUM SERPL-SCNC: 3.9 MMOL/L (ref 3.5–5.1)
PROT SERPL-MCNC: 6.4 G/DL (ref 6–8.4)
RBC # BLD AUTO: 4.63 M/UL (ref 4–5.4)
SODIUM SERPL-SCNC: 140 MMOL/L (ref 136–145)
WBC # BLD AUTO: 4.32 K/UL (ref 3.9–12.7)

## 2021-12-06 PROCEDURE — 36415 COLL VENOUS BLD VENIPUNCTURE: CPT | Mod: PO | Performed by: INTERNAL MEDICINE

## 2021-12-06 PROCEDURE — 86300 IMMUNOASSAY TUMOR CA 15-3: CPT | Performed by: INTERNAL MEDICINE

## 2021-12-06 PROCEDURE — 85025 COMPLETE CBC W/AUTO DIFF WBC: CPT | Mod: PO | Performed by: INTERNAL MEDICINE

## 2021-12-06 PROCEDURE — 86300 IMMUNOASSAY TUMOR CA 15-3: CPT | Mod: 91 | Performed by: INTERNAL MEDICINE

## 2021-12-06 PROCEDURE — 80053 COMPREHEN METABOLIC PANEL: CPT | Performed by: INTERNAL MEDICINE

## 2021-12-08 LAB — CANCER AG15-3 SERPL IA-ACNC: 48 U/ML

## 2021-12-09 LAB — CANCER AG27-29 SERPL-ACNC: 66.1 U/ML

## 2021-12-10 DIAGNOSIS — C78.00 MALIGNANT NEOPLASM METASTATIC TO LUNG, UNSPECIFIED LATERALITY: ICD-10-CM

## 2021-12-10 DIAGNOSIS — C50.112 MALIGNANT NEOPLASM OF CENTRAL PORTION OF LEFT BREAST IN FEMALE, ESTROGEN RECEPTOR POSITIVE: Primary | ICD-10-CM

## 2021-12-10 DIAGNOSIS — Z17.0 MALIGNANT NEOPLASM OF CENTRAL PORTION OF LEFT BREAST IN FEMALE, ESTROGEN RECEPTOR POSITIVE: Primary | ICD-10-CM

## 2021-12-13 ENCOUNTER — PATIENT MESSAGE (OUTPATIENT)
Dept: HEMATOLOGY/ONCOLOGY | Facility: CLINIC | Age: 51
End: 2021-12-13

## 2021-12-13 ENCOUNTER — INFUSION (OUTPATIENT)
Dept: INFUSION THERAPY | Facility: HOSPITAL | Age: 51
End: 2021-12-13
Attending: INTERNAL MEDICINE
Payer: MEDICAID

## 2021-12-13 ENCOUNTER — OFFICE VISIT (OUTPATIENT)
Dept: HEMATOLOGY/ONCOLOGY | Facility: CLINIC | Age: 51
End: 2021-12-13
Payer: MEDICAID

## 2021-12-13 VITALS
BODY MASS INDEX: 24.01 KG/M2 | WEIGHT: 140.63 LBS | DIASTOLIC BLOOD PRESSURE: 82 MMHG | HEIGHT: 64 IN | OXYGEN SATURATION: 99 % | RESPIRATION RATE: 18 BRPM | HEART RATE: 71 BPM | SYSTOLIC BLOOD PRESSURE: 102 MMHG | TEMPERATURE: 98 F

## 2021-12-13 VITALS
WEIGHT: 140.63 LBS | DIASTOLIC BLOOD PRESSURE: 82 MMHG | RESPIRATION RATE: 18 BRPM | TEMPERATURE: 98 F | OXYGEN SATURATION: 99 % | BODY MASS INDEX: 24.01 KG/M2 | HEIGHT: 64 IN | HEART RATE: 71 BPM | SYSTOLIC BLOOD PRESSURE: 102 MMHG

## 2021-12-13 DIAGNOSIS — C50.112 MALIGNANT NEOPLASM OF CENTRAL PORTION OF LEFT BREAST IN FEMALE, ESTROGEN RECEPTOR POSITIVE: Primary | ICD-10-CM

## 2021-12-13 DIAGNOSIS — D70.1 CHEMOTHERAPY-INDUCED NEUTROPENIA: ICD-10-CM

## 2021-12-13 DIAGNOSIS — Z17.0 MALIGNANT NEOPLASM OF CENTRAL PORTION OF LEFT BREAST IN FEMALE, ESTROGEN RECEPTOR POSITIVE: Primary | ICD-10-CM

## 2021-12-13 DIAGNOSIS — C78.00 MALIGNANT NEOPLASM METASTATIC TO LUNG, UNSPECIFIED LATERALITY: ICD-10-CM

## 2021-12-13 DIAGNOSIS — N63.10 BREAST MASS, RIGHT: ICD-10-CM

## 2021-12-13 DIAGNOSIS — C79.51 BONE METASTASES: ICD-10-CM

## 2021-12-13 DIAGNOSIS — T45.1X5A CHEMOTHERAPY-INDUCED NEUTROPENIA: ICD-10-CM

## 2021-12-13 DIAGNOSIS — R74.01 TRANSAMINITIS: ICD-10-CM

## 2021-12-13 PROCEDURE — 99215 OFFICE O/P EST HI 40 MIN: CPT | Mod: S$PBB,,, | Performed by: INTERNAL MEDICINE

## 2021-12-13 PROCEDURE — A4216 STERILE WATER/SALINE, 10 ML: HCPCS | Mod: PN | Performed by: INTERNAL MEDICINE

## 2021-12-13 PROCEDURE — 63600175 PHARM REV CODE 636 W HCPCS: Mod: PN | Performed by: INTERNAL MEDICINE

## 2021-12-13 PROCEDURE — 25000003 PHARM REV CODE 250: Mod: PN | Performed by: INTERNAL MEDICINE

## 2021-12-13 PROCEDURE — 99999 PR PBB SHADOW E&M-EST. PATIENT-LVL III: ICD-10-PCS | Mod: PBBFAC,,, | Performed by: INTERNAL MEDICINE

## 2021-12-13 PROCEDURE — 96402 CHEMO HORMON ANTINEOPL SQ/IM: CPT | Mod: PN

## 2021-12-13 PROCEDURE — 99999 PR PBB SHADOW E&M-EST. PATIENT-LVL III: CPT | Mod: PBBFAC,,, | Performed by: INTERNAL MEDICINE

## 2021-12-13 PROCEDURE — 99215 PR OFFICE/OUTPT VISIT, EST, LEVL V, 40-54 MIN: ICD-10-PCS | Mod: S$PBB,,, | Performed by: INTERNAL MEDICINE

## 2021-12-13 PROCEDURE — 99213 OFFICE O/P EST LOW 20 MIN: CPT | Mod: PBBFAC,PN | Performed by: INTERNAL MEDICINE

## 2021-12-13 RX ORDER — ANASTROZOLE 1 MG/1
1 TABLET ORAL DAILY
Qty: 90 TABLET | Refills: 6 | Status: SHIPPED | OUTPATIENT
Start: 2021-12-13 | End: 2023-01-03 | Stop reason: SDUPTHER

## 2021-12-13 RX ORDER — SODIUM CHLORIDE 0.9 % (FLUSH) 0.9 %
10 SYRINGE (ML) INJECTION
Status: DISCONTINUED | OUTPATIENT
Start: 2021-12-13 | End: 2021-12-13 | Stop reason: HOSPADM

## 2021-12-13 RX ORDER — HEPARIN 100 UNIT/ML
500 SYRINGE INTRAVENOUS
Status: CANCELLED | OUTPATIENT
Start: 2021-12-13

## 2021-12-13 RX ORDER — SODIUM CHLORIDE 0.9 % (FLUSH) 0.9 %
10 SYRINGE (ML) INJECTION
Status: CANCELLED | OUTPATIENT
Start: 2021-12-13

## 2021-12-13 RX ORDER — HEPARIN 100 UNIT/ML
500 SYRINGE INTRAVENOUS
Status: DISCONTINUED | OUTPATIENT
Start: 2021-12-13 | End: 2021-12-13 | Stop reason: HOSPADM

## 2021-12-13 RX ADMIN — Medication 10 ML: at 11:12

## 2021-12-13 RX ADMIN — GOSERELIN ACETATE 3.6 MG: 3.6 IMPLANT SUBCUTANEOUS at 11:12

## 2021-12-13 RX ADMIN — HEPARIN 500 UNITS: 100 SYRINGE at 11:12

## 2021-12-22 ENCOUNTER — SPECIALTY PHARMACY (OUTPATIENT)
Dept: PHARMACY | Facility: CLINIC | Age: 51
End: 2021-12-22
Payer: MEDICAID

## 2021-12-22 DIAGNOSIS — C50.112 MALIGNANT NEOPLASM OF CENTRAL PORTION OF LEFT BREAST IN FEMALE, ESTROGEN RECEPTOR POSITIVE: Primary | ICD-10-CM

## 2021-12-22 DIAGNOSIS — Z17.0 MALIGNANT NEOPLASM OF CENTRAL PORTION OF LEFT BREAST IN FEMALE, ESTROGEN RECEPTOR POSITIVE: Primary | ICD-10-CM

## 2022-01-03 ENCOUNTER — HOSPITAL ENCOUNTER (OUTPATIENT)
Dept: RADIOLOGY | Facility: HOSPITAL | Age: 52
Discharge: HOME | End: 2022-01-03
Attending: INTERNAL MEDICINE
Payer: MEDICAID

## 2022-01-03 DIAGNOSIS — C50.112 MALIGNANT NEOPLASM OF CENTRAL PORTION OF LEFT BREAST IN FEMALE, ESTROGEN RECEPTOR POSITIVE: ICD-10-CM

## 2022-01-03 DIAGNOSIS — Z17.0 MALIGNANT NEOPLASM OF CENTRAL PORTION OF LEFT BREAST IN FEMALE, ESTROGEN RECEPTOR POSITIVE: ICD-10-CM

## 2022-01-03 PROCEDURE — 71260 CT THORAX DX C+: CPT | Mod: TC,PO

## 2022-01-03 PROCEDURE — 74177 CT CHEST ABDOMEN PELVIS WITH CONTRAST (XPD): ICD-10-PCS | Mod: 26,,, | Performed by: RADIOLOGY

## 2022-01-03 PROCEDURE — 74177 CT ABD & PELVIS W/CONTRAST: CPT | Mod: 26,,, | Performed by: RADIOLOGY

## 2022-01-03 PROCEDURE — 74177 CT ABD & PELVIS W/CONTRAST: CPT | Mod: TC,PO

## 2022-01-03 PROCEDURE — 71260 CT CHEST ABDOMEN PELVIS WITH CONTRAST (XPD): ICD-10-PCS | Mod: 26,,, | Performed by: RADIOLOGY

## 2022-01-03 PROCEDURE — 71260 CT THORAX DX C+: CPT | Mod: 26,,, | Performed by: RADIOLOGY

## 2022-01-03 PROCEDURE — 25500020 PHARM REV CODE 255: Mod: PO | Performed by: INTERNAL MEDICINE

## 2022-01-03 PROCEDURE — A9698 NON-RAD CONTRAST MATERIALNOC: HCPCS | Mod: PO | Performed by: INTERNAL MEDICINE

## 2022-01-03 RX ADMIN — IOHEXOL 75 ML: 350 INJECTION, SOLUTION INTRAVENOUS at 10:01

## 2022-01-03 RX ADMIN — IOHEXOL 1000 ML: 9 SOLUTION ORAL at 10:01

## 2022-01-05 ENCOUNTER — PATIENT MESSAGE (OUTPATIENT)
Dept: ADMINISTRATIVE | Facility: OTHER | Age: 52
End: 2022-01-05
Payer: MEDICAID

## 2022-01-10 ENCOUNTER — INFUSION (OUTPATIENT)
Dept: INFUSION THERAPY | Facility: HOSPITAL | Age: 52
End: 2022-01-10
Attending: INTERNAL MEDICINE
Payer: MEDICAID

## 2022-01-10 ENCOUNTER — PATIENT MESSAGE (OUTPATIENT)
Dept: HEMATOLOGY/ONCOLOGY | Facility: CLINIC | Age: 52
End: 2022-01-10

## 2022-01-10 ENCOUNTER — OFFICE VISIT (OUTPATIENT)
Dept: HEMATOLOGY/ONCOLOGY | Facility: CLINIC | Age: 52
End: 2022-01-10
Payer: MEDICAID

## 2022-01-10 VITALS
BODY MASS INDEX: 24.42 KG/M2 | SYSTOLIC BLOOD PRESSURE: 124 MMHG | DIASTOLIC BLOOD PRESSURE: 80 MMHG | HEIGHT: 64 IN | TEMPERATURE: 97 F | RESPIRATION RATE: 18 BRPM | WEIGHT: 143.06 LBS | OXYGEN SATURATION: 98 % | HEART RATE: 73 BPM

## 2022-01-10 VITALS
BODY MASS INDEX: 24.42 KG/M2 | TEMPERATURE: 97 F | RESPIRATION RATE: 18 BRPM | WEIGHT: 143.06 LBS | SYSTOLIC BLOOD PRESSURE: 124 MMHG | DIASTOLIC BLOOD PRESSURE: 80 MMHG | HEART RATE: 73 BPM | HEIGHT: 64 IN

## 2022-01-10 DIAGNOSIS — C50.112 MALIGNANT NEOPLASM OF CENTRAL PORTION OF LEFT BREAST IN FEMALE, ESTROGEN RECEPTOR POSITIVE: Primary | ICD-10-CM

## 2022-01-10 DIAGNOSIS — C78.00 MALIGNANT NEOPLASM METASTATIC TO LUNG, UNSPECIFIED LATERALITY: ICD-10-CM

## 2022-01-10 DIAGNOSIS — N63.10 BREAST MASS, RIGHT: ICD-10-CM

## 2022-01-10 DIAGNOSIS — R74.01 TRANSAMINITIS: ICD-10-CM

## 2022-01-10 DIAGNOSIS — Z17.0 MALIGNANT NEOPLASM OF CENTRAL PORTION OF LEFT BREAST IN FEMALE, ESTROGEN RECEPTOR POSITIVE: Primary | ICD-10-CM

## 2022-01-10 DIAGNOSIS — C79.51 BONE METASTASES: ICD-10-CM

## 2022-01-10 PROCEDURE — 96372 THER/PROPH/DIAG INJ SC/IM: CPT | Mod: PN

## 2022-01-10 PROCEDURE — 3079F PR MOST RECENT DIASTOLIC BLOOD PRESSURE 80-89 MM HG: ICD-10-PCS | Mod: CPTII,,, | Performed by: INTERNAL MEDICINE

## 2022-01-10 PROCEDURE — 99215 OFFICE O/P EST HI 40 MIN: CPT | Mod: S$PBB,,, | Performed by: INTERNAL MEDICINE

## 2022-01-10 PROCEDURE — A4216 STERILE WATER/SALINE, 10 ML: HCPCS | Mod: PN | Performed by: INTERNAL MEDICINE

## 2022-01-10 PROCEDURE — 3008F PR BODY MASS INDEX (BMI) DOCUMENTED: ICD-10-PCS | Mod: CPTII,,, | Performed by: INTERNAL MEDICINE

## 2022-01-10 PROCEDURE — 25000003 PHARM REV CODE 250: Mod: PN | Performed by: INTERNAL MEDICINE

## 2022-01-10 PROCEDURE — 99999 PR PBB SHADOW E&M-EST. PATIENT-LVL III: CPT | Mod: PBBFAC,,, | Performed by: INTERNAL MEDICINE

## 2022-01-10 PROCEDURE — 3074F SYST BP LT 130 MM HG: CPT | Mod: CPTII,,, | Performed by: INTERNAL MEDICINE

## 2022-01-10 PROCEDURE — 3074F PR MOST RECENT SYSTOLIC BLOOD PRESSURE < 130 MM HG: ICD-10-PCS | Mod: CPTII,,, | Performed by: INTERNAL MEDICINE

## 2022-01-10 PROCEDURE — 63600175 PHARM REV CODE 636 W HCPCS: Mod: PN | Performed by: INTERNAL MEDICINE

## 2022-01-10 PROCEDURE — 99213 OFFICE O/P EST LOW 20 MIN: CPT | Mod: PBBFAC,PN | Performed by: INTERNAL MEDICINE

## 2022-01-10 PROCEDURE — 96523 IRRIG DRUG DELIVERY DEVICE: CPT | Mod: PN

## 2022-01-10 PROCEDURE — 99215 PR OFFICE/OUTPT VISIT, EST, LEVL V, 40-54 MIN: ICD-10-PCS | Mod: S$PBB,,, | Performed by: INTERNAL MEDICINE

## 2022-01-10 PROCEDURE — 3008F BODY MASS INDEX DOCD: CPT | Mod: CPTII,,, | Performed by: INTERNAL MEDICINE

## 2022-01-10 PROCEDURE — 99999 PR PBB SHADOW E&M-EST. PATIENT-LVL III: ICD-10-PCS | Mod: PBBFAC,,, | Performed by: INTERNAL MEDICINE

## 2022-01-10 PROCEDURE — 3079F DIAST BP 80-89 MM HG: CPT | Mod: CPTII,,, | Performed by: INTERNAL MEDICINE

## 2022-01-10 RX ORDER — HEPARIN 100 UNIT/ML
500 SYRINGE INTRAVENOUS
Status: CANCELLED | OUTPATIENT
Start: 2022-01-10

## 2022-01-10 RX ORDER — SODIUM CHLORIDE 0.9 % (FLUSH) 0.9 %
10 SYRINGE (ML) INJECTION
Status: CANCELLED | OUTPATIENT
Start: 2022-01-10

## 2022-01-10 RX ORDER — HEPARIN 100 UNIT/ML
500 SYRINGE INTRAVENOUS
Status: DISCONTINUED | OUTPATIENT
Start: 2022-01-10 | End: 2022-01-10 | Stop reason: HOSPADM

## 2022-01-10 RX ORDER — SODIUM CHLORIDE 0.9 % (FLUSH) 0.9 %
10 SYRINGE (ML) INJECTION
Status: DISCONTINUED | OUTPATIENT
Start: 2022-01-10 | End: 2022-01-10 | Stop reason: HOSPADM

## 2022-01-10 RX ADMIN — HEPARIN 500 UNITS: 100 SYRINGE at 10:01

## 2022-01-10 RX ADMIN — GOSERELIN ACETATE 3.6 MG: 3.6 IMPLANT SUBCUTANEOUS at 10:01

## 2022-01-10 RX ADMIN — Medication 10 ML: at 10:01

## 2022-01-10 NOTE — PLAN OF CARE
Problem: Adult Inpatient Plan of Care  Goal: Patient-Specific Goal (Individualization)  Outcome: Ongoing, Progressing  Flowsheets (Taken 1/10/2022 1043)  Individualized Care Needs: None  Anxieties, Fears or Concerns: None  Patient-Specific Goals (Include Timeframe): Infection prevention during treatment.     Problem: Adult Inpatient Plan of Care  Goal: Plan of Care Review  Outcome: Ongoing, Progressing  Flowsheets (Taken 1/10/2022 1043)  Plan of Care Reviewed With: patient  Tolerated injection well.  Ambulated from infusion center with steady gait.

## 2022-01-10 NOTE — PROGRESS NOTES
PROGRESS NOTE    Subjective:       Patient ID: Angeles Wright is a 51 y.o. female.  MRN: [id number]  : 1970    Chief Complaint: Establish Care (4 weeks follow up with labs CT infusion1/10)    HR + MBC    History of Present Illness:   Angeles Wright is a 51 y.o. female who presents with metastatic breast cancer.    As previously documented by Dr. Mcgovern  she transferred care to [facility] [place]. Labs showed her to be pre-menopausal thus she was treated with Ribociclib /Zoladex and Arimidex but she was off therapy from November to May 2020. In 2020 scans showed improvement thus she restarted Arimidex/ribociclib/zoladex.     She was diagnosed with COVID-19 infection 21, brought to [facility] via ambulance, stayed for 10 days. Had monoclonal antibody infusion soon after diagnosis.    She has completed right breast mammogram and ultrasound and  mass appears stable and has been biopsied in  to be a fibroadenoma.  A 2nd mass at 8:30 a.m. appears to be a fibroadenoma as well for which a close follow-up in 6 months is recommended     Interim history:   She presents today to discuss her symptoms, labs, scans and further recommendations.  She has  continued dose reduced ribociclib at the 400 mg p.o. day 1-day 21 Q 28 days since 2020.      She has continued anastrozole 1 mg p.o. daily. She is due to start her next cycle of Ribociclib at this time and will start tonight, 1/10/22     She had symptoms of a URI earlier this month and tested negative for COVID-19.  Today, she feels well and back to her baseline.  She denies any nausea, vomiting, abdominal pain, diarrhea or constipation or other complaints today.  She has completed her dental extractions and will be fitted for dentures.      She reports a stable weight, appetite and performance status.    Oncology History:  Oncology History   Malignant neoplasm of central portion of left breast in  female, estrogen receptor positive   9/3/2019 Initial Diagnosis    Malignant neoplasm of central portion of left breast in female, estrogen receptor positive     9/3/2019 - 9/3/2019 Chemotherapy    Treatment Summary   Plan Name: OP ANASTROZOLE PALBOCICLIB Q4W  Treatment Goal: Palliative  Status: Inactive  Start Date: 9/3/2019  End Date: 9/3/2019  Provider: Melania Mcgovern MD  Chemotherapy: [No matching medication found in this treatment plan]     9/18/2019 Cancer Staged    Staging form: Breast, AJCC 8th Edition  - Clinical: Stage IV (cT4b, cM1, ER+, CT+, HER2-)         History:  Past Medical History:   Diagnosis Date    Breast cancer       Past Surgical History:   Procedure Laterality Date    BREAST BIOPSY Right     BREAST LUMPECTOMY Left     TUBAL LIGATION       Family History   Problem Relation Age of Onset    Lung cancer Mother     Heart attack Father     Suicide Brother     No Known Problems Maternal Aunt     No Known Problems Maternal Uncle     No Known Problems Paternal Aunt     No Known Problems Paternal Uncle     No Known Problems Maternal Grandmother     No Known Problems Maternal Grandfather     No Known Problems Paternal Grandmother     No Known Problems Paternal Grandfather     No Known Problems Son     No Known Problems Son     No Known Problems Daughter     Breast cancer Cousin     Breast cancer Cousin      Social History     Tobacco Use    Smoking status: Current Every Day Smoker     Packs/day: 0.50     Years: 25.00     Pack years: 12.50     Start date: 9/3/1994    Smokeless tobacco: Never Used   Substance and Sexual Activity    Alcohol use: Yes     Comment: occasionally    Drug use: Not Currently    Sexual activity: Yes     Partners: Male     Birth control/protection: None        ROS:   Review of Systems   Constitutional: Negative for fever, malaise/fatigue and weight loss.   HENT: Negative for congestion, hearing loss, nosebleeds and sore throat.    Eyes: Negative for  "double vision and photophobia.   Respiratory: Negative for cough, hemoptysis, sputum production, shortness of breath and wheezing.    Cardiovascular: Negative for chest pain, palpitations, orthopnea and leg swelling.   Gastrointestinal: Negative for abdominal pain, blood in stool, constipation, diarrhea, heartburn, nausea and vomiting.   Genitourinary: Negative for dysuria, hematuria and urgency.   Musculoskeletal: Negative for back pain, joint pain and myalgias.   Skin: Negative for itching and rash.   Neurological: Negative for dizziness, tingling, seizures, weakness and headaches.   Endo/Heme/Allergies: Negative for polydipsia. Does not bruise/bleed easily.   Psychiatric/Behavioral: Negative for depression and memory loss. The patient is not nervous/anxious and does not have insomnia.         Objective:     Vitals:    01/10/22 0943   BP: 124/80   Pulse: 73   Resp: 18   Temp: 97.4 °F (36.3 °C)   TempSrc: Temporal   SpO2: 98%   Weight: 64.9 kg (143 lb 1.3 oz)   Height: 5' 4" (1.626 m)   PainSc: 0-No pain     Wt Readings from Last 10 Encounters:   01/10/22 64.9 kg (143 lb 1.3 oz)   01/10/22 64.9 kg (143 lb 1.3 oz)   12/13/21 63.8 kg (140 lb 10.5 oz)   12/13/21 63.8 kg (140 lb 10.5 oz)   11/15/21 64.8 kg (142 lb 13.7 oz)   11/15/21 64.8 kg (142 lb 13.7 oz)   10/18/21 63.8 kg (140 lb 10.5 oz)   10/18/21 63.8 kg (140 lb 10.5 oz)   09/20/21 64.7 kg (142 lb 10.2 oz)   08/23/21 63 kg (138 lb 14.2 oz)       Physical Examination:   Physical Exam  Vitals and nursing note reviewed.   Constitutional:       General: She is not in acute distress.     Appearance: She is not diaphoretic.   HENT:      Head: Normocephalic.      Mouth/Throat:      Mouth: Oropharynx is clear and moist.      Pharynx: No oropharyngeal exudate.   Eyes:      General: No scleral icterus.     Extraocular Movements: EOM normal.      Conjunctiva/sclera: Conjunctivae normal.   Neck:      Thyroid: No thyromegaly.   Cardiovascular:      Rate and Rhythm: Normal " rate and regular rhythm.      Heart sounds: Normal heart sounds. No murmur heard.      Pulmonary:      Effort: Pulmonary effort is normal. No respiratory distress.      Breath sounds: No stridor. No wheezing or rales.   Chest:      Chest wall: No tenderness.   Abdominal:      General: Bowel sounds are normal. There is no distension.      Palpations: Abdomen is soft. There is no mass.      Tenderness: There is no abdominal tenderness. There is no rebound.   Musculoskeletal:         General: No tenderness, deformity or edema. Normal range of motion.      Cervical back: Neck supple.   Lymphadenopathy:      Cervical: No cervical adenopathy.   Skin:     General: Skin is warm and dry.      Findings: No erythema or rash.   Neurological:      Mental Status: She is alert and oriented to person, place, and time.      Cranial Nerves: No cranial nerve deficit.      Coordination: Coordination normal.      Gait: Gait is intact.   Psychiatric:         Mood and Affect: Affect normal.         Cognition and Memory: Memory normal.         Judgment: Judgment normal.          Diagnostic Tests:  Significant Imaging: I have reviewed and interpreted all pertinent imaging results/findings.    CT Abdomen/Pelvis With Contrast 1/3/21  Impression:     1. Small benign right lung nodules which remain unchanged in size and appearance and are considered benign.  No new nodules have developed.  Chronic fibrotic changes are present at the left lung base.  2. Bilateral breast masses which are considered benign.  3. Numerous well-defined lytic metastases within the axial and appendicular skeleton which are unchanged in size and appearance with no evidence of pathologic fracture and no progression since the prior study.        Electronically signed by: Reynaldo Nava MD  Date:                                            01/03/2022  Time:                                           15:38    Laboratory Data:  All pertinent labs have been reviewed.    Labs:    Lab Results   Component Value Date    WBC 8.03 01/03/2022    RBC 4.75 01/03/2022    HGB 14.7 01/03/2022    HCT 43.5 01/03/2022    MCV 92 01/03/2022     01/03/2022    GLU 94 01/03/2022     01/03/2022    K 4.2 01/03/2022    BUN 11 01/03/2022    CREATININE 0.8 01/03/2022    AST 14 01/03/2022    ALT 12 01/03/2022    BILITOT 0.4 01/03/2022       Assessment/Plan:   Malignant neoplasm of central portion of left breast in female, estrogen receptor positive  Stage IV (T3N2M2) invasive ductal carcinoma of left breast,  quadrant, ER 96%, [place] 94%, Her2 neg, Grade 2, Ki67 30%      Recent scans are discussed, stable osseous metastasis noted without progression.  No new disease noted.     Continue current treatment as tolerated and until disease progression.   Continue ribociclib 400 mg p.o. day 1-day 21 Q 28 days and anastrozole 1 mg p.o.       Continue Zoladex Q 4 weekly.  -     CBC Auto Differential; Standing  -     CMP; Future; Expected date: 01/10/2022  -     CA 27.29; Future; Expected date: 01/10/2022  -     Cancer Antigen 15-3; Future; Expected date: 01/11/2022    Breast mass, right  Extensive workup done during prior visits.  Right breast mass is previously biopsied to be a fibroadenoma.  A 2nd mass it is also clinically consistent with a fibroadenoma and a six-month follow-up is recommended.  I will obtain a right breast ultrasound in March 2022.    Bone metastases  She is currently undergoing dental work.  Plan to start denosumab once she has dental clearance.    Malignant neoplasm metastatic to lung, unspecified laterality  Multiple lung nodules are small and benign appearing on recent scans.  Continue to monitor on subsequent scans.    Transaminitis  Resolved.  Continue to monitor.     ECOG SCORE    1 - Restricted in strenuous activity-ambulatory and able to carry out work of a light nature           Discussion:   Follow up in about 4 weeks (around 2/7/2022) for NP, Lab Results.    Plan was discussed with  the patient at length, and she verbalized understanding. Angeles was given an opportunity to ask questions that were answered to her satisfaction, and she was advised to call in the interval if any problems or questions arise.    Electronically signed by Shelby Thomas MD

## 2022-01-17 ENCOUNTER — PATIENT MESSAGE (OUTPATIENT)
Dept: HEMATOLOGY/ONCOLOGY | Facility: CLINIC | Age: 52
End: 2022-01-17
Payer: MEDICAID

## 2022-01-18 ENCOUNTER — SPECIALTY PHARMACY (OUTPATIENT)
Dept: PHARMACY | Facility: CLINIC | Age: 52
End: 2022-01-18
Payer: MEDICAID

## 2022-01-18 DIAGNOSIS — Z17.0 MALIGNANT NEOPLASM OF CENTRAL PORTION OF LEFT BREAST IN FEMALE, ESTROGEN RECEPTOR POSITIVE: Primary | ICD-10-CM

## 2022-01-18 DIAGNOSIS — C50.112 MALIGNANT NEOPLASM OF CENTRAL PORTION OF LEFT BREAST IN FEMALE, ESTROGEN RECEPTOR POSITIVE: Primary | ICD-10-CM

## 2022-01-18 NOTE — TELEPHONE ENCOUNTER
Specialty Pharmacy - Refill Coordination    Specialty Medication Orders Linked to Encounter    Flowsheet Row Most Recent Value   Medication #1 ribociclib (KISQALI) 400 mg/day (200 mg x 2) Tab (Order#[id number], Rx#[id number])          Refill Questions - Documented Responses    Flowsheet Row Most Recent Value   Patient Availability and HIPAA Verification    Does patient want to proceed with activity? Yes   HIPAA/medical authority confirmed? Yes   Relationship to patient of person spoken to? Self   Refill Screening Questions    Changes to allergies? No   Changes to medications? No   New conditions since last clinic visit? No   Unplanned office visit, urgent care, ED, or hospital admission in the last 4 weeks? No   How does patient/caregiver feel medication is working? Very good   Financial problems or insurance changes? No   How many doses of your specialty medications were missed in the last 4 weeks? 0   Would patient like to speak to a pharmacist? No   When does the patient need to receive the medication? 01/24/22   Refill Delivery Questions    How will the patient receive the medication? Delivery [place]   When does the patient need to receive the medication? 01/24/22   Shipping Address Home   Address in [facility] confirmed and updated if neccessary? Yes   Expected Copay ($) 0   Is the patient able to afford the medication copay? Yes   Payment Method zero copay   Days supply of Refill 28   Supplies needed? No supplies needed   Refill activity completed? Yes   Refill activity plan Refill scheduled   Shipment/Pickup Date: 01/21/22          Current Outpatient Medications   Medication Sig    anastrozole (ARIMIDEX) 1 mg Tab Take 1 tablet (1 mg total) by mouth once daily.    ribociclib (KISQALI) 400 mg/day (200 mg x 2) Tab Take 2 tablets (400mg) day 1 to day 21 every 28 days   Last reviewed on 1/18/2022 12:28 PM by Wendy Lopez, PharmD    Review of patient's allergies indicates:  No Known Allergies Last  reviewed on  1/18/2022 12:28 PM by Wendy Lopez      Tasks added this encounter   No tasks added.   Tasks due within next 3 months   2/10/2022 - Clinical - Follow Up Assesement (90 day)  1/17/2022 - Refill Call (Auto Added)     Wendy Lopez, PharmD  [facility]  [address] [place], [place] [address]  [facility] [address]  Phone: [phone]  Fax: [phone]

## 2022-02-01 ENCOUNTER — LAB VISIT (OUTPATIENT)
Dept: LAB | Facility: HOSPITAL | Age: 52
End: 2022-02-01
Attending: INTERNAL MEDICINE
Payer: MEDICAID

## 2022-02-01 DIAGNOSIS — Z17.0 MALIGNANT NEOPLASM OF CENTRAL PORTION OF LEFT BREAST IN FEMALE, ESTROGEN RECEPTOR POSITIVE: ICD-10-CM

## 2022-02-01 DIAGNOSIS — C50.112 MALIGNANT NEOPLASM OF CENTRAL PORTION OF LEFT BREAST IN FEMALE, ESTROGEN RECEPTOR POSITIVE: ICD-10-CM

## 2022-02-01 LAB
ALBUMIN SERPL BCP-MCNC: 4.1 G/DL (ref 3.5–5.2)
ALP SERPL-CCNC: 99 U/L (ref 55–135)
ALT SERPL W/O P-5'-P-CCNC: 28 U/L (ref 10–44)
ANION GAP SERPL CALC-SCNC: 8 MMOL/L (ref 8–16)
AST SERPL-CCNC: 26 U/L (ref 10–40)
BASOPHILS # BLD AUTO: 0.1 K/UL (ref 0–0.2)
BASOPHILS NFR BLD: 1.8 % (ref 0–1.9)
BILIRUB SERPL-MCNC: 0.4 MG/DL (ref 0.1–1)
BUN SERPL-MCNC: 12 MG/DL (ref 6–20)
CALCIUM SERPL-MCNC: 9.6 MG/DL (ref 8.7–10.5)
CHLORIDE SERPL-SCNC: 106 MMOL/L (ref 95–110)
CO2 SERPL-SCNC: 24 MMOL/L (ref 23–29)
CREAT SERPL-MCNC: 1 MG/DL (ref 0.5–1.4)
DIFFERENTIAL METHOD: ABNORMAL
EOSINOPHIL # BLD AUTO: 0.1 K/UL (ref 0–0.5)
EOSINOPHIL NFR BLD: 1.8 % (ref 0–8)
ERYTHROCYTE [DISTWIDTH] IN BLOOD BY AUTOMATED COUNT: 13.5 % (ref 11.5–14.5)
EST. GFR  (AFRICAN AMERICAN): >60 ML/MIN/1.73 M^2
EST. GFR  (NON AFRICAN AMERICAN): >60 ML/MIN/1.73 M^2
GLUCOSE SERPL-MCNC: 97 MG/DL (ref 70–110)
HCT VFR BLD AUTO: 44.1 % (ref 37–48.5)
HGB BLD-MCNC: 14.6 G/DL (ref 12–16)
IMM GRANULOCYTES # BLD AUTO: 0.01 K/UL (ref 0–0.04)
IMM GRANULOCYTES NFR BLD AUTO: 0.2 % (ref 0–0.5)
LYMPHOCYTES # BLD AUTO: 1.4 K/UL (ref 1–4.8)
LYMPHOCYTES NFR BLD: 25.1 % (ref 18–48)
MCH RBC QN AUTO: 30.2 PG (ref 27–31)
MCHC RBC AUTO-ENTMCNC: 33.1 G/DL (ref 32–36)
MCV RBC AUTO: 91 FL (ref 82–98)
MONOCYTES # BLD AUTO: 0.4 K/UL (ref 0.3–1)
MONOCYTES NFR BLD: 6.9 % (ref 4–15)
NEUTROPHILS # BLD AUTO: 3.5 K/UL (ref 1.8–7.7)
NEUTROPHILS NFR BLD: 64.2 % (ref 38–73)
NRBC BLD-RTO: 0 /100 WBC
PLATELET # BLD AUTO: 257 K/UL (ref 150–450)
PMV BLD AUTO: 8.6 FL (ref 9.2–12.9)
POTASSIUM SERPL-SCNC: 4.6 MMOL/L (ref 3.5–5.1)
PROT SERPL-MCNC: 7.1 G/DL (ref 6–8.4)
RBC # BLD AUTO: 4.83 M/UL (ref 4–5.4)
SODIUM SERPL-SCNC: 138 MMOL/L (ref 136–145)
WBC # BLD AUTO: 5.5 K/UL (ref 3.9–12.7)

## 2022-02-01 PROCEDURE — 86300 IMMUNOASSAY TUMOR CA 15-3: CPT | Performed by: INTERNAL MEDICINE

## 2022-02-01 PROCEDURE — 80053 COMPREHEN METABOLIC PANEL: CPT | Mod: PN | Performed by: INTERNAL MEDICINE

## 2022-02-01 PROCEDURE — 85025 COMPLETE CBC W/AUTO DIFF WBC: CPT | Mod: PN | Performed by: INTERNAL MEDICINE

## 2022-02-01 PROCEDURE — 36415 COLL VENOUS BLD VENIPUNCTURE: CPT | Mod: PN | Performed by: INTERNAL MEDICINE

## 2022-02-01 PROCEDURE — 86300 IMMUNOASSAY TUMOR CA 15-3: CPT | Mod: 91 | Performed by: INTERNAL MEDICINE

## 2022-02-05 LAB — CANCER AG15-3 SERPL IA-ACNC: 62 U/ML

## 2022-02-07 ENCOUNTER — OFFICE VISIT (OUTPATIENT)
Dept: HEMATOLOGY/ONCOLOGY | Facility: CLINIC | Age: 52
End: 2022-02-07
Payer: MEDICAID

## 2022-02-07 ENCOUNTER — INFUSION (OUTPATIENT)
Dept: INFUSION THERAPY | Facility: HOSPITAL | Age: 52
End: 2022-02-07
Attending: INTERNAL MEDICINE
Payer: MEDICAID

## 2022-02-07 VITALS
HEART RATE: 89 BPM | HEART RATE: 89 BPM | SYSTOLIC BLOOD PRESSURE: 116 MMHG | TEMPERATURE: 98 F | RESPIRATION RATE: 18 BRPM | DIASTOLIC BLOOD PRESSURE: 78 MMHG | WEIGHT: 145.06 LBS | DIASTOLIC BLOOD PRESSURE: 78 MMHG | OXYGEN SATURATION: 99 % | TEMPERATURE: 98 F | WEIGHT: 145.06 LBS | BODY MASS INDEX: 24.77 KG/M2 | OXYGEN SATURATION: 99 % | HEIGHT: 64 IN | RESPIRATION RATE: 18 BRPM | HEIGHT: 64 IN | BODY MASS INDEX: 24.77 KG/M2 | SYSTOLIC BLOOD PRESSURE: 116 MMHG

## 2022-02-07 DIAGNOSIS — C50.112 MALIGNANT NEOPLASM OF CENTRAL PORTION OF LEFT BREAST IN FEMALE, ESTROGEN RECEPTOR POSITIVE: Primary | ICD-10-CM

## 2022-02-07 DIAGNOSIS — D70.1 CHEMOTHERAPY-INDUCED NEUTROPENIA: ICD-10-CM

## 2022-02-07 DIAGNOSIS — C79.51 BONE METASTASES: ICD-10-CM

## 2022-02-07 DIAGNOSIS — Z17.0 MALIGNANT NEOPLASM OF CENTRAL PORTION OF LEFT BREAST IN FEMALE, ESTROGEN RECEPTOR POSITIVE: Primary | ICD-10-CM

## 2022-02-07 DIAGNOSIS — T45.1X5A CHEMOTHERAPY-INDUCED NEUTROPENIA: ICD-10-CM

## 2022-02-07 DIAGNOSIS — R74.01 TRANSAMINITIS: ICD-10-CM

## 2022-02-07 DIAGNOSIS — N63.10 BREAST MASS, RIGHT: ICD-10-CM

## 2022-02-07 DIAGNOSIS — C78.00 MALIGNANT NEOPLASM METASTATIC TO LUNG, UNSPECIFIED LATERALITY: ICD-10-CM

## 2022-02-07 LAB — CANCER AG27-29 SERPL-ACNC: 91.5 U/ML

## 2022-02-07 PROCEDURE — 99999 PR PBB SHADOW E&M-EST. PATIENT-LVL III: CPT | Mod: PBBFAC,,, | Performed by: INTERNAL MEDICINE

## 2022-02-07 PROCEDURE — 1159F PR MEDICATION LIST DOCUMENTED IN MEDICAL RECORD: ICD-10-PCS | Mod: CPTII,,, | Performed by: INTERNAL MEDICINE

## 2022-02-07 PROCEDURE — 1160F RVW MEDS BY RX/DR IN RCRD: CPT | Mod: CPTII,,, | Performed by: INTERNAL MEDICINE

## 2022-02-07 PROCEDURE — 99213 OFFICE O/P EST LOW 20 MIN: CPT | Mod: PBBFAC,PN | Performed by: INTERNAL MEDICINE

## 2022-02-07 PROCEDURE — 96402 CHEMO HORMON ANTINEOPL SQ/IM: CPT | Mod: PN

## 2022-02-07 PROCEDURE — 3078F DIAST BP <80 MM HG: CPT | Mod: CPTII,,, | Performed by: INTERNAL MEDICINE

## 2022-02-07 PROCEDURE — 99215 PR OFFICE/OUTPT VISIT, EST, LEVL V, 40-54 MIN: ICD-10-PCS | Mod: S$PBB,,, | Performed by: INTERNAL MEDICINE

## 2022-02-07 PROCEDURE — 63600175 PHARM REV CODE 636 W HCPCS: Mod: PN | Performed by: INTERNAL MEDICINE

## 2022-02-07 PROCEDURE — 3078F PR MOST RECENT DIASTOLIC BLOOD PRESSURE < 80 MM HG: ICD-10-PCS | Mod: CPTII,,, | Performed by: INTERNAL MEDICINE

## 2022-02-07 PROCEDURE — 1159F MED LIST DOCD IN RCRD: CPT | Mod: CPTII,,, | Performed by: INTERNAL MEDICINE

## 2022-02-07 PROCEDURE — A4216 STERILE WATER/SALINE, 10 ML: HCPCS | Mod: PN | Performed by: INTERNAL MEDICINE

## 2022-02-07 PROCEDURE — 1160F PR REVIEW ALL MEDS BY PRESCRIBER/CLIN PHARMACIST DOCUMENTED: ICD-10-PCS | Mod: CPTII,,, | Performed by: INTERNAL MEDICINE

## 2022-02-07 PROCEDURE — 99215 OFFICE O/P EST HI 40 MIN: CPT | Mod: S$PBB,,, | Performed by: INTERNAL MEDICINE

## 2022-02-07 PROCEDURE — 3074F SYST BP LT 130 MM HG: CPT | Mod: CPTII,,, | Performed by: INTERNAL MEDICINE

## 2022-02-07 PROCEDURE — 25000003 PHARM REV CODE 250: Mod: PN | Performed by: INTERNAL MEDICINE

## 2022-02-07 PROCEDURE — 99999 PR PBB SHADOW E&M-EST. PATIENT-LVL III: ICD-10-PCS | Mod: PBBFAC,,, | Performed by: INTERNAL MEDICINE

## 2022-02-07 PROCEDURE — 3074F PR MOST RECENT SYSTOLIC BLOOD PRESSURE < 130 MM HG: ICD-10-PCS | Mod: CPTII,,, | Performed by: INTERNAL MEDICINE

## 2022-02-07 PROCEDURE — 3008F BODY MASS INDEX DOCD: CPT | Mod: CPTII,,, | Performed by: INTERNAL MEDICINE

## 2022-02-07 PROCEDURE — 3008F PR BODY MASS INDEX (BMI) DOCUMENTED: ICD-10-PCS | Mod: CPTII,,, | Performed by: INTERNAL MEDICINE

## 2022-02-07 RX ORDER — HEPARIN 100 UNIT/ML
500 SYRINGE INTRAVENOUS
Status: CANCELLED | OUTPATIENT
Start: 2022-02-07

## 2022-02-07 RX ORDER — SODIUM CHLORIDE 0.9 % (FLUSH) 0.9 %
10 SYRINGE (ML) INJECTION
Status: DISCONTINUED | OUTPATIENT
Start: 2022-02-07 | End: 2022-02-07 | Stop reason: HOSPADM

## 2022-02-07 RX ORDER — HEPARIN 100 UNIT/ML
500 SYRINGE INTRAVENOUS
Status: DISCONTINUED | OUTPATIENT
Start: 2022-02-07 | End: 2022-02-07 | Stop reason: HOSPADM

## 2022-02-07 RX ORDER — SODIUM CHLORIDE 0.9 % (FLUSH) 0.9 %
10 SYRINGE (ML) INJECTION
Status: CANCELLED | OUTPATIENT
Start: 2022-02-07

## 2022-02-07 RX ADMIN — Medication 10 ML: at 10:02

## 2022-02-07 RX ADMIN — Medication 500 UNITS: at 10:02

## 2022-02-07 RX ADMIN — GOSERELIN ACETATE 3.6 MG: 3.6 IMPLANT SUBCUTANEOUS at 10:02

## 2022-02-07 NOTE — PROGRESS NOTES
PROGRESS NOTE    Subjective:       Patient ID: Angeles Wright is a 51 y.o. female.  MRN: [id number]  : 1970    Chief Complaint: Malignant neoplasm of central portion of left breast in fem      History of Present Illness:   Angeles Wright is a 51 y.o. female who presents with metastatic breast cancer.    As previously documented by Dr. Mcgovern  she transferred care to [facility] [place] [facility] in 2019. Labs showed her to be pre-menopausal thus she was treated with Ribociclib /Zoladex and Arimidex but she was off therapy from November to May 2020. In 2020 scans showed improvement thus she restarted Arimidex/ribociclib/zoladex.     She was diagnosed with COVID-19 infection 21, brought to [facility] via ambulance, stayed for 10 days. Had monoclonal antibody infusion soon after diagnosis.    She has completed right breast mammogram and ultrasound and  mass appears stable and has been biopsied in  to be a fibroadenoma.  A 2nd mass at 8:30 a.m. appears to be a fibroadenoma as well for which a close follow-up in 6 months is recommended     Interim history:   She presents today to discuss her symptoms, labs, scans and further recommendations.  She has  continued dose reduced ribociclib at the 400 mg p.o. day 1-day 21 Q 28 days since 2020.      She has continued anastrozole 1 mg p.o. daily. She is due to start her next cycle of Ribociclib at this time, is due for Zoladex injection today.      She reports sinus congestion and cough. She denies any fever.   She has completed her dental extractions and has partials put in.      She reports a stable weight, appetite and performance status.    Oncology History:  Oncology History   Malignant neoplasm of central portion of left breast in female, estrogen receptor positive   9/3/2019 Initial Diagnosis    Malignant neoplasm of central portion of left breast in female, estrogen receptor positive     9/3/2019 - 9/3/2019  Chemotherapy    Treatment Summary   Plan Name: OP ANASTROZOLE PALBOCICLIB Q4W  Treatment Goal: Palliative  Status: Inactive  Start Date: 9/3/2019  End Date: 9/3/2019  Provider: Melania Mcgovern MD  Chemotherapy: [No matching medication found in this treatment plan]     9/18/2019 Cancer Staged    Staging form: Breast, AJCC 8th Edition  - Clinical: Stage IV (cT4b, cM1, ER+, CO+, HER2-)         History:  Past Medical History:   Diagnosis Date    Breast cancer       Past Surgical History:   Procedure Laterality Date    BREAST BIOPSY Right     BREAST LUMPECTOMY Left     TUBAL LIGATION       Family History   Problem Relation Age of Onset    Lung cancer Mother     Heart attack Father     Suicide Brother     No Known Problems Maternal Aunt     No Known Problems Maternal Uncle     No Known Problems Paternal Aunt     No Known Problems Paternal Uncle     No Known Problems Maternal Grandmother     No Known Problems Maternal Grandfather     No Known Problems Paternal Grandmother     No Known Problems Paternal Grandfather     No Known Problems Son     No Known Problems Son     No Known Problems Daughter     Breast cancer Cousin     Breast cancer Cousin      Social History     Tobacco Use    Smoking status: Current Every Day Smoker     Packs/day: 0.50     Years: 25.00     Pack years: 12.50     Start date: 9/3/1994    Smokeless tobacco: Never Used   Substance and Sexual Activity    Alcohol use: Yes     Comment: occasionally    Drug use: Not Currently    Sexual activity: Yes     Partners: Male     Birth control/protection: None        ROS:   Review of Systems   Constitutional: Negative for fever, malaise/fatigue and weight loss.   HENT: Positive for congestion. Negative for hearing loss, nosebleeds and sore throat.    Eyes: Negative for double vision and photophobia.   Respiratory: Negative for cough, hemoptysis, sputum production, shortness of breath and wheezing.    Cardiovascular: Negative for chest pain,  "palpitations, orthopnea and leg swelling.   Gastrointestinal: Negative for abdominal pain, blood in stool, constipation, diarrhea, heartburn, nausea and vomiting.   Genitourinary: Negative for dysuria, hematuria and urgency.   Musculoskeletal: Negative for back pain, joint pain and myalgias.   Skin: Negative for itching and rash.   Neurological: Negative for dizziness, tingling, seizures, weakness and headaches.   Endo/Heme/Allergies: Negative for polydipsia. Does not bruise/bleed easily.   Psychiatric/Behavioral: Negative for depression and memory loss. The patient is not nervous/anxious and does not have insomnia.         Objective:     Vitals:    02/07/22 0958   BP: 116/78   Pulse: 89   Resp: 18   Temp: 97.9 °F (36.6 °C)   TempSrc: Oral   SpO2: 99%   Weight: 65.8 kg (145 lb 1 oz)   Height: 5' 4" (1.626 m)   PainSc: 0-No pain     Wt Readings from Last 10 Encounters:   02/07/22 65.8 kg (145 lb 1 oz)   02/07/22 65.8 kg (145 lb 1 oz)   01/10/22 64.9 kg (143 lb 1.3 oz)   01/10/22 64.9 kg (143 lb 1.3 oz)   12/13/21 63.8 kg (140 lb 10.5 oz)   12/13/21 63.8 kg (140 lb 10.5 oz)   11/15/21 64.8 kg (142 lb 13.7 oz)   11/15/21 64.8 kg (142 lb 13.7 oz)   10/18/21 63.8 kg (140 lb 10.5 oz)   10/18/21 63.8 kg (140 lb 10.5 oz)       Physical Examination:   Physical Exam  Vitals and nursing note reviewed.   Constitutional:       General: She is not in acute distress.     Appearance: She is not diaphoretic.   HENT:      Head: Normocephalic.      Mouth/Throat:      Mouth: Oropharynx is clear and moist.      Pharynx: No oropharyngeal exudate.   Eyes:      General: No scleral icterus.     Extraocular Movements: EOM normal.      Conjunctiva/sclera: Conjunctivae normal.   Neck:      Thyroid: No thyromegaly.   Cardiovascular:      Rate and Rhythm: Normal rate and regular rhythm.      Heart sounds: Normal heart sounds. No murmur heard.      Pulmonary:      Effort: Pulmonary effort is normal. No respiratory distress.      Breath sounds: No " stridor. No wheezing or rales.   Chest:      Chest wall: No tenderness.   Abdominal:      General: Bowel sounds are normal. There is no distension.      Palpations: Abdomen is soft. There is no mass.      Tenderness: There is no abdominal tenderness. There is no rebound.   Musculoskeletal:         General: No tenderness, deformity or edema. Normal range of motion.      Cervical back: Neck supple.   Lymphadenopathy:      Cervical: No cervical adenopathy.   Skin:     General: Skin is warm and dry.      Findings: No erythema or rash.   Neurological:      Mental Status: She is alert and oriented to person, place, and time.      Cranial Nerves: No cranial nerve deficit.      Coordination: Coordination normal.      Gait: Gait is intact.   Psychiatric:         Mood and Affect: Affect normal.         Cognition and Memory: Memory normal.         Judgment: Judgment normal.          Diagnostic Tests:  Significant Imaging: I have reviewed and interpreted all pertinent imaging results/findings.    Laboratory Data:  All pertinent labs have been reviewed.  Labs:   Lab Results   Component Value Date    WBC 5.50 02/01/2022    RBC 4.83 02/01/2022    HGB 14.6 02/01/2022    HCT 44.1 02/01/2022    MCV 91 02/01/2022     02/01/2022    GLU 97 02/01/2022     02/01/2022    K 4.6 02/01/2022    BUN 12 02/01/2022    CREATININE 1.0 02/01/2022    AST 26 02/01/2022    ALT 28 02/01/2022    BILITOT 0.4 02/01/2022       Assessment/Plan:   Malignant neoplasm of central portion of left breast in female, estrogen receptor positive  Malignant neoplasm metastatic to lung, unspecified laterality  Stage IV (T3N2M2) invasive ductal carcinoma of left breast,  quadrant, ER 96%, MN 94%, Her2 neg, Grade 2, Ki67 30%      Recent scans in January show stable osseous metastasis without progression.  No new disease noted.     Continue current treatment as tolerated and until disease progression.   Continue ribociclib 400 mg p.o. day 1-day 21 Q 28  days and anastrozole 1 mg p.o. Continue zoladex q 28 days.     Multiple lung nodules are small and benign appearing on recent scans.  Continue to monitor on subsequent scans.    Bone metastases  She has completed dental work. Will obtain dental clearance and start denosumab once appropriate.     Breast mass, right  Extensive workup done during prior visits.  Right breast mass is previously biopsied to be a fibroadenoma.  A 2nd mass it is also clinically consistent with a fibroadenoma and a six-month follow-up is recommended.  I will obtain a right breast ultrasound in March 2022.    Transaminitis  Resolved. Continue to monitor.      ECOG SCORE    0 - Fully active-able to carry on all pre-disease performance without restriction           Discussion:   Follow up in about 1 month (around 3/7/2022) for MD, Lab Results, Infusion.    Plan was discussed with the patient at length, and she verbalized understanding. Angeles was given an opportunity to ask questions that were answered to her satisfaction, and she was advised to call in the interval if any problems or questions arise.    Electronically signed by Shelby Thomas MD

## 2022-02-07 NOTE — PLAN OF CARE
Problem: Adult Inpatient Plan of Care  Goal: Plan of Care Review  Outcome: Ongoing, Progressing  Goal: Patient-Specific Goal (Individualization)  Outcome: Ongoing, Progressing   .Injection given without difficulties.Bandaid applied. Patient verbalized understanding and will notify nurse with any complaints.

## 2022-02-08 ENCOUNTER — TELEPHONE (OUTPATIENT)
Dept: HEMATOLOGY/ONCOLOGY | Facility: CLINIC | Age: 52
End: 2022-02-08
Payer: MEDICAID

## 2022-02-08 NOTE — TELEPHONE ENCOUNTER
Faxed dental clearance form to [facility] at [phone] for dentist to complete for patient to receive Xgeva.

## 2022-02-10 ENCOUNTER — TELEPHONE (OUTPATIENT)
Dept: HEMATOLOGY/ONCOLOGY | Facility: CLINIC | Age: 52
End: 2022-02-10
Payer: MEDICAID

## 2022-02-10 NOTE — TELEPHONE ENCOUNTER
Spoke with Antonella at [facility]- patient's dentist wanted her to be seen in clinic prior to signed dental release for Xgeva. Patient is scheduled for next Tuesday- 2/15. Will f/u with dental clearance at that time.

## 2022-02-14 ENCOUNTER — SPECIALTY PHARMACY (OUTPATIENT)
Dept: PHARMACY | Facility: CLINIC | Age: 52
End: 2022-02-14
Payer: MEDICAID

## 2022-02-14 DIAGNOSIS — C50.112 MALIGNANT NEOPLASM OF CENTRAL PORTION OF LEFT BREAST IN FEMALE, ESTROGEN RECEPTOR POSITIVE: ICD-10-CM

## 2022-02-14 DIAGNOSIS — Z17.0 MALIGNANT NEOPLASM OF CENTRAL PORTION OF LEFT BREAST IN FEMALE, ESTROGEN RECEPTOR POSITIVE: ICD-10-CM

## 2022-02-14 DIAGNOSIS — C50.112 MALIGNANT NEOPLASM OF CENTRAL PORTION OF LEFT BREAST IN FEMALE, ESTROGEN RECEPTOR POSITIVE: Primary | ICD-10-CM

## 2022-02-14 DIAGNOSIS — Z17.0 MALIGNANT NEOPLASM OF CENTRAL PORTION OF LEFT BREAST IN FEMALE, ESTROGEN RECEPTOR POSITIVE: Primary | ICD-10-CM

## 2022-02-14 RX ORDER — RIBOCICLIB 200 MG/1
TABLET, FILM COATED ORAL
Qty: 42 TABLET | Refills: 12 | Status: SHIPPED | OUTPATIENT
Start: 2022-02-14 | End: 2023-02-15 | Stop reason: SDUPTHER

## 2022-02-14 NOTE — TELEPHONE ENCOUNTER
Refill for Kisqali received. No PA or FA required. Called and LVM to set up refill.   Labs reviewed from 2/1/22. Recent scans in January show stable osseous metastasis without progression.

## 2022-02-16 ENCOUNTER — PATIENT MESSAGE (OUTPATIENT)
Dept: PHARMACY | Facility: CLINIC | Age: 52
End: 2022-02-16
Payer: MEDICAID

## 2022-02-18 ENCOUNTER — SPECIALTY PHARMACY (OUTPATIENT)
Dept: PHARMACY | Facility: CLINIC | Age: 52
End: 2022-02-18
Payer: MEDICAID

## 2022-02-21 NOTE — TELEPHONE ENCOUNTER
Specialty Pharmacy - Clinical Reassessment  Specialty Pharmacy - Refill Coordination    Specialty Medication Orders Linked to Encounter    Flowsheet Row Most Recent Value   Medication #1 ribociclib (KISQALI) 400 mg/day (200 mg x 2) Tab (Order#[id number], Rx#[id number])        Patient Diagnosis   C50.112, Z17.0 - Malignant neoplasm of central portion of left breast in female, estrogen receptor positive    Subjective    Angeles Wright is a 51 y.o. female, who is followed by the specialty pharmacy service for management and education.    Recent Encounters     Date Type Provider Description    02/18/2022 Specialty Pharmacy Wendy Lopez, Karina Follow-up Clinical Reassessment; Refill Coordination    02/14/2022 Specialty Pharmacy Wendy Lopez, Karina Referral Authorization    01/18/2022 Specialty Pharmacy Wendy Lopez, Karina Refill Coordination    12/22/2021 Specialty Pharmacy Wendy Lopez, PharmD Refill Coordination    11/19/2021 Specialty Pharmacy Wendy Lopez, Karina Refill Coordination; Follow-up Clinical Reassessment        Clinical call attempts since last clinical assessment   No call attempts found.     Current Outpatient Medications   Medication Sig    anastrozole (ARIMIDEX) 1 mg Tab Take 1 tablet (1 mg total) by mouth once daily.    ribociclib (KISQALI) 400 mg/day (200 mg x 2) Tab Take 2 tablets (400mg) day 1 to day 21 every 28 days   Last reviewed on 2/21/2022  7:05 AM by Wendy Lopez, PharmD    Review of patient's allergies indicates:  No Known AllergiesLast reviewed on  2/21/2022 7:05 AM by Wendy Lopez    Drug Interactions    Drug interactions evaluated: yes  Clinically relevant drug interactions identified: no  Provided the patient with educational material regarding drug interactions: not applicable       Medication Adherence    Patient reported X missed doses in the last month: 0  Any gaps in refill history greater than 2 weeks in the last 3 months: no  Demonstrates understanding  of importance of adherence: yes  Informant: patient  Reliability of informant: reliable  Provider-estimated medication adherence level: good  Reasons for non-adherence: no problems identified  Adherence tools used: calendar  Support network for adherence: family member, healthcare provider  Confirmed plan for next specialty medication refill: delivery by pharmacy  Refills needed for supportive medications: not needed       Adverse Effects    *All other systems reviewed and are negative       Assessment Questions - Documented Responses    Flowsheet Row Most Recent Value   Assessment    Medication Reconciliation completed for patient Yes   During the past 4 weeks, has patient missed any activities due to condition or medication? No   During the past 4 weeks, did patient have any of the following urgent care visits? None   Goals of Therapy Status Achieving   All education points have been covered with patient? Patient does not need education at this time.   Welcome packet contents reviewed and discussed with patient? No   Assesment completed? Yes   Plan Therapy continued   Do you need to open a clinical intervention (i-vent)? No   Do you want to schedule first shipment? No        Refill Questions - Documented Responses    Flowsheet Row Most Recent Value   Patient Availability and HIPAA Verification    Does patient want to proceed with activity? Yes   HIPAA/medical authority confirmed? Yes   Relationship to patient of person spoken to? Self   Refill Screening Questions    Changes to allergies? No   Changes to medications? No   New conditions since last clinic visit? No   Unplanned office visit, urgent care, ED, or hospital admission in the last 4 weeks? No   How does patient/caregiver feel medication is working? Very good   Financial problems or insurance changes? No   How many doses of your specialty medications were missed in the last 4 weeks? 0   Would patient like to speak to a pharmacist? Yes   When does the  "patient need to receive the medication? 02/21/22   Refill Delivery Questions    How will the patient receive the medication? Delivery [facility]   When does the patient need to receive the medication? 02/21/22   Shipping Address Home   Address in [facility] confirmed and updated if neccessary? Yes   Expected Copay ($) 0   Is the patient able to afford the medication copay? Yes   Payment Method zero copay   Days supply of Refill 28   Supplies needed? No supplies needed   Refill activity completed? Yes   Refill activity plan Refill scheduled   Shipment/Pickup Date: 02/21/22          Objective    She has a past medical history of Breast cancer.    Tried/failed medications: none    BP Readings from Last 4 Encounters:   02/07/22 116/78   02/07/22 116/78   01/10/22 124/80   01/10/22 124/80     Ht Readings from Last 4 Encounters:   02/07/22 5' 4" (1.626 m)   02/07/22 5' 4" (1.626 m)   01/10/22 5' 4" (1.626 m)   01/10/22 5' 4" (1.626 m)     Wt Readings from Last 4 Encounters:   02/07/22 65.8 kg (145 lb 1 oz)   02/07/22 65.8 kg (145 lb 1 oz)   01/10/22 64.9 kg (143 lb 1.3 oz)   01/10/22 64.9 kg (143 lb 1.3 oz)     Recent Labs   Lab Result Units 02/01/22  0924 01/03/22  1012 12/06/21  0951   RBC M/uL 4.83 4.75 4.63   Hemoglobin g/dL 14.6 14.7 14.3   Hematocrit % 44.1 43.5 42.8   WBC K/uL 5.50 8.03 4.32   Gran # (ANC) K/uL 3.5 5.0 2.3   Gran % % 64.2 62.8 53.9   Platelets K/uL 257 290 248   Sodium mmol/L 138 141 140   Potassium mmol/L 4.6 4.2 3.9   Chloride mmol/L 106 107 106   Glucose mg/dL 97 94 95   BUN mg/dL 12 11 10   Creatinine mg/dL 1.0 0.8 0.8   Calcium mg/dL 9.6 9.0 9.4   Total Protein g/dL 7.1 6.8 6.4   Albumin g/dL 4.1 4.0 3.8   Total Bilirubin mg/dL 0.4 0.4 0.6   Alkaline Phosphatase U/L 99 89 93   AST U/L 26 14 21   ALT U/L 28 12 22     The goals of cancer treatment include:  · Achieving remission of cancer, if possible  · Reducing tumor size and spread of cancer, if remission is not possible  · Minimizing pain " "and symptoms of the cancer  · Preventing infection and other complications of treatment  · Promoting adequate nutrition  · Encouraging proper hydration  · Improving or maintaining quality of life  · Maintaining optimal therapy adherence  · Minimizing and managing side effects    Goals of Therapy Status: Achieving    Assessment/Plan  Patient plans to continue therapy without changes      Indication, dosage, appropriateness, effectiveness, safety and convenience of her specialty medication(s) were reviewed today.         Angeles Wright is a 51 y.o. female, who is followed by the specialty pharmacy service for management and education of her Kisqali.  She has been on therapy with Kisqali for about 29 months.  I have reviewed her electronic medical record and current medication list and determined that specialty medication adjustment Is not needed at this time.    Patient has not experienced adverse events.  She Is adherent reporting 0 missed doses since last review.  Adherence has been encouraged with the following mechanism(s): calendar provided by [facility].  She is meeting goals of therapy and will continue treatment.    Follow Up Review 2/21/2022 1/18/2022 12/22/2021   # of missed doses 0 0 0   New Medications? No No No   New Conditions? No No No   New Allergies? No No No   Medication Effective? Very good Very good Very good   Missed activities? No - -   Urgent Care? None - -   Some recent data might be hidden       Therapy is appropriate to continue.    Therapy is effective: Yes  Recommendations: none at this time.  Review Method: Patient Contact     Labs reviewed from 2/1/22. Therapy and dosage are appropriate. Last note from Dr. Thomas on 2/7/22: " Recent scans in January show stable osseous metastasis without progression.  No new disease noted"      Tasks added this encounter   3/14/2022 - Refill Call (Auto Added)  8/13/2022 - Clinical - Follow Up Assesement (180 day)   Tasks due within next 3 months   No tasks due. "     eWndy Lopez, PharmD  [facility]  [address]  [facility] [address]  Phone: [phone]  Fax: [phone]

## 2022-02-28 ENCOUNTER — LAB VISIT (OUTPATIENT)
Dept: LAB | Facility: HOSPITAL | Age: 52
End: 2022-02-28
Attending: INTERNAL MEDICINE
Payer: MEDICAID

## 2022-02-28 DIAGNOSIS — Z17.0 MALIGNANT NEOPLASM OF CENTRAL PORTION OF LEFT BREAST IN FEMALE, ESTROGEN RECEPTOR POSITIVE: ICD-10-CM

## 2022-02-28 DIAGNOSIS — C50.112 MALIGNANT NEOPLASM OF CENTRAL PORTION OF LEFT BREAST IN FEMALE, ESTROGEN RECEPTOR POSITIVE: ICD-10-CM

## 2022-02-28 DIAGNOSIS — C78.00 MALIGNANT NEOPLASM METASTATIC TO LUNG, UNSPECIFIED LATERALITY: ICD-10-CM

## 2022-02-28 LAB
ALBUMIN SERPL BCP-MCNC: 4 G/DL (ref 3.5–5.2)
ALP SERPL-CCNC: 103 U/L (ref 55–135)
ALT SERPL W/O P-5'-P-CCNC: 16 U/L (ref 10–44)
ANION GAP SERPL CALC-SCNC: 9 MMOL/L (ref 8–16)
AST SERPL-CCNC: 17 U/L (ref 10–40)
BASOPHILS # BLD AUTO: 0.11 K/UL (ref 0–0.2)
BASOPHILS NFR BLD: 1.9 % (ref 0–1.9)
BILIRUB SERPL-MCNC: 0.4 MG/DL (ref 0.1–1)
BUN SERPL-MCNC: 10 MG/DL (ref 6–20)
CALCIUM SERPL-MCNC: 9.4 MG/DL (ref 8.7–10.5)
CHLORIDE SERPL-SCNC: 102 MMOL/L (ref 95–110)
CO2 SERPL-SCNC: 26 MMOL/L (ref 23–29)
CREAT SERPL-MCNC: 0.9 MG/DL (ref 0.5–1.4)
DIFFERENTIAL METHOD: ABNORMAL
EOSINOPHIL # BLD AUTO: 0.1 K/UL (ref 0–0.5)
EOSINOPHIL NFR BLD: 2.2 % (ref 0–8)
ERYTHROCYTE [DISTWIDTH] IN BLOOD BY AUTOMATED COUNT: 13.7 % (ref 11.5–14.5)
EST. GFR  (AFRICAN AMERICAN): >60 ML/MIN/1.73 M^2
EST. GFR  (NON AFRICAN AMERICAN): >60 ML/MIN/1.73 M^2
GLUCOSE SERPL-MCNC: 108 MG/DL (ref 70–110)
HCT VFR BLD AUTO: 44.9 % (ref 37–48.5)
HGB BLD-MCNC: 15.1 G/DL (ref 12–16)
IMM GRANULOCYTES # BLD AUTO: 0.02 K/UL (ref 0–0.04)
IMM GRANULOCYTES NFR BLD AUTO: 0.3 % (ref 0–0.5)
LYMPHOCYTES # BLD AUTO: 2.4 K/UL (ref 1–4.8)
LYMPHOCYTES NFR BLD: 40.5 % (ref 18–48)
MCH RBC QN AUTO: 30.7 PG (ref 27–31)
MCHC RBC AUTO-ENTMCNC: 33.6 G/DL (ref 32–36)
MCV RBC AUTO: 91 FL (ref 82–98)
MONOCYTES # BLD AUTO: 0.3 K/UL (ref 0.3–1)
MONOCYTES NFR BLD: 5.8 % (ref 4–15)
NEUTROPHILS # BLD AUTO: 2.9 K/UL (ref 1.8–7.7)
NEUTROPHILS NFR BLD: 49.6 % (ref 38–73)
NRBC BLD-RTO: 0 /100 WBC
PLATELET # BLD AUTO: 262 K/UL (ref 150–450)
PMV BLD AUTO: 9 FL (ref 9.2–12.9)
POTASSIUM SERPL-SCNC: 4 MMOL/L (ref 3.5–5.1)
PROT SERPL-MCNC: 7 G/DL (ref 6–8.4)
RBC # BLD AUTO: 4.92 M/UL (ref 4–5.4)
SODIUM SERPL-SCNC: 137 MMOL/L (ref 136–145)
WBC # BLD AUTO: 5.85 K/UL (ref 3.9–12.7)

## 2022-02-28 PROCEDURE — 36415 COLL VENOUS BLD VENIPUNCTURE: CPT | Mod: PO | Performed by: INTERNAL MEDICINE

## 2022-02-28 PROCEDURE — 85025 COMPLETE CBC W/AUTO DIFF WBC: CPT | Mod: PO | Performed by: INTERNAL MEDICINE

## 2022-02-28 PROCEDURE — 80053 COMPREHEN METABOLIC PANEL: CPT | Performed by: INTERNAL MEDICINE

## 2022-02-28 PROCEDURE — 86300 IMMUNOASSAY TUMOR CA 15-3: CPT | Performed by: INTERNAL MEDICINE

## 2022-02-28 PROCEDURE — 86300 IMMUNOASSAY TUMOR CA 15-3: CPT | Mod: 91 | Performed by: INTERNAL MEDICINE

## 2022-03-04 LAB
CANCER AG15-3 SERPL IA-ACNC: 63 U/ML
CANCER AG27-29 SERPL-ACNC: 92.8 U/ML

## 2022-03-07 ENCOUNTER — OFFICE VISIT (OUTPATIENT)
Dept: HEMATOLOGY/ONCOLOGY | Facility: CLINIC | Age: 52
End: 2022-03-07
Payer: MEDICAID

## 2022-03-07 ENCOUNTER — INFUSION (OUTPATIENT)
Dept: INFUSION THERAPY | Facility: HOSPITAL | Age: 52
End: 2022-03-07
Attending: INTERNAL MEDICINE
Payer: MEDICAID

## 2022-03-07 VITALS
HEIGHT: 64 IN | OXYGEN SATURATION: 99 % | HEART RATE: 80 BPM | RESPIRATION RATE: 18 BRPM | TEMPERATURE: 98 F | WEIGHT: 146.19 LBS | DIASTOLIC BLOOD PRESSURE: 80 MMHG | BODY MASS INDEX: 24.96 KG/M2 | SYSTOLIC BLOOD PRESSURE: 124 MMHG

## 2022-03-07 VITALS
RESPIRATION RATE: 18 BRPM | DIASTOLIC BLOOD PRESSURE: 80 MMHG | HEART RATE: 80 BPM | SYSTOLIC BLOOD PRESSURE: 124 MMHG | TEMPERATURE: 98 F | OXYGEN SATURATION: 99 %

## 2022-03-07 DIAGNOSIS — C78.00 MALIGNANT NEOPLASM METASTATIC TO LUNG, UNSPECIFIED LATERALITY: ICD-10-CM

## 2022-03-07 DIAGNOSIS — C50.112 MALIGNANT NEOPLASM OF CENTRAL PORTION OF LEFT BREAST IN FEMALE, ESTROGEN RECEPTOR POSITIVE: Primary | ICD-10-CM

## 2022-03-07 DIAGNOSIS — C79.51 BONE METASTASES: ICD-10-CM

## 2022-03-07 DIAGNOSIS — Z17.0 MALIGNANT NEOPLASM OF CENTRAL PORTION OF LEFT BREAST IN FEMALE, ESTROGEN RECEPTOR POSITIVE: Primary | ICD-10-CM

## 2022-03-07 DIAGNOSIS — N63.10 BREAST MASS, RIGHT: ICD-10-CM

## 2022-03-07 PROCEDURE — 3008F BODY MASS INDEX DOCD: CPT | Mod: CPTII,,, | Performed by: INTERNAL MEDICINE

## 2022-03-07 PROCEDURE — 99999 PR PBB SHADOW E&M-EST. PATIENT-LVL III: ICD-10-PCS | Mod: PBBFAC,,, | Performed by: INTERNAL MEDICINE

## 2022-03-07 PROCEDURE — 25000003 PHARM REV CODE 250: Mod: PN | Performed by: INTERNAL MEDICINE

## 2022-03-07 PROCEDURE — 3074F SYST BP LT 130 MM HG: CPT | Mod: CPTII,,, | Performed by: INTERNAL MEDICINE

## 2022-03-07 PROCEDURE — 99214 PR OFFICE/OUTPT VISIT, EST, LEVL IV, 30-39 MIN: ICD-10-PCS | Mod: S$PBB,,, | Performed by: INTERNAL MEDICINE

## 2022-03-07 PROCEDURE — 96402 CHEMO HORMON ANTINEOPL SQ/IM: CPT | Mod: PN

## 2022-03-07 PROCEDURE — 3079F DIAST BP 80-89 MM HG: CPT | Mod: CPTII,,, | Performed by: INTERNAL MEDICINE

## 2022-03-07 PROCEDURE — 3079F PR MOST RECENT DIASTOLIC BLOOD PRESSURE 80-89 MM HG: ICD-10-PCS | Mod: CPTII,,, | Performed by: INTERNAL MEDICINE

## 2022-03-07 PROCEDURE — 99999 PR PBB SHADOW E&M-EST. PATIENT-LVL III: CPT | Mod: PBBFAC,,, | Performed by: INTERNAL MEDICINE

## 2022-03-07 PROCEDURE — 99214 OFFICE O/P EST MOD 30 MIN: CPT | Mod: S$PBB,,, | Performed by: INTERNAL MEDICINE

## 2022-03-07 PROCEDURE — A4216 STERILE WATER/SALINE, 10 ML: HCPCS | Mod: PN | Performed by: INTERNAL MEDICINE

## 2022-03-07 PROCEDURE — 63600175 PHARM REV CODE 636 W HCPCS: Mod: PN | Performed by: INTERNAL MEDICINE

## 2022-03-07 PROCEDURE — 3008F PR BODY MASS INDEX (BMI) DOCUMENTED: ICD-10-PCS | Mod: CPTII,,, | Performed by: INTERNAL MEDICINE

## 2022-03-07 PROCEDURE — 3074F PR MOST RECENT SYSTOLIC BLOOD PRESSURE < 130 MM HG: ICD-10-PCS | Mod: CPTII,,, | Performed by: INTERNAL MEDICINE

## 2022-03-07 PROCEDURE — 99213 OFFICE O/P EST LOW 20 MIN: CPT | Mod: PBBFAC,PN,25 | Performed by: INTERNAL MEDICINE

## 2022-03-07 RX ORDER — SODIUM CHLORIDE 0.9 % (FLUSH) 0.9 %
10 SYRINGE (ML) INJECTION
Status: CANCELLED | OUTPATIENT
Start: 2022-03-07

## 2022-03-07 RX ORDER — HEPARIN 100 UNIT/ML
500 SYRINGE INTRAVENOUS
Status: DISCONTINUED | OUTPATIENT
Start: 2022-03-07 | End: 2022-03-07 | Stop reason: HOSPADM

## 2022-03-07 RX ORDER — HEPARIN 100 UNIT/ML
500 SYRINGE INTRAVENOUS
Status: CANCELLED | OUTPATIENT
Start: 2022-03-07

## 2022-03-07 RX ORDER — SODIUM CHLORIDE 0.9 % (FLUSH) 0.9 %
10 SYRINGE (ML) INJECTION
Status: DISCONTINUED | OUTPATIENT
Start: 2022-03-07 | End: 2022-03-07 | Stop reason: HOSPADM

## 2022-03-07 RX ADMIN — Medication 500 UNITS: at 03:03

## 2022-03-07 RX ADMIN — Medication 10 ML: at 03:03

## 2022-03-07 RX ADMIN — GOSERELIN ACETATE 3.6 MG: 3.6 IMPLANT SUBCUTANEOUS at 03:03

## 2022-03-07 NOTE — PLAN OF CARE
Pt tolerated Zoladex well today. Port flushed with saline, then heparin. Reviewed follow-up appointments. All questions were answered, ambulated independently at d/c.

## 2022-03-07 NOTE — PROGRESS NOTES
PROGRESS NOTE    Subjective:       Patient ID: Angeles Wright is a 51 y.o. female.  MRN: [id number]  : 1970    Chief Complaint: Malignant neoplasm of central portion of left breast in fem      History of Present Illness:   Angeles Wright is a 51 y.o. female who presents with metastatic breast cancer.    As previously documented by Dr. Mcgovern  [place] [facility] [place] Labs showed her to be pre-menopausal thus she was treated with Ribociclib /Zoladex and Arimidex but she was off therapy from November to May 2020. In 2020 scans showed improvement thus she restarted Arimidex/ribociclib/zoladex.     She was diagnosed with COVID-19 infection 21, brought to [facility] via ambulance, stayed for 10 days. Had monoclonal antibody infusion soon after diagnosis.    She has completed right breast mammogram and ultrasound and  mass appears stable and has been biopsied in  to be a fibroadenoma.  A 2nd mass at 8:30 a.m. appears to be a fibroadenoma as well for which a close follow-up in 6 months is recommended     Interim history:   She presents today to discuss her symptoms, labs,  and further recommendations.  She has  continued dose reduced ribociclib at the 400 mg p.o. day 1-day 21 Q 28 days since 2020.      She has continued anastrozole 1 mg p.o. daily. She is due to start her next cycle of Ribociclib at this time, is due for Zoladex injection today.     She has completed her dental extractions and has partials put in.      She reports a stable weight, appetite and performance status.      Oncology History:  Oncology History   Malignant neoplasm of central portion of left breast in female, estrogen receptor positive   9/3/2019 Initial Diagnosis    Malignant neoplasm of central portion of left breast in female, estrogen receptor positive     9/3/2019 - 9/3/2019 Chemotherapy    Treatment Summary   Plan Name: OP ANASTROZOLE  PALBOCICLIB Q4W  Treatment Goal: Palliative  Status: Inactive  Start Date: 9/3/2019  End Date: 9/3/2019  Provider: Melania Mcgovern MD  Chemotherapy: [No matching medication found in this treatment plan]     9/18/2019 Cancer Staged    Staging form: Breast, AJCC 8th Edition  - Clinical: Stage IV (cT4b, cM1, ER+, [place]+, HER2-)         History:  Past Medical History:   Diagnosis Date    Breast cancer       Past Surgical History:   Procedure Laterality Date    BREAST BIOPSY Right     BREAST LUMPECTOMY Left     TUBAL LIGATION       Family History   Problem Relation Age of Onset    Lung cancer Mother     Heart attack Father     Suicide Brother     No Known Problems Maternal Aunt     No Known Problems Maternal Uncle     No Known Problems Paternal Aunt     No Known Problems Paternal Uncle     No Known Problems Maternal Grandmother     No Known Problems Maternal Grandfather     No Known Problems Paternal Grandmother     No Known Problems Paternal Grandfather     No Known Problems Son     No Known Problems Son     No Known Problems Daughter     Breast cancer Cousin     Breast cancer Cousin      Social History     Tobacco Use    Smoking status: Current Every Day Smoker     Packs/day: 0.50     Years: 25.00     Pack years: 12.50     Start date: 9/3/1994    Smokeless tobacco: Never Used   Substance and Sexual Activity    Alcohol use: Yes     Comment: occasionally    Drug use: Not Currently    Sexual activity: Yes     Partners: Male     Birth control/protection: None        ROS:   Review of Systems   Constitutional: Negative for fever, malaise/fatigue and weight loss.   HENT: Negative for congestion, hearing loss, nosebleeds and sore throat.    Eyes: Negative for double vision and photophobia.   Respiratory: Negative for cough, hemoptysis, sputum production, shortness of breath and wheezing.    Cardiovascular: Negative for chest pain, palpitations, orthopnea and leg swelling.   Gastrointestinal: Negative for  "abdominal pain, blood in stool, constipation, diarrhea, heartburn, nausea and vomiting.   Genitourinary: Negative for dysuria, hematuria and urgency.   Musculoskeletal: Negative for back pain, joint pain and myalgias.   Skin: Negative for itching and rash.   Neurological: Negative for dizziness, tingling, seizures, weakness and headaches.   Endo/Heme/Allergies: Negative for polydipsia. Does not bruise/bleed easily.   Psychiatric/Behavioral: Negative for depression and memory loss. The patient is not nervous/anxious and does not have insomnia.         Objective:     Vitals:    03/07/22 1449   BP: 124/80   Pulse: 80   Resp: 18   Temp: 98.3 °F (36.8 °C)   TempSrc: Oral   SpO2: 99%   Weight: 66.3 kg (146 lb 2.6 oz)   Height: 5' 4" (1.626 m)   PainSc: 0-No pain     Wt Readings from Last 10 Encounters:   03/07/22 66.3 kg (146 lb 2.6 oz)   02/07/22 65.8 kg (145 lb 1 oz)   02/07/22 65.8 kg (145 lb 1 oz)   01/10/22 64.9 kg (143 lb 1.3 oz)   01/10/22 64.9 kg (143 lb 1.3 oz)   12/13/21 63.8 kg (140 lb 10.5 oz)   12/13/21 63.8 kg (140 lb 10.5 oz)   11/15/21 64.8 kg (142 lb 13.7 oz)   11/15/21 64.8 kg (142 lb 13.7 oz)   10/18/21 63.8 kg (140 lb 10.5 oz)       Physical Examination:   Physical Exam  Vitals and nursing note reviewed.   Constitutional:       General: She is not in acute distress.     Appearance: She is not diaphoretic.   HENT:      Head: Normocephalic.      Mouth/Throat:      Pharynx: No oropharyngeal exudate.   Eyes:      General: No scleral icterus.     Conjunctiva/sclera: Conjunctivae normal.   Neck:      Thyroid: No thyromegaly.   Cardiovascular:      Rate and Rhythm: Normal rate and regular rhythm.      Heart sounds: Normal heart sounds. No murmur heard.  Pulmonary:      Effort: Pulmonary effort is normal. No respiratory distress.      Breath sounds: No stridor. No wheezing or rales.   Chest:      Chest wall: No tenderness.   Abdominal:      General: Bowel sounds are normal. There is no distension.      " Palpations: Abdomen is soft. There is no mass.      Tenderness: There is no abdominal tenderness. There is no rebound.   Musculoskeletal:         General: No tenderness or deformity. Normal range of motion.      Cervical back: Neck supple.   Lymphadenopathy:      Cervical: No cervical adenopathy.   Skin:     General: Skin is warm and dry.      Findings: No erythema or rash.   Neurological:      Mental Status: She is alert and oriented to person, place, and time.      Cranial Nerves: No cranial nerve deficit.      Coordination: Coordination normal.      Gait: Gait is intact.   Psychiatric:         Mood and Affect: Affect normal.         Cognition and Memory: Memory normal.         Judgment: Judgment normal.          Diagnostic Tests:  Significant Imaging: I have reviewed and interpreted all pertinent imaging results/findings.  CT Chest Without Contrast No results found for this or any previous visit.    CT Chest With ContrastNo results found for this or any previous visit.    CT Abdomen/Pelvis Without Contrast No results found for this or any previous visit.     CT Abdomen/Pelvis With Contrast No results found for this or any previous visit.    CT Abdomen/Pelvis With Without Contrast No results found for this or any previous visit.     PET Scan No results found for this or any previous visit.      Laboratory Data:  All pertinent labs have been reviewed.  Labs:   Lab Results   Component Value Date    WBC 5.85 02/28/2022    RBC 4.92 02/28/2022    HGB 15.1 02/28/2022    HCT 44.9 02/28/2022    MCV 91 02/28/2022     02/28/2022     02/28/2022     02/28/2022    K 4.0 02/28/2022    BUN 10 02/28/2022    CREATININE 0.9 02/28/2022    AST 17 02/28/2022    ALT 16 02/28/2022    BILITOT 0.4 02/28/2022       Assessment/Plan:   Malignant neoplasm of central portion of left breast in female, estrogen receptor positive  Stage IV (T3N2M2) invasive ductal carcinoma of left breast,  quadrant, ER 96%, HI 94%, Her2 neg,  Grade 2, Ki67 30%      Recent scans in January show stable osseous metastasis without progression.  No new disease noted.     Continue current treatment as tolerated and until disease progression.   Continue ribociclib 400 mg p.o. day 1-day 21 Q 28 days and anastrozole 1 mg p.o. Continue zoladex q 28 days.      Multiple lung nodules are small and benign appearing on recent scans.  Continue to monitor on subsequent scans.     -     CBC Auto Differential; Standing  -     CMP; Future; Expected date: 03/07/2022  -     Cancer Antigen 15-3; Future; Expected date: 03/08/2022  -     CA 27.29; Future; Expected date: 03/07/2022    Bone metastases  She has completed dental work. Will request auth for Denosumab and start once she is cleared by her dentist.       Breast mass, right  Extensive workup done during prior visits.  Right breast mass is previously biopsied to be a fibroadenoma.  A 2nd mass it is also clinically consistent with a fibroadenoma and a six-month follow-up is recommended.  I will obtain a right breast ultrasound prior to her next visit.      ECOG SCORE    0 - Fully active-able to carry on all pre-disease performance without restriction           Discussion:   Follow up in about 1 month (around 4/7/2022) for MD, Lab Results, Scan Results.    Plan was discussed with the patient at length, and she verbalized understanding. Angeles was given an opportunity to ask questions that were answered to her satisfaction, and she was advised to call in the interval if any problems or questions arise.    Electronically signed by Shelby Thomas MD

## 2022-03-07 NOTE — Clinical Note
RTC 4 weeks with cbc, cmp, ca 15-3, ca 27-29, right breast usg and for md visit, zoladex and xgeva.

## 2022-03-14 ENCOUNTER — SPECIALTY PHARMACY (OUTPATIENT)
Dept: PHARMACY | Facility: CLINIC | Age: 52
End: 2022-03-14
Payer: MEDICAID

## 2022-03-14 DIAGNOSIS — Z17.0 MALIGNANT NEOPLASM OF CENTRAL PORTION OF LEFT BREAST IN FEMALE, ESTROGEN RECEPTOR POSITIVE: Primary | ICD-10-CM

## 2022-03-14 DIAGNOSIS — C50.112 MALIGNANT NEOPLASM OF CENTRAL PORTION OF LEFT BREAST IN FEMALE, ESTROGEN RECEPTOR POSITIVE: Primary | ICD-10-CM

## 2022-03-15 ENCOUNTER — TELEPHONE (OUTPATIENT)
Dept: HEMATOLOGY/ONCOLOGY | Facility: CLINIC | Age: 52
End: 2022-03-15
Payer: MEDICAID

## 2022-03-15 NOTE — TELEPHONE ENCOUNTER
----- Message from Susan Hui RN sent at 3/15/2022 11:48 AM CDT -----  Regarding: FW: please    ----- Message -----  From: Kim Pruitt  Sent: 3/15/2022  11:38 AM CDT  To: Martha Ryan ([facility]) Staff  Subject: please                                           Type: Needs Medical Advice  Who Called: patient   Symptoms (please be specific):    How long has patient had these symptoms:   Pharmacy name and phone #:    Best Call Back Number: [phone]  Additional Information:patient request call back returning Elsie blair, please call patient after 3 pm,  please advise.

## 2022-03-15 NOTE — TELEPHONE ENCOUNTER
----- Message from Susan Hui RN sent at 3/15/2022  8:14 AM CDT -----  Contact: Patient    ----- Message -----  From: Katie Hernández  Sent: 3/14/2022   5:36 PM CDT  To: Martha Ryan ([facility]) Staff    Type: Appointment Request      Name of Caller:Patient   When is the first available appointment?no appointment generated   Symptoms:neck pain/mobility   Would the patient rather a call back or a response via [facility]? Call back   Best Call Back Number:[phone]  Additional Information: Please assist     Patient stated not currently taking message at [facility]

## 2022-03-23 NOTE — TELEPHONE ENCOUNTER
Specialty Pharmacy - Medication/Referral Authorization  Specialty Pharmacy - Refill Coordination    Specialty Medication Orders Linked to Encounter    Flowsheet Row Most Recent Value   Medication #1 ribociclib (KISQALI) 400 mg/day (200 mg x 2) Tab (Order#[id number], Rx#[id number])        [facility] has left several messages for pt for refills. Pt continues to be hard to reach and this causes delays in treatment of Kisqali. [facility] sends calendar to assist with adherence. Pt reports no missed doses usually, but is usually delayed starting next cycle by a few days as we are not able to reach her in a timely manner.     Refill Questions - Documented Responses    Flowsheet Row Most Recent Value   Patient Availability and HIPAA Verification    Does patient want to proceed with activity? Yes   HIPAA/medical authority confirmed? Yes   Relationship to patient of person spoken to? Self   Refill Screening Questions    Changes to allergies? No   Changes to medications? No   New conditions since last clinic visit? No   Unplanned office visit, urgent care, ED, or hospital admission in the last 4 weeks? No   How does patient/caregiver feel medication is working? Very good   Financial problems or insurance changes? No   How many doses of your specialty medications were missed in the last 4 weeks? 0  [no missed doses but is delayed starting by 3 days as we were not able to reach pt]   Why were doses missed? Other (comment)  [no missed doses but is delayed starting by 3 days as we were not able to reach pt]   Would patient like to speak to a pharmacist? Yes   When does the patient need to receive the medication? 03/24/22   Refill Delivery Questions    How will the patient receive the medication? Delivery [facility]   When does the patient need to receive the medication? 03/24/22   Shipping Address Home   Address in [facility] confirmed and updated if neccessary? Yes   Expected Copay ($) 0   Is the patient able to afford the medication copay?  Yes   Payment Method zero copay   Days supply of Refill 28   Supplies needed? --  [calendar]   Refill activity completed? Yes   Refill activity plan Refill scheduled   Shipment/Pickup Date: 03/24/22          Current Outpatient Medications   Medication Sig    anastrozole (ARIMIDEX) 1 mg Tab Take 1 tablet (1 mg total) by mouth once daily.    ribociclib (KISQALI) 400 mg/day (200 mg x 2) Tab Take 2 tablets (400mg) day 1 to day 21 every 28 days   Last reviewed on 3/23/2022  9:34 AM by Wendy Lopez, PharmD    Review of patient's allergies indicates:  No Known Allergies Last reviewed on  3/23/2022 9:34 AM by Wendy Lopez      Tasks added this encounter   No tasks added.   Tasks due within next 3 months   3/14/2022 - Refill Call (Auto Added)     Wendy Lopez, PharmD  [facility]  [address] [place] [id number]  Phone: [phone]  Fax: [phone]

## 2022-03-30 ENCOUNTER — HOSPITAL ENCOUNTER (OUTPATIENT)
Dept: RADIOLOGY | Facility: HOSPITAL | Age: 52
Discharge: HOME | End: 2022-03-30
Attending: INTERNAL MEDICINE
Payer: MEDICAID

## 2022-03-30 ENCOUNTER — TELEPHONE (OUTPATIENT)
Dept: HEMATOLOGY/ONCOLOGY | Facility: CLINIC | Age: 52
End: 2022-03-30
Payer: MEDICAID

## 2022-03-30 DIAGNOSIS — N63.10 BREAST MASS, RIGHT: ICD-10-CM

## 2022-03-30 PROCEDURE — 76642 ULTRASOUND BREAST LIMITED: CPT | Mod: 26,RT,, | Performed by: RADIOLOGY

## 2022-03-30 PROCEDURE — 76642 ULTRASOUND BREAST LIMITED: CPT | Mod: TC,PO,RT

## 2022-03-30 PROCEDURE — 76642 US BREAST RIGHT LIMITED: ICD-10-PCS | Mod: 26,RT,, | Performed by: RADIOLOGY

## 2022-03-30 NOTE — TELEPHONE ENCOUNTER
Spoke with pt to get u/s scheduled and pt stated she only off today and needed today if possible pt accepted [facility] at [phone] at same location as labs and verbalized acceptance

## 2022-04-04 ENCOUNTER — OFFICE VISIT (OUTPATIENT)
Dept: HEMATOLOGY/ONCOLOGY | Facility: CLINIC | Age: 52
End: 2022-04-04
Payer: MEDICAID

## 2022-04-04 ENCOUNTER — INFUSION (OUTPATIENT)
Dept: INFUSION THERAPY | Facility: HOSPITAL | Age: 52
End: 2022-04-04
Attending: INTERNAL MEDICINE
Payer: MEDICAID

## 2022-04-04 VITALS
SYSTOLIC BLOOD PRESSURE: 111 MMHG | HEIGHT: 64 IN | BODY MASS INDEX: 25.21 KG/M2 | WEIGHT: 147.69 LBS | TEMPERATURE: 98 F | DIASTOLIC BLOOD PRESSURE: 62 MMHG | HEART RATE: 84 BPM | OXYGEN SATURATION: 97 % | RESPIRATION RATE: 18 BRPM

## 2022-04-04 VITALS
TEMPERATURE: 98 F | WEIGHT: 147.69 LBS | RESPIRATION RATE: 18 BRPM | OXYGEN SATURATION: 97 % | HEART RATE: 84 BPM | SYSTOLIC BLOOD PRESSURE: 111 MMHG | BODY MASS INDEX: 25.21 KG/M2 | HEIGHT: 64 IN | DIASTOLIC BLOOD PRESSURE: 62 MMHG

## 2022-04-04 DIAGNOSIS — C50.112 MALIGNANT NEOPLASM OF CENTRAL PORTION OF LEFT BREAST IN FEMALE, ESTROGEN RECEPTOR POSITIVE: Primary | ICD-10-CM

## 2022-04-04 DIAGNOSIS — Z17.0 MALIGNANT NEOPLASM OF CENTRAL PORTION OF LEFT BREAST IN FEMALE, ESTROGEN RECEPTOR POSITIVE: Primary | ICD-10-CM

## 2022-04-04 DIAGNOSIS — C79.51 BONE METASTASES: ICD-10-CM

## 2022-04-04 DIAGNOSIS — T45.1X5A CHEMOTHERAPY-INDUCED NEUTROPENIA: ICD-10-CM

## 2022-04-04 DIAGNOSIS — C78.00 MALIGNANT NEOPLASM METASTATIC TO LUNG, UNSPECIFIED LATERALITY: ICD-10-CM

## 2022-04-04 DIAGNOSIS — D70.1 CHEMOTHERAPY-INDUCED NEUTROPENIA: ICD-10-CM

## 2022-04-04 PROCEDURE — 99215 PR OFFICE/OUTPT VISIT, EST, LEVL V, 40-54 MIN: ICD-10-PCS | Mod: S$PBB,,, | Performed by: INTERNAL MEDICINE

## 2022-04-04 PROCEDURE — 99999 PR PBB SHADOW E&M-EST. PATIENT-LVL III: ICD-10-PCS | Mod: PBBFAC,,, | Performed by: INTERNAL MEDICINE

## 2022-04-04 PROCEDURE — 99999 PR PBB SHADOW E&M-EST. PATIENT-LVL III: CPT | Mod: PBBFAC,,, | Performed by: INTERNAL MEDICINE

## 2022-04-04 PROCEDURE — 25000003 PHARM REV CODE 250: Mod: PN | Performed by: INTERNAL MEDICINE

## 2022-04-04 PROCEDURE — 3074F PR MOST RECENT SYSTOLIC BLOOD PRESSURE < 130 MM HG: ICD-10-PCS | Mod: CPTII,,, | Performed by: INTERNAL MEDICINE

## 2022-04-04 PROCEDURE — 3008F PR BODY MASS INDEX (BMI) DOCUMENTED: ICD-10-PCS | Mod: CPTII,,, | Performed by: INTERNAL MEDICINE

## 2022-04-04 PROCEDURE — 99215 OFFICE O/P EST HI 40 MIN: CPT | Mod: S$PBB,,, | Performed by: INTERNAL MEDICINE

## 2022-04-04 PROCEDURE — 3074F SYST BP LT 130 MM HG: CPT | Mod: CPTII,,, | Performed by: INTERNAL MEDICINE

## 2022-04-04 PROCEDURE — A4216 STERILE WATER/SALINE, 10 ML: HCPCS | Mod: PN | Performed by: INTERNAL MEDICINE

## 2022-04-04 PROCEDURE — 3008F BODY MASS INDEX DOCD: CPT | Mod: CPTII,,, | Performed by: INTERNAL MEDICINE

## 2022-04-04 PROCEDURE — 63600175 PHARM REV CODE 636 W HCPCS: Mod: JG,PN | Performed by: INTERNAL MEDICINE

## 2022-04-04 PROCEDURE — 3078F PR MOST RECENT DIASTOLIC BLOOD PRESSURE < 80 MM HG: ICD-10-PCS | Mod: CPTII,,, | Performed by: INTERNAL MEDICINE

## 2022-04-04 PROCEDURE — 99213 OFFICE O/P EST LOW 20 MIN: CPT | Mod: PBBFAC,PN | Performed by: INTERNAL MEDICINE

## 2022-04-04 PROCEDURE — 96372 THER/PROPH/DIAG INJ SC/IM: CPT | Mod: PN

## 2022-04-04 PROCEDURE — 3078F DIAST BP <80 MM HG: CPT | Mod: CPTII,,, | Performed by: INTERNAL MEDICINE

## 2022-04-04 RX ORDER — HEPARIN 100 UNIT/ML
500 SYRINGE INTRAVENOUS
Status: CANCELLED | OUTPATIENT
Start: 2022-04-04

## 2022-04-04 RX ORDER — HEPARIN 100 UNIT/ML
500 SYRINGE INTRAVENOUS
Status: DISCONTINUED | OUTPATIENT
Start: 2022-04-04 | End: 2022-04-04 | Stop reason: HOSPADM

## 2022-04-04 RX ORDER — SODIUM CHLORIDE 0.9 % (FLUSH) 0.9 %
10 SYRINGE (ML) INJECTION
Status: DISCONTINUED | OUTPATIENT
Start: 2022-04-04 | End: 2022-04-04 | Stop reason: HOSPADM

## 2022-04-04 RX ORDER — SODIUM CHLORIDE 0.9 % (FLUSH) 0.9 %
10 SYRINGE (ML) INJECTION
Status: CANCELLED | OUTPATIENT
Start: 2022-04-04

## 2022-04-04 RX ADMIN — GOSERELIN ACETATE 3.6 MG: 3.6 IMPLANT SUBCUTANEOUS at 04:04

## 2022-04-04 RX ADMIN — Medication 500 UNITS: at 04:04

## 2022-04-04 RX ADMIN — DENOSUMAB 120 MG: 120 INJECTION SUBCUTANEOUS at 04:04

## 2022-04-04 RX ADMIN — Medication 10 ML: at 04:04

## 2022-04-04 NOTE — PROGRESS NOTES
PROGRESS NOTE    Subjective:       Patient ID: Angeles Wright is a 51 y.o. female.  MRN: [id number]  : 1970    Chief Complaint: Establish Care (4 weekd follow up with labs and injection)  Left breast IDC    History of Present Illness:   Angeles Wright is a 51 y.o. female who presents with  metastatic invasive ductal left breast cancer.      As previously documented by Dr. Mcgovern  [place] [facility] [place] Labs showed her to be pre-menopausal thus she was treated with Ribociclib /Zoladex and Arimidex but she was off therapy from November to May 2020. In 2020 scans showed improvement thus she restarted Arimidex/ribociclib/zoladex.     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      Interim history:   She presents today to discuss her symptoms, labs,  and further recommendations.  She has  continued dose reduced ribociclib at the 400 mg p.o. day 1-day 21 Q 28 days since 2020.      She has continued anastrozole 1 mg p.o. daily. She is finishing her most recent   cycle of Ribociclib at this time, is due for Zoladex injection today.       She has completed her dental extractions and has partials put in. She is due to receive her first dose of xgeva today.      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     She reports a stable weight, appetite and performance status.       Oncology History:  Oncology History   Malignant neoplasm of central portion of left breast in female, estrogen receptor positive   9/3/2019 Initial Diagnosis    Malignant neoplasm of central portion of left breast in female, estrogen receptor positive     9/3/2019 - 9/3/2019 Chemotherapy    Treatment Summary   Plan Name: OP ANASTROZOLE PALBOCICLIB Q4W  Treatment Goal: Palliative  Status: Inactive  Start Date: 9/3/2019  End Date: 9/3/2019  Provider: Melania Mcgovern MD  Chemotherapy: [No matching medication found in this treatment plan]     2019 Cancer Staged     Staging form: Breast, AJCC 8th Edition  - Clinical: Stage IV (cT4b, cM1, ER+, [place]+, HER2-)         History:  Past Medical History:   Diagnosis Date    Breast cancer       Past Surgical History:   Procedure Laterality Date    BREAST BIOPSY Right     BREAST LUMPECTOMY Left     TUBAL LIGATION       Family History   Problem Relation Age of Onset    Lung cancer Mother     Heart attack Father     Suicide Brother     No Known Problems Maternal Aunt     No Known Problems Maternal Uncle     No Known Problems Paternal Aunt     No Known Problems Paternal Uncle     No Known Problems Maternal Grandmother     No Known Problems Maternal Grandfather     No Known Problems Paternal Grandmother     No Known Problems Paternal Grandfather     No Known Problems Son     No Known Problems Son     No Known Problems Daughter     Breast cancer Cousin     Breast cancer Cousin      Social History     Tobacco Use    Smoking status: Current Every Day Smoker     Packs/day: 0.50     Years: 25.00     Pack years: 12.50     Start date: 9/3/1994    Smokeless tobacco: Never Used   Substance and Sexual Activity    Alcohol use: Yes     Comment: occasionally    Drug use: Not Currently    Sexual activity: Yes     Partners: Male     Birth control/protection: None        ROS:   Review of Systems   Constitutional: Negative for fever, malaise/fatigue and weight loss.   HENT: Negative for congestion, hearing loss, nosebleeds and sore throat.    Eyes: Negative for double vision and photophobia.   Respiratory: Negative for cough, hemoptysis, sputum production, shortness of breath and wheezing.    Cardiovascular: Negative for chest pain, palpitations, orthopnea and leg swelling.   Gastrointestinal: Negative for abdominal pain, blood in stool, constipation, diarrhea, heartburn, nausea and vomiting.   Genitourinary: Negative for dysuria, hematuria and urgency.   Musculoskeletal: Positive for joint pain. Negative for back pain and myalgias.  "  Skin: Negative for itching and rash.   Neurological: Negative for dizziness, tingling, seizures, weakness and headaches.   Endo/Heme/Allergies: Negative for polydipsia. Does not bruise/bleed easily.   Psychiatric/Behavioral: Negative for depression and memory loss. The patient is not nervous/anxious and does not have insomnia.         Objective:     Vitals:    04/04/22 1528   BP: 111/62   Pulse: 84   Resp: 18   Temp: 97.5 °F (36.4 °C)   TempSrc: Temporal   SpO2: 97%   Weight: 67 kg (147 lb 11.3 oz)   Height: 5' 4" (1.626 m)   PainSc: 0-No pain     Wt Readings from Last 10 Encounters:   04/04/22 67 kg (147 lb 11.3 oz)   04/04/22 67 kg (147 lb 11.3 oz)   03/07/22 66.3 kg (146 lb 2.6 oz)   02/07/22 65.8 kg (145 lb 1 oz)   02/07/22 65.8 kg (145 lb 1 oz)   01/10/22 64.9 kg (143 lb 1.3 oz)   01/10/22 64.9 kg (143 lb 1.3 oz)   12/13/21 63.8 kg (140 lb 10.5 oz)   12/13/21 63.8 kg (140 lb 10.5 oz)   11/15/21 64.8 kg (142 lb 13.7 oz)       Physical Examination:   Physical Exam  Vitals and nursing note reviewed.   Constitutional:       General: She is not in acute distress.     Appearance: She is not diaphoretic.   HENT:      Head: Normocephalic.      Mouth/Throat:      Pharynx: No oropharyngeal exudate.   Eyes:      General: No scleral icterus.     Conjunctiva/sclera: Conjunctivae normal.   Neck:      Thyroid: No thyromegaly.   Cardiovascular:      Rate and Rhythm: Normal rate and regular rhythm.      Heart sounds: Normal heart sounds. No murmur heard.  Pulmonary:      Effort: Pulmonary effort is normal. No respiratory distress.      Breath sounds: No stridor. No wheezing or rales.   Chest:      Chest wall: No tenderness.   Abdominal:      General: Bowel sounds are normal. There is no distension.      Palpations: Abdomen is soft. There is no mass.      Tenderness: There is no abdominal tenderness. There is no rebound.   Musculoskeletal:         General: No tenderness or deformity. Normal range of motion.      Cervical back: " Neck supple.   Lymphadenopathy:      Cervical: No cervical adenopathy.   Skin:     General: Skin is warm and dry.      Findings: No erythema or rash.   Neurological:      Mental Status: She is alert and oriented to person, place, and time.      Cranial Nerves: No cranial nerve deficit.      Coordination: Coordination normal.      Gait: Gait is intact.   Psychiatric:         Mood and Affect: Affect normal.         Cognition and Memory: Memory normal.         Judgment: Judgment normal.          Diagnostic Tests:  Significant Imaging: I have reviewed and interpreted all pertinent imaging results/findings.      Laboratory Data:  All pertinent labs have been reviewed.  Labs:   Lab Results   Component Value Date    WBC 5.46 03/30/2022    RBC 4.67 03/30/2022    HGB 14.5 03/30/2022    HCT 42.8 03/30/2022    MCV 92 03/30/2022     03/30/2022    GLU 91 03/30/2022     03/30/2022    K 3.9 03/30/2022    BUN 9 03/30/2022    CREATININE 0.9 03/30/2022    AST 22 03/30/2022    ALT 21 03/30/2022    BILITOT 0.3 03/30/2022       Assessment/Plan:   Malignant neoplasm of central portion of left breast in female, estrogen receptor positive  Stage IV (T3N2M2) invasive ductal carcinoma of left breast,  quadrant, ER 96%, AR 94%, Her2 neg, Grade 2, Ki67 30%  PIKC3A mutation positive         Continue current treatment as tolerated and until disease progression.  Due for repeat scans prior to next visit.      Continue ribociclib 400 mg p.o. day 1-day 21 Q 28 days and anastrozole 1 mg p.o. Continue zoladex q 28 days.     -     CT Chest Abdomen Pelvis With Contrast; Future; Expected date: 04/04/2022  -     Cancer Antigen 15-3; Future; Expected date: 04/05/2022  -     CA 27.29; Future; Expected date: 04/04/2022    Malignant neoplasm metastatic to lung, unspecified laterality     Multiple lung nodules are small and benign appearing on recent scans.  Continue to monitor on subsequent scans.      Bone metastases  She has completed dental  work. She is to receive Xgeva today, continue q  28 days.             ECOG SCORE    1 - Restricted in strenuous activity-ambulatory and able to carry out work of a light nature           Discussion:   Follow up in about 4 weeks (around 5/2/2022) for MD, Lab Results, Scan Results.    Plan was discussed with the patient at length, and she verbalized understanding. Angeles was given an opportunity to ask questions that were answered to her satisfaction, and she was advised to call in the interval if any problems or questions arise.    Electronically signed by Shelby Thomas MD

## 2022-04-04 NOTE — PLAN OF CARE
Problem: Adult Inpatient Plan of Care  Goal: Plan of Care Review  Outcome: Ongoing, Progressing  Flowsheets (Taken 4/4/2022 1610)  Plan of Care Reviewed With: patient  Goal: Patient-Specific Goal (Individualized)  Outcome: Ongoing, Progressing     Problem: Fatigue  Goal: Improved Activity Tolerance  Outcome: Ongoing, Progressing   Pt tolerated zoladex & xgeva injections well.   No adverse reaction noted.  PAC flushed with Heparin and de-accessed per protocol.   Pt left clinic in no acute distress.

## 2022-04-05 ENCOUNTER — OCCUPATIONAL HEALTH (OUTPATIENT)
Dept: URGENT CARE | Facility: CLINIC | Age: 52
End: 2022-04-05

## 2022-04-05 DIAGNOSIS — Z02.83 ENCOUNTER FOR DRUG SCREENING: Primary | ICD-10-CM

## 2022-04-05 LAB
CTP QC/QA: YES
POC 10 PANEL DRUG SCREEN: NEGATIVE

## 2022-04-05 PROCEDURE — 80305 DRUG TEST PRSMV DIR OPT OBS: CPT | Mod: S$GLB,,, | Performed by: FAMILY MEDICINE

## 2022-04-05 PROCEDURE — 80305 POCT RAPID DRUG SCREEN 10 PANEL: ICD-10-PCS | Mod: S$GLB,,, | Performed by: FAMILY MEDICINE

## 2022-04-18 ENCOUNTER — PATIENT MESSAGE (OUTPATIENT)
Dept: ADMINISTRATIVE | Facility: OTHER | Age: 52
End: 2022-04-18
Payer: MEDICAID

## 2022-04-18 ENCOUNTER — SPECIALTY PHARMACY (OUTPATIENT)
Dept: PHARMACY | Facility: CLINIC | Age: 52
End: 2022-04-18
Payer: MEDICAID

## 2022-04-18 DIAGNOSIS — Z17.0 MALIGNANT NEOPLASM OF CENTRAL PORTION OF LEFT BREAST IN FEMALE, ESTROGEN RECEPTOR POSITIVE: Primary | ICD-10-CM

## 2022-04-18 DIAGNOSIS — C50.112 MALIGNANT NEOPLASM OF CENTRAL PORTION OF LEFT BREAST IN FEMALE, ESTROGEN RECEPTOR POSITIVE: Primary | ICD-10-CM

## 2022-04-18 NOTE — TELEPHONE ENCOUNTER
Specialty Pharmacy - Refill Coordination    Specialty Medication Orders Linked to Encounter    Flowsheet Row Most Recent Value   Medication #1 ribociclib (KISQALI) 400 mg/day (200 mg x 2) Tab (Order#[id number], Rx#[id number])        Calendar sent to pt to assist with adherence. Start date of 4/21    Refill Questions - Documented Responses    Flowsheet Row Most Recent Value   Patient Availability and HIPAA Verification    Does patient want to proceed with activity? Yes   HIPAA/medical authority confirmed? Yes   Relationship to patient of person spoken to? Self   Refill Screening Questions    Changes to allergies? No   Changes to medications? No   New conditions since last clinic visit? No   Unplanned office visit, urgent care, ED, or hospital admission in the last 4 weeks? No   How does patient/caregiver feel medication is working? Very good   Financial problems or insurance changes? No   How many doses of your specialty medications were missed in the last 4 weeks? 0   Would patient like to speak to a pharmacist? Yes   When does the patient need to receive the medication? 04/21/22   Refill Delivery Questions    How will the patient receive the medication? Delivery Chuyita   When does the patient need to receive the medication? 04/21/22   Shipping Address Home   Address in [facility] confirmed and updated if neccessary? Yes   Expected Copay ($) 0   Is the patient able to afford the medication copay? Yes   Payment Method zero copay   Days supply of Refill 28   Supplies needed? No supplies needed   Refill activity completed? Yes   Refill activity plan Refill scheduled   Shipment/Pickup Date: 04/20/22          Current Outpatient Medications   Medication Sig    anastrozole (ARIMIDEX) 1 mg Tab Take 1 tablet (1 mg total) by mouth once daily.    ribociclib (KISQALI) 400 mg/day (200 mg x 2) Tab Take 2 tablets (400mg) day 1 to day 21 every 28 days   Last reviewed on 4/18/2022 10:08 AM by Wendy Lopez  PharmD    Review of patient's allergies indicates:  No Known Allergies Last reviewed on  4/18/2022 10:08 AM by Wendy Lopez      Tasks added this encounter   No tasks added.   Tasks due within next 3 months   4/14/2022 - Refill Call (Auto Added)     Wendy Lopez, PharmD  [facility]  [address] [place] [id number]  Phone: [phone]  Fax: [phone]

## 2022-04-19 ENCOUNTER — PATIENT MESSAGE (OUTPATIENT)
Dept: HEMATOLOGY/ONCOLOGY | Facility: CLINIC | Age: 52
End: 2022-04-19
Payer: MEDICAID

## 2022-04-19 ENCOUNTER — TELEPHONE (OUTPATIENT)
Dept: HEMATOLOGY/ONCOLOGY | Facility: CLINIC | Age: 52
End: 2022-04-19
Payer: MEDICAID

## 2022-04-19 DIAGNOSIS — C78.00 MALIGNANT NEOPLASM METASTATIC TO LUNG, UNSPECIFIED LATERALITY: ICD-10-CM

## 2022-04-19 DIAGNOSIS — Z17.0 MALIGNANT NEOPLASM OF CENTRAL PORTION OF LEFT BREAST IN FEMALE, ESTROGEN RECEPTOR POSITIVE: Primary | ICD-10-CM

## 2022-04-19 DIAGNOSIS — C50.112 MALIGNANT NEOPLASM OF CENTRAL PORTION OF LEFT BREAST IN FEMALE, ESTROGEN RECEPTOR POSITIVE: Primary | ICD-10-CM

## 2022-04-19 DIAGNOSIS — C79.51 BONE METASTASES: ICD-10-CM

## 2022-04-19 NOTE — TELEPHONE ENCOUNTER
Spoke with the pt and got the pt rescheduled with ct, labs, md, and injection. Pt verbalized and understanding.

## 2022-05-02 ENCOUNTER — LAB VISIT (OUTPATIENT)
Dept: LAB | Facility: HOSPITAL | Age: 52
End: 2022-05-02
Attending: INTERNAL MEDICINE
Payer: MEDICAID

## 2022-05-02 DIAGNOSIS — C50.112 MALIGNANT NEOPLASM OF CENTRAL PORTION OF LEFT BREAST IN FEMALE, ESTROGEN RECEPTOR POSITIVE: ICD-10-CM

## 2022-05-02 DIAGNOSIS — Z17.0 MALIGNANT NEOPLASM OF CENTRAL PORTION OF LEFT BREAST IN FEMALE, ESTROGEN RECEPTOR POSITIVE: ICD-10-CM

## 2022-05-02 LAB
ALBUMIN SERPL BCP-MCNC: 4 G/DL (ref 3.5–5.2)
ALP SERPL-CCNC: 100 U/L (ref 55–135)
ALT SERPL W/O P-5'-P-CCNC: 14 U/L (ref 10–44)
ANION GAP SERPL CALC-SCNC: 9 MMOL/L (ref 8–16)
AST SERPL-CCNC: 16 U/L (ref 10–40)
BASOPHILS # BLD AUTO: 0.1 K/UL (ref 0–0.2)
BASOPHILS NFR BLD: 1.9 % (ref 0–1.9)
BILIRUB SERPL-MCNC: 0.4 MG/DL (ref 0.1–1)
BUN SERPL-MCNC: 10 MG/DL (ref 6–20)
CALCIUM SERPL-MCNC: 8.5 MG/DL (ref 8.7–10.5)
CHLORIDE SERPL-SCNC: 107 MMOL/L (ref 95–110)
CO2 SERPL-SCNC: 23 MMOL/L (ref 23–29)
CREAT SERPL-MCNC: 0.7 MG/DL (ref 0.5–1.4)
DIFFERENTIAL METHOD: ABNORMAL
EOSINOPHIL # BLD AUTO: 0.1 K/UL (ref 0–0.5)
EOSINOPHIL NFR BLD: 2.1 % (ref 0–8)
ERYTHROCYTE [DISTWIDTH] IN BLOOD BY AUTOMATED COUNT: 14 % (ref 11.5–14.5)
EST. GFR  (AFRICAN AMERICAN): >60 ML/MIN/1.73 M^2
EST. GFR  (NON AFRICAN AMERICAN): >60 ML/MIN/1.73 M^2
GLUCOSE SERPL-MCNC: 95 MG/DL (ref 70–110)
HCT VFR BLD AUTO: 43.7 % (ref 37–48.5)
HGB BLD-MCNC: 14.7 G/DL (ref 12–16)
IMM GRANULOCYTES # BLD AUTO: 0.02 K/UL (ref 0–0.04)
IMM GRANULOCYTES NFR BLD AUTO: 0.4 % (ref 0–0.5)
LYMPHOCYTES # BLD AUTO: 1.8 K/UL (ref 1–4.8)
LYMPHOCYTES NFR BLD: 35.2 % (ref 18–48)
MCH RBC QN AUTO: 30.9 PG (ref 27–31)
MCHC RBC AUTO-ENTMCNC: 33.6 G/DL (ref 32–36)
MCV RBC AUTO: 92 FL (ref 82–98)
MONOCYTES # BLD AUTO: 0.3 K/UL (ref 0.3–1)
MONOCYTES NFR BLD: 5.8 % (ref 4–15)
NEUTROPHILS # BLD AUTO: 2.8 K/UL (ref 1.8–7.7)
NEUTROPHILS NFR BLD: 55 % (ref 38–73)
NRBC BLD-RTO: 0 /100 WBC
PLATELET # BLD AUTO: 294 K/UL (ref 150–450)
PMV BLD AUTO: 8.7 FL (ref 9.2–12.9)
POTASSIUM SERPL-SCNC: 4.3 MMOL/L (ref 3.5–5.1)
PROT SERPL-MCNC: 6.5 G/DL (ref 6–8.4)
RBC # BLD AUTO: 4.75 M/UL (ref 4–5.4)
SODIUM SERPL-SCNC: 139 MMOL/L (ref 136–145)
WBC # BLD AUTO: 5.14 K/UL (ref 3.9–12.7)

## 2022-05-02 PROCEDURE — 86300 IMMUNOASSAY TUMOR CA 15-3: CPT | Performed by: INTERNAL MEDICINE

## 2022-05-02 PROCEDURE — 80053 COMPREHEN METABOLIC PANEL: CPT | Performed by: INTERNAL MEDICINE

## 2022-05-02 PROCEDURE — 86300 IMMUNOASSAY TUMOR CA 15-3: CPT | Mod: 91 | Performed by: INTERNAL MEDICINE

## 2022-05-02 PROCEDURE — 85025 COMPLETE CBC W/AUTO DIFF WBC: CPT | Mod: PO | Performed by: INTERNAL MEDICINE

## 2022-05-04 ENCOUNTER — HOSPITAL ENCOUNTER (OUTPATIENT)
Dept: RADIOLOGY | Facility: HOSPITAL | Age: 52
Discharge: HOME | End: 2022-05-04
Attending: INTERNAL MEDICINE
Payer: MEDICAID

## 2022-05-04 DIAGNOSIS — C79.51 BONE METASTASES: ICD-10-CM

## 2022-05-04 DIAGNOSIS — Z17.0 MALIGNANT NEOPLASM OF CENTRAL PORTION OF LEFT BREAST IN FEMALE, ESTROGEN RECEPTOR POSITIVE: ICD-10-CM

## 2022-05-04 DIAGNOSIS — C50.112 MALIGNANT NEOPLASM OF CENTRAL PORTION OF LEFT BREAST IN FEMALE, ESTROGEN RECEPTOR POSITIVE: ICD-10-CM

## 2022-05-04 DIAGNOSIS — C78.00 MALIGNANT NEOPLASM METASTATIC TO LUNG, UNSPECIFIED LATERALITY: ICD-10-CM

## 2022-05-04 LAB — CANCER AG15-3 SERPL IA-ACNC: 64 U/ML

## 2022-05-04 PROCEDURE — 74177 CT CHEST ABDOMEN PELVIS WITH CONTRAST (XPD): ICD-10-PCS | Mod: 26,,, | Performed by: RADIOLOGY

## 2022-05-04 PROCEDURE — 74177 CT ABD & PELVIS W/CONTRAST: CPT | Mod: 26,,, | Performed by: RADIOLOGY

## 2022-05-04 PROCEDURE — 71260 CT THORAX DX C+: CPT | Mod: TC,PO

## 2022-05-04 PROCEDURE — 71260 CT CHEST ABDOMEN PELVIS WITH CONTRAST (XPD): ICD-10-PCS | Mod: 26,,, | Performed by: RADIOLOGY

## 2022-05-04 PROCEDURE — 74177 CT ABD & PELVIS W/CONTRAST: CPT | Mod: TC,PO

## 2022-05-04 PROCEDURE — 25500020 PHARM REV CODE 255: Mod: PO | Performed by: INTERNAL MEDICINE

## 2022-05-04 PROCEDURE — A9698 NON-RAD CONTRAST MATERIALNOC: HCPCS | Mod: PO | Performed by: INTERNAL MEDICINE

## 2022-05-04 PROCEDURE — 71260 CT THORAX DX C+: CPT | Mod: 26,,, | Performed by: RADIOLOGY

## 2022-05-04 RX ADMIN — IOHEXOL 1000 ML: 9 SOLUTION ORAL at 09:05

## 2022-05-04 RX ADMIN — IOHEXOL 75 ML: 350 INJECTION, SOLUTION INTRAVENOUS at 09:05

## 2022-05-05 LAB — CANCER AG27-29 SERPL-ACNC: 87.6 U/ML

## 2022-05-09 ENCOUNTER — OFFICE VISIT (OUTPATIENT)
Dept: HEMATOLOGY/ONCOLOGY | Facility: CLINIC | Age: 52
End: 2022-05-09
Payer: MEDICAID

## 2022-05-09 ENCOUNTER — INFUSION (OUTPATIENT)
Dept: INFUSION THERAPY | Facility: HOSPITAL | Age: 52
End: 2022-05-09
Attending: INTERNAL MEDICINE
Payer: MEDICAID

## 2022-05-09 VITALS
HEART RATE: 88 BPM | SYSTOLIC BLOOD PRESSURE: 98 MMHG | DIASTOLIC BLOOD PRESSURE: 65 MMHG | WEIGHT: 147.94 LBS | BODY MASS INDEX: 25.25 KG/M2 | HEIGHT: 64 IN | OXYGEN SATURATION: 97 % | RESPIRATION RATE: 18 BRPM | TEMPERATURE: 97 F

## 2022-05-09 VITALS
TEMPERATURE: 97 F | HEART RATE: 88 BPM | DIASTOLIC BLOOD PRESSURE: 65 MMHG | SYSTOLIC BLOOD PRESSURE: 98 MMHG | RESPIRATION RATE: 18 BRPM | OXYGEN SATURATION: 97 %

## 2022-05-09 DIAGNOSIS — C79.51 BONE METASTASES: Primary | ICD-10-CM

## 2022-05-09 DIAGNOSIS — Z17.0 MALIGNANT NEOPLASM OF CENTRAL PORTION OF LEFT BREAST IN FEMALE, ESTROGEN RECEPTOR POSITIVE: Primary | ICD-10-CM

## 2022-05-09 DIAGNOSIS — F17.200 SMOKER: ICD-10-CM

## 2022-05-09 DIAGNOSIS — Z79.811 USE OF ANASTROZOLE: ICD-10-CM

## 2022-05-09 DIAGNOSIS — C79.51 BONE METASTASES: ICD-10-CM

## 2022-05-09 DIAGNOSIS — C50.112 MALIGNANT NEOPLASM OF CENTRAL PORTION OF LEFT BREAST IN FEMALE, ESTROGEN RECEPTOR POSITIVE: Primary | ICD-10-CM

## 2022-05-09 DIAGNOSIS — Z17.0 MALIGNANT NEOPLASM OF CENTRAL PORTION OF LEFT BREAST IN FEMALE, ESTROGEN RECEPTOR POSITIVE: ICD-10-CM

## 2022-05-09 DIAGNOSIS — C78.00 MALIGNANT NEOPLASM METASTATIC TO LUNG, UNSPECIFIED LATERALITY: ICD-10-CM

## 2022-05-09 DIAGNOSIS — C50.112 MALIGNANT NEOPLASM OF CENTRAL PORTION OF LEFT BREAST IN FEMALE, ESTROGEN RECEPTOR POSITIVE: ICD-10-CM

## 2022-05-09 PROCEDURE — 3078F PR MOST RECENT DIASTOLIC BLOOD PRESSURE < 80 MM HG: ICD-10-PCS | Mod: CPTII,,, | Performed by: INTERNAL MEDICINE

## 2022-05-09 PROCEDURE — 3008F PR BODY MASS INDEX (BMI) DOCUMENTED: ICD-10-PCS | Mod: CPTII,,, | Performed by: INTERNAL MEDICINE

## 2022-05-09 PROCEDURE — 63600175 PHARM REV CODE 636 W HCPCS: Mod: PN | Performed by: INTERNAL MEDICINE

## 2022-05-09 PROCEDURE — 99999 PR PBB SHADOW E&M-EST. PATIENT-LVL III: ICD-10-PCS | Mod: PBBFAC,,, | Performed by: INTERNAL MEDICINE

## 2022-05-09 PROCEDURE — 99215 PR OFFICE/OUTPT VISIT, EST, LEVL V, 40-54 MIN: ICD-10-PCS | Mod: S$PBB,,, | Performed by: INTERNAL MEDICINE

## 2022-05-09 PROCEDURE — 3078F DIAST BP <80 MM HG: CPT | Mod: CPTII,,, | Performed by: INTERNAL MEDICINE

## 2022-05-09 PROCEDURE — 99213 OFFICE O/P EST LOW 20 MIN: CPT | Mod: PBBFAC,PN | Performed by: INTERNAL MEDICINE

## 2022-05-09 PROCEDURE — 96372 THER/PROPH/DIAG INJ SC/IM: CPT | Mod: PN

## 2022-05-09 PROCEDURE — 3074F PR MOST RECENT SYSTOLIC BLOOD PRESSURE < 130 MM HG: ICD-10-PCS | Mod: CPTII,,, | Performed by: INTERNAL MEDICINE

## 2022-05-09 PROCEDURE — 25000003 PHARM REV CODE 250: Mod: PN | Performed by: INTERNAL MEDICINE

## 2022-05-09 PROCEDURE — 99999 PR PBB SHADOW E&M-EST. PATIENT-LVL III: CPT | Mod: PBBFAC,,, | Performed by: INTERNAL MEDICINE

## 2022-05-09 PROCEDURE — 3008F BODY MASS INDEX DOCD: CPT | Mod: CPTII,,, | Performed by: INTERNAL MEDICINE

## 2022-05-09 PROCEDURE — A4216 STERILE WATER/SALINE, 10 ML: HCPCS | Mod: PN | Performed by: INTERNAL MEDICINE

## 2022-05-09 PROCEDURE — 99215 OFFICE O/P EST HI 40 MIN: CPT | Mod: S$PBB,,, | Performed by: INTERNAL MEDICINE

## 2022-05-09 PROCEDURE — 96523 IRRIG DRUG DELIVERY DEVICE: CPT | Mod: PN

## 2022-05-09 PROCEDURE — 96402 CHEMO HORMON ANTINEOPL SQ/IM: CPT | Mod: PN

## 2022-05-09 PROCEDURE — 3074F SYST BP LT 130 MM HG: CPT | Mod: CPTII,,, | Performed by: INTERNAL MEDICINE

## 2022-05-09 RX ORDER — SODIUM CHLORIDE 0.9 % (FLUSH) 0.9 %
10 SYRINGE (ML) INJECTION
Status: CANCELLED | OUTPATIENT
Start: 2022-05-09

## 2022-05-09 RX ORDER — HEPARIN 100 UNIT/ML
500 SYRINGE INTRAVENOUS
Status: CANCELLED | OUTPATIENT
Start: 2022-05-09

## 2022-05-09 RX ORDER — SODIUM CHLORIDE 0.9 % (FLUSH) 0.9 %
10 SYRINGE (ML) INJECTION
Status: DISCONTINUED | OUTPATIENT
Start: 2022-05-09 | End: 2022-05-09 | Stop reason: HOSPADM

## 2022-05-09 RX ORDER — HEPARIN 100 UNIT/ML
500 SYRINGE INTRAVENOUS
Status: DISCONTINUED | OUTPATIENT
Start: 2022-05-09 | End: 2022-05-09 | Stop reason: HOSPADM

## 2022-05-09 RX ORDER — VARENICLINE TARTRATE 0.5 (11)-1
KIT ORAL
Qty: 1 EACH | Refills: 0 | Status: SHIPPED | OUTPATIENT
Start: 2022-05-09 | End: 2022-09-14

## 2022-05-09 RX ADMIN — GOSERELIN ACETATE 3.6 MG: 3.6 IMPLANT SUBCUTANEOUS at 02:05

## 2022-05-09 RX ADMIN — Medication 500 UNITS: at 02:05

## 2022-05-09 RX ADMIN — DENOSUMAB 120 MG: 120 INJECTION SUBCUTANEOUS at 02:05

## 2022-05-09 RX ADMIN — Medication 10 ML: at 02:05

## 2022-05-09 NOTE — PLAN OF CARE
Pt received xgeva, zoladex, and port flushed with heparin today. Vitals remain stable. Reviewed follow-up appointments. All questions were answered, ambulated independently at d/c.

## 2022-05-09 NOTE — PROGRESS NOTES
PROGRESS NOTE    Subjective:       Patient ID: Angeles Wright is a 51 y.o. female.  MRN: [id number]  : 1970    Chief Complaint: Malignant neoplasm of central portion of left breast in fem      History of Present Illness:   Angeles Wright is a 51 y.o. female who presents with metastatic invasive ductal left breast cancer.       As previously documented by Dr. Mcgovern  [place] [facility] [place] Labs showed her to be pre-menopausal thus she was treated with Ribociclib /Zoladex and Arimidex but she was off therapy from November to May 2020. In 2020 scans showed improvement thus she restarted Arimidex/ribociclib/zoladex.     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      Interim history:   She presents today to discuss her symptoms, labs,CT scans  and further recommendations.  She has  continued dose reduced ribociclib at the 400 mg p.o. day 1-day 21 Q 28 days since 2020.      She has continued anastrozole 1 mg p.o. daily. She is complaint with Ribociclib. She is due for Zoladex and xgeva injection today.       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      She reports a stable weight, appetite and performance status.    She is smoking half a pack a day.         Oncology History:  Oncology History   Malignant neoplasm of central portion of left breast in female, estrogen receptor positive   9/3/2019 Initial Diagnosis    Malignant neoplasm of central portion of left breast in female, estrogen receptor positive     9/3/2019 - 9/3/2019 Chemotherapy    Treatment Summary   Plan Name: OP ANASTROZOLE PALBOCICLIB Q4W  Treatment Goal: Palliative  Status: Inactive  Start Date: 9/3/2019  End Date: 9/3/2019  Provider: Melania Mcgovern MD  Chemotherapy: [No matching medication found in this treatment plan]     2019 Cancer Staged    Staging form: Breast, AJCC 8th Edition  - Clinical: Stage IV (cT4b, cM1, ER+, NV+, HER2-)        5/4/22  Impression:     No significant interval changes.  Known malignancy in the left breast with metastatic disease to bone.  Additional stable bilateral breast nodules which have been evaluated previously with mammography and ultrasound.  Stable small pleural based lung nodules in the right hemithorax.       History:  Past Medical History:   Diagnosis Date    Breast cancer       Past Surgical History:   Procedure Laterality Date    BREAST BIOPSY Right     BREAST LUMPECTOMY Left     TUBAL LIGATION       Family History   Problem Relation Age of Onset    Lung cancer Mother     Heart attack Father     Suicide Brother     No Known Problems Maternal Aunt     No Known Problems Maternal Uncle     No Known Problems Paternal Aunt     No Known Problems Paternal Uncle     No Known Problems Maternal Grandmother     No Known Problems Maternal Grandfather     No Known Problems Paternal Grandmother     No Known Problems Paternal Grandfather     No Known Problems Son     No Known Problems Son     No Known Problems Daughter     Breast cancer Cousin     Breast cancer Cousin      Social History     Tobacco Use    Smoking status: Current Every Day Smoker     Packs/day: 0.50     Years: 25.00     Pack years: 12.50     Start date: 9/3/1994    Smokeless tobacco: Never Used   Substance and Sexual Activity    Alcohol use: Yes     Comment: occasionally    Drug use: Not Currently    Sexual activity: Yes     Partners: Male     Birth control/protection: None        ROS:   Review of Systems   Constitutional: Negative for fever, malaise/fatigue and weight loss.   HENT: Negative for congestion, hearing loss, nosebleeds and sore throat.    Eyes: Negative for double vision and photophobia.   Respiratory: Negative for cough, hemoptysis, sputum production, shortness of breath and wheezing.    Cardiovascular: Negative for chest pain, palpitations, orthopnea and leg swelling.   Gastrointestinal: Negative for abdominal pain,  "blood in stool, constipation, diarrhea, heartburn, nausea and vomiting.   Genitourinary: Negative for dysuria, hematuria and urgency.   Musculoskeletal: Negative for back pain, joint pain and myalgias.   Skin: Negative for itching and rash.   Neurological: Negative for dizziness, tingling, seizures, weakness and headaches.   Endo/Heme/Allergies: Negative for polydipsia. Does not bruise/bleed easily.   Psychiatric/Behavioral: Negative for depression and memory loss. The patient is not nervous/anxious and does not have insomnia.         Objective:     Vitals:    05/09/22 1335   BP: 98/65   Pulse: 88   Resp: 18   Temp: 97.3 °F (36.3 °C)   TempSrc: Temporal   SpO2: 97%   Weight: 67.1 kg (147 lb 14.9 oz)   Height: 5' 4" (1.626 m)   PainSc: 0-No pain     Wt Readings from Last 10 Encounters:   05/09/22 67.1 kg (147 lb 14.9 oz)   04/04/22 67 kg (147 lb 11.3 oz)   04/04/22 67 kg (147 lb 11.3 oz)   03/07/22 66.3 kg (146 lb 2.6 oz)   02/07/22 65.8 kg (145 lb 1 oz)   02/07/22 65.8 kg (145 lb 1 oz)   01/10/22 64.9 kg (143 lb 1.3 oz)   01/10/22 64.9 kg (143 lb 1.3 oz)   12/13/21 63.8 kg (140 lb 10.5 oz)   12/13/21 63.8 kg (140 lb 10.5 oz)       Physical Examination:   Physical Exam  Vitals and nursing note reviewed.   Constitutional:       General: She is not in acute distress.     Appearance: She is not diaphoretic.   HENT:      Head: Normocephalic.      Mouth/Throat:      Pharynx: No oropharyngeal exudate.   Eyes:      General: No scleral icterus.     Conjunctiva/sclera: Conjunctivae normal.   Neck:      Thyroid: No thyromegaly.   Cardiovascular:      Rate and Rhythm: Normal rate and regular rhythm.      Heart sounds: Normal heart sounds. No murmur heard.  Pulmonary:      Effort: Pulmonary effort is normal. No respiratory distress.      Breath sounds: No stridor. No wheezing or rales.   Chest:      Chest wall: No tenderness.   Abdominal:      General: Bowel sounds are normal. There is no distension.      Palpations: Abdomen is " soft. There is no mass.      Tenderness: There is no abdominal tenderness. There is no rebound.   Musculoskeletal:         General: No tenderness or deformity. Normal range of motion.      Cervical back: Neck supple.   Lymphadenopathy:      Cervical: No cervical adenopathy.   Skin:     General: Skin is warm and dry.      Findings: No erythema or rash.   Neurological:      Mental Status: She is alert and oriented to person, place, and time.      Cranial Nerves: No cranial nerve deficit.      Coordination: Coordination normal.      Gait: Gait is intact.   Psychiatric:         Mood and Affect: Affect normal.         Cognition and Memory: Memory normal.         Judgment: Judgment normal.          Diagnostic Tests:  Significant Imaging: I have reviewed and interpreted all pertinent imaging results/findings.      Laboratory Data:  All pertinent labs have been reviewed.  Labs:   Lab Results   Component Value Date    WBC 5.14 05/02/2022    RBC 4.75 05/02/2022    HGB 14.7 05/02/2022    HCT 43.7 05/02/2022    MCV 92 05/02/2022     05/02/2022    GLU 95 05/02/2022     05/02/2022    K 4.3 05/02/2022    BUN 10 05/02/2022    CREATININE 0.7 05/02/2022    AST 16 05/02/2022    ALT 14 05/02/2022    BILITOT 0.4 05/02/2022       Assessment/Plan:   Malignant neoplasm of central portion of left breast in female, estrogen receptor positive  Stage IV (T3N2M2) invasive ductal carcinoma of left breast,  quadrant, ER 96%, MS 94%, Her2 neg, Grade 2, Ki67 30%  PIKC3A mutation positive         Continue current treatment as tolerated and until disease progression.  Tumor markers are relatively stable.  Recent scans show stable disease and bone metastasis.  Repeat in 3 months.  She is also due for a right breast ultrasound in September 2022.      Continue ribociclib 400 mg p.o. day 1-day 21 Q 28 days and anastrozole 1 mg p.o. Continue zoladex q 28 days.   -     CBC Auto Differential; Standing  -     CMP; Future; Expected date:  05/09/2022  -     Vitamin D; Future; Expected date: 05/09/2022  -     Cancer Antigen 15-3; Future; Expected date: 05/10/2022  -     CA 27.29; Future; Expected date: 05/09/2022  -     FOLLICLE STIMULATING HORMONE; Future; Expected date: 05/09/2022  -     LUTEINIZING HORMONE; Future; Expected date: 05/09/2022  -     ESTRADIOL; Future; Expected date: 05/09/2022    Bone metastases  Continue Xgeva Q 28 days.  She is mildly hypocalcemic today, has not started calcium and vitamin-D supplementation.  Instructed to start Caltrate b.i.d. at this time.    Malignant neoplasm metastatic to lung, unspecified laterality  Subsided meter pulmonary nodules are stable and benign appearing.  Lung nodules have never been biopsied.  Continue to monitor.    Use of anastrozole  As above.    Smoker  She is motivated to quit.  She is referred to smoking cessation program as well as prescribed Chantix today.      -     Ambulatory referral/consult to Smoking Cessation Program; Future; Expected date: 05/16/2022  -     varenicline (CHANTIX STARTING MONTH BOX) 0.5 mg (11)- 1 mg (42) tablet; Take one 0.5mg tab by mouth once daily X3 days,then increase to one 0.5mg tab twice daily X4 days,then increase to one 1mg tab twice daily  Dispense: 1 each; Refill: 0    Other orders  -     goserelin (ZOLADEX) injection 3.6 mg       ECOG SCORE             Discussion:   No follow-ups on file.    Plan was discussed with the patient at length, and she verbalized understanding. Angeles was given an opportunity to ask questions that were answered to her satisfaction, and she was advised to call in the interval if any problems or questions arise.    Electronically signed by Shelby Thomas MD      Route Chart for Scheduling    Med Onc Chart Routing      Follow up with physician 4 weeks.   Follow up with [facility]    Labs CA 27.29, CBC, CMP and vitamin D   Lab interval:  Fsh, lh, estradiol, ca 27-29 before next visit    Imaging    Pharmacy appointment    Other referrals             Supportive Plan Information  OP BREAST GOSERELIN Q4W   Shelby Thomas MD   Upcoming Treatment Dates - OP BREAST GOSERELIN Q4W    5/10/2022       Chemotherapy       goserelin (ZOLADEX) injection 3.6 mg  6/8/2022       Chemotherapy       goserelin (ZOLADEX) injection 3.6 mg  7/7/2022       Chemotherapy       goserelin (ZOLADEX) injection 3.6 mg    Therapy Plan Information  PORT FLUSH  Flushes  heparin, porcine (PF) 100 unit/mL injection flush 500 Units  500 Units, Intravenous, Every visit  sodium chloride 0.9% flush 10 mL  10 mL, Intravenous, Every visit    DENOSUMAB (XGEVA) Q4W  Medications  denosumab (XGEVA) solution 120 mg  120 mg, Subcutaneous, Every 4 weeks

## 2022-05-12 ENCOUNTER — PATIENT MESSAGE (OUTPATIENT)
Dept: PHARMACY | Facility: CLINIC | Age: 52
End: 2022-05-12
Payer: MEDICAID

## 2022-05-12 ENCOUNTER — SPECIALTY PHARMACY (OUTPATIENT)
Dept: PHARMACY | Facility: CLINIC | Age: 52
End: 2022-05-12
Payer: MEDICAID

## 2022-05-12 DIAGNOSIS — Z17.0 MALIGNANT NEOPLASM OF CENTRAL PORTION OF LEFT BREAST IN FEMALE, ESTROGEN RECEPTOR POSITIVE: Primary | ICD-10-CM

## 2022-05-12 DIAGNOSIS — C50.112 MALIGNANT NEOPLASM OF CENTRAL PORTION OF LEFT BREAST IN FEMALE, ESTROGEN RECEPTOR POSITIVE: Primary | ICD-10-CM

## 2022-05-12 NOTE — TELEPHONE ENCOUNTER
Specialty Pharmacy - Refill Coordination    Specialty Medication Orders Linked to Encounter    Flowsheet Row Most Recent Value   Medication #1 ribociclib (KISQALI) 400 mg/day (200 mg x 2) Tab (Order#[id number], Rx#[id number])          Refill Questions - Documented Responses    Flowsheet Row Most Recent Value   Patient Availability and HIPAA Verification    Does patient want to proceed with activity? Yes   HIPAA/medical authority confirmed? Yes   Relationship to patient of person spoken to? Self   Refill Screening Questions    Changes to allergies? No   Changes to medications? No   New conditions since last clinic visit? No   Unplanned office visit, urgent care, ED, or hospital admission in the last 4 weeks? No   How does patient/caregiver feel medication is working? Very good   Financial problems or insurance changes? No   How many doses of your specialty medications were missed in the last 4 weeks? 0   Would patient like to speak to a pharmacist? No   When does the patient need to receive the medication? 05/19/22   Refill Delivery Questions    How will the patient receive the medication? Delivery Chuyita   When does the patient need to receive the medication? 05/19/22   Shipping Address Home   Address in [facility] confirmed and updated if neccessary? Yes   Expected Copay ($) 0   Is the patient able to afford the medication copay? Yes   Payment Method zero copay   Days supply of Refill 28   Supplies needed? No supplies needed   Refill activity completed? Yes   Refill activity plan Refill scheduled   Shipment/Pickup Date: 05/17/22          Current Outpatient Medications   Medication Sig    anastrozole (ARIMIDEX) 1 mg Tab Take 1 tablet (1 mg total) by mouth once daily.    ribociclib (KISQALI) 400 mg/day (200 mg x 2) Tab Take 2 tablets (400mg) day 1 to day 21 every 28 days    varenicline (CHANTIX STARTING MONTH BOX) 0.5 mg (11)- 1 mg (42) tablet Take one 0.5mg tab by mouth once daily X3 days,then increase to  one 0.5mg tab twice daily X4 days,then increase to one 1mg tab twice daily   Last reviewed on 5/12/2022  2:24 PM by Wendy Lopez, Karina    Review of patient's allergies indicates:  No Known Allergies Last reviewed on  5/12/2022 2:24 PM by Wendy Lopez      Tasks added this encounter   No tasks added.   Tasks due within next 3 months   5/12/2022 - Refill Call (Auto Added)     Wendy Lopez, PharmD  [facility]  [address] [place] [id number]  Phone: [phone]  Fax: [phone]

## 2022-05-30 ENCOUNTER — LAB VISIT (OUTPATIENT)
Dept: LAB | Facility: HOSPITAL | Age: 52
End: 2022-05-30
Attending: INTERNAL MEDICINE
Payer: MEDICAID

## 2022-05-30 DIAGNOSIS — C50.112 MALIGNANT NEOPLASM OF CENTRAL PORTION OF LEFT BREAST IN FEMALE, ESTROGEN RECEPTOR POSITIVE: ICD-10-CM

## 2022-05-30 DIAGNOSIS — Z17.0 MALIGNANT NEOPLASM OF CENTRAL PORTION OF LEFT BREAST IN FEMALE, ESTROGEN RECEPTOR POSITIVE: ICD-10-CM

## 2022-05-30 LAB
25(OH)D3+25(OH)D2 SERPL-MCNC: 22 NG/ML (ref 30–96)
ALBUMIN SERPL BCP-MCNC: 3.9 G/DL (ref 3.5–5.2)
ALP SERPL-CCNC: 72 U/L (ref 55–135)
ALT SERPL W/O P-5'-P-CCNC: 17 U/L (ref 10–44)
ANION GAP SERPL CALC-SCNC: 9 MMOL/L (ref 8–16)
AST SERPL-CCNC: 16 U/L (ref 10–40)
BASOPHILS # BLD AUTO: 0.07 K/UL (ref 0–0.2)
BASOPHILS NFR BLD: 1.7 % (ref 0–1.9)
BILIRUB SERPL-MCNC: 0.4 MG/DL (ref 0.1–1)
BUN SERPL-MCNC: 10 MG/DL (ref 6–20)
CALCIUM SERPL-MCNC: 9.1 MG/DL (ref 8.7–10.5)
CHLORIDE SERPL-SCNC: 108 MMOL/L (ref 95–110)
CO2 SERPL-SCNC: 23 MMOL/L (ref 23–29)
CREAT SERPL-MCNC: 0.8 MG/DL (ref 0.5–1.4)
DIFFERENTIAL METHOD: NORMAL
EOSINOPHIL # BLD AUTO: 0.1 K/UL (ref 0–0.5)
EOSINOPHIL NFR BLD: 2.6 % (ref 0–8)
ERYTHROCYTE [DISTWIDTH] IN BLOOD BY AUTOMATED COUNT: 13.9 % (ref 11.5–14.5)
EST. GFR  (AFRICAN AMERICAN): >60 ML/MIN/1.73 M^2
EST. GFR  (NON AFRICAN AMERICAN): >60 ML/MIN/1.73 M^2
ESTRADIOL SERPL-MCNC: <10 PG/ML
FSH SERPL-ACNC: 14.78 MIU/ML
GLUCOSE SERPL-MCNC: 76 MG/DL (ref 70–110)
HCT VFR BLD AUTO: 44.7 % (ref 37–48.5)
HGB BLD-MCNC: 14.9 G/DL (ref 12–16)
IMM GRANULOCYTES # BLD AUTO: 0.01 K/UL (ref 0–0.04)
IMM GRANULOCYTES NFR BLD AUTO: 0.2 % (ref 0–0.5)
LH SERPL-ACNC: <0.2 MIU/ML
LYMPHOCYTES # BLD AUTO: 1.9 K/UL (ref 1–4.8)
LYMPHOCYTES NFR BLD: 45.6 % (ref 18–48)
MCH RBC QN AUTO: 30.3 PG (ref 27–31)
MCHC RBC AUTO-ENTMCNC: 33.3 G/DL (ref 32–36)
MCV RBC AUTO: 91 FL (ref 82–98)
MONOCYTES # BLD AUTO: 0.3 K/UL (ref 0.3–1)
MONOCYTES NFR BLD: 6.6 % (ref 4–15)
NEUTROPHILS # BLD AUTO: 1.8 K/UL (ref 1.8–7.7)
NEUTROPHILS NFR BLD: 43.3 % (ref 38–73)
NRBC BLD-RTO: 0 /100 WBC
PLATELET # BLD AUTO: 226 K/UL (ref 150–450)
PLATELET BLD QL SMEAR: NORMAL
PMV BLD AUTO: 9.3 FL (ref 9.2–12.9)
POTASSIUM SERPL-SCNC: 4.2 MMOL/L (ref 3.5–5.1)
PROT SERPL-MCNC: 6.8 G/DL (ref 6–8.4)
RBC # BLD AUTO: 4.91 M/UL (ref 4–5.4)
SODIUM SERPL-SCNC: 140 MMOL/L (ref 136–145)
WBC # BLD AUTO: 4.23 K/UL (ref 3.9–12.7)

## 2022-05-30 PROCEDURE — 82670 ASSAY OF TOTAL ESTRADIOL: CPT | Performed by: INTERNAL MEDICINE

## 2022-05-30 PROCEDURE — 80053 COMPREHEN METABOLIC PANEL: CPT | Mod: PN | Performed by: INTERNAL MEDICINE

## 2022-05-30 PROCEDURE — 83001 ASSAY OF GONADOTROPIN (FSH): CPT | Performed by: INTERNAL MEDICINE

## 2022-05-30 PROCEDURE — 85025 COMPLETE CBC W/AUTO DIFF WBC: CPT | Mod: PN | Performed by: INTERNAL MEDICINE

## 2022-05-30 PROCEDURE — 82306 VITAMIN D 25 HYDROXY: CPT | Performed by: INTERNAL MEDICINE

## 2022-05-30 PROCEDURE — 86300 IMMUNOASSAY TUMOR CA 15-3: CPT | Performed by: INTERNAL MEDICINE

## 2022-05-30 PROCEDURE — 86300 IMMUNOASSAY TUMOR CA 15-3: CPT | Mod: 91 | Performed by: INTERNAL MEDICINE

## 2022-05-30 PROCEDURE — 83002 ASSAY OF GONADOTROPIN (LH): CPT | Performed by: INTERNAL MEDICINE

## 2022-06-01 LAB
CANCER AG15-3 SERPL IA-ACNC: 63 U/ML
CANCER AG27-29 SERPL-ACNC: 89.3 U/ML

## 2022-06-06 ENCOUNTER — TELEPHONE (OUTPATIENT)
Dept: INFUSION THERAPY | Facility: HOSPITAL | Age: 52
End: 2022-06-06
Payer: MEDICAID

## 2022-06-06 ENCOUNTER — PATIENT MESSAGE (OUTPATIENT)
Dept: HEMATOLOGY/ONCOLOGY | Facility: CLINIC | Age: 52
End: 2022-06-06
Payer: MEDICAID

## 2022-06-06 ENCOUNTER — TELEPHONE (OUTPATIENT)
Dept: HEMATOLOGY/ONCOLOGY | Facility: CLINIC | Age: 52
End: 2022-06-06
Payer: MEDICAID

## 2022-06-06 NOTE — TELEPHONE ENCOUNTER
Due to pt testing positive for COVID , apt has been R/S for  6/13/2022@11:30 . Patient verbalized understanding .

## 2022-06-13 ENCOUNTER — PATIENT MESSAGE (OUTPATIENT)
Dept: PHARMACY | Facility: CLINIC | Age: 52
End: 2022-06-13
Payer: MEDICAID

## 2022-06-13 ENCOUNTER — TELEPHONE (OUTPATIENT)
Dept: HEMATOLOGY/ONCOLOGY | Facility: CLINIC | Age: 52
End: 2022-06-13
Payer: MEDICAID

## 2022-06-16 ENCOUNTER — SPECIALTY PHARMACY (OUTPATIENT)
Dept: PHARMACY | Facility: CLINIC | Age: 52
End: 2022-06-16
Payer: MEDICAID

## 2022-06-16 ENCOUNTER — TELEPHONE (OUTPATIENT)
Dept: HEMATOLOGY/ONCOLOGY | Facility: CLINIC | Age: 52
End: 2022-06-16
Payer: MEDICAID

## 2022-06-16 NOTE — TELEPHONE ENCOUNTER
Specialty Pharmacy - Refill Coordination    Specialty Medication Orders Linked to Encounter    Flowsheet Row Most Recent Value   Medication #1 ribociclib (KISQALI) 400 mg/day (200 mg x 2) Tab (Order#[id number], Rx#[id number])        Refill Questions - Documented Responses    Flowsheet Row Most Recent Value   Patient Availability and HIPAA Verification    Does patient want to proceed with activity? Unable to Reach        We have had multiple attempts to the patient and have been unsuccessful to reach the patient. We will stop reaching out to the patient but in the event that the patient needs the med and contacts us, we will communicate and begin dispensing for the patient. At your next visit with the patient, please review the importance of being in contact with our specialty pharmacy as a part of our care team.      Ruthie Crespo, PharmD  [facility]  [address] [facility] [address]  Phone: [phone]  Fax: [phone]

## 2022-06-28 ENCOUNTER — PATIENT MESSAGE (OUTPATIENT)
Dept: HEMATOLOGY/ONCOLOGY | Facility: CLINIC | Age: 52
End: 2022-06-28
Payer: MEDICAID

## 2022-07-12 ENCOUNTER — TELEPHONE (OUTPATIENT)
Dept: HEMATOLOGY/ONCOLOGY | Facility: CLINIC | Age: 52
End: 2022-07-12
Payer: MEDICAID

## 2022-07-12 NOTE — TELEPHONE ENCOUNTER
Left message with Aleyda at [facility] for Ms Wright to call the clinic to get her appt with Dr Thomas on Monday scheduled with a different provider bc Dr. Thomas will be out until 7/28.

## 2022-07-18 ENCOUNTER — INFUSION (OUTPATIENT)
Dept: INFUSION THERAPY | Facility: HOSPITAL | Age: 52
End: 2022-07-18
Attending: INTERNAL MEDICINE
Payer: MEDICAID

## 2022-07-18 ENCOUNTER — OFFICE VISIT (OUTPATIENT)
Dept: HEMATOLOGY/ONCOLOGY | Facility: CLINIC | Age: 52
End: 2022-07-18
Payer: MEDICAID

## 2022-07-18 VITALS
DIASTOLIC BLOOD PRESSURE: 69 MMHG | HEART RATE: 72 BPM | OXYGEN SATURATION: 96 % | SYSTOLIC BLOOD PRESSURE: 105 MMHG | RESPIRATION RATE: 16 BRPM | WEIGHT: 145.5 LBS | TEMPERATURE: 98 F | BODY MASS INDEX: 24.98 KG/M2

## 2022-07-18 VITALS
OXYGEN SATURATION: 96 % | WEIGHT: 145.5 LBS | BODY MASS INDEX: 24.84 KG/M2 | HEIGHT: 64 IN | DIASTOLIC BLOOD PRESSURE: 69 MMHG | HEART RATE: 72 BPM | RESPIRATION RATE: 16 BRPM | SYSTOLIC BLOOD PRESSURE: 105 MMHG | TEMPERATURE: 98 F

## 2022-07-18 DIAGNOSIS — C79.51 BONE METASTASES: ICD-10-CM

## 2022-07-18 DIAGNOSIS — Z79.811 USE OF ANASTROZOLE: ICD-10-CM

## 2022-07-18 DIAGNOSIS — Z17.0 MALIGNANT NEOPLASM OF CENTRAL PORTION OF LEFT BREAST IN FEMALE, ESTROGEN RECEPTOR POSITIVE: Primary | ICD-10-CM

## 2022-07-18 DIAGNOSIS — C50.112 MALIGNANT NEOPLASM OF CENTRAL PORTION OF LEFT BREAST IN FEMALE, ESTROGEN RECEPTOR POSITIVE: Primary | ICD-10-CM

## 2022-07-18 PROCEDURE — 3074F SYST BP LT 130 MM HG: CPT | Mod: CPTII,,, | Performed by: STUDENT IN AN ORGANIZED HEALTH CARE EDUCATION/TRAINING PROGRAM

## 2022-07-18 PROCEDURE — 96402 CHEMO HORMON ANTINEOPL SQ/IM: CPT | Mod: PN

## 2022-07-18 PROCEDURE — 63600175 PHARM REV CODE 636 W HCPCS: Mod: JG,PN | Performed by: INTERNAL MEDICINE

## 2022-07-18 PROCEDURE — 1159F MED LIST DOCD IN RCRD: CPT | Mod: CPTII,,, | Performed by: STUDENT IN AN ORGANIZED HEALTH CARE EDUCATION/TRAINING PROGRAM

## 2022-07-18 PROCEDURE — 3078F DIAST BP <80 MM HG: CPT | Mod: CPTII,,, | Performed by: STUDENT IN AN ORGANIZED HEALTH CARE EDUCATION/TRAINING PROGRAM

## 2022-07-18 PROCEDURE — 63600175 PHARM REV CODE 636 W HCPCS: Mod: JG,PN | Performed by: STUDENT IN AN ORGANIZED HEALTH CARE EDUCATION/TRAINING PROGRAM

## 2022-07-18 PROCEDURE — 3008F BODY MASS INDEX DOCD: CPT | Mod: CPTII,,, | Performed by: STUDENT IN AN ORGANIZED HEALTH CARE EDUCATION/TRAINING PROGRAM

## 2022-07-18 PROCEDURE — 1159F PR MEDICATION LIST DOCUMENTED IN MEDICAL RECORD: ICD-10-PCS | Mod: CPTII,,, | Performed by: STUDENT IN AN ORGANIZED HEALTH CARE EDUCATION/TRAINING PROGRAM

## 2022-07-18 PROCEDURE — 99215 OFFICE O/P EST HI 40 MIN: CPT | Mod: S$PBB,,, | Performed by: STUDENT IN AN ORGANIZED HEALTH CARE EDUCATION/TRAINING PROGRAM

## 2022-07-18 PROCEDURE — 3074F PR MOST RECENT SYSTOLIC BLOOD PRESSURE < 130 MM HG: ICD-10-PCS | Mod: CPTII,,, | Performed by: STUDENT IN AN ORGANIZED HEALTH CARE EDUCATION/TRAINING PROGRAM

## 2022-07-18 PROCEDURE — 96372 THER/PROPH/DIAG INJ SC/IM: CPT | Mod: PN

## 2022-07-18 PROCEDURE — 99999 PR PBB SHADOW E&M-EST. PATIENT-LVL III: CPT | Mod: PBBFAC,,, | Performed by: STUDENT IN AN ORGANIZED HEALTH CARE EDUCATION/TRAINING PROGRAM

## 2022-07-18 PROCEDURE — 3008F PR BODY MASS INDEX (BMI) DOCUMENTED: ICD-10-PCS | Mod: CPTII,,, | Performed by: STUDENT IN AN ORGANIZED HEALTH CARE EDUCATION/TRAINING PROGRAM

## 2022-07-18 PROCEDURE — 3078F PR MOST RECENT DIASTOLIC BLOOD PRESSURE < 80 MM HG: ICD-10-PCS | Mod: CPTII,,, | Performed by: STUDENT IN AN ORGANIZED HEALTH CARE EDUCATION/TRAINING PROGRAM

## 2022-07-18 PROCEDURE — 99213 OFFICE O/P EST LOW 20 MIN: CPT | Mod: PBBFAC,PN | Performed by: STUDENT IN AN ORGANIZED HEALTH CARE EDUCATION/TRAINING PROGRAM

## 2022-07-18 PROCEDURE — 99215 PR OFFICE/OUTPT VISIT, EST, LEVL V, 40-54 MIN: ICD-10-PCS | Mod: S$PBB,,, | Performed by: STUDENT IN AN ORGANIZED HEALTH CARE EDUCATION/TRAINING PROGRAM

## 2022-07-18 PROCEDURE — 99999 PR PBB SHADOW E&M-EST. PATIENT-LVL III: ICD-10-PCS | Mod: PBBFAC,,, | Performed by: STUDENT IN AN ORGANIZED HEALTH CARE EDUCATION/TRAINING PROGRAM

## 2022-07-18 RX ORDER — SODIUM CHLORIDE 0.9 % (FLUSH) 0.9 %
10 SYRINGE (ML) INJECTION
Status: DISCONTINUED | OUTPATIENT
Start: 2022-07-18 | End: 2022-07-18 | Stop reason: HOSPADM

## 2022-07-18 RX ORDER — HEPARIN 100 UNIT/ML
500 SYRINGE INTRAVENOUS
Status: CANCELLED | OUTPATIENT
Start: 2022-07-18

## 2022-07-18 RX ORDER — SODIUM CHLORIDE 0.9 % (FLUSH) 0.9 %
10 SYRINGE (ML) INJECTION
Status: CANCELLED | OUTPATIENT
Start: 2022-07-18

## 2022-07-18 RX ORDER — HEPARIN 100 UNIT/ML
500 SYRINGE INTRAVENOUS
Status: DISCONTINUED | OUTPATIENT
Start: 2022-07-18 | End: 2022-07-18 | Stop reason: HOSPADM

## 2022-07-18 RX ADMIN — GOSERELIN ACETATE 3.6 MG: 3.6 IMPLANT SUBCUTANEOUS at 04:07

## 2022-07-18 RX ADMIN — DENOSUMAB 120 MG: 120 INJECTION SUBCUTANEOUS at 04:07

## 2022-07-18 NOTE — PROGRESS NOTES
PROGRESS NOTE    Subjective:       Patient ID: Angeles Wright is a 52 y.o. female.  MRN: [id number]  : 1970    Chief Complaint: Malignant neoplasm of central portion of left breast in fem      History of Present Illness:   Angeles Wright is a 52 y.o. female who presents with metastatic invasive ductal left breast cancer.       As previously documented by Dr. Mcgovern  she transferred care to [facility] in 2019. Labs showed her to be pre-menopausal thus she was treated with Ribociclib /Zoladex and Arimidex but she was off therapy from November to May 2020. In 2020 scans showed improvement thus she restarted Arimidex/ribociclib/zoladex.     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      Interim history:  The patient comes for review of blood work and resume her xgeva and gorereline injections,. Feels well and has been tolerating PO chemo well. Continued dose reduced ribociclib at the 400 mg p.o. day 1-day 21 Q 28 days since 2020 and anastrozole 1 mg p.o. daily. She is complaint with Ribociclib. She is due for Zoladex and xgeva injection today. Went for  5k run recently, cont to work, has mild hot flashes,tolerable        Denies any nausea, vomiting, diarrhea, constipation, abdominal pain, weight loss or loss of appetite, chest pain, shortness of breath, leg swelling, fatigue, pain, headache, dizziness, or mood changes.ECOG PS is zero.     She reports a stable weight, appetite and performance status.    She is smoking half a pack a day.     Oncology History:  Oncology History   Malignant neoplasm of central portion of left breast in female, estrogen receptor positive   9/3/2019 Initial Diagnosis    Malignant neoplasm of central portion of left breast in female, estrogen receptor positive     9/3/2019 - 9/3/2019 Chemotherapy    Treatment Summary   Plan Name: OP ANASTROZOLE PALBOCICLIB Q4W  Treatment Goal: Palliative  Status: Inactive  Start Date:  9/3/2019  End Date: 9/3/2019  Provider: Melania Mcgovern MD  Chemotherapy: [No matching medication found in this treatment plan]     9/18/2019 Cancer Staged    Staging form: Breast, AJCC 8th Edition  - Clinical: Stage IV (cT4b, cM1, ER+, NC+, HER2-)       5/4/22  Impression:     No significant interval changes.  Known malignancy in the left breast with metastatic disease to bone.  Additional stable bilateral breast nodules which have been evaluated previously with mammography and ultrasound.  Stable small pleural based lung nodules in the right hemithorax.       History:  Past Medical History:   Diagnosis Date    Breast cancer       Past Surgical History:   Procedure Laterality Date    BREAST BIOPSY Right     BREAST LUMPECTOMY Left     TUBAL LIGATION       Family History   Problem Relation Age of Onset    Lung cancer Mother     Heart attack Father     Suicide Brother     No Known Problems Maternal Aunt     No Known Problems Maternal Uncle     No Known Problems Paternal Aunt     No Known Problems Paternal Uncle     No Known Problems Maternal Grandmother     No Known Problems Maternal Grandfather     No Known Problems Paternal Grandmother     No Known Problems Paternal Grandfather     No Known Problems Son     No Known Problems Son     No Known Problems Daughter     Breast cancer Cousin     Breast cancer Cousin      Social History     Tobacco Use    Smoking status: Current Every Day Smoker     Packs/day: 0.50     Years: 25.00     Pack years: 12.50     Start date: 9/3/1994    Smokeless tobacco: Never Used   Substance and Sexual Activity    Alcohol use: Yes     Comment: occasionally    Drug use: Not Currently    Sexual activity: Yes     Partners: Male     Birth control/protection: None        ROS:   Review of Systems   Constitutional: Negative for fever, malaise/fatigue and weight loss.   HENT: Negative for congestion, hearing loss, nosebleeds and sore throat.    Eyes: Negative for double vision  "and photophobia.   Respiratory: Negative for cough, hemoptysis, sputum production, shortness of breath and wheezing.    Cardiovascular: Negative for chest pain, palpitations, orthopnea and leg swelling.   Gastrointestinal: Negative for abdominal pain, blood in stool, constipation, diarrhea, heartburn, nausea and vomiting.   Genitourinary: Negative for dysuria, hematuria and urgency.   Musculoskeletal: Negative for back pain, joint pain and myalgias.   Skin: Negative for itching and rash.   Neurological: Negative for dizziness, tingling, seizures, weakness and headaches.   Endo/Heme/Allergies: Negative for polydipsia. Does not bruise/bleed easily.   Psychiatric/Behavioral: Negative for depression and memory loss. The patient is not nervous/anxious and does not have insomnia.         Objective:     Vitals:    07/18/22 1510   BP: 105/69   Pulse: 72   Resp: 16   Temp: 97.5 °F (36.4 °C)   TempSrc: Temporal   SpO2: 96%   Weight: 66 kg (145 lb 8.1 oz)   Height: 5' 4" (1.626 m)   PainSc: 0-No pain     Wt Readings from Last 10 Encounters:   07/18/22 66 kg (145 lb 8.1 oz)   05/09/22 67.1 kg (147 lb 14.9 oz)   04/04/22 67 kg (147 lb 11.3 oz)   04/04/22 67 kg (147 lb 11.3 oz)   03/07/22 66.3 kg (146 lb 2.6 oz)   02/07/22 65.8 kg (145 lb 1 oz)   02/07/22 65.8 kg (145 lb 1 oz)   01/10/22 64.9 kg (143 lb 1.3 oz)   01/10/22 64.9 kg (143 lb 1.3 oz)   12/13/21 63.8 kg (140 lb 10.5 oz)       Physical Examination:   Physical Exam  Vitals and nursing note reviewed.   Constitutional:       General: She is not in acute distress.     Appearance: She is not diaphoretic.   HENT:      Head: Normocephalic.      Mouth/Throat:      Pharynx: No oropharyngeal exudate.   Eyes:      General: No scleral icterus.     Conjunctiva/sclera: Conjunctivae normal.   Neck:      Thyroid: No thyromegaly.   Cardiovascular:      Rate and Rhythm: Normal rate and regular rhythm.      Heart sounds: Normal heart sounds. No murmur heard.  Pulmonary:      Effort: " Pulmonary effort is normal. No respiratory distress.      Breath sounds: No stridor. No wheezing or rales.   Chest:      Chest wall: No tenderness.      Comments: Significant reduction on the mass of her left breast, no lymphadenopathy appreciated bilateral  Abdominal:      General: Bowel sounds are normal. There is no distension.      Palpations: Abdomen is soft. There is no mass.      Tenderness: There is no abdominal tenderness. There is no rebound.   Musculoskeletal:         General: No tenderness or deformity. Normal range of motion.      Cervical back: Neck supple.   Lymphadenopathy:      Cervical: No cervical adenopathy.   Skin:     General: Skin is warm and dry.      Findings: No erythema or rash.   Neurological:      Mental Status: She is alert and oriented to person, place, and time.      Cranial Nerves: No cranial nerve deficit.      Coordination: Coordination normal.      Gait: Gait is intact.   Psychiatric:         Mood and Affect: Affect normal.         Cognition and Memory: Memory normal.         Judgment: Judgment normal.          Diagnostic Tests:  Significant Imaging: I have reviewed and interpreted all pertinent imaging results/findings.    Laboratory Data:  All pertinent labs have been reviewed.  Labs:   Lab Results   Component Value Date    WBC 7.12 07/18/2022    RBC 4.46 07/18/2022    HGB 13.7 07/18/2022    HCT 40.4 07/18/2022    MCV 91 07/18/2022     07/18/2022    GLU 76 05/30/2022     05/30/2022    K 4.2 05/30/2022    BUN 10 05/30/2022    CREATININE 0.8 05/30/2022    AST 16 05/30/2022    ALT 17 05/30/2022    BILITOT 0.4 05/30/2022       Assessment/Plan:   Malignant neoplasm of central portion of left breast in female, estrogen receptor positive  Stage IV (T3N2M2) invasive ductal carcinoma of left breast,  quadrant, ER 96%, KS 94%, Her2 neg, Grade 2, Ki67 30%  PIKC3A mutation positive      Continue current treatment as tolerated and until disease progression.  Tumor markers are  relatively stable.  Recent scans show stable disease and bone metastasis.  Repeat in 3 months.  She is also due for a right breast ultrasound in September 2022.      Continue ribociclib 400 mg p.o. day 1-day 21 Q 28 days and anastrozole 1 mg p.o. Continue zoladex q 28 days.     Cont monitoring blood work CBC/CMP/ and CA27.29     Bone metastases  Continue Xgeva Q 28 days.   calcium and vitamin-D supplementation.  Instructed to start Caltrate b.i.d. with monitoring blood work.    Malignant neoplasm metastatic to lung, unspecified laterality  Subsided meter pulmonary nodules are stable and benign appearing.  Lung nodules have never been biopsied.  Continue to monitor.    Use of anastrozole    Smoker  She is motivated to quit.  She is referred to smoking cessation program as well as prescribed Chantix        ECOG SCORE    1 - Restricted in strenuous activity-ambulatory and able to carry out work of a light nature         Discussion:   No follow-ups on file.    Plan was discussed with the patient at length, and she verbalized understanding. Angeles was given an opportunity to ask questions that were answered to her satisfaction, and she was advised to call in the interval if any problems or questions arise.    Electronically signed by Elizabeth Ritchie MD      Route Chart for Scheduling    Med Onc Chart Routing      Follow up with physician 4 weeks. With MD, proceed wtih xgeva and gosereline injections today   Follow up with PRICILLA    Infusion scheduling note    Injection scheduling note    Labs CBC, CMP and other   Lab interval:  CBC/CMP every 4 weeks    Imaging CT chest abdomen pelvis   Prior to next pricilla    Pharmacy appointment    Other referrals            Supportive Plan Information  OP BREAST GOSERELIN Q4W   Shelby Thomas MD   Upcoming Treatment Dates - OP BREAST GOSERELIN Q4W    8/16/2022       Chemotherapy       goserelin (ZOLADEX) injection 3.6 mg    Therapy Plan Information  PORT FLUSH  Flushes  heparin,  porcine (PF) 100 unit/mL injection flush 500 Units  500 Units, Intravenous, Every visit  sodium chloride 0.9% flush 10 mL  10 mL, Intravenous, Every visit    DENOSUMAB (XGEVA) Q4W  Medications  denosumab (XGEVA) solution 120 mg  120 mg, Subcutaneous, Every 4 weeks

## 2022-08-10 ENCOUNTER — PATIENT MESSAGE (OUTPATIENT)
Dept: HEMATOLOGY/ONCOLOGY | Facility: CLINIC | Age: 52
End: 2022-08-10
Payer: MEDICAID

## 2022-08-11 ENCOUNTER — HOSPITAL ENCOUNTER (OUTPATIENT)
Dept: RADIOLOGY | Facility: HOSPITAL | Age: 52
Discharge: HOME | End: 2022-08-11
Attending: INTERNAL MEDICINE
Payer: MEDICAID

## 2022-08-11 DIAGNOSIS — Z17.0 MALIGNANT NEOPLASM OF CENTRAL PORTION OF LEFT BREAST IN FEMALE, ESTROGEN RECEPTOR POSITIVE: ICD-10-CM

## 2022-08-11 DIAGNOSIS — C50.112 MALIGNANT NEOPLASM OF CENTRAL PORTION OF LEFT BREAST IN FEMALE, ESTROGEN RECEPTOR POSITIVE: ICD-10-CM

## 2022-08-11 PROCEDURE — A9698 NON-RAD CONTRAST MATERIALNOC: HCPCS | Mod: PO | Performed by: INTERNAL MEDICINE

## 2022-08-11 PROCEDURE — 74177 CT ABD & PELVIS W/CONTRAST: CPT | Mod: TC,PO

## 2022-08-11 PROCEDURE — 74177 CT ABD & PELVIS W/CONTRAST: CPT | Mod: 26,,, | Performed by: RADIOLOGY

## 2022-08-11 PROCEDURE — 25500020 PHARM REV CODE 255: Mod: PO | Performed by: INTERNAL MEDICINE

## 2022-08-11 PROCEDURE — 71260 CT CHEST ABDOMEN PELVIS WITH CONTRAST (XPD): ICD-10-PCS | Mod: 26,,, | Performed by: RADIOLOGY

## 2022-08-11 PROCEDURE — 71260 CT THORAX DX C+: CPT | Mod: TC,PO

## 2022-08-11 PROCEDURE — 71260 CT THORAX DX C+: CPT | Mod: 26,,, | Performed by: RADIOLOGY

## 2022-08-11 PROCEDURE — 74177 CT CHEST ABDOMEN PELVIS WITH CONTRAST (XPD): ICD-10-PCS | Mod: 26,,, | Performed by: RADIOLOGY

## 2022-08-11 RX ADMIN — IOHEXOL 1000 ML: 9 SOLUTION ORAL at 08:08

## 2022-08-11 RX ADMIN — IOHEXOL 75 ML: 350 INJECTION, SOLUTION INTRAVENOUS at 08:08

## 2022-08-15 ENCOUNTER — OFFICE VISIT (OUTPATIENT)
Dept: HEMATOLOGY/ONCOLOGY | Facility: CLINIC | Age: 52
End: 2022-08-15
Payer: MEDICAID

## 2022-08-15 ENCOUNTER — INFUSION (OUTPATIENT)
Dept: INFUSION THERAPY | Facility: HOSPITAL | Age: 52
End: 2022-08-15
Attending: INTERNAL MEDICINE
Payer: MEDICAID

## 2022-08-15 ENCOUNTER — SPECIALTY PHARMACY (OUTPATIENT)
Dept: PHARMACY | Facility: CLINIC | Age: 52
End: 2022-08-15
Payer: MEDICAID

## 2022-08-15 VITALS
HEART RATE: 72 BPM | RESPIRATION RATE: 18 BRPM | BODY MASS INDEX: 24.71 KG/M2 | TEMPERATURE: 98 F | SYSTOLIC BLOOD PRESSURE: 102 MMHG | SYSTOLIC BLOOD PRESSURE: 102 MMHG | RESPIRATION RATE: 18 BRPM | TEMPERATURE: 98 F | OXYGEN SATURATION: 98 % | HEIGHT: 64 IN | DIASTOLIC BLOOD PRESSURE: 61 MMHG | DIASTOLIC BLOOD PRESSURE: 61 MMHG | HEART RATE: 72 BPM | WEIGHT: 144.75 LBS

## 2022-08-15 DIAGNOSIS — C79.51 BONE METASTASES: ICD-10-CM

## 2022-08-15 DIAGNOSIS — C50.112 MALIGNANT NEOPLASM OF CENTRAL PORTION OF LEFT BREAST IN FEMALE, ESTROGEN RECEPTOR POSITIVE: Primary | ICD-10-CM

## 2022-08-15 DIAGNOSIS — Z17.0 MALIGNANT NEOPLASM OF CENTRAL PORTION OF LEFT BREAST IN FEMALE, ESTROGEN RECEPTOR POSITIVE: Primary | ICD-10-CM

## 2022-08-15 DIAGNOSIS — Z79.811 USE OF ANASTROZOLE: ICD-10-CM

## 2022-08-15 DIAGNOSIS — C78.00 MALIGNANT NEOPLASM METASTATIC TO LUNG, UNSPECIFIED LATERALITY: ICD-10-CM

## 2022-08-15 DIAGNOSIS — F17.200 SMOKER: ICD-10-CM

## 2022-08-15 DIAGNOSIS — T45.1X5A CHEMOTHERAPY-INDUCED NEUTROPENIA: ICD-10-CM

## 2022-08-15 DIAGNOSIS — D70.1 CHEMOTHERAPY-INDUCED NEUTROPENIA: ICD-10-CM

## 2022-08-15 PROCEDURE — 99999 PR PBB SHADOW E&M-EST. PATIENT-LVL IV: CPT | Mod: PBBFAC,,, | Performed by: INTERNAL MEDICINE

## 2022-08-15 PROCEDURE — A4216 STERILE WATER/SALINE, 10 ML: HCPCS | Mod: PN | Performed by: INTERNAL MEDICINE

## 2022-08-15 PROCEDURE — 99214 OFFICE O/P EST MOD 30 MIN: CPT | Mod: PBBFAC,PN | Performed by: INTERNAL MEDICINE

## 2022-08-15 PROCEDURE — 96372 THER/PROPH/DIAG INJ SC/IM: CPT | Mod: PN,59

## 2022-08-15 PROCEDURE — 3078F PR MOST RECENT DIASTOLIC BLOOD PRESSURE < 80 MM HG: ICD-10-PCS | Mod: CPTII,,, | Performed by: INTERNAL MEDICINE

## 2022-08-15 PROCEDURE — 3074F SYST BP LT 130 MM HG: CPT | Mod: CPTII,,, | Performed by: INTERNAL MEDICINE

## 2022-08-15 PROCEDURE — 3008F PR BODY MASS INDEX (BMI) DOCUMENTED: ICD-10-PCS | Mod: CPTII,,, | Performed by: INTERNAL MEDICINE

## 2022-08-15 PROCEDURE — 3078F DIAST BP <80 MM HG: CPT | Mod: CPTII,,, | Performed by: INTERNAL MEDICINE

## 2022-08-15 PROCEDURE — 99215 OFFICE O/P EST HI 40 MIN: CPT | Mod: S$PBB,,, | Performed by: INTERNAL MEDICINE

## 2022-08-15 PROCEDURE — 96401 CHEMO ANTI-NEOPL SQ/IM: CPT | Mod: PN

## 2022-08-15 PROCEDURE — 25000003 PHARM REV CODE 250: Mod: PN | Performed by: INTERNAL MEDICINE

## 2022-08-15 PROCEDURE — 96402 CHEMO HORMON ANTINEOPL SQ/IM: CPT | Mod: PN

## 2022-08-15 PROCEDURE — 3008F BODY MASS INDEX DOCD: CPT | Mod: CPTII,,, | Performed by: INTERNAL MEDICINE

## 2022-08-15 PROCEDURE — 3074F PR MOST RECENT SYSTOLIC BLOOD PRESSURE < 130 MM HG: ICD-10-PCS | Mod: CPTII,,, | Performed by: INTERNAL MEDICINE

## 2022-08-15 PROCEDURE — 99999 PR PBB SHADOW E&M-EST. PATIENT-LVL IV: ICD-10-PCS | Mod: PBBFAC,,, | Performed by: INTERNAL MEDICINE

## 2022-08-15 PROCEDURE — 63600175 PHARM REV CODE 636 W HCPCS: Mod: JG,PN | Performed by: INTERNAL MEDICINE

## 2022-08-15 PROCEDURE — 99215 PR OFFICE/OUTPT VISIT, EST, LEVL V, 40-54 MIN: ICD-10-PCS | Mod: S$PBB,,, | Performed by: INTERNAL MEDICINE

## 2022-08-15 RX ORDER — AMOXICILLIN AND CLAVULANATE POTASSIUM 875; 125 MG/1; MG/1
TABLET, FILM COATED ORAL
COMMUNITY
Start: 2022-05-27 | End: 2022-10-10

## 2022-08-15 RX ORDER — SODIUM CHLORIDE 0.9 % (FLUSH) 0.9 %
10 SYRINGE (ML) INJECTION
Status: CANCELLED | OUTPATIENT
Start: 2022-08-15

## 2022-08-15 RX ORDER — ONDANSETRON 4 MG/1
1 TABLET, ORALLY DISINTEGRATING ORAL EVERY 8 HOURS PRN
COMMUNITY
Start: 2022-07-22

## 2022-08-15 RX ORDER — ONDANSETRON 4 MG/1
4 TABLET, ORALLY DISINTEGRATING ORAL EVERY 8 HOURS PRN
COMMUNITY
Start: 2022-07-22 | End: 2022-11-14 | Stop reason: SDUPTHER

## 2022-08-15 RX ORDER — HEPARIN 100 UNIT/ML
500 SYRINGE INTRAVENOUS
Status: DISCONTINUED | OUTPATIENT
Start: 2022-08-15 | End: 2022-08-15 | Stop reason: HOSPADM

## 2022-08-15 RX ORDER — CETIRIZINE HYDROCHLORIDE 10 MG/1
10 TABLET ORAL DAILY
COMMUNITY
Start: 2022-06-06 | End: 2023-09-11

## 2022-08-15 RX ORDER — KETOROLAC TROMETHAMINE 10 MG/1
10 TABLET, FILM COATED ORAL EVERY 6 HOURS PRN
COMMUNITY
Start: 2022-07-22 | End: 2023-06-19

## 2022-08-15 RX ORDER — HEPARIN 100 UNIT/ML
500 SYRINGE INTRAVENOUS
Status: CANCELLED | OUTPATIENT
Start: 2022-08-15

## 2022-08-15 RX ORDER — NEBULIZER AND COMPRESSOR
EACH MISCELLANEOUS
COMMUNITY
Start: 2022-06-06

## 2022-08-15 RX ORDER — ALBUTEROL SULFATE 0.83 MG/ML
SOLUTION RESPIRATORY (INHALATION)
COMMUNITY
Start: 2022-06-06

## 2022-08-15 RX ORDER — SODIUM CHLORIDE 0.9 % (FLUSH) 0.9 %
10 SYRINGE (ML) INJECTION
Status: DISCONTINUED | OUTPATIENT
Start: 2022-08-15 | End: 2022-08-15 | Stop reason: HOSPADM

## 2022-08-15 RX ORDER — ALBUTEROL SULFATE 0.83 MG/ML
2.5 SOLUTION RESPIRATORY (INHALATION)
COMMUNITY
Start: 2022-06-06 | End: 2022-11-14 | Stop reason: SDUPTHER

## 2022-08-15 RX ORDER — NEBULIZER AND COMPRESSOR
EACH MISCELLANEOUS
COMMUNITY
Start: 2022-06-06 | End: 2023-06-19

## 2022-08-15 RX ORDER — CETIRIZINE HYDROCHLORIDE 10 MG/1
10 TABLET ORAL
COMMUNITY
Start: 2022-06-06 | End: 2022-11-14 | Stop reason: SDUPTHER

## 2022-08-15 RX ADMIN — GOSERELIN ACETATE 3.6 MG: 3.6 IMPLANT SUBCUTANEOUS at 03:08

## 2022-08-15 RX ADMIN — Medication 500 UNITS: at 03:08

## 2022-08-15 RX ADMIN — DENOSUMAB 120 MG: 120 INJECTION SUBCUTANEOUS at 03:08

## 2022-08-15 RX ADMIN — SODIUM CHLORIDE, PRESERVATIVE FREE 10 ML: 5 INJECTION INTRAVENOUS at 03:08

## 2022-08-15 NOTE — PROGRESS NOTES
PROGRESS NOTE    Subjective:       Patient ID: Angeles Wright is a 52 y.o. female.  MRN: [id number]  : 1970    Chief Complaint: Malignant neoplasm of central portion of left breast in fem      History of Present Illness:   Angeles Wright is a 52 y.o. female who presents with metastatic invasive ductal left breast cancer.       As previously documented by Dr. Mcgovern  she transferred care to [facility] [place] in 2019. Labs showed her to be pre-menopausal thus she was treated with Ribociclib /Zoladex and Arimidex but she was off therapy from November to May 2020. In 2020 scans showed improvement thus she restarted Arimidex/ribociclib/zoladex.     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      Interim history:   She presents today to discuss her symptoms, labs,CT scans  and further recommendations.      She has  continued dose reduced ribociclib at the 400 mg p.o. day 1-day 21 Q 28 days since 2020.      She has continued anastrozole 1 mg p.o. daily. She is out of Ribociclib as she is unable to take calls from the specialty pharmacy. Unsure how long she has been off of it but looks like a few weeks.      She is due for Zoladex and xgeva injection today.      She reports a stable weight, appetite and performance status.    She is smoking half a pack a day.     Was seen in the ER with flank pain in July, found to have right ureteral calculus, managed conservatively, symptoms have resolved.         Oncology History:  Oncology History   Malignant neoplasm of central portion of left breast in female, estrogen receptor positive   9/3/2019 Initial Diagnosis    Malignant neoplasm of central portion of left breast in female, estrogen receptor positive     9/3/2019 - 9/3/2019 Chemotherapy    Treatment Summary   Plan Name: OP ANASTROZOLE PALBOCICLIB Q4W  Treatment Goal: Palliative  Status: Inactive  Start Date: 9/3/2019  End Date: 9/3/2019  Provider: Melania AYALA  MD Froilan  Chemotherapy: [No matching medication found in this treatment plan]     9/18/2019 Cancer Staged    Staging form: Breast, AJCC 8th Edition  - Clinical: Stage IV (cT4b, cM1, ER+, MA+, HER2-)       5/4/22  Impression:     No significant interval changes.  Known malignancy in the left breast with metastatic disease to bone.  Additional stable bilateral breast nodules which have been evaluated previously with mammography and ultrasound.  Stable small pleural based lung nodules in the right hemithorax.       History:  Past Medical History:   Diagnosis Date    Breast cancer       Past Surgical History:   Procedure Laterality Date    BREAST BIOPSY Right     BREAST LUMPECTOMY Left     TUBAL LIGATION       Family History   Problem Relation Age of Onset    Lung cancer Mother     Heart attack Father     Suicide Brother     No Known Problems Maternal Aunt     No Known Problems Maternal Uncle     No Known Problems Paternal Aunt     No Known Problems Paternal Uncle     No Known Problems Maternal Grandmother     No Known Problems Maternal Grandfather     No Known Problems Paternal Grandmother     No Known Problems Paternal Grandfather     No Known Problems Son     No Known Problems Son     No Known Problems Daughter     Breast cancer Cousin     Breast cancer Cousin      Social History     Tobacco Use    Smoking status: Current Every Day Smoker     Packs/day: 0.50     Years: 25.00     Pack years: 12.50     Start date: 9/3/1994    Smokeless tobacco: Never Used   Substance and Sexual Activity    Alcohol use: Yes     Comment: occasionally    Drug use: Not Currently    Sexual activity: Yes     Partners: Male     Birth control/protection: None        ROS:   Review of Systems   Constitutional: Negative for fever, malaise/fatigue and weight loss.   HENT: Negative for congestion, hearing loss, nosebleeds and sore throat.    Eyes: Negative for double vision and photophobia.   Respiratory: Negative for  "cough, hemoptysis, sputum production, shortness of breath and wheezing.    Cardiovascular: Negative for chest pain, palpitations, orthopnea and leg swelling.   Gastrointestinal: Negative for abdominal pain, blood in stool, constipation, diarrhea, heartburn, nausea and vomiting.   Genitourinary: Negative for dysuria, hematuria and urgency.   Musculoskeletal: Positive for joint pain (left hip, mild ). Negative for back pain and myalgias.   Skin: Negative for itching and rash.   Neurological: Negative for dizziness, tingling, seizures, weakness and headaches.   Endo/Heme/Allergies: Negative for polydipsia. Does not bruise/bleed easily.   Psychiatric/Behavioral: Negative for depression and memory loss. The patient is not nervous/anxious and does not have insomnia.         Objective:     Vitals:    08/15/22 1400   BP: 102/61   Pulse: 72   Resp: 18   Temp: 98.2 °F (36.8 °C)   TempSrc: Temporal   SpO2: 98%   Weight: 65.6 kg (144 lb 11.7 oz)   Height: 5' 4" (1.626 m)   PainSc: 0-No pain     Wt Readings from Last 10 Encounters:   08/15/22 65.6 kg (144 lb 11.7 oz)   07/18/22 66 kg (145 lb 8.1 oz)   07/18/22 66 kg (145 lb 8.1 oz)   05/09/22 67.1 kg (147 lb 14.9 oz)   04/04/22 67 kg (147 lb 11.3 oz)   04/04/22 67 kg (147 lb 11.3 oz)   03/07/22 66.3 kg (146 lb 2.6 oz)   02/07/22 65.8 kg (145 lb 1 oz)   02/07/22 65.8 kg (145 lb 1 oz)   01/10/22 64.9 kg (143 lb 1.3 oz)       Physical Examination:   Physical Exam  Vitals and nursing note reviewed.   Constitutional:       General: She is not in acute distress.     Appearance: She is not diaphoretic.   HENT:      Head: Normocephalic.      Mouth/Throat:      Pharynx: No oropharyngeal exudate.   Eyes:      General: No scleral icterus.     Conjunctiva/sclera: Conjunctivae normal.   Neck:      Thyroid: No thyromegaly.   Cardiovascular:      Rate and Rhythm: Normal rate and regular rhythm.      Heart sounds: Normal heart sounds. No murmur heard.  Pulmonary:      Effort: Pulmonary effort " is normal. No respiratory distress.      Breath sounds: No stridor. No wheezing or rales.   Chest:      Chest wall: No tenderness.   Abdominal:      General: Bowel sounds are normal. There is no distension.      Palpations: Abdomen is soft. There is no mass.      Tenderness: There is no abdominal tenderness. There is no rebound.   Musculoskeletal:         General: No tenderness or deformity. Normal range of motion.      Cervical back: Neck supple.   Lymphadenopathy:      Cervical: No cervical adenopathy.   Skin:     General: Skin is warm and dry.      Findings: No erythema or rash.   Neurological:      Mental Status: She is alert and oriented to person, place, and time.      Cranial Nerves: No cranial nerve deficit.      Coordination: Coordination normal.      Gait: Gait is intact.   Psychiatric:         Mood and Affect: Affect normal.         Cognition and Memory: Memory normal.         Judgment: Judgment normal.          Diagnostic Tests:  Significant Imaging: I have reviewed and interpreted all pertinent imaging results/findings.      Laboratory Data:  All pertinent labs have been reviewed.  Labs:   Lab Results   Component Value Date    WBC 5.19 08/11/2022    RBC 4.87 08/11/2022    HGB 14.9 08/11/2022    HCT 43.8 08/11/2022    MCV 90 08/11/2022     08/11/2022     (H) 08/11/2022     08/11/2022    K 4.2 08/11/2022    BUN 11 08/11/2022    CREATININE 0.8 08/11/2022    AST 15 08/11/2022    ALT 10 08/11/2022    BILITOT 0.4 08/11/2022       Assessment/Plan:   Malignant neoplasm of central portion of left breast in female, estrogen receptor positive  Stage IV (T3N2M2) invasive ductal carcinoma of left breast,  quadrant, ER 96%, AL 94%, Her2 neg, Grade 2, Ki67 30%  PIKC3A mutation positive     Recent scans are reviewed. She has diffuse osseous metastatic disease but no new sites of disease. Tumor markers are slightly elevated, could be due to not taking ribociclib for a few weeks.     Compliance was  encouraged and she will reach out to specialty pharmacy today, we will follow up as well.            She is due for a right breast ultrasound in September 2022.      Continue ribociclib 400 mg p.o. day 1-day 21 Q 28 days and anastrozole 1 mg p.o. Continue zoladex q 28 days.     Bone metastases  Continue Xgeva Q 28 days.  Continue Caltrate b.i.d. at this time.    Malignant neoplasm metastatic to lung, unspecified laterality  Subsided meter pulmonary nodules are stable and benign appearing.  Lung nodules have never been biopsied.  Continue to monitor.    Use of anastrozole  As above.    Smoker  She is motivated to quit.  She is referred to smoking cessation program.       ECOG SCORE    1 - Restricted in strenuous activity-ambulatory and able to carry out work of a light nature           Discussion:   No follow-ups on file.    Plan was discussed with the patient at length, and she verbalized understanding. Angeles was given an opportunity to ask questions that were answered to her satisfaction, and she was advised to call in the interval if any problems or questions arise.    Electronically signed by Shelby Thomas MD          Route Chart for Scheduling    Med Onc Chart Routing      Follow up with physician . 9/12. Refer to smoking cessation    Follow up with LEÓN    Infusion scheduling note    Injection scheduling note 9/12 zoladex and xgeva    Labs CMP, CBC and CA 27.29   Lab interval:  Ca 15-3    Imaging    Pharmacy appointment    Other referrals            Supportive Plan Information  OP BREAST GOSERELIN Q4W   Shelby Thomas MD   Upcoming Treatment Dates - OP BREAST GOSERELIN Q4W    8/15/2022       Chemotherapy       goserelin (ZOLADEX) injection 3.6 mg  9/12/2022       Chemotherapy       goserelin (ZOLADEX) injection 3.6 mg  10/10/2022       Chemotherapy       goserelin (ZOLADEX) injection 3.6 mg    Therapy Plan Information  PORT FLUSH  Flushes  heparin, porcine (PF) 100 unit/mL injection flush 500 Units  500  Units, Intravenous, Every visit  sodium chloride 0.9% flush 10 mL  10 mL, Intravenous, Every visit    DENOSUMAB (XGEVA) Q4W  Medications  denosumab (XGEVA) solution 120 mg  120 mg, Subcutaneous, Every 4 weeks

## 2022-08-15 NOTE — TELEPHONE ENCOUNTER
Specialty Pharmacy - Refill Coordination  Specialty Pharmacy - Clinical Reassessment    Specialty Medication Orders Linked to Encounter    Flowsheet Row Most Recent Value   Medication #1 ribociclib (KISQALI) 400 mg/day (200 mg x 2) Tab (Order#[id number], Rx#[id number])        Patient Diagnosis   C50.112, Z17.0 - Malignant neoplasm of central portion of left breast in female, estrogen receptor positive    Subjective    Angeles Wright is a 52 y.o. female, who is followed by the specialty pharmacy service for management and education.    Recent Encounters     Date Type Provider Description    08/15/2022 Specialty Pharmacy Ruthie Crespo, Karina Refill Coordination; Follow-up Clinical Reassessment    06/16/2022 Specialty Pharmacy Ruthie Crespo PharmD Refill Coordination    05/12/2022 Specialty Pharmacy Wendy Lopez PharmD Refill Coordination    04/18/2022 Specialty Pharmacy Wendy Lopez PharmD Refill Coordination    03/14/2022 Specialty Pharmacy Wendy Lopez PharmD Referral Authorization; Refill Coordination        Clinical call attempts since last clinical assessment   No call attempts found.     Current Outpatient Medications   Medication Sig    albuterol (PROVENTIL) 2.5 mg /3 mL (0.083 %) nebulizer solution SMARTSIG:3 Milliliter(s) Via Nebulizer Every 4 Hours PRN    albuterol (PROVENTIL) 2.5 mg /3 mL (0.083 %) nebulizer solution Inhale 2.5 mg into the lungs.    amoxicillin-clavulanate 875-125mg (AUGMENTIN) 875-125 mg per tablet Take 1 tablet by mouth 2 (two) times daily for 7 days    anastrozole (ARIMIDEX) 1 mg Tab Take 1 tablet (1 mg total) by mouth once daily.    cetirizine (ZYRTEC) 10 MG tablet Take 10 mg by mouth once daily.    cetirizine (ZYRTEC) 10 MG tablet Take 10 mg by mouth.    COMP-AIR NEBULIZER COMPRESSOR Shreri use as directed    ketorolac (TORADOL) 10 mg tablet Take 10 mg by mouth every 6 (six) hours as needed.    nebulizer and compressor Sherri Use as directed    ondansetron  (ZOFRAN-ODT) 4 MG TbDL Take 1 tablet by mouth every 8 (eight) hours as needed.    ondansetron (ZOFRAN-ODT) 4 MG TbDL Take 4 mg by mouth every 8 (eight) hours as needed.    ribociclib (KISQALI) 400 mg/day (200 mg x 2) Tab Take 2 tablets (400mg) day 1 to day 21 every 28 days    varenicline (CHANTIX STARTING MONTH BOX) 0.5 mg (11)- 1 mg (42) tablet Take one 0.5mg tab by mouth once daily X3 days,then increase to one 0.5mg tab twice daily X4 days,then increase to one 1mg tab twice daily (Patient not taking: Reported on 8/15/2022)   Last reviewed on 8/15/2022  2:58 PM by Gail Vaughn RN    Review of patient's allergies indicates:  No Known AllergiesLast reviewed on  8/15/2022 1:51 PM by Magdalena Avalos    Drug Interactions    Clinically relevant drug interactions identified: no       Medication Adherence    Adherence tools used: calendar  Support network for adherence: family member, healthcare provider         Assessment Questions - Documented Responses    Flowsheet Row Most Recent Value   Assessment    Medication Reconciliation completed for patient Yes   During the past 4 weeks, has patient missed any activities due to condition or medication? No   During the past 4 weeks, did patient have any of the following urgent care visits? None   Goals of Therapy Status Achieving   All education points have been covered with patient? Patient does not need education at this time.   Assesment completed? Yes   Plan Therapy continued   Do you need to open a clinical intervention (i-vent)? No   Medication #1 Assessment Info    Patient status Existing medication, Exisiting to [facility]   Is this medication appropriate for the patient? Yes   Is this medication effective? Yes        Refill Questions - Documented Responses    Flowsheet Row Most Recent Value   Patient Availability and HIPAA Verification    Does patient want to proceed with activity? Yes   HIPAA/medical authority confirmed? Yes   Relationship to patient of person spoken  "to? Self   Refill Screening Questions    Changes to allergies? No   Changes to medications? No   New conditions since last clinic visit? No   Unplanned office visit, urgent care, ED, or hospital admission in the last 4 weeks? No   How does patient/caregiver feel medication is working? Good   Financial problems or insurance changes? No   How many doses of your specialty medications were missed in the last 4 weeks? > 5   Why were doses missed? Other (comment)  [had trouble contacting [facility]]   Would patient like to speak to a pharmacist? No   When does the patient need to receive the medication? 08/17/22   Refill Delivery Questions    How will the patient receive the medication? Delivery Ch[facility]ita   When does the patient need to receive the medication? 08/17/22   Shipping Address Home   Address in [facility] confirmed and updated if neccessary? Yes   Expected Copay ($) 0   Is the patient able to afford the medication copay? Yes   Payment Method zero copay   Days supply of Refill 28   Supplies needed? No supplies needed   Refill activity completed? Yes   Refill activity plan Refill scheduled   Shipment/Pickup Date: 08/16/22          Objective    She has a past medical history of Breast cancer.    Tried/failed medications: Ibrance     BP Readings from Last 4 Encounters:   08/15/22 102/61   08/15/22 102/61   07/18/22 105/69   07/18/22 105/69     Ht Readings from Last 4 Encounters:   08/15/22 5' 4" (1.626 m)   07/18/22 5' 4" (1.626 m)   05/09/22 5' 4" (1.626 m)   04/04/22 5' 4" (1.626 m)     Wt Readings from Last 4 Encounters:   08/15/22 65.6 kg (144 lb 11.7 oz)   07/18/22 66 kg (145 lb 8.1 oz)   07/18/22 66 kg (145 lb 8.1 oz)   05/09/22 67.1 kg (147 lb 14.9 oz)     Recent Labs   Lab Result Units 08/11/22  0717 07/18/22  1505 05/30/22  0912   RBC M/uL 4.87 4.46 4.91   Hemoglobin g/dL 14.9 13.7 14.9   Hematocrit % 43.8 40.4 44.7   WBC K/uL 5.19 7.12 4.23   Gran # (ANC) K/uL 2.7 3.7 1.8   Gran % %  --  51.7 43.3   Platelets " K/uL 281 266 226   Sodium mmol/L 140  --  140   Potassium mmol/L 4.2  --  4.2   Chloride mmol/L 108  --  108   Glucose mg/dL 113 H  --  76   BUN mg/dL 11  --  10   Creatinine mg/dL 0.8  --  0.8   Calcium mg/dL 8.7  --  9.1   Total Protein g/dL 6.5  --  6.8   Albumin g/dL 3.9  --  3.9   Total Bilirubin mg/dL 0.4  --  0.4   Alkaline Phosphatase U/L 63  --  72   AST U/L 15  --  16   ALT U/L 10  --  17     The goals of cancer treatment include:  · Achieving remission of cancer, if possible  · Reducing tumor size and spread of cancer, if remission is not possible  · Minimizing pain and symptoms of the cancer  · Preventing infection and other complications of treatment  · Promoting adequate nutrition  · Encouraging proper hydration  · Improving or maintaining quality of life  · Maintaining optimal therapy adherence  · Minimizing and managing side effects    Goals of Therapy Status: Achieving    Assessment/Plan  Patient plans to start therapy on 08/17/22      Indication, dosage, appropriateness, effectiveness, safety and convenience of her specialty medication(s) were reviewed today.       Side effects: none   Recent infections: none   Pain: none   Appetite: no changes   Energy, fatigue: no changes   ED/UC visits: none   Medication list reviewed. No new allergies or health conditions. nond     Labs reviewed from: 08/11/22     Patient had been previously closed out due to no contact. She states she has a new job and cannot answer phone calls as easily. She will contact us on her lunch now once she sees [facility] called. Contact information has been updated.         Tasks added this encounter   9/7/2022 - Refill Call (Auto Added)  2/4/2023 - Clinical - Follow Up Assesement (180 day)   Tasks due within next 3 months   No tasks due.     Ruthie Crespo, PharmD  [facility]  [address] [facility] [address]  Phone: [phone]  Fax: [phone]

## 2022-08-15 NOTE — PLAN OF CARE
Tolerated xgeva/zoladex injections well.  Port flushed.  No questions or concerns at this time.  Ambulated off unit in NAD.

## 2022-08-24 ENCOUNTER — PATIENT MESSAGE (OUTPATIENT)
Dept: HEMATOLOGY/ONCOLOGY | Facility: CLINIC | Age: 52
End: 2022-08-24
Payer: MEDICAID

## 2022-08-30 ENCOUNTER — OFFICE VISIT (OUTPATIENT)
Dept: URGENT CARE | Facility: CLINIC | Age: 52
End: 2022-08-30
Payer: MEDICAID

## 2022-08-30 VITALS
BODY MASS INDEX: 24.59 KG/M2 | RESPIRATION RATE: 16 BRPM | TEMPERATURE: 99 F | DIASTOLIC BLOOD PRESSURE: 72 MMHG | SYSTOLIC BLOOD PRESSURE: 115 MMHG | WEIGHT: 144 LBS | OXYGEN SATURATION: 98 % | HEIGHT: 64 IN | HEART RATE: 90 BPM

## 2022-08-30 DIAGNOSIS — J02.9 SORE THROAT: Primary | ICD-10-CM

## 2022-08-30 DIAGNOSIS — U07.1 COVID-19: ICD-10-CM

## 2022-08-30 LAB
CTP QC/QA: YES
SARS-COV-2 RDRP RESP QL NAA+PROBE: POSITIVE

## 2022-08-30 PROCEDURE — 99203 OFFICE O/P NEW LOW 30 MIN: CPT | Mod: S$GLB,,, | Performed by: EMERGENCY MEDICINE

## 2022-08-30 PROCEDURE — 3074F PR MOST RECENT SYSTOLIC BLOOD PRESSURE < 130 MM HG: ICD-10-PCS | Mod: CPTII,S$GLB,, | Performed by: EMERGENCY MEDICINE

## 2022-08-30 PROCEDURE — 3078F PR MOST RECENT DIASTOLIC BLOOD PRESSURE < 80 MM HG: ICD-10-PCS | Mod: CPTII,S$GLB,, | Performed by: EMERGENCY MEDICINE

## 2022-08-30 PROCEDURE — 3008F PR BODY MASS INDEX (BMI) DOCUMENTED: ICD-10-PCS | Mod: CPTII,S$GLB,, | Performed by: EMERGENCY MEDICINE

## 2022-08-30 PROCEDURE — U0002: ICD-10-PCS | Mod: QW,S$GLB,, | Performed by: EMERGENCY MEDICINE

## 2022-08-30 PROCEDURE — 1159F PR MEDICATION LIST DOCUMENTED IN MEDICAL RECORD: ICD-10-PCS | Mod: CPTII,S$GLB,, | Performed by: EMERGENCY MEDICINE

## 2022-08-30 PROCEDURE — 1160F RVW MEDS BY RX/DR IN RCRD: CPT | Mod: CPTII,S$GLB,, | Performed by: EMERGENCY MEDICINE

## 2022-08-30 PROCEDURE — 3074F SYST BP LT 130 MM HG: CPT | Mod: CPTII,S$GLB,, | Performed by: EMERGENCY MEDICINE

## 2022-08-30 PROCEDURE — U0002 COVID-19 LAB TEST NON-CDC: HCPCS | Mod: QW,S$GLB,, | Performed by: EMERGENCY MEDICINE

## 2022-08-30 PROCEDURE — 99203 PR OFFICE/OUTPT VISIT, NEW, LEVL III, 30-44 MIN: ICD-10-PCS | Mod: S$GLB,,, | Performed by: EMERGENCY MEDICINE

## 2022-08-30 PROCEDURE — 3078F DIAST BP <80 MM HG: CPT | Mod: CPTII,S$GLB,, | Performed by: EMERGENCY MEDICINE

## 2022-08-30 PROCEDURE — 3008F BODY MASS INDEX DOCD: CPT | Mod: CPTII,S$GLB,, | Performed by: EMERGENCY MEDICINE

## 2022-08-30 PROCEDURE — 1159F MED LIST DOCD IN RCRD: CPT | Mod: CPTII,S$GLB,, | Performed by: EMERGENCY MEDICINE

## 2022-08-30 PROCEDURE — 1160F PR REVIEW ALL MEDS BY PRESCRIBER/CLIN PHARMACIST DOCUMENTED: ICD-10-PCS | Mod: CPTII,S$GLB,, | Performed by: EMERGENCY MEDICINE

## 2022-08-30 NOTE — LETTER
August 30, 2022      [facility]  [address] [place] [address], [address]  [place] [address]  Phone: [phone]  Fax: [phone]       Patient: Angeles Wright   YOB: 1970  Date of Visit: 08/30/2022    To Whom It May Concern:    Haroon Wright  was at [facility] on 08/30/2022. The patient may return to work/school on 09/05/2022 with no restrictions. If asymptomatic and no fever for 24 hours. If you have any questions or concerns, or if I can be of further assistance, please do not hesitate to contact me.    Sincerely,    ALIREZA Liz

## 2022-08-30 NOTE — LETTER
August 30, 2022      [facility] - [facility]  [address] [place] [address], [address]  [place] [address]  Phone: [phone]  Fax: [phone]       Patient: Angeles Wright   YOB: 1970  Date of Visit: 08/30/2022    To Whom It May Concern:    Haroon Wright  was at [facility] on 08/30/2022. The patient may return to work/school on 08/05/2022  If asymptomatic and no fever for 24 hours. If you have any questions or concerns, or if I can be of further assistance, please do not hesitate to contact me.    Sincerely,    Don Fernandez, RT

## 2022-08-30 NOTE — PROGRESS NOTES
"Subjective:       Patient ID: Angeles Wright is a 52 y.o. female.    Vitals:  height is 5' 4" (1.626 m) and weight is 65.3 kg (144 lb). Her temperature is 98.7 °F (37.1 °C). Her blood pressure is 115/72 and her pulse is 90. Her respiration is 16 and oxygen saturation is 98%.     Chief Complaint: Sore Throat    Patient presents to clinic with Covid symptoms. Patient states symptoms started on yesterday. Positive Covid exposure.  Symptoms: body aches, cough, sore throat, headaches. Patient is currently taking DayQuil for symptoms; with mild relief.    URI   This is a new problem. The current episode started yesterday. The problem has been unchanged. There has been no fever. Associated symptoms include coughing, headaches and a sore throat.     Constitution: Positive for fatigue.   HENT:  Positive for sore throat.    Respiratory:  Positive for cough.    Musculoskeletal:  Positive for muscle ache.   Neurological:  Positive for headaches.     Objective:      Physical Exam   Constitutional: She is oriented to person, place, and time. She appears well-developed. She is cooperative.  Non-toxic appearance. She does not appear ill. No distress.   HENT:   Head: Normocephalic and atraumatic.   Ears:   Right Ear: Hearing and external ear normal.   Left Ear: Hearing and external ear normal.   Nose: Nose normal. No mucosal edema, rhinorrhea or nasal deformity. No epistaxis. Right sinus exhibits no maxillary sinus tenderness and no frontal sinus tenderness. Left sinus exhibits no maxillary sinus tenderness and no frontal sinus tenderness.   Mouth/Throat: Uvula is midline, oropharynx is clear and moist and mucous membranes are normal. No trismus in the jaw. Normal dentition. No uvula swelling. No oropharyngeal exudate, posterior oropharyngeal edema or posterior oropharyngeal erythema.   Eyes: Conjunctivae and lids are normal. No scleral icterus.   Neck: Trachea normal and phonation normal. Neck supple. No edema present. No erythema " present. No neck rigidity present.   Cardiovascular: Normal rate, regular rhythm, normal heart sounds and normal pulses.   Pulmonary/Chest: Effort normal and breath sounds normal. No respiratory distress. She has no decreased breath sounds. She has no rhonchi.   Abdominal: Normal appearance.   Musculoskeletal: Normal range of motion.         General: No deformity. Normal range of motion.   Neurological: She is alert and oriented to person, place, and time. She exhibits normal muscle tone. Coordination normal.   Skin: Skin is warm, dry, intact, not diaphoretic and not pale.   Psychiatric: Her speech is normal and behavior is normal. Judgment and thought content normal.   Nursing note and vitals reviewed.        Results for orders placed or performed in visit on 08/30/22   POCT COVID-19 Rapid Screening   Result Value Ref Range    POC Rapid COVID Positive (A) Negative     Acceptable Yes        Patient takes chemotherapy for breast cancer.  I offered antiviral is a.  She will begin as soon as possible.        Assessment:       1. Sore throat    2. COVID-19          Plan:         Sore throat  -     POCT COVID-19 Rapid Screening    COVID-19  -     Discontinue: molnupiravir 200 mg capsule (EUA); Take 4 capsules (800 mg total) by mouth every 12 (twelve) hours. for 5 days  Dispense: 40 capsule; Refill: 0  -     molnupiravir 200 mg capsule (EUA); Take 4 capsules (800 mg total) by mouth every 12 (twelve) hours. for 5 days  Dispense: 40 capsule; Refill: 0

## 2022-09-07 ENCOUNTER — PATIENT MESSAGE (OUTPATIENT)
Dept: PHARMACY | Facility: CLINIC | Age: 52
End: 2022-09-07
Payer: MEDICAID

## 2022-09-08 ENCOUNTER — LAB VISIT (OUTPATIENT)
Dept: LAB | Facility: HOSPITAL | Age: 52
End: 2022-09-08
Attending: INTERNAL MEDICINE
Payer: MEDICAID

## 2022-09-08 DIAGNOSIS — Z17.0 MALIGNANT NEOPLASM OF CENTRAL PORTION OF LEFT BREAST IN FEMALE, ESTROGEN RECEPTOR POSITIVE: ICD-10-CM

## 2022-09-08 DIAGNOSIS — C50.112 MALIGNANT NEOPLASM OF CENTRAL PORTION OF LEFT BREAST IN FEMALE, ESTROGEN RECEPTOR POSITIVE: ICD-10-CM

## 2022-09-08 LAB
ALBUMIN SERPL BCP-MCNC: 3.8 G/DL (ref 3.5–5.2)
ALP SERPL-CCNC: 73 U/L (ref 55–135)
ALT SERPL W/O P-5'-P-CCNC: 21 U/L (ref 10–44)
ANION GAP SERPL CALC-SCNC: 10 MMOL/L (ref 8–16)
AST SERPL-CCNC: 20 U/L (ref 10–40)
BASOPHILS # BLD AUTO: 0.1 K/UL (ref 0–0.2)
BASOPHILS NFR BLD: 2.3 % (ref 0–1.9)
BILIRUB SERPL-MCNC: 0.4 MG/DL (ref 0.1–1)
BUN SERPL-MCNC: 13 MG/DL (ref 6–20)
CALCIUM SERPL-MCNC: 8.7 MG/DL (ref 8.7–10.5)
CHLORIDE SERPL-SCNC: 109 MMOL/L (ref 95–110)
CO2 SERPL-SCNC: 20 MMOL/L (ref 23–29)
CREAT SERPL-MCNC: 0.9 MG/DL (ref 0.5–1.4)
DIFFERENTIAL METHOD: ABNORMAL
EOSINOPHIL # BLD AUTO: 0.1 K/UL (ref 0–0.5)
EOSINOPHIL NFR BLD: 1.6 % (ref 0–8)
ERYTHROCYTE [DISTWIDTH] IN BLOOD BY AUTOMATED COUNT: 13.9 % (ref 11.5–14.5)
EST. GFR  (NO RACE VARIABLE): >60 ML/MIN/1.73 M^2
GLUCOSE SERPL-MCNC: 119 MG/DL (ref 70–110)
HCT VFR BLD AUTO: 42.1 % (ref 37–48.5)
HGB BLD-MCNC: 14.4 G/DL (ref 12–16)
IMM GRANULOCYTES # BLD AUTO: 0 K/UL (ref 0–0.04)
IMM GRANULOCYTES NFR BLD AUTO: 0 % (ref 0–0.5)
LYMPHOCYTES # BLD AUTO: 1.3 K/UL (ref 1–4.8)
LYMPHOCYTES NFR BLD: 30 % (ref 18–48)
MCH RBC QN AUTO: 30.9 PG (ref 27–31)
MCHC RBC AUTO-ENTMCNC: 34.2 G/DL (ref 32–36)
MCV RBC AUTO: 90 FL (ref 82–98)
MONOCYTES # BLD AUTO: 0.3 K/UL (ref 0.3–1)
MONOCYTES NFR BLD: 6.2 % (ref 4–15)
NEUTROPHILS # BLD AUTO: 2.6 K/UL (ref 1.8–7.7)
NEUTROPHILS NFR BLD: 59.9 % (ref 38–73)
NRBC BLD-RTO: 0 /100 WBC
PLATELET # BLD AUTO: 218 K/UL (ref 150–450)
PMV BLD AUTO: 8.4 FL (ref 9.2–12.9)
POTASSIUM SERPL-SCNC: 4.4 MMOL/L (ref 3.5–5.1)
PROT SERPL-MCNC: 6.7 G/DL (ref 6–8.4)
RBC # BLD AUTO: 4.66 M/UL (ref 4–5.4)
SODIUM SERPL-SCNC: 139 MMOL/L (ref 136–145)
WBC # BLD AUTO: 4.37 K/UL (ref 3.9–12.7)

## 2022-09-08 PROCEDURE — 86300 IMMUNOASSAY TUMOR CA 15-3: CPT | Performed by: INTERNAL MEDICINE

## 2022-09-08 PROCEDURE — 86300 IMMUNOASSAY TUMOR CA 15-3: CPT | Mod: 91 | Performed by: INTERNAL MEDICINE

## 2022-09-08 PROCEDURE — 85025 COMPLETE CBC W/AUTO DIFF WBC: CPT | Mod: PN | Performed by: INTERNAL MEDICINE

## 2022-09-08 PROCEDURE — 36415 COLL VENOUS BLD VENIPUNCTURE: CPT | Mod: PN | Performed by: INTERNAL MEDICINE

## 2022-09-08 PROCEDURE — 80053 COMPREHEN METABOLIC PANEL: CPT | Mod: PN | Performed by: INTERNAL MEDICINE

## 2022-09-10 LAB — CANCER AG15-3 SERPL IA-ACNC: 81 U/ML

## 2022-09-12 ENCOUNTER — SPECIALTY PHARMACY (OUTPATIENT)
Dept: PHARMACY | Facility: CLINIC | Age: 52
End: 2022-09-12
Payer: MEDICAID

## 2022-09-12 ENCOUNTER — PATIENT MESSAGE (OUTPATIENT)
Dept: PHARMACY | Facility: CLINIC | Age: 52
End: 2022-09-12
Payer: MEDICAID

## 2022-09-12 ENCOUNTER — TELEPHONE (OUTPATIENT)
Dept: RADIOLOGY | Facility: HOSPITAL | Age: 52
End: 2022-09-12
Payer: MEDICAID

## 2022-09-12 ENCOUNTER — OFFICE VISIT (OUTPATIENT)
Dept: HEMATOLOGY/ONCOLOGY | Facility: CLINIC | Age: 52
End: 2022-09-12
Payer: MEDICAID

## 2022-09-12 ENCOUNTER — INFUSION (OUTPATIENT)
Dept: INFUSION THERAPY | Facility: HOSPITAL | Age: 52
End: 2022-09-12
Attending: INTERNAL MEDICINE
Payer: MEDICAID

## 2022-09-12 VITALS
TEMPERATURE: 98 F | SYSTOLIC BLOOD PRESSURE: 116 MMHG | RESPIRATION RATE: 18 BRPM | HEART RATE: 71 BPM | OXYGEN SATURATION: 95 % | HEIGHT: 64 IN | DIASTOLIC BLOOD PRESSURE: 77 MMHG | BODY MASS INDEX: 24.37 KG/M2 | WEIGHT: 142.75 LBS

## 2022-09-12 VITALS
HEART RATE: 72 BPM | SYSTOLIC BLOOD PRESSURE: 119 MMHG | TEMPERATURE: 98 F | RESPIRATION RATE: 16 BRPM | DIASTOLIC BLOOD PRESSURE: 76 MMHG

## 2022-09-12 DIAGNOSIS — C50.112 MALIGNANT NEOPLASM OF CENTRAL PORTION OF LEFT BREAST IN FEMALE, ESTROGEN RECEPTOR POSITIVE: Primary | ICD-10-CM

## 2022-09-12 DIAGNOSIS — D70.1 CHEMOTHERAPY-INDUCED NEUTROPENIA: ICD-10-CM

## 2022-09-12 DIAGNOSIS — C79.51 BONE METASTASES: ICD-10-CM

## 2022-09-12 DIAGNOSIS — Z17.0 MALIGNANT NEOPLASM OF CENTRAL PORTION OF LEFT BREAST IN FEMALE, ESTROGEN RECEPTOR POSITIVE: Primary | ICD-10-CM

## 2022-09-12 DIAGNOSIS — T45.1X5A CHEMOTHERAPY-INDUCED NEUTROPENIA: ICD-10-CM

## 2022-09-12 DIAGNOSIS — Z79.811 USE OF ANASTROZOLE: ICD-10-CM

## 2022-09-12 LAB — CANCER AG27-29 SERPL-ACNC: 109 U/ML

## 2022-09-12 PROCEDURE — 25000003 PHARM REV CODE 250: Mod: PN | Performed by: INTERNAL MEDICINE

## 2022-09-12 PROCEDURE — 63600175 PHARM REV CODE 636 W HCPCS: Mod: PN | Performed by: INTERNAL MEDICINE

## 2022-09-12 PROCEDURE — 96372 THER/PROPH/DIAG INJ SC/IM: CPT | Mod: 59,PN

## 2022-09-12 PROCEDURE — 3074F SYST BP LT 130 MM HG: CPT | Mod: CPTII,,, | Performed by: INTERNAL MEDICINE

## 2022-09-12 PROCEDURE — 3078F DIAST BP <80 MM HG: CPT | Mod: CPTII,,, | Performed by: INTERNAL MEDICINE

## 2022-09-12 PROCEDURE — 99214 OFFICE O/P EST MOD 30 MIN: CPT | Mod: PBBFAC,PN | Performed by: INTERNAL MEDICINE

## 2022-09-12 PROCEDURE — 96401 CHEMO ANTI-NEOPL SQ/IM: CPT | Mod: PN

## 2022-09-12 PROCEDURE — 99999 PR PBB SHADOW E&M-EST. PATIENT-LVL IV: ICD-10-PCS | Mod: PBBFAC,,, | Performed by: INTERNAL MEDICINE

## 2022-09-12 PROCEDURE — 3074F PR MOST RECENT SYSTOLIC BLOOD PRESSURE < 130 MM HG: ICD-10-PCS | Mod: CPTII,,, | Performed by: INTERNAL MEDICINE

## 2022-09-12 PROCEDURE — 96402 CHEMO HORMON ANTINEOPL SQ/IM: CPT | Mod: PN

## 2022-09-12 PROCEDURE — 3008F PR BODY MASS INDEX (BMI) DOCUMENTED: ICD-10-PCS | Mod: CPTII,,, | Performed by: INTERNAL MEDICINE

## 2022-09-12 PROCEDURE — 3078F PR MOST RECENT DIASTOLIC BLOOD PRESSURE < 80 MM HG: ICD-10-PCS | Mod: CPTII,,, | Performed by: INTERNAL MEDICINE

## 2022-09-12 PROCEDURE — 99999 PR PBB SHADOW E&M-EST. PATIENT-LVL IV: CPT | Mod: PBBFAC,,, | Performed by: INTERNAL MEDICINE

## 2022-09-12 PROCEDURE — A4216 STERILE WATER/SALINE, 10 ML: HCPCS | Mod: PN | Performed by: INTERNAL MEDICINE

## 2022-09-12 PROCEDURE — 99214 OFFICE O/P EST MOD 30 MIN: CPT | Mod: S$PBB,,, | Performed by: INTERNAL MEDICINE

## 2022-09-12 PROCEDURE — 99214 PR OFFICE/OUTPT VISIT, EST, LEVL IV, 30-39 MIN: ICD-10-PCS | Mod: S$PBB,,, | Performed by: INTERNAL MEDICINE

## 2022-09-12 PROCEDURE — 3008F BODY MASS INDEX DOCD: CPT | Mod: CPTII,,, | Performed by: INTERNAL MEDICINE

## 2022-09-12 RX ORDER — SODIUM CHLORIDE 0.9 % (FLUSH) 0.9 %
10 SYRINGE (ML) INJECTION
Status: DISCONTINUED | OUTPATIENT
Start: 2022-09-12 | End: 2022-09-12 | Stop reason: HOSPADM

## 2022-09-12 RX ORDER — HEPARIN 100 UNIT/ML
500 SYRINGE INTRAVENOUS
Status: CANCELLED | OUTPATIENT
Start: 2022-09-12

## 2022-09-12 RX ORDER — SODIUM CHLORIDE 0.9 % (FLUSH) 0.9 %
10 SYRINGE (ML) INJECTION
Status: CANCELLED | OUTPATIENT
Start: 2022-09-12

## 2022-09-12 RX ORDER — HEPARIN 100 UNIT/ML
500 SYRINGE INTRAVENOUS
Status: DISCONTINUED | OUTPATIENT
Start: 2022-09-12 | End: 2022-09-12 | Stop reason: HOSPADM

## 2022-09-12 RX ADMIN — DENOSUMAB 120 MG: 120 INJECTION SUBCUTANEOUS at 03:09

## 2022-09-12 RX ADMIN — Medication 500 UNITS: at 04:09

## 2022-09-12 RX ADMIN — GOSERELIN ACETATE 3.6 MG: 3.6 IMPLANT SUBCUTANEOUS at 03:09

## 2022-09-12 RX ADMIN — Medication 10 ML: at 04:09

## 2022-09-12 NOTE — PROGRESS NOTES
PROGRESS NOTE    Subjective:       Patient ID: Angeles Wright is a 52 y.o. female.  MRN:   : [id number]    Chief Complaint: Breast Cancer      History of Present Illness:   Angeles Wright is a 52 y.o. female who presents with metastatic invasive ductal left breast cancer.       As previously documented by Dr. Mcgovern  she transferred care to [facility] in 2019. Labs showed her to be pre-menopausal thus she was treated with Ribociclib /Zoladex and Arimidex but she was off therapy from November to May 2020. In 2020 scans showed improvement thus she restarted Arimidex/ribociclib/zoladex.     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      Interim history:   She presents today to discuss her symptoms, labs and further recommendations.      She has  continued dose reduced ribociclib at the 400 mg p.o. day 1-day 21 Q 28 days since 2020.      She has continued anastrozole 1 mg p.o. daily. She has resumed Ribociclib after missing a few weeks when she could not be reached by specialty pharmacy.     She is due for Zoladex and xgeva injection today.      She reports a stable weight, appetite and performance status.    She is smoking half a pack a day.           Oncology History:  Oncology History   Malignant neoplasm of central portion of left breast in female, estrogen receptor positive   9/3/2019 Initial Diagnosis    Malignant neoplasm of central portion of left breast in female, estrogen receptor positive     9/3/2019 - 9/3/2019 Chemotherapy    Treatment Summary   Plan Name: OP ANASTROZOLE PALBOCICLIB Q4W  Treatment Goal: Palliative  Status: Inactive  Start Date: 9/3/2019  End Date: 9/3/2019  Provider: Melania Mcgovern MD  Chemotherapy: [No matching medication found in this treatment plan]       2019 Cancer Staged    Staging form: Breast, AJCC 8th Edition  - Clinical: Stage IV (cT4b, cM1, ER+, MN+, HER2-)         22  Impression:     No  significant interval changes.  Known malignancy in the left breast with metastatic disease to bone.  Additional stable bilateral breast nodules which have been evaluated previously with mammography and ultrasound.  Stable small pleural based lung nodules in the right hemithorax.       History:  Past Medical History:   Diagnosis Date    Breast cancer       Past Surgical History:   Procedure Laterality Date    BREAST BIOPSY Right     BREAST LUMPECTOMY Left     TUBAL LIGATION       Family History   Problem Relation Age of Onset    Lung cancer Mother     Heart attack Father     Suicide Brother     No Known Problems Maternal Aunt     No Known Problems Maternal Uncle     No Known Problems Paternal Aunt     No Known Problems Paternal Uncle     No Known Problems Maternal Grandmother     No Known Problems Maternal Grandfather     No Known Problems Paternal Grandmother     No Known Problems Paternal Grandfather     No Known Problems Son     No Known Problems Son     No Known Problems Daughter     Breast cancer Cousin     Breast cancer Cousin      Social History     Tobacco Use    Smoking status: Every Day     Packs/day: 1.00     Years: 33.00     Pack years: 33.00     Types: Cigarettes     Start date: 9/3/1994    Smokeless tobacco: Never   Substance and Sexual Activity    Alcohol use: Yes     Comment: occasionally    Drug use: Not Currently    Sexual activity: Yes     Partners: Male     Birth control/protection: None        ROS:   Review of Systems   Constitutional:  Negative for fever, malaise/fatigue and weight loss.   HENT:  Negative for congestion, hearing loss, nosebleeds and sore throat.    Eyes:  Negative for double vision and photophobia.   Respiratory:  Negative for cough, hemoptysis, sputum production, shortness of breath and wheezing.    Cardiovascular:  Negative for chest pain, palpitations, orthopnea and leg swelling.   Gastrointestinal:  Negative for abdominal pain, blood in stool, constipation, diarrhea,  "heartburn, nausea and vomiting.   Genitourinary:  Negative for dysuria, hematuria and urgency.   Musculoskeletal:  Positive for joint pain (left hip, mild ). Negative for back pain and myalgias.   Skin:  Negative for itching and rash.   Neurological:  Positive for headaches. Negative for dizziness, tingling, seizures and weakness.   Endo/Heme/Allergies:  Negative for polydipsia. Does not bruise/bleed easily.   Psychiatric/Behavioral:  Negative for depression and memory loss. The patient is not nervous/anxious and does not have insomnia.       Objective:     Vitals:    09/12/22 1357   BP: 116/77   Pulse: 71   Resp: 18   Temp: 98.1 °F (36.7 °C)   TempSrc: Temporal   SpO2: 95%   Weight: 64.8 kg (142 lb 12 oz)   Height: 5' 4" (1.626 m)   PainSc: 0-No pain     Wt Readings from Last 10 Encounters:   09/12/22 64.8 kg (142 lb 12 oz)   08/30/22 65.3 kg (144 lb)   08/15/22 65.6 kg (144 lb 11.7 oz)   07/18/22 66 kg (145 lb 8.1 oz)   07/18/22 66 kg (145 lb 8.1 oz)   05/09/22 67.1 kg (147 lb 14.9 oz)   04/04/22 67 kg (147 lb 11.3 oz)   04/04/22 67 kg (147 lb 11.3 oz)   03/07/22 66.3 kg (146 lb 2.6 oz)   02/07/22 65.8 kg (145 lb 1 oz)       Physical Examination:   Physical Exam  Vitals and nursing note reviewed.   Constitutional:       General: She is not in acute distress.     Appearance: She is not diaphoretic.   HENT:      Head: Normocephalic.      Mouth/Throat:      Pharynx: No oropharyngeal exudate.   Eyes:      General: No scleral icterus.     Conjunctiva/sclera: Conjunctivae normal.   Neck:      Thyroid: No thyromegaly.   Cardiovascular:      Rate and Rhythm: Normal rate and regular rhythm.      Heart sounds: Normal heart sounds. No murmur heard.  Pulmonary:      Effort: Pulmonary effort is normal. No respiratory distress.      Breath sounds: No stridor. No wheezing or rales.   Chest:      Chest wall: No tenderness.   Abdominal:      General: Bowel sounds are normal. There is no distension.      Palpations: Abdomen is " soft. There is no mass.      Tenderness: There is no abdominal tenderness. There is no rebound.   Musculoskeletal:         General: No tenderness or deformity. Normal range of motion.      Cervical back: Neck supple.   Lymphadenopathy:      Cervical: No cervical adenopathy.   Skin:     General: Skin is warm and dry.      Findings: No erythema or rash.   Neurological:      Mental Status: She is alert and oriented to person, place, and time.      Cranial Nerves: No cranial nerve deficit.      Coordination: Coordination normal.      Gait: Gait is intact.   Psychiatric:         Mood and Affect: Affect normal.         Cognition and Memory: Memory normal.         Judgment: Judgment normal.        Diagnostic Tests:  Significant Imaging: I have reviewed and interpreted all pertinent imaging results/findings.      Laboratory Data:  All pertinent labs have been reviewed.  Labs:   Lab Results   Component Value Date    WBC 4.37 09/08/2022    RBC 4.66 09/08/2022    HGB 14.4 09/08/2022    HCT 42.1 09/08/2022    MCV 90 09/08/2022     09/08/2022     (H) 09/08/2022     09/08/2022    K 4.4 09/08/2022    BUN 13 09/08/2022    CREATININE 0.9 09/08/2022    AST 20 09/08/2022    ALT 21 09/08/2022    BILITOT 0.4 09/08/2022       Assessment/Plan:   Malignant neoplasm of central portion of left breast in female, estrogen receptor positive  Stage IV (T3N2M2) invasive ductal carcinoma of left breast,  quadrant, ER 96%, PA 94%, Her2 neg, Grade 2, Ki67 30%  PIKC3A mutation positive     Recent scans are reviewed. She has diffuse osseous metastatic disease but no new sites of disease. Tumor markers are slightly elevated, could be due to not taking ribociclib for a few weeks.Monitor closely, stabilizing now.       She is due for a right breast ultrasound and mammogram prior to her next visit.     Continue ribociclib 400 mg p.o. day 1-day 21 Q 28 days and anastrozole 1 mg p.o. Continue zoladex q 28 days.     Bone  metastases  Continue Xgeva Q 28 days.  Continue Caltrate b.i.d. at this time.    Malignant neoplasm metastatic to lung, unspecified laterality  Subcm pulmonary nodules are stable and benign appearing.  Lung nodules have never been biopsied.  Continue to monitor.    Headache   Intermittent, self limited, related to tension headache vs related to blurry vision. Will see her optometrist soon, continue to monitor.     Use of anastrozole  As above.    Smoker  She is motivated to quit.  She is referred to smoking cessation program.       ECOG SCORE    1 - Restricted in strenuous activity-ambulatory and able to carry out work of a light nature           Discussion:   No follow-ups on file.    Plan was discussed with the patient at length, and she verbalized understanding. Angeles was given an opportunity to ask questions that were answered to her satisfaction, and she was advised to call in the interval if any problems or questions arise.    Electronically signed by Shelby Thomas MD          Route Chart for Scheduling    Med Onc Chart Routing      Follow up with physician . 10/10   Follow up with [facility]    Infusion scheduling note    Injection scheduling note    Labs CBC, CMP and CA 27.29   Lab interval:     Imaging Mammogram   right breast ultrsound first week of October   Pharmacy appointment    Other referrals          Supportive Plan Information  OP BREAST GOSERELIN Q4W   Shelby Thomas MD   Upcoming Treatment Dates - OP BREAST GOSERELIN Q4W    10/10/2022       Chemotherapy       goserelin (ZOLADEX) injection 3.6 mg    Therapy Plan Information  PORT FLUSH  Flushes  heparin, porcine (PF) 100 unit/mL injection flush 500 Units  500 Units, Intravenous, Every visit  sodium chloride 0.9% flush 10 mL  10 mL, Intravenous, Every visit    DENOSUMAB (XGEVA) Q4W  Medications  denosumab (XGEVA) solution 120 mg  120 mg, Subcutaneous, Every 4 weeks

## 2022-09-12 NOTE — TELEPHONE ENCOUNTER
Specialty Pharmacy - Refill Coordination    Specialty Medication Orders Linked to Encounter      Flowsheet Row Most Recent Value   Medication #1 ribociclib (KISQALI) 400 mg/day (200 mg x 2) Tab (Order#[id number], Rx#[id number])            Refill Questions - Documented Responses      Flowsheet Row Most Recent Value   Patient Availability and HIPAA Verification    Does patient want to proceed with activity? Yes   HIPAA/medical authority confirmed? Yes   Relationship to patient of person spoken to? Self   Refill Screening Questions    Changes to allergies? No   Changes to medications? No   New conditions since last clinic visit? No   Unplanned office visit, urgent care, ED, or hospital admission in the last 4 weeks? No   How does patient/caregiver feel medication is working? Good   Financial problems or insurance changes? No   How many doses of your specialty medications were missed in the last 4 weeks? 1   Why were doses missed? Simply forgot   Would patient like to speak to a pharmacist? No   When does the patient need to receive the medication? 09/19/22   Refill Delivery Questions    How will the patient receive the medication? Delivery Chuyita   When does the patient need to receive the medication? 09/19/22   Shipping Address Home   Address in [facility] confirmed and updated if neccessary? Yes   Expected Copay ($) 0   Is the patient able to afford the medication copay? Yes   Payment Method zero copay   Days supply of Refill 28   Supplies needed? No supplies needed   Refill activity completed? Yes   Refill activity plan Refill scheduled   Shipment/Pickup Date: 09/13/22            Current Outpatient Medications   Medication Sig    albuterol (PROVENTIL) 2.5 mg /3 mL (0.083 %) nebulizer solution SMARTSIG:3 Milliliter(s) Via Nebulizer Every 4 Hours PRN    albuterol (PROVENTIL) 2.5 mg /3 mL (0.083 %) nebulizer solution Inhale 2.5 mg into the lungs.    amoxicillin-clavulanate 875-125mg (AUGMENTIN) 875-125 mg per  tablet Take 1 tablet by mouth 2 (two) times daily for 7 days    anastrozole (ARIMIDEX) 1 mg Tab Take 1 tablet (1 mg total) by mouth once daily.    cetirizine (ZYRTEC) 10 MG tablet Take 10 mg by mouth once daily.    cetirizine (ZYRTEC) 10 MG tablet Take 10 mg by mouth.    COMP-AIR NEBULIZER COMPRESSOR Sherri use as directed    ketorolac (TORADOL) 10 mg tablet Take 10 mg by mouth every 6 (six) hours as needed.    nebulizer and compressor Sherri Use as directed    ondansetron (ZOFRAN-ODT) 4 MG TbDL Take 1 tablet by mouth every 8 (eight) hours as needed.    ondansetron (ZOFRAN-ODT) 4 MG TbDL Take 4 mg by mouth every 8 (eight) hours as needed.    ribociclib (KISQALI) 400 mg/day (200 mg x 2) Tab Take 2 tablets (400mg) day 1 to day 21 every 28 days    varenicline (CHANTIX STARTING MONTH BOX) 0.5 mg (11)- 1 mg (42) tablet Take one 0.5mg tab by mouth once daily X3 days,then increase to one 0.5mg tab twice daily X4 days,then increase to one 1mg tab twice daily (Patient not taking: No sig reported)   Last reviewed on 9/12/2022  2:01 PM by Rhina West RN    Review of patient's allergies indicates:  No Known Allergies Last reviewed on  9/12/2022 2:00 PM by Rhina West      Tasks added this encounter   No tasks added.   Tasks due within next 3 months   9/7/2022 - Refill Call (Auto Added)     Kevin Amado, PharmD  [facility]  [address] [place] [id number]  Phone: [phone]  Fax: [phone]

## 2022-09-12 NOTE — TELEPHONE ENCOUNTER
Incoming call from pt about Kisqali refill. Was not sure of exact onhand count. Stated she would call back with it.

## 2022-09-12 NOTE — TELEPHONE ENCOUNTER
Received call from Katya at [facility] with orders for patient for diag mammo, bilat and right breast us, ordered by Dr. Cardona.   Scheduled on 9/28/22 at 8am at [address] radiology    Patient currently under treatment

## 2022-09-14 ENCOUNTER — CLINICAL SUPPORT (OUTPATIENT)
Dept: SMOKING CESSATION | Facility: CLINIC | Age: 52
End: 2022-09-14
Payer: COMMERCIAL

## 2022-09-14 DIAGNOSIS — F17.200 TOBACCO USE DISORDER: Primary | ICD-10-CM

## 2022-09-14 PROCEDURE — 99999 PR PBB SHADOW E&M-EST. PATIENT-LVL III: CPT | Mod: PBBFAC,,,

## 2022-09-14 PROCEDURE — 99404 PR PREVENT COUNSEL,INDIV,60 MIN: ICD-10-PCS | Mod: S$GLB,,, | Performed by: GENERAL PRACTICE

## 2022-09-14 PROCEDURE — 99999 PR PBB SHADOW E&M-EST. PATIENT-LVL III: ICD-10-PCS | Mod: PBBFAC,,,

## 2022-09-14 PROCEDURE — 99404 PREV MED CNSL INDIV APPRX 60: CPT | Mod: S$GLB,,, | Performed by: GENERAL PRACTICE

## 2022-09-14 RX ORDER — BUPROPION HYDROCHLORIDE 150 MG/1
TABLET, EXTENDED RELEASE ORAL
Qty: 60 TABLET | Refills: 0 | Status: SHIPPED | OUTPATIENT
Start: 2022-09-14 | End: 2023-03-20 | Stop reason: SDUPTHER

## 2022-09-14 RX ORDER — IBUPROFEN 200 MG
1 TABLET ORAL DAILY
Qty: 28 PATCH | Refills: 0 | Status: SHIPPED | OUTPATIENT
Start: 2022-09-14 | End: 2023-03-20 | Stop reason: SDUPTHER

## 2022-09-14 NOTE — Clinical Note
Patient will be participating in weekly tobacco cessation meetings and will begin the prescribed tobacco cessation medication regime of the Wellbutrin and 21 mg patches. Patient has used Wellbutrin before.

## 2022-09-19 ENCOUNTER — PATIENT MESSAGE (OUTPATIENT)
Dept: HEMATOLOGY/ONCOLOGY | Facility: CLINIC | Age: 52
End: 2022-09-19
Payer: MEDICAID

## 2022-09-19 ENCOUNTER — TELEPHONE (OUTPATIENT)
Dept: HEMATOLOGY/ONCOLOGY | Facility: CLINIC | Age: 52
End: 2022-09-19
Payer: MEDICAID

## 2022-09-28 ENCOUNTER — HOSPITAL ENCOUNTER (OUTPATIENT)
Dept: RADIOLOGY | Facility: HOSPITAL | Age: 52
Discharge: HOME | End: 2022-09-28
Attending: INTERNAL MEDICINE
Payer: MEDICAID

## 2022-09-28 ENCOUNTER — TELEPHONE (OUTPATIENT)
Dept: RADIOLOGY | Facility: HOSPITAL | Age: 52
End: 2022-09-28

## 2022-09-28 DIAGNOSIS — C50.112 MALIGNANT NEOPLASM OF CENTRAL PORTION OF LEFT BREAST IN FEMALE, ESTROGEN RECEPTOR POSITIVE: ICD-10-CM

## 2022-09-28 DIAGNOSIS — Z17.0 MALIGNANT NEOPLASM OF CENTRAL PORTION OF LEFT BREAST IN FEMALE, ESTROGEN RECEPTOR POSITIVE: ICD-10-CM

## 2022-09-28 DIAGNOSIS — R92.8 ABNORMAL MAMMOGRAM OF RIGHT BREAST: ICD-10-CM

## 2022-09-28 PROCEDURE — 77062 BREAST TOMOSYNTHESIS BI: CPT | Mod: 26,,, | Performed by: RADIOLOGY

## 2022-09-28 PROCEDURE — 77066 MAMMO DIGITAL DIAGNOSTIC BILAT WITH TOMO: ICD-10-PCS | Mod: 26,,, | Performed by: RADIOLOGY

## 2022-09-28 PROCEDURE — 77066 DX MAMMO INCL CAD BI: CPT | Mod: 26,,, | Performed by: RADIOLOGY

## 2022-09-28 PROCEDURE — 76642 ULTRASOUND BREAST LIMITED: CPT | Mod: TC,50,PO

## 2022-09-28 PROCEDURE — 76642 US BREAST BILATERAL LIMITED: ICD-10-PCS | Mod: 26,50,, | Performed by: RADIOLOGY

## 2022-09-28 PROCEDURE — 77066 DX MAMMO INCL CAD BI: CPT | Mod: TC,PO

## 2022-09-28 PROCEDURE — 76642 ULTRASOUND BREAST LIMITED: CPT | Mod: 26,50,, | Performed by: RADIOLOGY

## 2022-09-28 PROCEDURE — 77062 MAMMO DIGITAL DIAGNOSTIC BILAT WITH TOMO: ICD-10-PCS | Mod: 26,,, | Performed by: RADIOLOGY

## 2022-09-29 ENCOUNTER — TELEPHONE (OUTPATIENT)
Dept: HEMATOLOGY/ONCOLOGY | Facility: CLINIC | Age: 52
End: 2022-09-29
Payer: MEDICAID

## 2022-09-30 ENCOUNTER — TELEPHONE (OUTPATIENT)
Dept: RADIOLOGY | Facility: HOSPITAL | Age: 52
End: 2022-09-30
Payer: MEDICAID

## 2022-09-30 NOTE — TELEPHONE ENCOUNTER
Called patient x 3 today to discuss scheduling breast biopsy, no answer, voice mail is not set up. She does have a breast biopsy booklet with my private line listed.   Will resume attempts to contact patient Monday.   Kim Rosales RN at [facility] aware also as she is attempting to reach also

## 2022-10-04 ENCOUNTER — TELEPHONE (OUTPATIENT)
Dept: RADIOLOGY | Facility: HOSPITAL | Age: 52
End: 2022-10-04
Payer: MEDICAID

## 2022-10-04 NOTE — TELEPHONE ENCOUNTER
"Called patient's cell, no answer, no voice mail.  Conversation with Kim Rosales RN at [facility] on chart in contact info now includes "patient is staying at [facility] appointments need to be M-TH before 12 noon/no 2nd phone number.    I called [phone] as listed, spoke with a sponsor at [facility]. She will give patient my call back number.    Kim Rosales RN informed of above.   "

## 2022-10-06 ENCOUNTER — LAB VISIT (OUTPATIENT)
Dept: LAB | Facility: HOSPITAL | Age: 52
End: 2022-10-06
Attending: INTERNAL MEDICINE
Payer: MEDICAID

## 2022-10-06 DIAGNOSIS — C50.112 MALIGNANT NEOPLASM OF CENTRAL PORTION OF LEFT BREAST IN FEMALE, ESTROGEN RECEPTOR POSITIVE: ICD-10-CM

## 2022-10-06 DIAGNOSIS — Z17.0 MALIGNANT NEOPLASM OF CENTRAL PORTION OF LEFT BREAST IN FEMALE, ESTROGEN RECEPTOR POSITIVE: ICD-10-CM

## 2022-10-06 LAB
ALBUMIN SERPL BCP-MCNC: 4 G/DL (ref 3.5–5.2)
ALP SERPL-CCNC: 66 U/L (ref 55–135)
ALT SERPL W/O P-5'-P-CCNC: 17 U/L (ref 10–44)
ANION GAP SERPL CALC-SCNC: 9 MMOL/L (ref 8–16)
AST SERPL-CCNC: 17 U/L (ref 10–40)
BASOPHILS # BLD AUTO: 0.1 K/UL (ref 0–0.2)
BASOPHILS NFR BLD: 2.7 % (ref 0–1.9)
BILIRUB SERPL-MCNC: 0.4 MG/DL (ref 0.1–1)
BUN SERPL-MCNC: 11 MG/DL (ref 6–20)
CALCIUM SERPL-MCNC: 9.1 MG/DL (ref 8.7–10.5)
CHLORIDE SERPL-SCNC: 107 MMOL/L (ref 95–110)
CO2 SERPL-SCNC: 24 MMOL/L (ref 23–29)
CREAT SERPL-MCNC: 0.9 MG/DL (ref 0.5–1.4)
DIFFERENTIAL METHOD: ABNORMAL
EOSINOPHIL # BLD AUTO: 0.1 K/UL (ref 0–0.5)
EOSINOPHIL NFR BLD: 2.7 % (ref 0–8)
ERYTHROCYTE [DISTWIDTH] IN BLOOD BY AUTOMATED COUNT: 14.6 % (ref 11.5–14.5)
EST. GFR  (NO RACE VARIABLE): >60 ML/MIN/1.73 M^2
GLUCOSE SERPL-MCNC: 91 MG/DL (ref 70–110)
HCT VFR BLD AUTO: 45.7 % (ref 37–48.5)
HGB BLD-MCNC: 15.5 G/DL (ref 12–16)
IMM GRANULOCYTES # BLD AUTO: 0.01 K/UL (ref 0–0.04)
IMM GRANULOCYTES NFR BLD AUTO: 0.3 % (ref 0–0.5)
LYMPHOCYTES # BLD AUTO: 1.1 K/UL (ref 1–4.8)
LYMPHOCYTES NFR BLD: 29.5 % (ref 18–48)
MCH RBC QN AUTO: 30.8 PG (ref 27–31)
MCHC RBC AUTO-ENTMCNC: 33.9 G/DL (ref 32–36)
MCV RBC AUTO: 91 FL (ref 82–98)
MONOCYTES # BLD AUTO: 0.3 K/UL (ref 0.3–1)
MONOCYTES NFR BLD: 8.7 % (ref 4–15)
NEUTROPHILS # BLD AUTO: 2.1 K/UL (ref 1.8–7.7)
NEUTROPHILS NFR BLD: 56.1 % (ref 38–73)
NRBC BLD-RTO: 0 /100 WBC
PLATELET # BLD AUTO: 239 K/UL (ref 150–450)
PMV BLD AUTO: 8.7 FL (ref 9.2–12.9)
POTASSIUM SERPL-SCNC: 4.5 MMOL/L (ref 3.5–5.1)
PROT SERPL-MCNC: 6.9 G/DL (ref 6–8.4)
RBC # BLD AUTO: 5.03 M/UL (ref 4–5.4)
SODIUM SERPL-SCNC: 140 MMOL/L (ref 136–145)
WBC # BLD AUTO: 3.66 K/UL (ref 3.9–12.7)

## 2022-10-06 PROCEDURE — 80053 COMPREHEN METABOLIC PANEL: CPT | Mod: PN | Performed by: INTERNAL MEDICINE

## 2022-10-06 PROCEDURE — 85025 COMPLETE CBC W/AUTO DIFF WBC: CPT | Mod: PN | Performed by: INTERNAL MEDICINE

## 2022-10-06 PROCEDURE — 86300 IMMUNOASSAY TUMOR CA 15-3: CPT | Mod: 91 | Performed by: INTERNAL MEDICINE

## 2022-10-06 PROCEDURE — 86300 IMMUNOASSAY TUMOR CA 15-3: CPT | Performed by: INTERNAL MEDICINE

## 2022-10-08 LAB — CANCER AG15-3 SERPL IA-ACNC: 87 U/ML

## 2022-10-10 ENCOUNTER — DOCUMENTATION ONLY (OUTPATIENT)
Dept: INFUSION THERAPY | Facility: HOSPITAL | Age: 52
End: 2022-10-10
Payer: MEDICAID

## 2022-10-10 ENCOUNTER — OFFICE VISIT (OUTPATIENT)
Dept: HEMATOLOGY/ONCOLOGY | Facility: CLINIC | Age: 52
End: 2022-10-10
Payer: MEDICAID

## 2022-10-10 ENCOUNTER — TELEPHONE (OUTPATIENT)
Dept: RADIOLOGY | Facility: HOSPITAL | Age: 52
End: 2022-10-10
Payer: MEDICAID

## 2022-10-10 ENCOUNTER — INFUSION (OUTPATIENT)
Dept: INFUSION THERAPY | Facility: HOSPITAL | Age: 52
End: 2022-10-10
Attending: INTERNAL MEDICINE
Payer: MEDICAID

## 2022-10-10 ENCOUNTER — TELEPHONE (OUTPATIENT)
Dept: SMOKING CESSATION | Facility: CLINIC | Age: 52
End: 2022-10-10
Payer: MEDICAID

## 2022-10-10 VITALS
RESPIRATION RATE: 16 BRPM | DIASTOLIC BLOOD PRESSURE: 76 MMHG | TEMPERATURE: 98 F | HEART RATE: 80 BPM | BODY MASS INDEX: 24.41 KG/M2 | WEIGHT: 142.19 LBS | SYSTOLIC BLOOD PRESSURE: 110 MMHG

## 2022-10-10 VITALS
HEART RATE: 80 BPM | WEIGHT: 142.19 LBS | HEIGHT: 64 IN | DIASTOLIC BLOOD PRESSURE: 76 MMHG | RESPIRATION RATE: 16 BRPM | SYSTOLIC BLOOD PRESSURE: 110 MMHG | TEMPERATURE: 98 F | BODY MASS INDEX: 24.28 KG/M2 | OXYGEN SATURATION: 94 %

## 2022-10-10 DIAGNOSIS — C50.112 MALIGNANT NEOPLASM OF CENTRAL PORTION OF LEFT BREAST IN FEMALE, ESTROGEN RECEPTOR POSITIVE: Primary | ICD-10-CM

## 2022-10-10 DIAGNOSIS — Z17.0 MALIGNANT NEOPLASM OF CENTRAL PORTION OF LEFT BREAST IN FEMALE, ESTROGEN RECEPTOR POSITIVE: Primary | ICD-10-CM

## 2022-10-10 DIAGNOSIS — Z79.811 USE OF ANASTROZOLE: ICD-10-CM

## 2022-10-10 DIAGNOSIS — C79.51 BONE METASTASES: ICD-10-CM

## 2022-10-10 DIAGNOSIS — D70.1 CHEMOTHERAPY-INDUCED NEUTROPENIA: ICD-10-CM

## 2022-10-10 DIAGNOSIS — J06.9 UPPER RESPIRATORY TRACT INFECTION, UNSPECIFIED TYPE: ICD-10-CM

## 2022-10-10 DIAGNOSIS — F17.200 SMOKER: ICD-10-CM

## 2022-10-10 DIAGNOSIS — T45.1X5A CHEMOTHERAPY-INDUCED NEUTROPENIA: ICD-10-CM

## 2022-10-10 LAB — CANCER AG27-29 SERPL-ACNC: 101 U/ML

## 2022-10-10 PROCEDURE — 1159F MED LIST DOCD IN RCRD: CPT | Mod: CPTII,,, | Performed by: INTERNAL MEDICINE

## 2022-10-10 PROCEDURE — 96372 THER/PROPH/DIAG INJ SC/IM: CPT | Mod: PN,59

## 2022-10-10 PROCEDURE — 3078F DIAST BP <80 MM HG: CPT | Mod: CPTII,,, | Performed by: INTERNAL MEDICINE

## 2022-10-10 PROCEDURE — 3074F PR MOST RECENT SYSTOLIC BLOOD PRESSURE < 130 MM HG: ICD-10-PCS | Mod: CPTII,,, | Performed by: INTERNAL MEDICINE

## 2022-10-10 PROCEDURE — 63600175 PHARM REV CODE 636 W HCPCS: Mod: JG,PN | Performed by: INTERNAL MEDICINE

## 2022-10-10 PROCEDURE — 99999 PR PBB SHADOW E&M-EST. PATIENT-LVL IV: ICD-10-PCS | Mod: PBBFAC,,, | Performed by: INTERNAL MEDICINE

## 2022-10-10 PROCEDURE — 1159F PR MEDICATION LIST DOCUMENTED IN MEDICAL RECORD: ICD-10-PCS | Mod: CPTII,,, | Performed by: INTERNAL MEDICINE

## 2022-10-10 PROCEDURE — 99214 OFFICE O/P EST MOD 30 MIN: CPT | Mod: PBBFAC,PN | Performed by: INTERNAL MEDICINE

## 2022-10-10 PROCEDURE — 1160F RVW MEDS BY RX/DR IN RCRD: CPT | Mod: CPTII,,, | Performed by: INTERNAL MEDICINE

## 2022-10-10 PROCEDURE — 99999 PR PBB SHADOW E&M-EST. PATIENT-LVL IV: CPT | Mod: PBBFAC,,, | Performed by: INTERNAL MEDICINE

## 2022-10-10 PROCEDURE — 99215 OFFICE O/P EST HI 40 MIN: CPT | Mod: S$PBB,,, | Performed by: INTERNAL MEDICINE

## 2022-10-10 PROCEDURE — 96401 CHEMO ANTI-NEOPL SQ/IM: CPT | Mod: PN

## 2022-10-10 PROCEDURE — 3074F SYST BP LT 130 MM HG: CPT | Mod: CPTII,,, | Performed by: INTERNAL MEDICINE

## 2022-10-10 PROCEDURE — 96402 CHEMO HORMON ANTINEOPL SQ/IM: CPT | Mod: PN

## 2022-10-10 PROCEDURE — 1160F PR REVIEW ALL MEDS BY PRESCRIBER/CLIN PHARMACIST DOCUMENTED: ICD-10-PCS | Mod: CPTII,,, | Performed by: INTERNAL MEDICINE

## 2022-10-10 PROCEDURE — 99215 PR OFFICE/OUTPT VISIT, EST, LEVL V, 40-54 MIN: ICD-10-PCS | Mod: S$PBB,,, | Performed by: INTERNAL MEDICINE

## 2022-10-10 PROCEDURE — 3078F PR MOST RECENT DIASTOLIC BLOOD PRESSURE < 80 MM HG: ICD-10-PCS | Mod: CPTII,,, | Performed by: INTERNAL MEDICINE

## 2022-10-10 RX ORDER — AZITHROMYCIN 500 MG/1
500 TABLET, FILM COATED ORAL DAILY
Qty: 3 TABLET | Refills: 0 | Status: SHIPPED | OUTPATIENT
Start: 2022-10-10 | End: 2022-10-13

## 2022-10-10 RX ADMIN — GOSERELIN ACETATE 3.6 MG: 3.6 IMPLANT SUBCUTANEOUS at 04:10

## 2022-10-10 RX ADMIN — DENOSUMAB 120 MG: 120 INJECTION SUBCUTANEOUS at 04:10

## 2022-10-10 NOTE — PROGRESS NOTES
PROGRESS NOTE    Subjective:       Patient ID: Angeles Wright is a 52 y.o. female.  MRN:   : [id number]    Chief Complaint: Breast cancer       History of Present Illness:   Angeles Wright is a 52 y.o. female who presents with metastatic invasive ductal left breast cancer.       As previously documented by Dr. Mcgovern  [place] [facility] [place] Labs showed her to be pre-menopausal thus she was treated with Ribociclib /Zoladex and Arimidex but she was off therapy from November to May 2020. In 2020 scans showed improvement thus she restarted Arimidex/ribociclib/zoladex.     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      Interim history:   She presents today to discuss her symptoms, labs, mammogram and further recommendations.      She has  continued dose reduced ribociclib at the 400 mg p.o. day 1-day 21 Q 28 days since 2020.      She has continued anastrozole 1 mg p.o. daily.  She is due for Zoladex and xgeva injection today.     She had a mammogram and ultrasound on 22 that showed a mass in the left breast, in addition to the known malignancy. Biopsy is recommended.        She is smoking half a pack a day. Reports cough and congestion for the past 2 weeks. Is meeting with smoking cessation counselor virtually today.           Oncology History:  Oncology History   Malignant neoplasm of central portion of left breast in female, estrogen receptor positive   9/3/2019 Initial Diagnosis    Malignant neoplasm of central portion of left breast in female, estrogen receptor positive     9/3/2019 - 9/3/2019 Chemotherapy    Treatment Summary   Plan Name: OP ANASTROZOLE PALBOCICLIB Q4W  Treatment Goal: Palliative  Status: Inactive  Start Date: 9/3/2019  End Date: 9/3/2019  Provider: Melania Mcgovern MD  Chemotherapy: [No matching medication found in this treatment plan]       2019 Cancer Staged    Staging form: Breast, AJCC 8th  Edition  - Clinical: Stage IV (cT4b, cM1, ER+, AK+, HER2-)         5/4/22  Impression:     No significant interval changes.  Known malignancy in the left breast with metastatic disease to bone.  Additional stable bilateral breast nodules which have been evaluated previously with mammography and ultrasound.  Stable small pleural based lung nodules in the right hemithorax.     9/12/22  Impression:  Left  Mass: Left breast 22 mm x 22 mm x 15 mm mass at the upper middle 12 o'clock position. Assessment: 6 - Known biopsy, proven malignancy.   Mass: Left breast 12 mm x 9 mm x 5 mm mass at the upper posterior 12 o'clock position. Assessment: 4C - Suspicious finding. Biopsy is recommended.      Right  Mass: Right breast 10 mm x 10 mm x 7 mm mass at the upper outer 11 o'clock position. Assessment: 3 - Probably benign.   Mass: Right breast 14 mm x 7 mm x 11 mm mass at the 9 o'clock position. Assessment: 3 - Probably benign.      BI-RADS Category:   Overall: 4 - Suspicious    History:  Past Medical History:   Diagnosis Date    Breast cancer       Past Surgical History:   Procedure Laterality Date    BREAST BIOPSY Right     BREAST LUMPECTOMY Left     TUBAL LIGATION       Family History   Problem Relation Age of Onset    Lung cancer Mother     Heart attack Father     Suicide Brother     No Known Problems Maternal Aunt     No Known Problems Maternal Uncle     No Known Problems Paternal Aunt     No Known Problems Paternal Uncle     No Known Problems Maternal Grandmother     No Known Problems Maternal Grandfather     No Known Problems Paternal Grandmother     No Known Problems Paternal Grandfather     No Known Problems Son     No Known Problems Son     No Known Problems Daughter     Breast cancer Cousin     Breast cancer Cousin      Social History     Tobacco Use    Smoking status: Every Day     Packs/day: 1.00     Years: 33.00     Pack years: 33.00     Types: Cigarettes     Start date: 9/3/1994    Smokeless tobacco: Never  "  Substance and Sexual Activity    Alcohol use: Yes     Comment: occasionally    Drug use: Not Currently    Sexual activity: Yes     Partners: Male     Birth control/protection: None        ROS:   Review of Systems   Constitutional:  Negative for fever, malaise/fatigue and weight loss.   HENT:  Negative for congestion, hearing loss, nosebleeds and sore throat.    Eyes:  Negative for double vision and photophobia.   Respiratory:  Positive for cough. Negative for hemoptysis, sputum production, shortness of breath and wheezing.    Cardiovascular:  Negative for chest pain, palpitations, orthopnea and leg swelling.   Gastrointestinal:  Negative for abdominal pain, blood in stool, constipation, diarrhea, heartburn, nausea and vomiting.   Genitourinary:  Negative for dysuria, hematuria and urgency.   Musculoskeletal:  Positive for joint pain (left hip, mild ). Negative for back pain and myalgias.   Skin:  Negative for itching and rash.   Neurological:  Positive for headaches. Negative for dizziness, tingling, seizures and weakness.   Endo/Heme/Allergies:  Negative for polydipsia. Does not bruise/bleed easily.   Psychiatric/Behavioral:  Negative for depression and memory loss. The patient is not nervous/anxious and does not have insomnia.       Objective:     Vitals:    10/10/22 1453   BP: 110/76   Pulse: 80   Resp: 16   Temp: 98.1 °F (36.7 °C)   TempSrc: Temporal   SpO2: (!) 94%   Weight: 64.5 kg (142 lb 3.2 oz)   Height: 5' 4" (1.626 m)   PainSc: 0-No pain     Wt Readings from Last 10 Encounters:   10/10/22 64.5 kg (142 lb 3.2 oz)   10/10/22 64.5 kg (142 lb 3.2 oz)   09/12/22 64.8 kg (142 lb 12 oz)   08/30/22 65.3 kg (144 lb)   08/15/22 65.6 kg (144 lb 11.7 oz)   07/18/22 66 kg (145 lb 8.1 oz)   07/18/22 66 kg (145 lb 8.1 oz)   05/09/22 67.1 kg (147 lb 14.9 oz)   04/04/22 67 kg (147 lb 11.3 oz)   04/04/22 67 kg (147 lb 11.3 oz)       Physical Examination:   Physical Exam  Vitals and nursing note reviewed. "   Constitutional:       General: She is not in acute distress.     Appearance: She is not diaphoretic.   HENT:      Head: Normocephalic.      Mouth/Throat:      Pharynx: No oropharyngeal exudate.   Eyes:      General: No scleral icterus.     Conjunctiva/sclera: Conjunctivae normal.   Neck:      Thyroid: No thyromegaly.   Cardiovascular:      Rate and Rhythm: Normal rate and regular rhythm.      Heart sounds: Normal heart sounds. No murmur heard.  Pulmonary:      Effort: Pulmonary effort is normal. No respiratory distress.      Breath sounds: No stridor. No wheezing or rales.   Chest:      Chest wall: No tenderness.   Abdominal:      General: Bowel sounds are normal. There is no distension.      Palpations: Abdomen is soft. There is no mass.      Tenderness: There is no abdominal tenderness. There is no rebound.   Musculoskeletal:         General: No tenderness or deformity. Normal range of motion.      Cervical back: Neck supple.   Lymphadenopathy:      Cervical: No cervical adenopathy.   Skin:     General: Skin is warm and dry.      Findings: No erythema or rash.   Neurological:      Mental Status: She is alert and oriented to person, place, and time.      Cranial Nerves: No cranial nerve deficit.      Coordination: Coordination normal.      Gait: Gait is intact.   Psychiatric:         Mood and Affect: Affect normal.         Cognition and Memory: Memory normal.         Judgment: Judgment normal.        Diagnostic Tests:  Significant Imaging: I have reviewed and interpreted all pertinent imaging results/findings.      Laboratory Data:  All pertinent labs have been reviewed.  Labs:   Lab Results   Component Value Date    WBC 3.66 (L) 10/06/2022    RBC 5.03 10/06/2022    HGB 15.5 10/06/2022    HCT 45.7 10/06/2022    MCV 91 10/06/2022     10/06/2022    GLU 91 10/06/2022     10/06/2022    K 4.5 10/06/2022    BUN 11 10/06/2022    CREATININE 0.9 10/06/2022    AST 17 10/06/2022    ALT 17 10/06/2022    BILITOT  0.4 10/06/2022       Assessment/Plan:   Malignant neoplasm of central portion of left breast in female, estrogen receptor positive  Stage IV (T3N2M2) invasive ductal carcinoma of left breast,  quadrant, ER 96%, NM 94%, Her2 neg, Grade 2, Ki67 30%  PIKC3A mutation positive      Recent mammogram shows slight increase in the size of the left breast mass, previously biopsied and a new mass. POD cannot be ruled out and a biopsy of both is recommended. Will arrange for biopsy and discuss further recommendations. I will also obtain restaging work up prior to her next visit.     Meanwhile, continue ribociclib 400 mg p.o. day 1-day 21 Q 28 days and anastrozole 1 mg p.o. Continue zoladex q 28 days.     Bone metastases  Continue Xgeva Q 28 days.  Continue Caltrate b.i.d. at this time.    Malignant neoplasm metastatic to lung, unspecified laterality  Subcm pulmonary nodules are stable and benign appearing.  Lung nodules have never been biopsied.  Continue to monitor.    Use of anastrozole  As above.    Smoker  She is motivated to quit.  She is referred to smoking cessation program.    URI   Over 2 weeks, will give Z pack and offer further work up if symptoms do  not improve.        ECOG SCORE    1 - Restricted in strenuous activity-ambulatory and able to carry out work of a light nature         MDM includes  :    - Acute or chronic illness or injury that poses a threat to life or bodily function  - Independent review and explanation of 3+ results from unique tests  - Discussion of management and ordering 3+ unique tests  - Extensive discussion of treatment and management  - Prescription drug management  - Drug therapy requiring intensive monitoring for toxicity     Discussion:   No follow-ups on file.    Plan was discussed with the patient at length, and she verbalized understanding. Angeles was given an opportunity to ask questions that were answered to her satisfaction, and she was advised to call in the interval if any  problems or questions arise.    Electronically signed by Shelby Thomas MD      Route Chart for Scheduling    Med Onc Chart Routing      Follow up with physician . 11/7   Follow up with LEÓN    Infusion scheduling note    Injection scheduling note 11/7   Labs CBC, CMP and CA 27.29   Lab interval:  11/7   Imaging CT chest abdomen pelvis   11/4   Pharmacy appointment    Other referrals            Supportive Plan Information  OP BREAST GOSERELIN Q4W   Shelby Thomas MD   Upcoming Treatment Dates - OP BREAST GOSERELIN Q4W    No upcoming days in selected categories.    Therapy Plan Information  PORT FLUSH  Flushes  heparin, porcine (PF) 100 unit/mL injection flush 500 Units  500 Units, Intravenous, Every visit  sodium chloride 0.9% flush 10 mL  10 mL, Intravenous, Every visit    DENOSUMAB (XGEVA) Q4W  Medications  denosumab (XGEVA) solution 120 mg  120 mg, Subcutaneous, Every 4 weeks

## 2022-10-10 NOTE — TELEPHONE ENCOUNTER
[facility]. Cardona. Through the efforts of Kim Rosales RN and myself, we were able to scheduled left breast u/s guided biopsy on 10/21/22 at 8 am at [address]. Nurse Kim discussed appt date/time and gave the patient my private line to call to discuss the procedure.     Kim also informed Dr. Cardona of the stef/time.

## 2022-10-10 NOTE — PROGRESS NOTES
SHAZIA contacted by Kim RN in navigation to assess for resource needs. SW met with Kim and pt. 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 She is on Medicaid and is trying to get back on her feet through the help of the [employer] that runs her group home. SW completed a support enrolment from with pt. Pt does not need food at this time as the group home supplies her meals. She does need help with transportation cost. She has to drive self to her job and to medical appointments. Pt on limited income. SHAZIA approved pt for gas cards. Provided pt with a gas card today.       Radha Garcia, DEVW

## 2022-10-13 ENCOUNTER — PATIENT MESSAGE (OUTPATIENT)
Dept: PHARMACY | Facility: CLINIC | Age: 52
End: 2022-10-13
Payer: MEDICAID

## 2022-10-13 ENCOUNTER — PATIENT MESSAGE (OUTPATIENT)
Dept: HEMATOLOGY/ONCOLOGY | Facility: CLINIC | Age: 52
End: 2022-10-13
Payer: MEDICAID

## 2022-10-13 NOTE — NURSING
Met with pt in clinic on 10/10/2022.  Spoke with Zofia in Radiology.  Scheduled pt for mammo and u/s for 10/21/22.   Confirmed best way to communicate with her.  She can have her phone at work and can check the portal.  Going forward will send messages through the portal.  She met with SHAZIA Garcia for available resources and support. Encouraged her to call or send a message if she has any questions or concerns.  Pt thanked me and verbalized understanding.

## 2022-10-17 ENCOUNTER — SPECIALTY PHARMACY (OUTPATIENT)
Dept: PHARMACY | Facility: CLINIC | Age: 52
End: 2022-10-17
Payer: MEDICAID

## 2022-10-17 NOTE — TELEPHONE ENCOUNTER
Specialty Pharmacy - Refill Coordination    Specialty Medication Orders Linked to Encounter      Flowsheet Row Most Recent Value   Medication #1 ribociclib (KISQALI) 400 mg/day (200 mg x 2) Tab (Order#[id number], Rx#[id number])          Refill set up with Anjelica at [facility]. She will relay message to pt and have her call if any issues with medication.    Refill Questions - Documented Responses      Flowsheet Row Most Recent Value   Refill Screening Questions    Changes to allergies? No   Changes to medications? No   New conditions since last clinic visit? No   Unplanned office visit, urgent care, ED, or hospital admission in the last 4 weeks? No   How does patient/caregiver feel medication is working? Good   Financial problems or insurance changes? No   How many doses of your specialty medications were missed in the last 4 weeks? 0   Would patient like to speak to a pharmacist? No   When does the patient need to receive the medication? 10/19/22   Refill Delivery Questions    How will the patient receive the medication? MEDRx   When does the patient need to receive the medication? 10/19/22   Shipping Address Home   Address in [facility] confirmed and updated if neccessary? Yes   Expected Copay ($) 0   Is the patient able to afford the medication copay? Yes   Payment Method zero copay   Days supply of Refill 28   Supplies needed? No supplies needed   Refill activity completed? Yes   Refill activity plan Refill scheduled   Shipment/Pickup Date: 10/18/22            Current Outpatient Medications   Medication Sig    albuterol (PROVENTIL) 2.5 mg /3 mL (0.083 %) nebulizer solution SMARTSIG:3 Milliliter(s) Via Nebulizer Every 4 Hours PRN    albuterol (PROVENTIL) 2.5 mg /3 mL (0.083 %) nebulizer solution Inhale 2.5 mg into the lungs.    anastrozole (ARIMIDEX) 1 mg Tab Take 1 tablet (1 mg total) by mouth once daily.    buPROPion (WELLBUTRIN SR) 150 MG TBSR 12 hr tablet Take 1 tablet daily for 7 days then 1 tablet twice  daily thereafter    cetirizine (ZYRTEC) 10 MG tablet Take 10 mg by mouth once daily.    cetirizine (ZYRTEC) 10 MG tablet Take 10 mg by mouth.    COMP-AIR NEBULIZER COMPRESSOR Sherri use as directed    ketorolac (TORADOL) 10 mg tablet Take 10 mg by mouth every 6 (six) hours as needed.    nebulizer and compressor Sherri Use as directed    nicotine (NICODERM CQ) 21 mg/24 hr Place 1 patch onto the skin once daily. (Patient not taking: Reported on 10/10/2022)    ondansetron (ZOFRAN-ODT) 4 MG TbDL Take 1 tablet by mouth every 8 (eight) hours as needed.    ondansetron (ZOFRAN-ODT) 4 MG TbDL Take 4 mg by mouth every 8 (eight) hours as needed.    ribociclib (KISQALI) 400 mg/day (200 mg x 2) Tab Take 2 tablets (400mg) day 1 to day 21 every 28 days   Last reviewed on 10/14/2022  9:09 AM by Shelby Thomas MD    Review of patient's allergies indicates:  No Known Allergies Last reviewed on  10/10/2022 3:59 PM by Zofia Hyde      Tasks added this encounter   11/9/2022 - Refill Call (Auto Added)   Tasks due within next 3 months   No tasks due.     Charlotte Braden, PharmD  [facility]  [address] [facility] [address]  Phone: [phone]  Fax: [phone]

## 2022-10-17 NOTE — TELEPHONE ENCOUNTER
Attempted to call alternative number in chart with no success. Terra not available, instructed to call back in about 45 min.

## 2022-10-21 ENCOUNTER — HOSPITAL ENCOUNTER (OUTPATIENT)
Dept: RADIOLOGY | Facility: HOSPITAL | Age: 52
Discharge: HOME | End: 2022-10-21
Attending: INTERNAL MEDICINE
Payer: MEDICAID

## 2022-10-21 DIAGNOSIS — R92.8 ABNORMAL MAMMOGRAM OF LEFT BREAST: ICD-10-CM

## 2022-10-21 DIAGNOSIS — R92.8 ABNORMAL MAMMOGRAM OF RIGHT BREAST: ICD-10-CM

## 2022-10-21 PROCEDURE — 77065 DX MAMMO INCL CAD UNI: CPT | Mod: TC,PO,LT

## 2022-10-21 PROCEDURE — 88305 TISSUE EXAM BY PATHOLOGIST: CPT | Performed by: PATHOLOGY

## 2022-10-21 PROCEDURE — 77065 MAMMO DIGITAL DIAGNOSTIC LEFT: ICD-10-PCS | Mod: 26,LT,, | Performed by: RADIOLOGY

## 2022-10-21 PROCEDURE — 88360 PR  TUMOR IMMUNOHISTOCHEM/MANUAL: ICD-10-PCS | Mod: 26,,, | Performed by: PATHOLOGY

## 2022-10-21 PROCEDURE — 88341 PR IHC OR ICC EACH ADD'L SINGLE ANTIBODY  STAINPR: ICD-10-PCS | Mod: 26,59,, | Performed by: PATHOLOGY

## 2022-10-21 PROCEDURE — 19083 US BREAST BIOPSY WITH IMAGING 1ST SITE LEFT: ICD-10-PCS | Mod: LT,,, | Performed by: RADIOLOGY

## 2022-10-21 PROCEDURE — 88360 TUMOR IMMUNOHISTOCHEM/MANUAL: CPT | Mod: 26,,, | Performed by: PATHOLOGY

## 2022-10-21 PROCEDURE — 19083 BX BREAST 1ST LESION US IMAG: CPT | Mod: LT,,, | Performed by: RADIOLOGY

## 2022-10-21 PROCEDURE — 88342 CHG IMMUNOCYTOCHEMISTRY: ICD-10-PCS | Mod: 26,XU,, | Performed by: PATHOLOGY

## 2022-10-21 PROCEDURE — 25000003 PHARM REV CODE 250: Mod: PO | Performed by: INTERNAL MEDICINE

## 2022-10-21 PROCEDURE — 27201044 US BREAST BIOPSY WITH IMAGING 1ST SITE LEFT: Mod: PO

## 2022-10-21 PROCEDURE — 88342 IMHCHEM/IMCYTCHM 1ST ANTB: CPT | Mod: 26,XU,, | Performed by: PATHOLOGY

## 2022-10-21 PROCEDURE — 77065 DX MAMMO INCL CAD UNI: CPT | Mod: 26,LT,, | Performed by: RADIOLOGY

## 2022-10-21 PROCEDURE — 88341 IMHCHEM/IMCYTCHM EA ADD ANTB: CPT | Mod: 26,59,, | Performed by: PATHOLOGY

## 2022-10-21 PROCEDURE — 88305 TISSUE EXAM BY PATHOLOGIST: CPT | Mod: 26,,, | Performed by: PATHOLOGY

## 2022-10-21 PROCEDURE — 88341 IMHCHEM/IMCYTCHM EA ADD ANTB: CPT | Mod: 59 | Performed by: PATHOLOGY

## 2022-10-21 PROCEDURE — 88360 TUMOR IMMUNOHISTOCHEM/MANUAL: CPT | Mod: 59 | Performed by: PATHOLOGY

## 2022-10-21 PROCEDURE — 88342 IMHCHEM/IMCYTCHM 1ST ANTB: CPT | Performed by: PATHOLOGY

## 2022-10-21 PROCEDURE — 88305 TISSUE EXAM BY PATHOLOGIST: ICD-10-PCS | Mod: 26,,, | Performed by: PATHOLOGY

## 2022-10-21 RX ORDER — LIDOCAINE HYDROCHLORIDE 10 MG/ML
5 INJECTION INFILTRATION; PERINEURAL ONCE
Status: COMPLETED | OUTPATIENT
Start: 2022-10-21 | End: 2022-10-21

## 2022-10-21 RX ORDER — LIDOCAINE HCL/EPINEPHRINE/PF 2%-1:200K
10 VIAL (ML) INJECTION ONCE
Status: COMPLETED | OUTPATIENT
Start: 2022-10-21 | End: 2022-10-21

## 2022-10-21 RX ADMIN — LIDOCAINE HYDROCHLORIDE,EPINEPHRINE BITARTRATE 10 ML: 20; .005 INJECTION, SOLUTION EPIDURAL; INFILTRATION; INTRACAUDAL; PERINEURAL at 08:10

## 2022-10-21 RX ADMIN — LIDOCAINE HYDROCHLORIDE 5 ML: 10 INJECTION, SOLUTION EPIDURAL; INFILTRATION; INTRACAUDAL; PERINEURAL at 08:10

## 2022-10-28 ENCOUNTER — DOCUMENTATION ONLY (OUTPATIENT)
Dept: RADIOLOGY | Facility: HOSPITAL | Age: 52
End: 2022-10-28

## 2022-10-28 LAB
FINAL PATHOLOGIC DIAGNOSIS: NORMAL
GROSS: NORMAL
Lab: NORMAL

## 2022-10-28 NOTE — PROGRESS NOTES
Breast biopsy results POSitifge for invasive ductal carcinoma. Patient already has known breast cancer, [facility], therefore did not call as Dr. Cardona will inform patient.     Teams message to Kim [facility]ales RN at the [facility] as well.  Final Pathologic Diagnosis LEFT BREAST, 12 O'CLOCK MASS 1 CM FROM NIPPLE, BIOPSY:   Invasive ductal carcinoma, Arjun histologic grade 1 (tubule formation:   2, nuclear pleomorphism:  2, mitotic activity:  1).   Comment:  The biopsy reveals infiltrating ductal carcinoma with partial   mucinous features including abundant tubules and tumor nests with punched out   cribriform spaces containing mucin.  Tumor cells are positive with GATA3 and   negative with , in keeping with mammary ductal origin.  The largest   continuous focus of invasive carcinoma present measures 4 mm.  Dr. Zofia Saleem also reviewed this case and agrees with the diagnosis.   BREAST BIOMARKER RESULTS   Estrogen receptor (ER):  Positive (nearly 100%, strong)   Progesterone receptor (UT):  Positive (50%, weak to moderate)   HER2 IHC:  Negative (1+)   Ki-67 proliferation index:  10%   All immunostain controls react appropriately.    Comment: Interp By KAYDEN Cordova M.D., Signed on 10/28/2022 at 15:56

## 2022-11-04 ENCOUNTER — HOSPITAL ENCOUNTER (OUTPATIENT)
Dept: RADIOLOGY | Facility: HOSPITAL | Age: 52
Discharge: HOME | End: 2022-11-04
Attending: INTERNAL MEDICINE
Payer: MEDICAID

## 2022-11-04 DIAGNOSIS — C50.112 MALIGNANT NEOPLASM OF CENTRAL PORTION OF LEFT BREAST IN FEMALE, ESTROGEN RECEPTOR POSITIVE: ICD-10-CM

## 2022-11-04 DIAGNOSIS — Z17.0 MALIGNANT NEOPLASM OF CENTRAL PORTION OF LEFT BREAST IN FEMALE, ESTROGEN RECEPTOR POSITIVE: ICD-10-CM

## 2022-11-04 PROCEDURE — 71260 CT CHEST ABDOMEN PELVIS WITH CONTRAST (XPD): ICD-10-PCS | Mod: 26,,, | Performed by: RADIOLOGY

## 2022-11-04 PROCEDURE — A9698 NON-RAD CONTRAST MATERIALNOC: HCPCS | Mod: PO | Performed by: INTERNAL MEDICINE

## 2022-11-04 PROCEDURE — 74177 CT ABD & PELVIS W/CONTRAST: CPT | Mod: 26,,, | Performed by: RADIOLOGY

## 2022-11-04 PROCEDURE — 25500020 PHARM REV CODE 255: Mod: PO | Performed by: INTERNAL MEDICINE

## 2022-11-04 PROCEDURE — 74177 CT ABD & PELVIS W/CONTRAST: CPT | Mod: TC,PO

## 2022-11-04 PROCEDURE — 71260 CT THORAX DX C+: CPT | Mod: TC,PO

## 2022-11-04 PROCEDURE — 74177 CT CHEST ABDOMEN PELVIS WITH CONTRAST (XPD): ICD-10-PCS | Mod: 26,,, | Performed by: RADIOLOGY

## 2022-11-04 PROCEDURE — 71260 CT THORAX DX C+: CPT | Mod: 26,,, | Performed by: RADIOLOGY

## 2022-11-04 RX ADMIN — IOHEXOL 75 ML: 350 INJECTION, SOLUTION INTRAVENOUS at 08:11

## 2022-11-04 RX ADMIN — BARIUM SULFATE 900 ML: 20 SUSPENSION ORAL at 09:11

## 2022-11-07 ENCOUNTER — INFUSION (OUTPATIENT)
Dept: INFUSION THERAPY | Facility: HOSPITAL | Age: 52
End: 2022-11-07
Attending: INTERNAL MEDICINE
Payer: MEDICAID

## 2022-11-07 ENCOUNTER — PATIENT MESSAGE (OUTPATIENT)
Dept: HEMATOLOGY/ONCOLOGY | Facility: CLINIC | Age: 52
End: 2022-11-07
Payer: MEDICAID

## 2022-11-07 ENCOUNTER — OFFICE VISIT (OUTPATIENT)
Dept: HEMATOLOGY/ONCOLOGY | Facility: CLINIC | Age: 52
End: 2022-11-07
Payer: MEDICAID

## 2022-11-07 VITALS
RESPIRATION RATE: 16 BRPM | OXYGEN SATURATION: 98 % | DIASTOLIC BLOOD PRESSURE: 84 MMHG | SYSTOLIC BLOOD PRESSURE: 132 MMHG | HEART RATE: 82 BPM

## 2022-11-07 VITALS
BODY MASS INDEX: 24.13 KG/M2 | WEIGHT: 141.31 LBS | HEIGHT: 64 IN | RESPIRATION RATE: 18 BRPM | OXYGEN SATURATION: 98 % | SYSTOLIC BLOOD PRESSURE: 132 MMHG | DIASTOLIC BLOOD PRESSURE: 84 MMHG | HEART RATE: 82 BPM

## 2022-11-07 DIAGNOSIS — C50.112 MALIGNANT NEOPLASM OF CENTRAL PORTION OF LEFT BREAST IN FEMALE, ESTROGEN RECEPTOR POSITIVE: Primary | ICD-10-CM

## 2022-11-07 DIAGNOSIS — C50.112 MALIGNANT NEOPLASM OF CENTRAL PORTION OF LEFT BREAST IN FEMALE, ESTROGEN RECEPTOR POSITIVE: ICD-10-CM

## 2022-11-07 DIAGNOSIS — Z17.0 MALIGNANT NEOPLASM OF CENTRAL PORTION OF LEFT BREAST IN FEMALE, ESTROGEN RECEPTOR POSITIVE: ICD-10-CM

## 2022-11-07 DIAGNOSIS — C79.51 BONE METASTASES: Primary | ICD-10-CM

## 2022-11-07 DIAGNOSIS — Z17.0 MALIGNANT NEOPLASM OF CENTRAL PORTION OF LEFT BREAST IN FEMALE, ESTROGEN RECEPTOR POSITIVE: Primary | ICD-10-CM

## 2022-11-07 PROCEDURE — 3075F PR MOST RECENT SYSTOLIC BLOOD PRESS GE 130-139MM HG: ICD-10-PCS | Mod: CPTII,,, | Performed by: INTERNAL MEDICINE

## 2022-11-07 PROCEDURE — 99215 OFFICE O/P EST HI 40 MIN: CPT | Mod: S$PBB,,, | Performed by: INTERNAL MEDICINE

## 2022-11-07 PROCEDURE — 3079F PR MOST RECENT DIASTOLIC BLOOD PRESSURE 80-89 MM HG: ICD-10-PCS | Mod: CPTII,,, | Performed by: INTERNAL MEDICINE

## 2022-11-07 PROCEDURE — 96402 CHEMO HORMON ANTINEOPL SQ/IM: CPT | Mod: PN

## 2022-11-07 PROCEDURE — 99999 PR PBB SHADOW E&M-EST. PATIENT-LVL V: CPT | Mod: PBBFAC,,, | Performed by: INTERNAL MEDICINE

## 2022-11-07 PROCEDURE — 99999 PR PBB SHADOW E&M-EST. PATIENT-LVL V: ICD-10-PCS | Mod: PBBFAC,,, | Performed by: INTERNAL MEDICINE

## 2022-11-07 PROCEDURE — 3008F PR BODY MASS INDEX (BMI) DOCUMENTED: ICD-10-PCS | Mod: CPTII,,, | Performed by: INTERNAL MEDICINE

## 2022-11-07 PROCEDURE — 3075F SYST BP GE 130 - 139MM HG: CPT | Mod: CPTII,,, | Performed by: INTERNAL MEDICINE

## 2022-11-07 PROCEDURE — 96372 THER/PROPH/DIAG INJ SC/IM: CPT | Mod: 59,PN

## 2022-11-07 PROCEDURE — 3079F DIAST BP 80-89 MM HG: CPT | Mod: CPTII,,, | Performed by: INTERNAL MEDICINE

## 2022-11-07 PROCEDURE — 99215 OFFICE O/P EST HI 40 MIN: CPT | Mod: PBBFAC,25,PN | Performed by: INTERNAL MEDICINE

## 2022-11-07 PROCEDURE — 3008F BODY MASS INDEX DOCD: CPT | Mod: CPTII,,, | Performed by: INTERNAL MEDICINE

## 2022-11-07 PROCEDURE — 99215 PR OFFICE/OUTPT VISIT, EST, LEVL V, 40-54 MIN: ICD-10-PCS | Mod: S$PBB,,, | Performed by: INTERNAL MEDICINE

## 2022-11-07 PROCEDURE — 63600175 PHARM REV CODE 636 W HCPCS: Mod: PN | Performed by: INTERNAL MEDICINE

## 2022-11-07 RX ORDER — PREDNISONE 20 MG/1
20 TABLET ORAL DAILY
COMMUNITY
Start: 2022-10-13 | End: 2022-12-13 | Stop reason: ALTCHOICE

## 2022-11-07 RX ORDER — ALBUTEROL SULFATE 90 UG/1
2 AEROSOL, METERED RESPIRATORY (INHALATION) EVERY 6 HOURS PRN
COMMUNITY
Start: 2022-10-13 | End: 2022-11-14 | Stop reason: SDUPTHER

## 2022-11-07 RX ORDER — DOXYCYCLINE 100 MG/1
100 CAPSULE ORAL 2 TIMES DAILY
COMMUNITY
Start: 2022-10-13 | End: 2022-11-14 | Stop reason: SDUPTHER

## 2022-11-07 RX ORDER — BENZONATATE 100 MG/1
200 CAPSULE ORAL 3 TIMES DAILY PRN
COMMUNITY
Start: 2022-10-14 | End: 2023-06-19

## 2022-11-07 RX ORDER — SODIUM CHLORIDE 0.9 % (FLUSH) 0.9 %
10 SYRINGE (ML) INJECTION
Status: DISCONTINUED | OUTPATIENT
Start: 2022-11-07 | End: 2022-11-07 | Stop reason: HOSPADM

## 2022-11-07 RX ORDER — ALBUTEROL SULFATE 90 UG/1
2 AEROSOL, METERED RESPIRATORY (INHALATION) EVERY 6 HOURS PRN
COMMUNITY
Start: 2022-10-13 | End: 2022-11-12

## 2022-11-07 RX ORDER — HEPARIN 100 UNIT/ML
500 SYRINGE INTRAVENOUS
Status: DISCONTINUED | OUTPATIENT
Start: 2022-11-07 | End: 2022-11-07 | Stop reason: HOSPADM

## 2022-11-07 RX ORDER — BENZONATATE 100 MG/1
200 CAPSULE ORAL
COMMUNITY
Start: 2022-10-13 | End: 2022-11-14 | Stop reason: SDUPTHER

## 2022-11-07 RX ADMIN — GOSERELIN ACETATE 3.6 MG: 3.6 IMPLANT SUBCUTANEOUS at 02:11

## 2022-11-07 RX ADMIN — DENOSUMAB 120 MG: 120 INJECTION SUBCUTANEOUS at 02:11

## 2022-11-07 RX ADMIN — Medication 500 UNITS: at 02:11

## 2022-11-07 NOTE — PLAN OF CARE
Pt tolerated zoladex and xgeva well today. Port flushed today. Reviewed follow-up appointments. All questions were answered, ambulated independently at d/c.

## 2022-11-07 NOTE — PROGRESS NOTES
PROGRESS NOTE    Subjective:       Patient ID: Angeles Wright is a 52 y.o. female.  MRN:   : [id number]    Chief Complaint: Breast cancer       History of Present Illness:   Angeles Wright is a 52 y.o. female who presents with metastatic invasive ductal left breast cancer.       As previously documented by Dr. Mcgovern  she transferred care to [facility] in 2019. Labs showed her to be pre-menopausal thus she was treated with Ribociclib /Zoladex and Arimidex but she was off therapy from November to May 2020. In 2020 scans showed improvement thus she restarted Arimidex/ribociclib/zoladex.     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      Interim history:   She presents today to discuss her symptoms, labs, scans, biopsy and further recommendations.      She has  continued dose reduced ribociclib at the 400 mg p.o. day 1-day 21 Q 28 days since 2020. She has missed doses in the past few months.      She has continued anastrozole 1 mg p.o. daily.  She is due for Zoladex and xgeva injection today.     She had a mammogram and ultrasound on 22 that showed a mass in the left breast, in addition to the known malignancy. Biopsy was done, results discussed.     Oncology History:  Oncology History   Malignant neoplasm of central portion of left breast in female, estrogen receptor positive   9/3/2019 Initial Diagnosis    Malignant neoplasm of central portion of left breast in female, estrogen receptor positive     9/3/2019 - 9/3/2019 Chemotherapy    Treatment Summary   Plan Name: OP ANASTROZOLE PALBOCICLIB Q4W  Treatment Goal: Palliative  Status: Inactive  Start Date: 9/3/2019  End Date: 9/3/2019  Provider: Melania Mcgovern MD  Chemotherapy: [No matching medication found in this treatment plan]       2019 Cancer Staged    Staging form: Breast, AJCC 8th Edition  - Clinical: Stage IV (cT4b, cM1, ER+, MA+, HER2-)         22  Impression:     No  significant interval changes.  Known malignancy in the left breast with metastatic disease to bone.  Additional stable bilateral breast nodules which have been evaluated previously with mammography and ultrasound.  Stable small pleural based lung nodules in the right hemithorax.     9/12/22  Impression:  Left  Mass: Left breast 22 mm x 22 mm x 15 mm mass at the upper middle 12 o'clock position. Assessment: 6 - Known biopsy, proven malignancy.   Mass: Left breast 12 mm x 9 mm x 5 mm mass at the upper posterior 12 o'clock position. Assessment: 4C - Suspicious finding. Biopsy is recommended.      Right  Mass: Right breast 10 mm x 10 mm x 7 mm mass at the upper outer 11 o'clock position. Assessment: 3 - Probably benign.   Mass: Right breast 14 mm x 7 mm x 11 mm mass at the 9 o'clock position. Assessment: 3 - Probably benign.      BI-RADS Category:   Overall: 4 - Suspicious    9/28/22  Impression:  Left  Mass: Left breast 22 mm x 22 mm x 15 mm mass at the upper middle 12 o'clock position. Assessment: 6 - Known biopsy, proven malignancy.   Mass: Left breast 12 mm x 9 mm x 5 mm mass at the upper posterior 12 o'clock position. Assessment: 4C - Suspicious finding. Biopsy is recommended.      Right  Mass: Right breast 10 mm x 10 mm x 7 mm mass at the upper outer 11 o'clock position. Assessment: 3 - Probably benign.   Mass: Right breast 14 mm x 7 mm x 11 mm mass at the 9 o'clock position. Assessment: 3 - Probably benign.      BI-RADS Category:   Overall: 4 - Suspicious     Recommendation:  Biopsy could be considered. There is a strong possibility that this represents progression of disease in this patient with known metastatic breast carcinoma.    10/21/22   Final Pathologic Diagnosis LEFT BREAST, 12 O'CLOCK MASS 1 CM FROM NIPPLE, BIOPSY:   Invasive ductal carcinoma, Arjun histologic grade 1 (tubule formation:   2, nuclear pleomorphism:  2, mitotic activity:  1).   Comment:  The biopsy reveals infiltrating ductal  carcinoma with partial   mucinous features including abundant tubules and tumor nests with punched out   cribriform spaces containing mucin.  Tumor cells are positive with GATA3 and   negative with , in keeping with mammary ductal origin.  The largest   continuous focus of invasive carcinoma present measures 4 mm.  Dr. Zofia Saleem also reviewed this case and agrees with the diagnosis.   BREAST BIOMARKER RESULTS   Estrogen receptor (ER):  Positive (nearly 100%, strong)   Progesterone receptor (MA):  Positive (50%, weak to moderate)   HER2 IHC:  Negative (1+)   Ki-67 proliferation index:  10%        11/4/22  CT chest abd pelvis  Impression:     Stable diffuse osseous metastatic disease.     Left breast masses consistent with known malignancy and better evaluated on recent mammogram and ultrasound.     New left lower lobe linear consolidation, favored to be due to atelectasis versus infection/inflammation.  Recommend attention on follow-up exam as per clinical protocol.     Sigmoid colon diverticulosis without diverticulitis.     Pulmonary emphysema.    History:  Past Medical History:   Diagnosis Date    Breast cancer       Past Surgical History:   Procedure Laterality Date    BREAST BIOPSY Right     BREAST LUMPECTOMY Left     TUBAL LIGATION       Family History   Problem Relation Age of Onset    Lung cancer Mother     Heart attack Father     Suicide Brother     No Known Problems Maternal Aunt     No Known Problems Maternal Uncle     No Known Problems Paternal Aunt     No Known Problems Paternal Uncle     No Known Problems Maternal Grandmother     No Known Problems Maternal Grandfather     No Known Problems Paternal Grandmother     No Known Problems Paternal Grandfather     No Known Problems Son     No Known Problems Son     No Known Problems Daughter     Breast cancer Cousin     Breast cancer Cousin      Social History     Tobacco Use    Smoking status: Every Day     Packs/day: 1.00     Years: 33.00     Pack  "years: 33.00     Types: Cigarettes     Start date: 9/3/1994    Smokeless tobacco: Never   Substance and Sexual Activity    Alcohol use: Yes     Comment: occasionally    Drug use: Not Currently    Sexual activity: Yes     Partners: Male     Birth control/protection: None        ROS:   Review of Systems   Constitutional:  Negative for fever, malaise/fatigue and weight loss.   HENT:  Negative for congestion, hearing loss, nosebleeds and sore throat.    Eyes:  Negative for double vision and photophobia.   Respiratory:  Negative for cough, hemoptysis, sputum production, shortness of breath and wheezing.    Cardiovascular:  Negative for chest pain, palpitations, orthopnea and leg swelling.   Gastrointestinal:  Negative for abdominal pain, blood in stool, constipation, diarrhea, heartburn, nausea and vomiting.   Genitourinary:  Negative for dysuria, hematuria and urgency.   Musculoskeletal:  Positive for joint pain (left hip, mild ). Negative for back pain and myalgias.   Skin:  Negative for itching and rash.   Neurological:  Negative for dizziness, tingling, seizures and weakness.   Endo/Heme/Allergies:  Negative for polydipsia. Does not bruise/bleed easily.   Psychiatric/Behavioral:  Negative for depression and memory loss. The patient is not nervous/anxious and does not have insomnia.       Objective:     Vitals:    11/07/22 1354   BP: 132/84   Pulse: 82   Resp: 18   SpO2: 98%   Weight: 64.1 kg (141 lb 5 oz)   Height: 5' 4" (1.626 m)   PainSc: 0-No pain     Wt Readings from Last 10 Encounters:   11/07/22 64.1 kg (141 lb 5 oz)   10/10/22 64.5 kg (142 lb 3.2 oz)   10/10/22 64.5 kg (142 lb 3.2 oz)   09/12/22 64.8 kg (142 lb 12 oz)   08/30/22 65.3 kg (144 lb)   08/15/22 65.6 kg (144 lb 11.7 oz)   07/18/22 66 kg (145 lb 8.1 oz)   07/18/22 66 kg (145 lb 8.1 oz)   05/09/22 67.1 kg (147 lb 14.9 oz)   04/04/22 67 kg (147 lb 11.3 oz)       Physical Examination:   Physical Exam  Vitals and nursing note reviewed.   Constitutional:  "      General: She is not in acute distress.     Appearance: She is not diaphoretic.   HENT:      Head: Normocephalic.      Mouth/Throat:      Pharynx: No oropharyngeal exudate.   Eyes:      General: No scleral icterus.     Conjunctiva/sclera: Conjunctivae normal.   Neck:      Thyroid: No thyromegaly.   Cardiovascular:      Rate and Rhythm: Normal rate and regular rhythm.      Heart sounds: Normal heart sounds. No murmur heard.  Pulmonary:      Effort: Pulmonary effort is normal. No respiratory distress.      Breath sounds: No stridor. No wheezing or rales.   Chest:      Chest wall: No tenderness.   Abdominal:      General: Bowel sounds are normal. There is no distension.      Palpations: Abdomen is soft. There is no mass.      Tenderness: There is no abdominal tenderness. There is no rebound.   Musculoskeletal:         General: No tenderness or deformity. Normal range of motion.      Cervical back: Neck supple.   Lymphadenopathy:      Cervical: No cervical adenopathy.   Skin:     General: Skin is warm and dry.      Findings: No erythema or rash.   Neurological:      Mental Status: She is alert and oriented to person, place, and time.      Cranial Nerves: No cranial nerve deficit.      Coordination: Coordination normal.      Gait: Gait is intact.   Psychiatric:         Mood and Affect: Affect normal.         Cognition and Memory: Memory normal.         Judgment: Judgment normal.        Diagnostic Tests:  Significant Imaging: I have reviewed and interpreted all pertinent imaging results/findings.      Laboratory Data:  All pertinent labs have been reviewed.  Labs:   Lab Results   Component Value Date    WBC 10.54 11/04/2022    RBC 4.44 11/04/2022    HGB 13.8 11/04/2022    HCT 40.5 11/04/2022    MCV 91 11/04/2022     11/04/2022    GLU 76 11/04/2022     11/04/2022    K 4.3 11/04/2022    BUN 8 11/04/2022    CREATININE 0.8 11/04/2022    AST 13 11/04/2022    ALT 7 (L) 11/04/2022    BILITOT 0.3 11/04/2022        Assessment/Plan:   Malignant neoplasm of central portion of left breast in female, estrogen receptor positive  Stage IV (T3N2M2) invasive ductal carcinoma of left breast,  quadrant, ER 96%, ID 94%, Her2 neg, Grade 2, Ki67 30%  PIKC3A mutation positive      Recent mammogram shows slight increase in the size of the left breast mass, previously biopsied and a new mass.   Biopsy shows a Grade 1 IDC, strongly ER + tumor with Ki 67 10%, more favorable than prior biopsy.   There is no clear metastatic disease progression based on recent scans.      We discussed lumpectomy or mastectomy for solitary disease progression in the breast vs change in systemic therapy. Will get surgical opinion and discuss at tumor board.     Meanwhile, continue ribociclib 400 mg p.o. day 1-day 21 Q 28 days and anastrozole 1 mg p.o. Continue zoladex q 28 days.     Bone metastases  Continue Xgeva Q 28 days.  Continue Caltrate b.i.d. at this time.    Malignant neoplasm metastatic to lung, unspecified laterality  Subcm pulmonary nodules are stable and benign appearing.  Lung nodules have never been biopsied.  Recent changes appear inflammatory.Continue to monitor.    Use of anastrozole  As above.    Smoker  She is motivated to quit.  She is referred to smoking cessation program.         ECOG SCORE             MDM includes  :    - Acute or chronic illness or injury that poses a threat to life or bodily function  - Independent review and explanation of 3+ results from unique tests  - Discussion of management and ordering 3+ unique tests  - Extensive discussion of treatment and management  - Prescription drug management  - Drug therapy requiring intensive monitoring for toxicity     Discussion:   No follow-ups on file.    Plan was discussed with the patient at length, and she verbalized understanding. Angeles was given an opportunity to ask questions that were answered to her satisfaction, and she was advised to call in the interval if any problems  or questions arise.    Electronically signed by Shelby Thomas MD      Route Chart for Scheduling    Med Onc Chart Routing      Follow up with physician . 12/5. Refer to Dr. Eloisa swanson   Follow up with [facility]    Infusion scheduling note    Injection scheduling note    Labs CBC, CMP and CA 27.29   Lab interval: every 4 weeks     Imaging    Pharmacy appointment    Other referrals          Supportive Plan Information  OP BREAST GOSERELIN Q4W   Shelby Thomas MD   Upcoming Treatment Dates - OP BREAST GOSERELIN Q4W    11/8/2022       Chemotherapy       goserelin (ZOLADEX) injection 3.6 mg  12/7/2022       Chemotherapy       goserelin (ZOLADEX) injection 3.6 mg    Therapy Plan Information  PORT FLUSH  Flushes  heparin, porcine (PF) 100 unit/mL injection flush 500 Units  500 Units, Intravenous, Every visit  sodium chloride 0.9% flush 10 mL  10 mL, Intravenous, Every visit    DENOSUMAB (XGEVA) Q4W  Medications  denosumab (XGEVA) solution 120 mg  120 mg, Subcutaneous, Every 4 weeks

## 2022-11-08 ENCOUNTER — PATIENT MESSAGE (OUTPATIENT)
Dept: HEMATOLOGY/ONCOLOGY | Facility: CLINIC | Age: 52
End: 2022-11-08
Payer: MEDICAID

## 2022-11-09 ENCOUNTER — PATIENT MESSAGE (OUTPATIENT)
Dept: PHARMACY | Facility: CLINIC | Age: 52
End: 2022-11-09
Payer: MEDICAID

## 2022-11-09 ENCOUNTER — SPECIALTY PHARMACY (OUTPATIENT)
Dept: PHARMACY | Facility: CLINIC | Age: 52
End: 2022-11-09
Payer: MEDICAID

## 2022-11-09 NOTE — PROGRESS NOTES
[facility] - Breast Surgery   History and Physical    Subjective:      Angeles Wright is a 52 y.o. female     Pt with complex history as detailed below in imaging and chronology    Pt diagnosed with Stage 4 left breast cancer 2019. She had T3 8cm clinical palpable mass and left axilla LN. She underwent treatment and had considerable shrinkage of breast mass and has had improvement in bone lesions and lung lesions. Last year the breast mass was smaller. But now on recent imaging 2022 she has a new area in the left breast as well as larger primary breast lesion. It appears to be a different type tumor from original one 2019.     On ribociclib, anastrozole, zoladex, and xgeva (osteo med)    Options were discussed with treatment team and tumor conference. With stable to improved distant disease and progression in breast she is sent for evaluation for excision of breast lesions left.    She is still smoking. Chantix was not covered. She has smoking cessation clinic referral. She wants to quit.     She is not on prednisone.      Original treatment in [place], then [place], then [place].     Mat cousin w br cancer at 23,     No prior lumpectomy    Had XRT spine for pain, xrt helped      Menarche at age 18yrs old. . Age at first live birth 19yrs old. Did not breast feed. LMP 4 years ago. OCP-less than 1yr when she was 21yrs old. Menopause at 48yrs. HRT-no  Personal history of breast cancer for the last 6 years. Had previous breast biopsies done. Unsure about previous genetic testing.    Family history cancer: Maternal grandfather with Prostate Ca, Maternal cousin  of Breast Ca at 23yrs old, paternal cousin with Breast Ca    Smoker less than a pack a day  No Covid-19 vaccine   Relevant medical history has had breast cancer for the last several years      Patient Active Problem List   Diagnosis    Malignant neoplasm of central portion of left breast in female, estrogen receptor positive     Bone metastases    Lung metastases    Transaminitis    Chemotherapy-induced neutropenia    Breast mass, right    Use of anastrozole     Current Outpatient Medications on File Prior to Visit   Medication Sig Dispense Refill    albuterol (PROVENTIL) 2.5 mg /3 mL (0.083 %) nebulizer solution SMARTSIG:3 Milliliter(s) Via Nebulizer Every 4 Hours PRN      albuterol (PROVENTIL) 2.5 mg /3 mL (0.083 %) nebulizer solution Inhale 2.5 mg into the lungs.      albuterol (PROVENTIL/VENTOLIN HFA) 90 mcg/actuation inhaler Inhale 2 puffs into the lungs every 6 (six) hours as needed.      anastrozole (ARIMIDEX) 1 mg Tab Take 1 tablet (1 mg total) by mouth once daily. 90 tablet 6    benzonatate (TESSALON) 100 MG capsule Take 200 mg by mouth.      cetirizine (ZYRTEC) 10 MG tablet Take 10 mg by mouth once daily.      cetirizine (ZYRTEC) 10 MG tablet Take 10 mg by mouth.      COMP-AIR NEBULIZER COMPRESSOR Sherri use as directed      doxycycline (VIBRAMYCIN) 100 MG Cap Take 100 mg by mouth 2 (two) times daily.      ketorolac (TORADOL) 10 mg tablet Take 10 mg by mouth every 6 (six) hours as needed.      ondansetron (ZOFRAN-ODT) 4 MG TbDL Take 1 tablet by mouth every 8 (eight) hours as needed.      ondansetron (ZOFRAN-ODT) 4 MG TbDL Take 4 mg by mouth every 8 (eight) hours as needed.      ribociclib (KISQALI) 400 mg/day (200 mg x 2) Tab Take 2 tablets (400mg) by mouth on days 1 to day 21 every 28 days 42 tablet 12    benzonatate (TESSALON) 100 MG capsule Take 200 mg by mouth 3 (three) times daily as needed.      buPROPion (WELLBUTRIN SR) 150 MG TBSR 12 hr tablet Take 1 tablet daily for 7 days then 1 tablet twice daily thereafter (Patient not taking: Reported on 11/14/2022) 60 tablet 0    nebulizer and compressor Sherri Use as directed      nicotine (NICODERM CQ) 21 mg/24 hr Place 1 patch onto the skin once daily. (Patient not taking: Reported on 11/14/2022) 28 patch 0    predniSONE (DELTASONE) 20 MG tablet Take 20 mg by mouth once daily.    "    No current facility-administered medications on file prior to visit.     Review of patient's allergies indicates:  No Known Allergies  Past Medical History:   Diagnosis Date    Breast cancer      Past Surgical History:   Procedure Laterality Date    BREAST BIOPSY Right     BREAST LUMPECTOMY Left     TUBAL LIGATION       Family History   Problem Relation Age of Onset    Lung cancer Mother     Heart attack Father     Suicide Brother     No Known Problems Maternal Aunt     No Known Problems Maternal Uncle     No Known Problems Paternal Aunt     No Known Problems Paternal Uncle     No Known Problems Maternal Grandmother     No Known Problems Maternal Grandfather     No Known Problems Paternal Grandmother     No Known Problems Paternal Grandfather     No Known Problems Son     No Known Problems Son     No Known Problems Daughter     Breast cancer Cousin     Breast cancer Cousin      Social History     Socioeconomic History    Marital status:    Tobacco Use    Smoking status: Every Day     Packs/day: 1.00     Years: 33.00     Pack years: 33.00     Types: Cigarettes     Start date: 9/3/1994    Smokeless tobacco: Never   Substance and Sexual Activity    Alcohol use: Yes     Comment: occasionally    Drug use: Not Currently    Sexual activity: Yes     Partners: Male     Birth control/protection: None         Review of Systems    No CP, No SOB  Pos cough      Objective:      BP (P) 119/75 (BP Location: Left arm, Patient Position: Sitting, BP Method: Medium (Automatic))   Pulse (P) 83   Temp (P) 97.4 °F (36.3 °C) (Temporal)   Resp (P) 18   Ht (P) 5' 4" (1.626 m)   Wt 64 kg (141 lb 1.5 oz)   SpO2 (P) 97%   BMI (P) 24.22 kg/m²     Constitutional: NAD AAOX4  HEENT: EOMI, nonicteric sclera  NECK: no mass, no thyromegaly, no lymphadenopathy  CV: regular rate  PULM: good respiratory effort  ABDOMEN: soft, Nontender, nondistended. No guarding. No rebound.  MUSCULOSKELETAL: Good ROM  SKIN: No rashes or ulcerations " seen.   NEUROLOGIC: CN grossly intact. Motor, sensory grossly intact    Breast exam   C moderate ptosis  Right: No mass appreciated, no discharge, dimpling, lymphadenopathy  Left:  1200 2cm FN 2x2cm movable mass, extends to skin with 1cm extrusion dimpling, no peau, no DC, no LN appreciated          Lab Visit on 11/04/2022   Component Date Value Ref Range Status    WBC 11/04/2022 10.54  3.90 - 12.70 K/uL Final    RBC 11/04/2022 4.44  4.00 - 5.40 M/uL Final    Hemoglobin 11/04/2022 13.8  12.0 - 16.0 g/dL Final    Hematocrit 11/04/2022 40.5  37.0 - 48.5 % Final    MCV 11/04/2022 91  82 - 98 fL Final    MCH 11/04/2022 31.1 (H)  27.0 - 31.0 pg Final    MCHC 11/04/2022 34.1  32.0 - 36.0 g/dL Final    RDW 11/04/2022 14.2  11.5 - 14.5 % Final    Platelets 11/04/2022 305  150 - 450 K/uL Final    MPV 11/04/2022 8.7 (L)  9.2 - 12.9 fL Final    Immature Granulocytes 11/04/2022 0.3  0.0 - 0.5 % Final    Gran # (ANC) 11/04/2022 7.6  1.8 - 7.7 K/uL Final    Immature Grans (Abs) 11/04/2022 0.03  0.00 - 0.04 K/uL Final    Comment: Mild elevation in immature granulocytes is non specific and   can be seen in a variety of conditions including stress response,   acute inflammation, trauma and pregnancy. Correlation with other   laboratory and clinical findings is essential.      Lymph # 11/04/2022 2.1  1.0 - 4.8 K/uL Final    Mono # 11/04/2022 0.6  0.3 - 1.0 K/uL Final    Eos # 11/04/2022 0.1  0.0 - 0.5 K/uL Final    Baso # 11/04/2022 0.09  0.00 - 0.20 K/uL Final    nRBC 11/04/2022 0  0 /100 WBC Final    Gran % 11/04/2022 72.5  38.0 - 73.0 % Final    Lymph % 11/04/2022 19.4  18.0 - 48.0 % Final    Mono % 11/04/2022 5.9  4.0 - 15.0 % Final    Eosinophil % 11/04/2022 1.0  0.0 - 8.0 % Final    Basophil % 11/04/2022 0.9  0.0 - 1.9 % Final    Differential Method 11/04/2022 Automated   Final    Sodium 11/04/2022 139  136 - 145 mmol/L Final    Potassium 11/04/2022 4.3  3.5 - 5.1 mmol/L Final    Chloride 11/04/2022 106  95 - 110 mmol/L Final     CO2 11/04/2022 25  23 - 29 mmol/L Final    Glucose 11/04/2022 76  70 - 110 mg/dL Final    BUN 11/04/2022 8  6 - 20 mg/dL Final    Creatinine 11/04/2022 0.8  0.5 - 1.4 mg/dL Final    Calcium 11/04/2022 8.7  8.7 - 10.5 mg/dL Final    Total Protein 11/04/2022 6.4  6.0 - 8.4 g/dL Final    Albumin 11/04/2022 3.5  3.5 - 5.2 g/dL Final    Total Bilirubin 11/04/2022 0.3  0.1 - 1.0 mg/dL Final    Comment: For infants and newborns, interpretation of results should be based  on gestational age, weight and in agreement with clinical  observations.    Premature Infant recommended reference ranges:  Up to 24 hours.............<8.0 mg/dL  Up to 48 hours............<12.0 mg/dL  3-5 days..................<15.0 mg/dL  6-29 days.................<15.0 mg/dL      Alkaline Phosphatase 11/04/2022 76  55 - 135 U/L Final    AST 11/04/2022 13  10 - 40 U/L Final    ALT 11/04/2022 7 (L)  10 - 44 U/L Final    Anion Gap 11/04/2022 8  8 - 16 mmol/L Final    eGFR 11/04/2022 >60.0  >60 mL/min/1.73 m^2 Final    CA 27.29 11/04/2022 101 (H)  <=38.0 U/mL Final    Comment: -------------------ADDITIONAL INFORMATION-------------------  The testing method is a chemiluminometric immunoassay  manufactured by Siem[employer] and performed on the [employer]  [employer].    Values obtained with different assay methods or kits may be  different and cannot be used interchangeably.    Test results cannot be interpreted as absolute evidence for  the presence or absence of malignant disease.    Test Performed by:  [address] [address]  : Gautam Cruz M.D. Ph.D.; CLIA# [id number]      CA 15-3 11/04/2022 76 (H)  <30 U/mL Final    Comment: -------------------ADDITIONAL INFORMATION-------------------  The testing method is an electrochemiluminescence   assay manufactured by Roche [employer] Inc. and   performed on the Modular or Naye system.     Values obtained with different assay  "methods or kits   may be different and cannot be used interchangeably.    Test results cannot be interpreted as absolute evidence   for the presence or absence of malignant disease.    Test Performed by:  [facility]  [address] [place] [address]  : Gautam Cruz M.D. Ph.D.; CLIA# [id number]     Hospital Outpatient Visit on 10/21/2022   Component Date Value Ref Range Status    Final Pathologic Diagnosis 10/21/2022    Final                    Value:LEFT BREAST, 12 O'CLOCK MASS 1 CM FROM NIPPLE, BIOPSY:  Invasive ductal carcinoma, Arjun histologic grade 1 (tubule formation:  2, nuclear pleomorphism:  2, mitotic activity:  1).  Comment:  The biopsy reveals infiltrating ductal carcinoma with partial  mucinous features including abundant tubules and tumor nests with punched out  cribriform spaces containing mucin.  Tumor cells are positive with GATA3 and  negative with , in keeping with mammary ductal origin.  The largest  continuous focus of invasive carcinoma present measures 4 mm.  Dr. Zofia Saleem also reviewed this case and agrees with the diagnosis.  BREAST BIOMARKER RESULTS  Estrogen receptor (ER):  Positive (nearly 100%, strong)  Progesterone receptor (NH):  Positive (50%, weak to moderate)  HER2 IHC:  Negative (1+)  Ki-67 proliferation index:  10%  All immunostain controls react appropriately.      Interp By KAYDEN Cordova M.D., Signed on 10/28/2022 at 15:56    Gross 10/21/2022    Final                    Value:MRN # [id number]  Pathology ID# [id number]      Received in formalin labeled with the patient's name and medical record  number, designated as, "left breast 12 o'clock", is a 1.2 x 0.7 x 0.6 cm  aggregate of ragged, variegated, tan-white fibrofatty tissue fragments.  Entirely submitted in cassettes [id number] and [id number].  Time collected:  Not provided 10/21/2022  Time placed in formalin:  0844  " 10/21/2022  Cold ischemic time:  Not able to calculate  Maegan Sr MD, MS  Pathologists' Assistant      Disclaimer 10/21/2022    Final                    Value:Unless the case is a 'gross only' or additional testing only, the final  diagnosis for each specimen is based on a microscopic examination of  appropriate tissue sections.   (c-KIT) immunohistochemical staining (clone 9.7, DAB detection method)  is performed on formalin-fixed (10% neutral buffered formalin), paraffin  embedded tissue sections. GIST tumors are considered positive for c-KIT  protein expression if any neoplastic cells demonstrate specific cytoplasmic  and/or cell membrane staining. Strong cytoplasmic, membrane, and occasional  dot-like perinuclear Golgi staining are reliable indicators of c-KIT  expression. Staining of c-KIT in GIST is often heterogeneous and not all  neoplastic cells display positivity. This test was developed and performance  characteristics determined by [facility], Section of Anatomic  Pathology. It has been cleared by the U.S. Food and Drug Administration.  ER immunohistochemical staining (clone SP1, DAB detection method) is  perform                          ed on formalin-fixed, paraffin embedded tissue sections. The  percentage of cell nuclei stained and the strength of staining is reported  (weak, moderate, strong), using the 2010 ACSO/CAP scoring guidelines. Tumors  used for determing predictive markers are fixed in10% neutral buffered  formalin for 6-72 hours. It has been cleared by the U.S. FDA for use as an  IVD test. This assay has not been validated on decalcified tissues. Results  should be interpreted with caution given the likelihood of false negativity  on decalcified specimens.  HER-2/elaine IHC (4B5) clone, DAB detection method) is done on 10% buffered  formalin-fixed (for 6-72 hrs), paraffin embedded tissue sections. The scoring  is completed on a 4-tiered scoring system of membrane  staining using the 2014  ASCO/CAP scoring guidelines. It has been cleared by the U.S. FDA for use as  an IVD test. This assay has not been validated on decalcified tissues.  Results should be interpreted with caution given the likelihood of false                            negativity on decalcified specimens.  PgR immunohistochemical staining (clone 1E2, DAB detection method) is  performed on formalin-fixed, paraffin embedded tissue sections. The  percentage of cell nuclei stained and the strength of staining is report  (weak, moderate, strong), using 2010 ASCO/CAP scoring guidelines. Tumors used  for determing predictive markers are fixed in 10% neutral buffered formalin  for 6-72 hours. It has been cleared by the U.S. FDA for use as an IVD test.  This assay has not been validated on decalcified tissues. Results should be  interpreted with caution given the likelihood of false negativity on  decalcified specimens.     Lab Visit on 10/06/2022   Component Date Value Ref Range Status    WBC 10/06/2022 3.66 (L)  3.90 - 12.70 K/uL Final    RBC 10/06/2022 5.03  4.00 - 5.40 M/uL Final    Hemoglobin 10/06/2022 15.5  12.0 - 16.0 g/dL Final    Hematocrit 10/06/2022 45.7  37.0 - 48.5 % Final    MCV 10/06/2022 91  82 - 98 fL Final    MCH 10/06/2022 30.8  27.0 - 31.0 pg Final    MCHC 10/06/2022 33.9  32.0 - 36.0 g/dL Final    RDW 10/06/2022 14.6 (H)  11.5 - 14.5 % Final    Platelets 10/06/2022 239  150 - 450 K/uL Final    MPV 10/06/2022 8.7 (L)  9.2 - 12.9 fL Final    Immature Granulocytes 10/06/2022 0.3  0.0 - 0.5 % Final    Gran # (ANC) 10/06/2022 2.1  1.8 - 7.7 K/uL Final    Immature Grans (Abs) 10/06/2022 0.01  0.00 - 0.04 K/uL Final    Comment: Mild elevation in immature granulocytes is non specific and   can be seen in a variety of conditions including stress response,   acute inflammation, trauma and pregnancy. Correlation with other   laboratory and clinical findings is essential.      Lymph # 10/06/2022 1.1  1.0 - 4.8  K/uL Final    Mono # 10/06/2022 0.3  0.3 - 1.0 K/uL Final    Eos # 10/06/2022 0.1  0.0 - 0.5 K/uL Final    Baso # 10/06/2022 0.10  0.00 - 0.20 K/uL Final    nRBC 10/06/2022 0  0 /100 WBC Final    Gran % 10/06/2022 56.1  38.0 - 73.0 % Final    Lymph % 10/06/2022 29.5  18.0 - 48.0 % Final    Mono % 10/06/2022 8.7  4.0 - 15.0 % Final    Eosinophil % 10/06/2022 2.7  0.0 - 8.0 % Final    Basophil % 10/06/2022 2.7 (H)  0.0 - 1.9 % Final    Differential Method 10/06/2022 Automated   Final    Sodium 10/06/2022 140  136 - 145 mmol/L Final    Potassium 10/06/2022 4.5  3.5 - 5.1 mmol/L Final    Chloride 10/06/2022 107  95 - 110 mmol/L Final    CO2 10/06/2022 24  23 - 29 mmol/L Final    Glucose 10/06/2022 91  70 - 110 mg/dL Final    BUN 10/06/2022 11  6 - 20 mg/dL Final    Creatinine 10/06/2022 0.9  0.5 - 1.4 mg/dL Final    Calcium 10/06/2022 9.1  8.7 - 10.5 mg/dL Final    Total Protein 10/06/2022 6.9  6.0 - 8.4 g/dL Final    Albumin 10/06/2022 4.0  3.5 - 5.2 g/dL Final    Total Bilirubin 10/06/2022 0.4  0.1 - 1.0 mg/dL Final    Comment: For infants and newborns, interpretation of results should be based  on gestational age, weight and in agreement with clinical  observations.    Premature Infant recommended reference ranges:  Up to 24 hours.............<8.0 mg/dL  Up to 48 hours............<12.0 mg/dL  3-5 days..................<15.0 mg/dL  6-29 days.................<15.0 mg/dL      Alkaline Phosphatase 10/06/2022 66  55 - 135 U/L Final    AST 10/06/2022 17  10 - 40 U/L Final    ALT 10/06/2022 17  10 - 44 U/L Final    Anion Gap 10/06/2022 9  8 - 16 mmol/L Final    eGFR 10/06/2022 >60.0  >60 mL/min/1.73 m^2 Final    CA 15-3 10/06/2022 87 (H)  <30 U/mL Final    Comment: -------------------ADDITIONAL INFORMATION-------------------  The testing method is an electrochemiluminescence   assay manufactured by Roche [employer] Inc. and   performed on the Modular or Naye system.     Values obtained with different assay methods or kits    may be different and cannot be used interchangeably.    Test results cannot be interpreted as absolute evidence   for the presence or absence of malignant disease.    Test Performed by:  [facility]  [address] [place] [address]  : Gautam Cruz M.D. Ph.D.; CLIA# [id number]      CA 27.29 10/06/2022 101 (H)  <=38.0 U/mL Final    Comment: -------------------ADDITIONAL INFORMATION-------------------  The testing method is a chemiluminometric immunoassay  manufactured by Siemens and performed on the [employer].    Values obtained with different assay methods or kits may be  different and cannot be used interchangeably.    Test results cannot be interpreted as absolute evidence for  the presence or absence of malignant disease.    Test Performed by:  [facility]  [address] [place] [address]  : Gautam Cruz M.D. Ph.D.; CLIA# [id number]           Patient Active Problem List   Diagnosis    Malignant neoplasm of central portion of left breast in female, estrogen receptor positive    Bone metastases    Lung metastases    Transaminitis    Chemotherapy-induced neutropenia    Breast mass, right    Use of anastrozole        High complexity of problems addressed - breast cancer, treatment options, expectations, effects of treatment, risks, benefits. Complex data reviewed, independent interpretation of tests, discussion of management with another health care provider.    Alternative efficacious methods of treatment of breast cancer were given.  Options including lumpectomy, mastectomy, reconstruction options with advantages/disadvantages of each, provisions assuring coverage of reconstructive surgery costs, how the patient may access reconstructive care including the potential to be transferred to a facility that provides reconstructive care or choosing reconstruction after  completion of breast cancer surgery and treatment.  LDH, LCLTF breast cancer brochure given.          Imaging and Chronology:     2016 [facility] outside facility  Bilateral abnormalities  Cat 0  Did not follow up    1/21/19 presented with left 1200 palpable area  Bilat diag [facility], bilat US limited [facility]  Left 1200 mid 4.5cm mass  Left axilla LN 1.2cm abnormal, thick cortex  Right 1100 mid 1.3cm mass (FA by biopsy)      1/28/19 bilateral US biopsy left breast and axilla LN, [facility] biopsy  right Fibroadenoma (concordant)  Left axilla LN biopsy met cancer  Left 1200 IDC grade 1-2  ER 96 VT 95 Her 2 0-1+ neg Ki 30    T3N1 ERPR pos her2 neg (T3 clinically)    2/18/19 BRCA 1 and 2 neg    2/22/19 CT CAP [facility]  4.2cm left breast mass  Mult LN left axilla  LN in each hilum  No mediastinal LN  Numerous nodules throughout lungs  Metastatic lesions in ribs, sternum, vertebra, pelvis    2/25/19 right port, [facility]ehan    2/28/19-9/2019 with inconsistent dosing ibrance (palbociclib)    arimidex    3/6/19 faslodex (fulvestrant) - 4/2019    3/14/19 3000 cGt T4-T8    3/28/19 genetics Gene Dx 23 genes  neg    April 2019 moved to  area    Lapse in chemo    Sept 2019 [facility]    tested 9/20/19 Strata on 1/28/19 biopsy specimen   [id number] apelisib + fulvestrant    9/2019 ribociclib (kisqali), zoladex, arimidex    9/2019 new baseline scans with diffuse osseous metastatic disease throughout spine, sternum, ribs, iliac wings, femoral bones. T5 with possible extension into spinal canal; left femur lesion at risk of pathologic fracture    9/18/2019 PExam left breast - Large left breast mass, superior to nipple, 8 cm x 7 cm. + freely mobile axillary LN    Off therapy 11/2019-may 2020    May 2020 scans better  Restarted Arimidex/ribociclib/zoladex    7/8/20 moved to [place]  On and off ribociclib    8/5/20 intermittent use of ribociclib and arimidex    9/2/20 tumor markers down    10/2/20 transfer care to [facility]  disease    8/11/21 covid    10/13/21 bilat diag [facility], bilat US [facility]   Left 1200 [id number] mass, smaller cat 6    Right 1100 8mm mass, new cat 4  Right 1000 UOQ 1.2cm mass, stable (FA)    11/10/21 right diag [facility], right US complete [facility]  Right 1130 biopsy proven FA, no change  Right 0830 (was previously labelled 1000) appears to be FA  Cat 3    3/30/22 right US limited [facility]  Right 0800 17mm   Cat 3    8/22 Off ribociclib few weeks      9/28/22 bilat diag [facility], bilat US limited [facility]  Right 1100 UOQ 1cm mass cat 3  Right 0900 4cm FN 1.4cm mass, stable, likely FA, cat 3    Left 1200 4cm FN [id number] mass, enlarged  Left 1200 1cm FN [id number] mass, new      10/21/22 left US breast biopsy [facility]  left 1200 1cm FN   Grade 1 IDC (2,2,1)    NY 50  Her2 1+ neg Ki 10  Felt to be different pathologic lesion than original biopsy    11/4/22 CT CAP  LLL consolidation, emphysema  Stable lung nodules  Retroperitoneal LN  Left breast masses  Stable bony mets        Assessment/Plan:      Cancer Staging   Malignant neoplasm of central portion of left breast in female, estrogen receptor positive  Staging form: Breast, AJCC 8th Edition  - Clinical: Stage IV (cT4b, cM1, ER+, NY+, HER2-) - Signed by Melania Mcgovern MD on 9/18/2019  - Clinical stage from 11/13/2022: No Stage Recommended (ycT2(m), cN1(f), cM1, G1, ER+, NY+, HER2-) - Signed by Maday Amin MD on 11/13/2022      Pt with stage 4 left breast cancer undergoing chemo  Has had stable bony disease but progression of breast disease.  Case reviewed with [facility] oncology [facility]  Agree with local progression it is reasonable to resect breast cancer  The tumor will likely continue to enlarge and may become unresectable and larger wound issues.    Discussed options with patient. Discussed no surgery, continue chemo vs lumpectomy vs mastectomy. Not a candidate for recon with smoking.    She understands. She wishes to have lumpectomy. She understands this is not for  cure. This is for palliation. She will be increased risk local recurrence.   Referral rad onc Dr Bower for opinion    Plan left loco lumpectomy, no SLNB (loco for nonpalpable area 1cm from nipple)  Wide excision including skin, ellipse  12/14/22 Main OR     Discussed her smoking. She is in smoking cessation clinic. She wants to quit. Discussed worse outcomes, poor healing, seroma, infection. Discussed her CT lung findings as well.         Explained smoking increases risks of complications - infection, perforation, increased pain, chronic pain, stricture, ischemia, recurrent problems, poor healing, flap failure, seromas, pulmonary complications, ventilator, clots, pulmonary embolism, thrombosis, cancers, heart attack, stroke, death.    I have given her all options - lumpectomy XRT, unilateral or bilateral mastectomy +/- reconstruction. I have explained procedure, risks, benefits, postop expectations. All questions answered. Risks include but are not limited to infection, bleeding, injury to nerves, vessels, numbness, weakness, difficulty lifting arm, swelling of arm (lymphedema), inability to remove all lesion, residual breast tissue, recurrence of malignancy, need for further procedure, loss of skin flap, seroma (fluid collection), inability to find sentinel lymph node, need for axillary dissection, chronic pain, radioactive substance may be given, allergic reaction, staining of the skin, difficulty with future imaging.

## 2022-11-11 ENCOUNTER — PATIENT MESSAGE (OUTPATIENT)
Dept: PHARMACY | Facility: CLINIC | Age: 52
End: 2022-11-11
Payer: MEDICAID

## 2022-11-14 ENCOUNTER — TUMOR BOARD CONFERENCE (OUTPATIENT)
Dept: SURGERY | Facility: CLINIC | Age: 52
End: 2022-11-14

## 2022-11-14 ENCOUNTER — OFFICE VISIT (OUTPATIENT)
Dept: SURGERY | Facility: CLINIC | Age: 52
End: 2022-11-14
Payer: MEDICAID

## 2022-11-14 VITALS — WEIGHT: 141.13 LBS | BODY MASS INDEX: 24.22 KG/M2

## 2022-11-14 DIAGNOSIS — C50.112 MALIGNANT NEOPLASM OF CENTRAL PORTION OF LEFT BREAST IN FEMALE, ESTROGEN RECEPTOR POSITIVE: Primary | ICD-10-CM

## 2022-11-14 DIAGNOSIS — Z17.0 MALIGNANT NEOPLASM OF CENTRAL PORTION OF LEFT BREAST IN FEMALE, ESTROGEN RECEPTOR POSITIVE: Primary | ICD-10-CM

## 2022-11-14 PROCEDURE — 3008F PR BODY MASS INDEX (BMI) DOCUMENTED: ICD-10-PCS | Mod: CPTII,,, | Performed by: SURGERY

## 2022-11-14 PROCEDURE — 99215 OFFICE O/P EST HI 40 MIN: CPT | Mod: PBBFAC,PN | Performed by: SURGERY

## 2022-11-14 PROCEDURE — 1159F MED LIST DOCD IN RCRD: CPT | Mod: CPTII,,, | Performed by: SURGERY

## 2022-11-14 PROCEDURE — 3008F BODY MASS INDEX DOCD: CPT | Mod: CPTII,,, | Performed by: SURGERY

## 2022-11-14 PROCEDURE — 99999 PR PBB SHADOW E&M-EST. PATIENT-LVL V: ICD-10-PCS | Mod: PBBFAC,,, | Performed by: SURGERY

## 2022-11-14 PROCEDURE — 99999 PR PBB SHADOW E&M-EST. PATIENT-LVL V: CPT | Mod: PBBFAC,,, | Performed by: SURGERY

## 2022-11-14 PROCEDURE — 1159F PR MEDICATION LIST DOCUMENTED IN MEDICAL RECORD: ICD-10-PCS | Mod: CPTII,,, | Performed by: SURGERY

## 2022-11-14 PROCEDURE — 99205 PR OFFICE/OUTPT VISIT, NEW, LEVL V, 60-74 MIN: ICD-10-PCS | Mod: S$PBB,,, | Performed by: SURGERY

## 2022-11-14 PROCEDURE — 99205 OFFICE O/P NEW HI 60 MIN: CPT | Mod: S$PBB,,, | Performed by: SURGERY

## 2022-11-14 NOTE — TELEPHONE ENCOUNTER
Spoke with Aleyda, pt no longer resides at [facility]. Will reach out to pt once more, then dis-enroll.

## 2022-11-14 NOTE — NURSING
Consents signed for left loco-lumpectomy.  Post op instructions given to the patient both verbally and in written form.  She verbalized understanding.  Gave her the phone number and location of the OPP and told her to call for a pre op nurse visit.  Referral placed for radiation oncology visit with Dr Bower.  Message sent to Antonio at the [facility] for placement of a loco-.  Told the patient they will call her to schedule.  Gave her the phone number for the navigator and Dr Amin's clinic.

## 2022-11-15 ENCOUNTER — TELEPHONE (OUTPATIENT)
Dept: RADIATION ONCOLOGY | Facility: CLINIC | Age: 52
End: 2022-11-15
Payer: MEDICAID

## 2022-11-15 NOTE — TELEPHONE ENCOUNTER
Specialty Pharmacy - Refill Coordination    Specialty Medication Orders Linked to Encounter      Flowsheet Row Most Recent Value   Medication #1 ribociclib (KISQALI) 400 mg/day (200 mg x 2) Tab (Order#[id number], Rx#[id number])            Refill Questions - Documented Responses      Flowsheet Row Most Recent Value   Refill Screening Questions    Changes to allergies? No   Changes to medications? No   New conditions since last clinic visit? No   Unplanned office visit, urgent care, ED, or hospital admission in the last 4 weeks? No   How does patient/caregiver feel medication is working? Good   Financial problems or insurance changes? No   How many doses of your specialty medications were missed in the last 4 weeks? 0   Would patient like to speak to a pharmacist? No   When does the patient need to receive the medication? 11/22/22   Refill Delivery Questions    How will the patient receive the medication? MEDRx   When does the patient need to receive the medication? 11/22/22   Shipping Address Home   Address in [facility] confirmed and updated if neccessary? Yes   Expected Copay ($) 0   Is the patient able to afford the medication copay? Yes   Payment Method zero copay   Days supply of Refill 28   Supplies needed? No supplies needed   Refill activity completed? Yes   Refill activity plan Refill scheduled   Shipment/Pickup Date: 11/17/22            Current Outpatient Medications   Medication Sig    albuterol (PROVENTIL) 2.5 mg /3 mL (0.083 %) nebulizer solution SMARTSIG:3 Milliliter(s) Via Nebulizer Every 4 Hours PRN    anastrozole (ARIMIDEX) 1 mg Tab Take 1 tablet (1 mg total) by mouth once daily.    benzonatate (TESSALON) 100 MG capsule Take 200 mg by mouth 3 (three) times daily as needed.    buPROPion (WELLBUTRIN SR) 150 MG TBSR 12 hr tablet Take 1 tablet daily for 7 days then 1 tablet twice daily thereafter (Patient not taking: Reported on 11/14/2022)    cetirizine (ZYRTEC) 10 MG tablet Take 10 mg by mouth  once daily.    COMP-AIR NEBULIZER COMPRESSOR Sherri use as directed    ketorolac (TORADOL) 10 mg tablet Take 10 mg by mouth every 6 (six) hours as needed.    nebulizer and compressor Sherri Use as directed    nicotine (NICODERM CQ) 21 mg/24 hr Place 1 patch onto the skin once daily. (Patient not taking: Reported on 11/14/2022)    ondansetron (ZOFRAN-ODT) 4 MG TbDL Take 1 tablet by mouth every 8 (eight) hours as needed.    predniSONE (DELTASONE) 20 MG tablet Take 20 mg by mouth once daily.    ribociclib (KISQALI) 400 mg/day (200 mg x 2) Tab Take 2 tablets (400mg) by mouth on days 1 to day 21 every 28 days   Last reviewed on 11/14/2022  1:33 PM by Rhina West RN    Review of patient's allergies indicates:  No Known Allergies Last reviewed on  11/14/2022 1:31 PM by Rhina West      Tasks added this encounter   No tasks added.   Tasks due within next 3 months   2/4/2023 - Clinical - Follow Up Assesement (180 day)  11/9/2022 - Refill Call (Auto Added)     ASHLEY CONNER, PharmD  [facility]  [address] [place] [id number]  Phone: [phone]  Fax: [phone]

## 2022-11-16 ENCOUNTER — TELEPHONE (OUTPATIENT)
Dept: SURGERY | Facility: CLINIC | Age: 52
End: 2022-11-16
Payer: MEDICAID

## 2022-11-16 NOTE — PROGRESS NOTES
Interdisciplinary Breast Cancer Conference    Angeles Wright    Female              Tumor Laterality: Left    Breast Tumor Site: UOQ              Specialties Present: Medical Oncology;Radiation Oncology;Surgical Oncology;Pathology;Navigation;Research;Integrative Oncology;Radiology    Patient Status: a new patient    Treatment to Date: None         ER: Positive    AL: Positive    Her2: Negative    Cancer Staging   Malignant neoplasm of central portion of left breast in female, estrogen receptor positive  Staging form: Breast, AJCC 8th Edition  - Clinical: Stage IV (cT4b, cM1, ER+, AL+, HER2-) - Signed by Melania Mcgovern MD on 9/18/2019  - Clinical stage from 11/13/2022: No Stage Recommended (ycT2(m), cN1(f), cM1, G1, ER+, AL+, HER2-) - Signed by Maday Amin MD on 11/13/2022      Metastatic Site(s): Bone       Recommended Therapy:  Genetic testing  Adjuvant Chemotherapy/ Endocrine   Surgery  Recommendations pending final path  Radiation

## 2022-11-17 ENCOUNTER — TELEPHONE (OUTPATIENT)
Dept: SURGERY | Facility: CLINIC | Age: 52
End: 2022-11-17
Payer: MEDICAID

## 2022-11-21 ENCOUNTER — PATIENT MESSAGE (OUTPATIENT)
Dept: HEMATOLOGY/ONCOLOGY | Facility: CLINIC | Age: 52
End: 2022-11-21
Payer: MEDICAID

## 2022-12-05 ENCOUNTER — INFUSION (OUTPATIENT)
Dept: INFUSION THERAPY | Facility: HOSPITAL | Age: 52
End: 2022-12-05
Attending: INTERNAL MEDICINE
Payer: MEDICAID

## 2022-12-05 ENCOUNTER — OFFICE VISIT (OUTPATIENT)
Dept: RADIATION ONCOLOGY | Facility: CLINIC | Age: 52
End: 2022-12-05
Payer: MEDICAID

## 2022-12-05 ENCOUNTER — OFFICE VISIT (OUTPATIENT)
Dept: HEMATOLOGY/ONCOLOGY | Facility: CLINIC | Age: 52
End: 2022-12-05
Payer: MEDICAID

## 2022-12-05 VITALS
BODY MASS INDEX: 23.86 KG/M2 | HEART RATE: 78 BPM | WEIGHT: 139.75 LBS | TEMPERATURE: 99 F | WEIGHT: 140 LBS | DIASTOLIC BLOOD PRESSURE: 49 MMHG | RESPIRATION RATE: 18 BRPM | SYSTOLIC BLOOD PRESSURE: 101 MMHG | DIASTOLIC BLOOD PRESSURE: 86 MMHG | TEMPERATURE: 97 F | HEIGHT: 64 IN | HEIGHT: 64 IN | SYSTOLIC BLOOD PRESSURE: 108 MMHG | BODY MASS INDEX: 23.9 KG/M2 | RESPIRATION RATE: 16 BRPM | HEART RATE: 69 BPM | OXYGEN SATURATION: 99 % | OXYGEN SATURATION: 99 %

## 2022-12-05 VITALS
SYSTOLIC BLOOD PRESSURE: 108 MMHG | DIASTOLIC BLOOD PRESSURE: 86 MMHG | RESPIRATION RATE: 18 BRPM | BODY MASS INDEX: 23.9 KG/M2 | OXYGEN SATURATION: 99 % | WEIGHT: 140 LBS | TEMPERATURE: 97 F | HEIGHT: 64 IN | HEART RATE: 69 BPM

## 2022-12-05 DIAGNOSIS — C50.112 MALIGNANT NEOPLASM OF CENTRAL PORTION OF LEFT BREAST IN FEMALE, ESTROGEN RECEPTOR POSITIVE: ICD-10-CM

## 2022-12-05 DIAGNOSIS — C50.112 MALIGNANT NEOPLASM OF CENTRAL PORTION OF LEFT BREAST IN FEMALE, ESTROGEN RECEPTOR POSITIVE: Primary | ICD-10-CM

## 2022-12-05 DIAGNOSIS — Z17.0 MALIGNANT NEOPLASM OF CENTRAL PORTION OF LEFT BREAST IN FEMALE, ESTROGEN RECEPTOR POSITIVE: ICD-10-CM

## 2022-12-05 DIAGNOSIS — C79.51 BONE METASTASES: Primary | ICD-10-CM

## 2022-12-05 DIAGNOSIS — Z17.0 MALIGNANT NEOPLASM OF CENTRAL PORTION OF LEFT BREAST IN FEMALE, ESTROGEN RECEPTOR POSITIVE: Primary | ICD-10-CM

## 2022-12-05 DIAGNOSIS — Z79.811 USE OF ANASTROZOLE: ICD-10-CM

## 2022-12-05 DIAGNOSIS — C79.51 BONE METASTASES: ICD-10-CM

## 2022-12-05 PROCEDURE — 99215 OFFICE O/P EST HI 40 MIN: CPT | Mod: S$PBB,,, | Performed by: INTERNAL MEDICINE

## 2022-12-05 PROCEDURE — 96402 CHEMO HORMON ANTINEOPL SQ/IM: CPT | Mod: PN

## 2022-12-05 PROCEDURE — 99999 PR PBB SHADOW E&M-EST. PATIENT-LVL IV: ICD-10-PCS | Mod: PBBFAC,,, | Performed by: INTERNAL MEDICINE

## 2022-12-05 PROCEDURE — 3008F PR BODY MASS INDEX (BMI) DOCUMENTED: ICD-10-PCS | Mod: CPTII,,, | Performed by: INTERNAL MEDICINE

## 2022-12-05 PROCEDURE — 96401 CHEMO ANTI-NEOPL SQ/IM: CPT | Mod: PN

## 2022-12-05 PROCEDURE — 99999 PR PBB SHADOW E&M-EST. PATIENT-LVL IV: CPT | Mod: PBBFAC,,, | Performed by: INTERNAL MEDICINE

## 2022-12-05 PROCEDURE — 3078F PR MOST RECENT DIASTOLIC BLOOD PRESSURE < 80 MM HG: ICD-10-PCS | Mod: CPTII,,, | Performed by: RADIOLOGY

## 2022-12-05 PROCEDURE — 3079F PR MOST RECENT DIASTOLIC BLOOD PRESSURE 80-89 MM HG: ICD-10-PCS | Mod: CPTII,,, | Performed by: INTERNAL MEDICINE

## 2022-12-05 PROCEDURE — 99214 OFFICE O/P EST MOD 30 MIN: CPT | Mod: PBBFAC,25,PN | Performed by: INTERNAL MEDICINE

## 2022-12-05 PROCEDURE — 99213 OFFICE O/P EST LOW 20 MIN: CPT | Mod: PBBFAC,25,27,PN | Performed by: RADIOLOGY

## 2022-12-05 PROCEDURE — 3074F PR MOST RECENT SYSTOLIC BLOOD PRESSURE < 130 MM HG: ICD-10-PCS | Mod: CPTII,,, | Performed by: INTERNAL MEDICINE

## 2022-12-05 PROCEDURE — 3008F PR BODY MASS INDEX (BMI) DOCUMENTED: ICD-10-PCS | Mod: CPTII,,, | Performed by: RADIOLOGY

## 2022-12-05 PROCEDURE — 3078F DIAST BP <80 MM HG: CPT | Mod: CPTII,,, | Performed by: RADIOLOGY

## 2022-12-05 PROCEDURE — 63600175 PHARM REV CODE 636 W HCPCS: Mod: JG,PN | Performed by: INTERNAL MEDICINE

## 2022-12-05 PROCEDURE — 99999 PR PBB SHADOW E&M-EST. PATIENT-LVL III: ICD-10-PCS | Mod: PBBFAC,,, | Performed by: RADIOLOGY

## 2022-12-05 PROCEDURE — 99999 PR PBB SHADOW E&M-EST. PATIENT-LVL III: CPT | Mod: PBBFAC,,, | Performed by: RADIOLOGY

## 2022-12-05 PROCEDURE — 3008F BODY MASS INDEX DOCD: CPT | Mod: CPTII,,, | Performed by: RADIOLOGY

## 2022-12-05 PROCEDURE — 3079F DIAST BP 80-89 MM HG: CPT | Mod: CPTII,,, | Performed by: INTERNAL MEDICINE

## 2022-12-05 PROCEDURE — 3074F SYST BP LT 130 MM HG: CPT | Mod: CPTII,,, | Performed by: INTERNAL MEDICINE

## 2022-12-05 PROCEDURE — 99215 OFFICE O/P EST HI 40 MIN: CPT | Mod: S$PBB,,, | Performed by: RADIOLOGY

## 2022-12-05 PROCEDURE — 3008F BODY MASS INDEX DOCD: CPT | Mod: CPTII,,, | Performed by: INTERNAL MEDICINE

## 2022-12-05 PROCEDURE — 99215 PR OFFICE/OUTPT VISIT, EST, LEVL V, 40-54 MIN: ICD-10-PCS | Mod: S$PBB,,, | Performed by: RADIOLOGY

## 2022-12-05 PROCEDURE — 3074F SYST BP LT 130 MM HG: CPT | Mod: CPTII,,, | Performed by: RADIOLOGY

## 2022-12-05 PROCEDURE — 99215 PR OFFICE/OUTPT VISIT, EST, LEVL V, 40-54 MIN: ICD-10-PCS | Mod: S$PBB,,, | Performed by: INTERNAL MEDICINE

## 2022-12-05 PROCEDURE — 3074F PR MOST RECENT SYSTOLIC BLOOD PRESSURE < 130 MM HG: ICD-10-PCS | Mod: CPTII,,, | Performed by: RADIOLOGY

## 2022-12-05 RX ADMIN — DENOSUMAB 120 MG: 120 INJECTION SUBCUTANEOUS at 02:12

## 2022-12-05 RX ADMIN — GOSERELIN ACETATE 3.6 MG: 3.6 IMPLANT SUBCUTANEOUS at 02:12

## 2022-12-05 NOTE — PLAN OF CARE
Problem: Adult Inpatient Plan of Care  Goal: Plan of Care Review  Outcome: Ongoing, Progressing  Flowsheets (Taken 12/5/2022 1526)  Plan of Care Reviewed With: patient  Goal: Patient-Specific Goal (Individualized)  Outcome: Ongoing, Progressing  Flowsheets (Taken 12/5/2022 1526)  Anxieties, Fears or Concerns: none  Individualized Care Needs: none  Patient-Specific Goals (Include Timeframe): no s/sx of rx during tx     Problem: Fatigue  Goal: Improved Activity Tolerance  Outcome: Ongoing, Progressing  Intervention: Promote Improved Energy  Flowsheets (Taken 12/5/2022 1526)  Fatigue Management:   frequent rest breaks encouraged   paced activity encouraged  Sleep/Rest Enhancement:   natural light exposure provided   noise level reduced  Activity Management:   Ambulated -L4   Ambulated to bathroom - L4   Ambulated in [place] - L4   Up in chair - L3   Pt tolerated zoladex, xgeva injection well.   No adverse reaction noted.  PAC flushed with Heparin and deaccessed.  All questions answered.  Pt left clinic in no acute distress.

## 2022-12-05 NOTE — PROGRESS NOTES
12/05/2022    [facility]   Radiation Oncology Consultation    ASSESSMENT  This is a 52 y.o. 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 She is seen discussion of treatment options.       PLAN    Treatment options were discussed with the patient including palliative lumpectomy +/- adjuvant RT.  We discussed the goals of treatment to be palliative.  The risks, benefits, scheduling, alternatives to and rationale of radiation therapy were explained in detail.   We discussed the indications, course, and potential risks associated with radiation therapy, including but not limited to fatigue, local skin reaction such as hyperpigmentation, tenderness or erythema, breast pain, and potential long term toxicities such as rib fragility, and remote risks of lung inflammation, esophageal inflammation, heart inflammation, or secondary malignancy.  She expressed understanding of these risks, all questions were answered to her apparent satisfaction.   As the patient is undergoing palliative metastatectomy in the breast, where there is other stable tumor, I do not recommend adjuvant radiation therapy to the left breast at this time.  She understands that she may be at increased risk for local recurrence of this growing mass, but that this is of lesser concern in the setting of metastatic disease than in definitive treatment  We also reviewed the lesion in the left femoral neck.  She understands that at the moment she appears to be at low risk for pathologic fracture, with minimally to uninvolved cortex, with <50% cortical thinning, but that the location is high risk for fracture if there should be any local progression of tumor.   She was given our contact information, and she was told that she could call our clinic at any time  if she has any questions or concerns.      Radiation Treatment Details:   No radiation therapy planned at this time      Chief Complaint   Patient presents with    Breast Cancer       HPI: The patient is a 52 y.o. year old female with a diagnosis of Stage IV breast cancer dating back to 2019.  She had a T3 8cm palpable mass and left axillary lymphadenopathy, as well as skeletal and lung lesions.  She underwent systemic therapy with decrease in size of breast mass noted.  More recently (9/2022), she was noted to have POD with new lesion in left breast, larger primary breast mass, which appeared to be different typo of tumor than 2022. She initially presented due to abnormal mammogram.     History (moved to [facility] 2019, previous treatments elsewhere in [place]):  - 1/2019 - presented for palpable mass left breast. Of note, patient had prior abnormal mammograms in 2016 for both breasts and was called back for additional images but didn't return.    - 1/2019 - Diagnostic mammogram and US showed 4.5 cm left breast mass with abnormal LN; 1.3 cm right breast mass  - 1/28/19 - Biopsy left breast mass - Grade 2 IDC, estrogen receptor 96%, progesterone receptor 95%, Her2 elaine neg, Ki67 30%.   - 2/2019 CT scans showed known left breast mass with axillary adenopathy, metastatic disease in chest and bones (ribs, sternum, vertebra, pelvis)  - 3/7/19 started Ibrance/Fulvsetrant. Dose reduced of Ibrance in 4/2019.              - 3/14/19 - 3/27/19 3000 cGt T4-T8                              - 4/4/19 - Genetics GeneDx Breast/Gyn panel negative  [facility] Medical oncology consultation on 9/3/19              - 9/2019 - Labs show pre-menopausal. Change therapy to Ribociclib, Zoladex and Arimidex.               - 9/2019 new baseline scans with diffuse osseous metastatic disease throughout spine, sternum, ribs, iliac wings, femoral bones. T5 with possible extension into spinal canal; left femur lesion at risk of pathologic fracture.  Referred to ortho - saw them 2/2020 and not felt to have imminent fracture              * 9/2019: Started Ribociclib, Zoladex and Arimidex but was then non-compliant. Only took for two months.                 - 5/2020 - non-complaince - off treatment from 11/2019 - 5/2020. Saw ortho though and surgery not felt to be needed at time of consult (2/2020). CT scans with improved disease. Labs still perimenopausal. Continued Arimidex/ribociclib/zoladex.    9/12/22 Breast re-imaging:    Impression:  Left  Mass: Left breast 22 mm x 22 mm x 15 mm mass at the upper middle 12 o'clock position. Assessment: 6 - Known biopsy, proven malignancy.   Mass: Left breast 12 mm x 9 mm x 5 mm mass at the upper posterior 12 o'clock position. Assessment: 4C - Suspicious finding. Biopsy is recommended.      Right  Mass: Right breast 10 mm x 10 mm x 7 mm mass at the upper outer 11 o'clock position. Assessment: 3 - Probably benign.   Mass: Right breast 14 mm x 7 mm x 11 mm mass at the 9 o'clock position. Assessment: 3 - Probably benign.      BI-RADS Category:   Overall: 4 - Suspicious    11/4/22  CT chest abd pelvis  Impression:     Stable diffuse osseous metastatic disease.     Left breast masses consistent with known malignancy and better evaluated on recent mammogram and ultrasound.     New left lower lobe linear consolidation, favored to be due to atelectasis versus infection/inflammation.  Recommend attention on follow-up exam as per clinical protocol.     Sigmoid colon diverticulosis without diverticulitis.     Pulmonary emphysema.    11/21/22 Biopsy new LEFT 12:00 mass: invasive ductal carcinoma, partially mucinous, G1, +/+/-, Ki 67 10%      Possibility of pregnancy: No  History of prior irradiation: Yes - T4-T9  History of prior systemic anti-cancer therapy: Yes - see Onc History  History of collagen vascular disease: No  Implanted electronic device (pacer/defib/nerve stimulator): No      Past Medical History:   Diagnosis Date     Breast cancer        Past Surgical History:   Procedure Laterality Date    BREAST BIOPSY Right     BREAST LUMPECTOMY Left     TUBAL LIGATION         Social History     Tobacco Use    Smoking status: Every Day     Packs/day: 1.00     Years: 33.00     Pack years: 33.00     Types: Cigarettes     Start date: 9/3/1994    Smokeless tobacco: Never   Substance Use Topics    Alcohol use: Yes     Comment: occasionally    Drug use: Not Currently       Cancer-related family history includes Breast cancer in her cousin and cousin; Lung cancer in her mother.    Current Outpatient Medications on File Prior to Visit   Medication Sig Dispense Refill    albuterol (PROVENTIL) 2.5 mg /3 mL (0.083 %) nebulizer solution SMARTSIG:3 Milliliter(s) Via Nebulizer Every 4 Hours PRN      anastrozole (ARIMIDEX) 1 mg Tab Take 1 tablet (1 mg total) by mouth once daily. 90 tablet 6    benzonatate (TESSALON) 100 MG capsule Take 200 mg by mouth 3 (three) times daily as needed.      buPROPion (WELLBUTRIN SR) 150 MG TBSR 12 hr tablet Take 1 tablet daily for 7 days then 1 tablet twice daily thereafter (Patient not taking: Reported on 11/14/2022) 60 tablet 0    cetirizine (ZYRTEC) 10 MG tablet Take 10 mg by mouth once daily.      COMP-AIR NEBULIZER COMPRESSOR Sherri use as directed      ketorolac (TORADOL) 10 mg tablet Take 10 mg by mouth every 6 (six) hours as needed.      nebulizer and compressor Sherri Use as directed      nicotine (NICODERM CQ) 21 mg/24 hr Place 1 patch onto the skin once daily. (Patient not taking: Reported on 11/14/2022) 28 patch 0    ondansetron (ZOFRAN-ODT) 4 MG TbDL Take 1 tablet by mouth every 8 (eight) hours as needed.      predniSONE (DELTASONE) 20 MG tablet Take 20 mg by mouth once daily.      ribociclib (KISQALI) 400 mg/day (200 mg x 2) Tab Take 2 tablets (400mg) by mouth on days 1 to day 21 every 28 days 42 tablet 12     No current facility-administered medications on file prior to visit.       Review of patient's allergies  "indicates:  No Known Allergies    Review of Systems   Constitutional:  Negative for chills and fever.   Eyes:  Negative for blurred vision.   Neurological:  Negative for focal weakness and headaches.      Vital Signs: BP (!) 101/49 (BP Location: Left arm, Patient Position: Sitting)   Pulse 78   Temp 98.5 °F (36.9 °C)   Resp 16   Ht 5' 4" (1.626 m)   Wt 63.4 kg (139 lb 12.4 oz)   SpO2 99%   BMI 23.99 kg/m²     ECOG Performance Status: 1 - Ambulates, capable of light work    Physical Exam  Vitals reviewed.   Constitutional:       General: She is not in acute distress.     Appearance: Normal appearance. She is not ill-appearing or toxic-appearing.   HENT:      Head: Normocephalic and atraumatic.      Nose: Nose normal.   Eyes:      Extraocular Movements: Extraocular movements intact.      Conjunctiva/sclera: Conjunctivae normal.      Pupils: Pupils are equal, round, and reactive to light.   Pulmonary:      Effort: Pulmonary effort is normal.   Musculoskeletal:         General: Normal range of motion.      Cervical back: Normal range of motion.   Skin:     General: Skin is warm.   Neurological:      General: No focal deficit present.      Mental Status: She is alert and oriented to person, place, and time.      Gait: Gait normal.   Psychiatric:         Mood and Affect: Mood normal.         Behavior: Behavior normal.         Thought Content: Thought content normal.         Judgment: Judgment normal.          Imaging: I have personally reviewed the patient's available images and reports and summarized pertinent findings above in HPI.     Pathology: I have personally reviewed the patient's available pathology and summarized pertinent findings above in HPI.     This case was discussed with Dr. Amin.      - Thank you for allowing me to participate in the care of your patient.    Do Bower MD      "

## 2022-12-05 NOTE — PROGRESS NOTES
PROGRESS NOTE    Subjective:       Patient ID: Angeles Wright is a 52 y.o. female.  MRN:   : [id number]    Chief Complaint: Breast cancer       History of Present Illness:   Angeles Wright is a 52 y.o. female who presents with metastatic invasive ductal left breast cancer.       As previously documented by Dr. Mcgovern  she transferred care to [facility] [place] [facility] in 2019. Labs showed her to be pre-menopausal thus she was treated with Ribociclib /Zoladex and Arimidex but she was off therapy from November to May 2020. In 2020 scans showed improvement thus she restarted Arimidex/ribociclib/zoladex.     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      Interim history:   She presents today to discuss her symptoms, labs, and further recommendations.      She has  continued dose reduced ribociclib at the 400 mg p.o. day 1-day 21 Q 28 days since 2020. She has missed doses in the past few months.      She has continued anastrozole 1 mg p.o. daily.  She is due for Zoladex and xgeva injection today.     Left Lumpectomy is scheduled on .     Oncology History:  Oncology History   Malignant neoplasm of central portion of left breast in female, estrogen receptor positive   9/3/2019 Initial Diagnosis    Malignant neoplasm of central portion of left breast in female, estrogen receptor positive     9/3/2019 - 9/3/2019 Chemotherapy    Treatment Summary   Plan Name: OP ANASTROZOLE PALBOCICLIB Q4W  Treatment Goal: Palliative  Status: Inactive  Start Date: 9/3/2019  End Date: 9/3/2019  Provider: Melania Mcgovern MD  Chemotherapy: [No matching medication found in this treatment plan]       2019 Cancer Staged    Staging form: Breast, AJCC 8th Edition  - Clinical: Stage IV (cT4b, cM1, ER+, NV+, HER2-)       2022 Cancer Staged    Staging form: Breast, AJCC 8th Edition  - Clinical stage from 2022: No Stage Recommended (ycT2(m), cN1(f), cM1, G1, ER+, NV+,  HER2-)         5/4/22  Impression:     No significant interval changes.  Known malignancy in the left breast with metastatic disease to bone.  Additional stable bilateral breast nodules which have been evaluated previously with mammography and ultrasound.  Stable small pleural based lung nodules in the right hemithorax.     9/12/22  Impression:  Left  Mass: Left breast 22 mm x 22 mm x 15 mm mass at the upper middle 12 o'clock position. Assessment: 6 - Known biopsy, proven malignancy.   Mass: Left breast 12 mm x 9 mm x 5 mm mass at the upper posterior 12 o'clock position. Assessment: 4C - Suspicious finding. Biopsy is recommended.      Right  Mass: Right breast 10 mm x 10 mm x 7 mm mass at the upper outer 11 o'clock position. Assessment: 3 - Probably benign.   Mass: Right breast 14 mm x 7 mm x 11 mm mass at the 9 o'clock position. Assessment: 3 - Probably benign.      BI-RADS Category:   Overall: 4 - Suspicious    9/28/22  Impression:  Left  Mass: Left breast 22 mm x 22 mm x 15 mm mass at the upper middle 12 o'clock position. Assessment: 6 - Known biopsy, proven malignancy.   Mass: Left breast 12 mm x 9 mm x 5 mm mass at the upper posterior 12 o'clock position. Assessment: 4C - Suspicious finding. Biopsy is recommended.      Right  Mass: Right breast 10 mm x 10 mm x 7 mm mass at the upper outer 11 o'clock position. Assessment: 3 - Probably benign.   Mass: Right breast 14 mm x 7 mm x 11 mm mass at the 9 o'clock position. Assessment: 3 - Probably benign.      BI-RADS Category:   Overall: 4 - Suspicious     Recommendation:  Biopsy could be considered. There is a strong possibility that this represents progression of disease in this patient with known metastatic breast carcinoma.    10/21/22   Final Pathologic Diagnosis LEFT BREAST, 12 O'CLOCK MASS 1 CM FROM NIPPLE, BIOPSY:   Invasive ductal carcinoma, [place] histologic grade 1 (tubule formation:   2, nuclear pleomorphism:  2, mitotic activity:  1).   Comment:  The  biopsy reveals infiltrating ductal carcinoma with partial   mucinous features including abundant tubules and tumor nests with punched out   cribriform spaces containing mucin.  Tumor cells are positive with GATA3 and   negative with , in keeping with mammary ductal origin.  The largest   continuous focus of invasive carcinoma present measures 4 mm.  Dr. Zofia Saleem also reviewed this case and agrees with the diagnosis.   BREAST BIOMARKER RESULTS   Estrogen receptor (ER):  Positive (nearly 100%, strong)   Progesterone receptor (CT):  Positive (50%, weak to moderate)   HER2 IHC:  Negative (1+)   Ki-67 proliferation index:  10%        11/4/22  CT chest abd pelvis  Impression:     Stable diffuse osseous metastatic disease.     Left breast masses consistent with known malignancy and better evaluated on recent mammogram and ultrasound.     New left lower lobe linear consolidation, favored to be due to atelectasis versus infection/inflammation.  Recommend attention on follow-up exam as per clinical protocol.     Sigmoid colon diverticulosis without diverticulitis.     Pulmonary emphysema.    History:  Past Medical History:   Diagnosis Date    Breast cancer       Past Surgical History:   Procedure Laterality Date    BREAST BIOPSY Right     BREAST LUMPECTOMY Left     TUBAL LIGATION       Family History   Problem Relation Age of Onset    Lung cancer Mother     Heart attack Father     Suicide Brother     No Known Problems Maternal Aunt     No Known Problems Maternal Uncle     No Known Problems Paternal Aunt     No Known Problems Paternal Uncle     No Known Problems Maternal Grandmother     No Known Problems Maternal Grandfather     No Known Problems Paternal Grandmother     No Known Problems Paternal Grandfather     No Known Problems Son     No Known Problems Son     No Known Problems Daughter     Breast cancer Cousin     Breast cancer Cousin      Social History     Tobacco Use    Smoking status: Every Day      "Packs/day: 1.00     Years: 33.00     Pack years: 33.00     Types: Cigarettes     Start date: 9/3/1994    Smokeless tobacco: Never   Substance and Sexual Activity    Alcohol use: Yes     Comment: occasionally    Drug use: Not Currently    Sexual activity: Yes     Partners: Male     Birth control/protection: None        ROS:   Review of Systems   Constitutional:  Negative for fever, malaise/fatigue and weight loss.   HENT:  Negative for congestion, hearing loss, nosebleeds and sore throat.    Eyes:  Negative for double vision and photophobia.   Respiratory:  Negative for cough, hemoptysis, sputum production, shortness of breath and wheezing.    Cardiovascular:  Negative for chest pain, palpitations, orthopnea and leg swelling.   Gastrointestinal:  Negative for abdominal pain, blood in stool, constipation, diarrhea, heartburn, nausea and vomiting.   Genitourinary:  Negative for dysuria, hematuria and urgency.   Musculoskeletal:  Positive for joint pain (left hip, mild ). Negative for back pain and myalgias.   Skin:  Negative for itching and rash.   Neurological:  Negative for dizziness, tingling, seizures and weakness.   Endo/Heme/Allergies:  Negative for polydipsia. Does not bruise/bleed easily.   Psychiatric/Behavioral:  Negative for depression and memory loss. The patient is not nervous/anxious and does not have insomnia.       Objective:     Vitals:    12/05/22 1346   BP: 108/86   Pulse: 69   Resp: 18   Temp: 97.4 °F (36.3 °C)   TempSrc: Temporal   SpO2: 99%   Weight: 63.5 kg (139 lb 15.9 oz)   Height: 5' 4" (1.626 m)   PainSc: 0-No pain       Wt Readings from Last 10 Encounters:   12/05/22 63.5 kg (139 lb 15.9 oz)   12/05/22 63.4 kg (139 lb 12.4 oz)   12/02/22 63.5 kg (140 lb)   11/14/22 64 kg (141 lb 1.5 oz)   11/07/22 64.1 kg (141 lb 5 oz)   10/10/22 64.5 kg (142 lb 3.2 oz)   10/10/22 64.5 kg (142 lb 3.2 oz)   09/12/22 64.8 kg (142 lb 12 oz)   08/30/22 65.3 kg (144 lb)   08/15/22 65.6 kg (144 lb 11.7 oz) "       Physical Examination:   Physical Exam  Vitals and nursing note reviewed.   Constitutional:       General: She is not in acute distress.     Appearance: She is not diaphoretic.   HENT:      Head: Normocephalic.      Mouth/Throat:      Pharynx: No oropharyngeal exudate.   Eyes:      General: No scleral icterus.     Conjunctiva/sclera: Conjunctivae normal.   Neck:      Thyroid: No thyromegaly.   Cardiovascular:      Rate and Rhythm: Normal rate and regular rhythm.      Heart sounds: Normal heart sounds. No murmur heard.  Pulmonary:      Effort: Pulmonary effort is normal. No respiratory distress.      Breath sounds: No stridor. No wheezing or rales.   Chest:      Chest wall: No tenderness.   Abdominal:      General: Bowel sounds are normal. There is no distension.      Palpations: Abdomen is soft. There is no mass.      Tenderness: There is no abdominal tenderness. There is no rebound.   Musculoskeletal:         General: No tenderness or deformity. Normal range of motion.      Cervical back: Neck supple.   Lymphadenopathy:      Cervical: No cervical adenopathy.   Skin:     General: Skin is warm and dry.      Findings: No erythema or rash.   Neurological:      Mental Status: She is alert and oriented to person, place, and time.      Cranial Nerves: No cranial nerve deficit.      Coordination: Coordination normal.      Gait: Gait is intact.   Psychiatric:         Mood and Affect: Affect normal.         Cognition and Memory: Memory normal.         Judgment: Judgment normal.        Diagnostic Tests:  Significant Imaging: I have reviewed and interpreted all pertinent imaging results/findings.      Laboratory Data:  All pertinent labs have been reviewed.  Labs:   Lab Results   Component Value Date    WBC 5.52 12/05/2022    RBC 4.05 12/05/2022    HGB 12.8 12/05/2022    HCT 37.2 12/05/2022    MCV 92 12/05/2022     12/05/2022     (H) 12/05/2022     12/05/2022    K 3.7 12/05/2022    BUN 10 12/05/2022     CREATININE 0.9 12/05/2022    AST 12 12/05/2022    ALT 8 (L) 12/05/2022    BILITOT 0.3 12/05/2022       Assessment/Plan:   Malignant neoplasm of central portion of left breast in female, estrogen receptor positive  Stage IV (T3N2M2) invasive ductal carcinoma of left breast,  quadrant, ER 96%, FL 94%, Her2 neg, Grade 2, Ki67 30%  PIKC3A mutation positive      Recent mammogram shows slight increase in the size of the left breast mass, previously biopsied and a new mass.   Biopsy shows a Grade 1 IDC, strongly ER + tumor with Ki 67 10%, more favorable than prior biopsy.   There is no clear metastatic disease progression based on recent scans.      We discussed lumpectomy or mastectomy for solitary disease progression in the breast vs change in systemic therapy. Her case was discussed at our multi D tumor board and the consensus was to remove the site of isolated disease progression. She will be undergoing left lumpectomy on 12/14 with Dr. Amin.     On ribociclib 400 mg p.o. day 1-day 21 Q 28 days and anastrozole 1 mg p.o. Hold for 1 week in the alfonso operative period.    Continue zoladex q 28 days.     Bone metastases  Continue Xgeva Q 28 days.  Continue Caltrate b.i.d. at this time.    Malignant neoplasm metastatic to lung, unspecified laterality  Subcm pulmonary nodules are stable and benign appearing.  Lung nodules have never been biopsied.  Recent changes appear inflammatory.Continue to monitor.    Use of anastrozole  As above.    Smoker  She is cutting down. Was started on Wellbutrin but not taking it at this time.          ECOG SCORE    1 - Restricted in strenuous activity-ambulatory and able to carry out work of a light nature         MDM includes  :    - Acute or chronic illness or injury that poses a threat to life or bodily function  - Independent review and explanation of 3+ results from unique tests  - Discussion of management and ordering 3+ unique tests  - Extensive discussion of treatment and  management  - Prescription drug management  - Drug therapy requiring intensive monitoring for toxicity     Discussion:   No follow-ups on file.    Plan was discussed with the patient at length, and she verbalized understanding. Angeles was given an opportunity to ask questions that were answered to her satisfaction, and she was advised to call in the interval if any problems or questions arise.    Electronically signed by Shelby Thomas MD      Route Chart for Scheduling    Med Onc Chart Routing      Follow up with physician    Follow up with LEÓN . 1/3/23   Infusion scheduling note    Injection scheduling note 1/3/23   Labs CBC and CMP   Lab interval:     Imaging    Pharmacy appointment    Other referrals          Supportive Plan Information  OP BREAST GOSERELIN Q4W   Shelby Thomas MD   Upcoming Treatment Dates - OP BREAST GOSERELIN Q4W    12/6/2022       Chemotherapy       goserelin (ZOLADEX) injection 3.6 mg  1/3/2023       Chemotherapy       goserelin (ZOLADEX) injection 3.6 mg  1/30/2023       Chemotherapy       goserelin (ZOLADEX) injection 3.6 mg    Therapy Plan Information  PORT FLUSH  Flushes  heparin, porcine (PF) 100 unit/mL injection flush 500 Units  500 Units, Intravenous, Every visit  sodium chloride 0.9% flush 10 mL  10 mL, Intravenous, Every visit    DENOSUMAB (XGEVA) Q4W  Medications  denosumab (XGEVA) solution 120 mg  120 mg, Subcutaneous, Every 4 weeks

## 2022-12-06 DIAGNOSIS — C50.112 MALIGNANT NEOPLASM OF CENTRAL PORTION OF LEFT BREAST IN FEMALE, ESTROGEN RECEPTOR POSITIVE: Primary | ICD-10-CM

## 2022-12-06 DIAGNOSIS — Z17.0 MALIGNANT NEOPLASM OF CENTRAL PORTION OF LEFT BREAST IN FEMALE, ESTROGEN RECEPTOR POSITIVE: Primary | ICD-10-CM

## 2022-12-13 ENCOUNTER — PATIENT MESSAGE (OUTPATIENT)
Dept: PHARMACY | Facility: CLINIC | Age: 52
End: 2022-12-13
Payer: MEDICAID

## 2022-12-13 ENCOUNTER — SPECIALTY PHARMACY (OUTPATIENT)
Dept: PHARMACY | Facility: CLINIC | Age: 52
End: 2022-12-13
Payer: MEDICAID

## 2022-12-13 NOTE — TELEPHONE ENCOUNTER
Patient returned refill call for Kisqali. She reports that she is scheduled for a lumpectomy tomorrow and was advised by surgeon to hold Kisqali, but unsure how long to hold. Advised that refill will not be set up at this time until confirmation with providers on when to restart. Forwarding to assigned pharmacist to follow up with oncology/surgery providers.

## 2022-12-13 NOTE — TELEPHONE ENCOUNTER
According to notes, Ms. Wright is instructed to hold Kisqali/hector  for 1 week in the alfonso operative period. Called pt to discuss, but she did not answer. Her next cycle starts on 12/21. Will follow up in a few days.

## 2022-12-19 NOTE — TELEPHONE ENCOUNTER
Specialty Pharmacy - Refill Coordination  Specialty Pharmacy - Clinical Intervention    Specialty Medication Orders Linked to Encounter      Flowsheet Row Most Recent Value   Medication #1 ribociclib (KISQALI) 400 mg/day (200 mg x 2) Tab (Order#[id number], Rx#[id number])            Refill Questions - Documented Responses      Flowsheet Row Most Recent Value   Patient Availability and HIPAA Verification    Does patient want to proceed with activity? Yes   HIPAA/medical authority confirmed? Yes   Relationship to patient of person spoken to? Self   Refill Screening Questions    Changes to allergies? No   Changes to medications? No   New conditions since last clinic visit? No   Unplanned office visit, urgent care, ED, or hospital admission in the last 4 weeks? No   How does patient/caregiver feel medication is working? Good   Financial problems or insurance changes? No   How many doses of your specialty medications were missed in the last 4 weeks? 0   Would patient like to speak to a pharmacist? No   When does the patient need to receive the medication? 12/21/22   Refill Delivery Questions    How will the patient receive the medication? [facility]   When does the patient need to receive the medication? 12/21/22   Shipping Address Home   Address in [facility] confirmed and updated if neccessary? Yes   Expected Copay ($) 0   Is the patient able to afford the medication copay? Yes   Payment Method zero copay   Days supply of Refill 28   Supplies needed? No supplies needed   Refill activity completed? Yes   Refill activity plan Refill scheduled   Shipment/Pickup Date: 12/20/22            Current Outpatient Medications   Medication Sig    albuterol (PROVENTIL) 2.5 mg /3 mL (0.083 %) nebulizer solution SMARTSIG:3 Milliliter(s) Via Nebulizer Every 4 Hours PRN    ALBUTEROL INHL Inhale 2 puffs into the lungs as needed.    anastrozole (ARIMIDEX) 1 mg Tab Take 1 tablet (1 mg total) by mouth once daily.    ascorbic acid, vitamin  C, (VITAMIN C) 500 MG tablet Take 1,000 mg by mouth once daily.    benzonatate (TESSALON) 100 MG capsule Take 200 mg by mouth 3 (three) times daily as needed.    buPROPion (WELLBUTRIN SR) 150 MG TBSR 12 hr tablet Take 1 tablet daily for 7 days then 1 tablet twice daily thereafter (Patient not taking: Reported on 11/14/2022)    cetirizine (ZYRTEC) 10 MG tablet Take 10 mg by mouth once daily.    COMP-AIR NEBULIZER COMPRESSOR Sherri use as directed    HYDROcodone-acetaminophen (NORCO)  mg per tablet Take 1 tablet by mouth every 6 (six) hours as needed for Pain.    ketorolac (TORADOL) 10 mg tablet Take 10 mg by mouth every 6 (six) hours as needed.    nebulizer and compressor Sherri Use as directed    nicotine (NICODERM CQ) 21 mg/24 hr Place 1 patch onto the skin once daily. (Patient not taking: Reported on 11/14/2022)    ondansetron (ZOFRAN-ODT) 4 MG TbDL Take 1 tablet by mouth every 8 (eight) hours as needed.    ribociclib (KISQALI) 400 mg/day (200 mg x 2) Tab Take 2 tablets (400mg) by mouth on days 1 to day 21 every 28 days   Last reviewed on 12/14/2022 10:58 AM by Chapin Fields CRNA    Review of patient's allergies indicates:  No Known Allergies Last reviewed on  12/14/2022 1:13 PM by Aleida Boles    Interventions added this encounter   Open: [facility] Care Plan: ribociclib (KISQALI) 400 mg/day (200 mg x 2) Tab     Tasks added this encounter   1/11/2023 - Refill Call (Auto Added)   Tasks due within next 3 months   2/4/2023 - Clinical - Follow Up Assesement (180 day)     Ruthie Diego, PharmD  [facility]  [address] [place] [id number]  Phone: [phone]  Fax: [phone]

## 2022-12-27 ENCOUNTER — TELEPHONE (OUTPATIENT)
Dept: HEMATOLOGY/ONCOLOGY | Facility: CLINIC | Age: 52
End: 2022-12-27
Payer: MEDICAID

## 2022-12-27 NOTE — TELEPHONE ENCOUNTER
----- Message from Julia Cobb sent at 12/27/2022  9:07 AM CST -----  Contact: Self  Type:  Needs Medical Advice    Who Called: Pt   Would the patient rather a call back or a response via [facility]? Call  Best Call Back Number: [phone]    Additional Information: Pt states she was informed that she could  a return to work letter from the doc's ofc.  Please call to confirm.

## 2022-12-30 NOTE — PROGRESS NOTES
PATIENT: Angeles Wright  MRN: [id number]  DATE: 1/3/2023      Diagnosis:   1. Malignant neoplasm of central portion of left breast in female, estrogen receptor positive    2. Use of anastrozole    3. Bone metastases    4. Malignant neoplasm metastatic to lung, unspecified laterality        Chief Complaint: No chief complaint on file.    Subjective:   HPI: Ms. Wright is a 52 y.o. female who returns for follow up of metastatic invasive ductal left breast cancer.       As previously documented by Dr. Mcgovern  she transferred care to [facility] in 9/2019. Labs showed her to be pre-menopausal thus she was treated with Ribociclib /Zoladex and Arimidex but she was off therapy from November to May 2020. In 5/2020 scans showed improvement thus she restarted Arimidex/ribociclib/zoladex.     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      Interim history:  She presents today to discuss her symptoms, labs, and further recommendations.      She has  continued dose reduced ribociclib at the 400 mg p.o. day 1-day 21 Q 28 days since December 2020. Held x 1 week prior to recent surgery.     She has continued anastrozole 1 mg p.o. daily.  She is due for Zoladex and xgeva injection today.      Left Lumpectomy 12/14/22, grade I IDC 4cm, negative margins. Healing well, some fatigue and tenderness.     Denies HA, CP, cough, abd pain changes in bowel habits, N/V, dysuria, swelling, new lumps or bumps, skin changes. Denies fevers, chills.     Oncology History   Malignant neoplasm of central portion of left breast in female, estrogen receptor positive   9/3/2019 Initial Diagnosis    Malignant neoplasm of central portion of left breast in female, estrogen receptor positive     9/3/2019 - 9/3/2019 Chemotherapy    Treatment Summary   Plan Name: OP ANASTROZOLE PALBOCICLIB Q4W  Treatment Goal: Palliative  Status: Inactive  Start Date: 9/3/2019  End Date: 9/3/2019  Provider: Melania Mcgovern MD  Chemotherapy: [No matching medication found  in this treatment plan]       9/18/2019 Cancer Staged    Staging form: Breast, AJCC 8th Edition  - Clinical: Stage IV (cT4b, cM1, ER+, IL+, HER2-)       11/13/2022 Cancer Staged    Staging form: Breast, AJCC 8th Edition  - Clinical stage from 11/13/2022: No Stage Recommended (ycT2(m), cN1(f), cM1, G1, ER+, IL+, HER2-)       12/26/2022 Cancer Staged    Staging form: Breast, AJCC 8th Edition  - Pathologic stage from 12/26/2022: No Stage Recommended (ypT2, pNX, pM1, G1, ER+, IL+, HER2-)          Past Medical History:   Past Medical History:   Diagnosis Date    Breast cancer        Past Surgical HIstory:   Past Surgical History:   Procedure Laterality Date    BREAST BIOPSY Right     BREAST LUMPECTOMY Left     LUMPECTOMY, WITH RADAR LOCALIZATION USING COLETTE  Left 12/14/2022    Procedure: LUMPECTOMY,WITH RADAR LOCALIZATION USING COLETTE ;  Surgeon: Maday Amin MD;  Location: [facility];  Service: General;  Laterality: Left;    TUBAL LIGATION         Family History:   Family History   Problem Relation Age of Onset    Lung cancer Mother     Heart attack Father     Suicide Brother     No Known Problems Maternal Aunt     No Known Problems Maternal Uncle     No Known Problems Paternal Aunt     No Known Problems Paternal Uncle     No Known Problems Maternal Grandmother     No Known Problems Maternal Grandfather     No Known Problems Paternal Grandmother     No Known Problems Paternal Grandfather     No Known Problems Son     No Known Problems Son     No Known Problems Daughter     Breast cancer Cousin     Breast cancer Cousin        Social History:  reports that she has been smoking cigarettes. She started smoking about 28 years ago. She has a 33.00 pack-year smoking history. She has never used smokeless tobacco. She reports current alcohol use. She reports that she does not currently use drugs.    Allergies:  Review of patient's allergies indicates:  No Known Allergies    Medications:  Current Outpatient  Medications   Medication Sig Dispense Refill    ALBUTEROL INHL Inhale 2 puffs into the lungs as needed.      anastrozole (ARIMIDEX) 1 mg Tab Take 1 tablet (1 mg total) by mouth once daily. 90 tablet 6    ascorbic acid, vitamin C, (VITAMIN C) 500 MG tablet Take 1,000 mg by mouth once daily.      COMP-AIR NEBULIZER COMPRESSOR Sherri use as directed      nebulizer and compressor Sherri Use as directed      ribociclib (KISQALI) 400 mg/day (200 mg x 2) Tab Take 2 tablets (400mg) by mouth on days 1 to day 21 every 28 days 42 tablet 12    albuterol (PROVENTIL) 2.5 mg /3 mL (0.083 %) nebulizer solution SMARTSIG:3 Milliliter(s) Via Nebulizer Every 4 Hours PRN      benzonatate (TESSALON) 100 MG capsule Take 200 mg by mouth 3 (three) times daily as needed.      buPROPion (WELLBUTRIN SR) 150 MG TBSR 12 hr tablet Take 1 tablet daily for 7 days then 1 tablet twice daily thereafter (Patient not taking: Reported on 11/14/2022) 60 tablet 0    cetirizine (ZYRTEC) 10 MG tablet Take 10 mg by mouth once daily.      HYDROcodone-acetaminophen (NORCO)  mg per tablet Take 1 tablet by mouth every 6 (six) hours as needed for Pain. (Patient not taking: Reported on 12/23/2022) 20 tablet 0    ketorolac (TORADOL) 10 mg tablet Take 10 mg by mouth every 6 (six) hours as needed.      nicotine (NICODERM CQ) 21 mg/24 hr Place 1 patch onto the skin once daily. (Patient not taking: Reported on 12/23/2022) 28 patch 0    ondansetron (ZOFRAN-ODT) 4 MG TbDL Take 1 tablet by mouth every 8 (eight) hours as needed.       No current facility-administered medications for this visit.     Facility-Administered Medications Ordered in Other Visits   Medication Dose Route Frequency Provider Last Rate Last Admin    diphenhydrAMINE injection 25 mg  25 mg Intravenous Q6H PRN Pravin Sánchez MD        HYDROmorphone injection 0.5 mg  0.5 mg Intravenous Q5 Min PRN Pravin Sánchez MD   0.5 mg at 12/14/22 1342    lactated ringers infusion   Intravenous Continuous  "Maday Amin  mL/hr at 12/14/22 1105 New Bag at 12/14/22 1105    ondansetron injection 4 mg  4 mg Intravenous Daily PRN Pravin Sánchez MD        prochlorperazine injection Soln 5 mg  5 mg Intravenous Q30 Min PRN Pravin Sánchez MD        sodium chloride 0.9% bolus 250 mL 250 mL  250 mL Intravenous Once Pravin Sánchez MD           Review of Systems   Constitutional:  Positive for fatigue. Negative for appetite change, chills and fever.   HENT:  Negative for trouble swallowing.    Respiratory:  Negative for cough and shortness of breath.    Cardiovascular:  Negative for chest pain and palpitations.   Gastrointestinal:  Negative for abdominal pain, constipation, diarrhea, nausea and vomiting.   Genitourinary:  Negative for dysuria.   Musculoskeletal:  Negative for arthralgias.   Skin:  Negative for rash.   Neurological:  Negative for light-headedness and headaches.   Hematological:  Negative for adenopathy.   Psychiatric/Behavioral:  The patient is not nervous/anxious.      ECOG Performance Status:   ECOG SCORE    1 - Restricted in strenuous activity-ambulatory and able to carry out work of a light nature         Objective:      Vitals:   Vitals:    01/03/23 1313   BP: (!) 102/53   Pulse: 76   Resp: 18   Temp: 98.2 °F (36.8 °C)   TempSrc: Oral   SpO2: 100%   Weight: 64.5 kg (142 lb 4.9 oz)   Height: 5' 4" (1.626 m)     BMI: Body mass index is 24.43 kg/m².    Physical Exam  Vitals reviewed.   Constitutional:       General: She is not in acute distress.     Appearance: She is not diaphoretic.   HENT:      Head: Normocephalic and atraumatic.      Mouth/Throat:      Mouth: Mucous membranes are moist.      Pharynx: No posterior oropharyngeal erythema.   Eyes:      General: No scleral icterus.  Cardiovascular:      Rate and Rhythm: Normal rate and regular rhythm.      Heart sounds: Normal heart sounds. No murmur heard.  Pulmonary:      Effort: Pulmonary effort is normal. No respiratory distress. "      Breath sounds: No wheezing.   Abdominal:      General: Bowel sounds are normal. There is no distension.      Palpations: Abdomen is soft.      Tenderness: There is no abdominal tenderness.   Musculoskeletal:      Cervical back: No tenderness.      Right lower leg: No edema.      Left lower leg: No edema.   Lymphadenopathy:      Cervical: No cervical adenopathy.   Skin:     General: Skin is warm.      Findings: No rash.   Neurological:      Mental Status: She is alert and oriented to person, place, and time.   Psychiatric:         Mood and Affect: Mood normal.         Behavior: Behavior normal.       Laboratory Data:  Lab Results   Component Value Date    WBC 7.03 01/03/2023    HGB 13.2 01/03/2023    HCT 38.5 01/03/2023    MCV 93 01/03/2023     01/03/2023      Assessment:       1. Malignant neoplasm of central portion of left breast in female, estrogen receptor positive    2. Use of anastrozole    3. Bone metastases    4. Malignant neoplasm metastatic to lung, unspecified laterality         Plan:   Malignant neoplasm of central portion of left breast in female, estrogen receptor positive  Stage IV (T3N2M2) invasive ductal carcinoma of left breast,  quadrant, ER 96%, CT 94%, Her2 neg, Grade 2, Ki67 30%  PIKC3A mutation positive      9/2022 mammogram shows slight increase in the size of the left breast mass, previously biopsied and a new mass.   Biopsy shows a Grade 1 IDC, strongly ER + tumor with Ki 67 10%, more favorable than prior biopsy.   There is no clear metastatic disease progression based on recent scans.      Discussion with Dr. Thomas regarding lumpectomy or mastectomy for solitary disease progression in the breast vs change in systemic therapy. Her case was discussed at our multi D tumor board and the consensus was to remove the site of isolated disease progression.   Underwent left lumpectomy on 12/14 with Dr. Amin. Grade I IDC 4cm, negative margins     On ribociclib 400 mg p.o. day 1-day  21 Q 28 days and anastrozole 1 mg p.o. Held for 1 week in the alfonso operative period. Resume as previous.      Continue zoladex q 28 days; due today, proceed      Bone metastases  -Continue Xgeva Q 28 days. Due today, proceed  -Continue Caltrate b.i.d. at this time.     Malignant neoplasm metastatic to lung, unspecified laterality  -Subcm pulmonary nodules are stable and benign appearing.    -Lung nodules have never been biopsied.    -Recent changes appear inflammatory.  -Continue to monitor.     Use of anastrozole  -As above, continue     Patient queried and all questions were answered.  Assessment/Plan reviewed and approved by Dr. Thomas       Route Chart for Scheduling    Med Onc Chart Routing      Follow up with physician 4 weeks. Dr. Thomas with labs prior   Follow up with [facility]    Infusion scheduling note Proceed with Zoladex and Xgeva today as planned   Injection scheduling note    Labs CBC, CMP and CA 27.29   Lab interval:  Ca15-3 prior to appointment in 4 weeks   Imaging    Pharmacy appointment    Other referrals          Supportive Plan Information  OP BREAST GOSERELIN Q4W   Shelby Thomas MD   Upcoming Treatment Dates - OP BREAST GOSERELIN Q4W    1/3/2023       Chemotherapy       goserelin (ZOLADEX) injection 3.6 mg  1/30/2023       Chemotherapy       goserelin (ZOLADEX) injection 3.6 mg    Therapy Plan Information  PORT FLUSH  Flushes  heparin, porcine (PF) 100 unit/mL injection flush 500 Units  500 Units, Intravenous, Every visit  sodium chloride 0.9% flush 10 mL  10 mL, Intravenous, Every visit    DENOSUMAB (XGEVA) Q4W  Medications  denosumab (XGEVA) solution 120 mg  120 mg, Subcutaneous, Every 4 weeks

## 2023-01-03 ENCOUNTER — OFFICE VISIT (OUTPATIENT)
Dept: HEMATOLOGY/ONCOLOGY | Facility: CLINIC | Age: 53
End: 2023-01-03
Payer: MEDICAID

## 2023-01-03 ENCOUNTER — INFUSION (OUTPATIENT)
Dept: INFUSION THERAPY | Facility: HOSPITAL | Age: 53
End: 2023-01-03
Attending: INTERNAL MEDICINE
Payer: MEDICAID

## 2023-01-03 VITALS
DIASTOLIC BLOOD PRESSURE: 53 MMHG | WEIGHT: 142.31 LBS | TEMPERATURE: 98 F | TEMPERATURE: 98 F | HEIGHT: 64 IN | WEIGHT: 142.31 LBS | SYSTOLIC BLOOD PRESSURE: 102 MMHG | SYSTOLIC BLOOD PRESSURE: 102 MMHG | RESPIRATION RATE: 18 BRPM | RESPIRATION RATE: 18 BRPM | DIASTOLIC BLOOD PRESSURE: 53 MMHG | HEART RATE: 76 BPM | BODY MASS INDEX: 24.3 KG/M2 | OXYGEN SATURATION: 100 % | BODY MASS INDEX: 24.43 KG/M2 | HEART RATE: 76 BPM

## 2023-01-03 DIAGNOSIS — C50.112 MALIGNANT NEOPLASM OF CENTRAL PORTION OF LEFT BREAST IN FEMALE, ESTROGEN RECEPTOR POSITIVE: Primary | ICD-10-CM

## 2023-01-03 DIAGNOSIS — C79.51 BONE METASTASES: ICD-10-CM

## 2023-01-03 DIAGNOSIS — C78.00 MALIGNANT NEOPLASM METASTATIC TO LUNG, UNSPECIFIED LATERALITY: ICD-10-CM

## 2023-01-03 DIAGNOSIS — Z17.0 MALIGNANT NEOPLASM OF CENTRAL PORTION OF LEFT BREAST IN FEMALE, ESTROGEN RECEPTOR POSITIVE: Primary | ICD-10-CM

## 2023-01-03 DIAGNOSIS — Z79.811 USE OF ANASTROZOLE: ICD-10-CM

## 2023-01-03 PROCEDURE — 99214 PR OFFICE/OUTPT VISIT, EST, LEVL IV, 30-39 MIN: ICD-10-PCS | Mod: S$PBB,,,

## 2023-01-03 PROCEDURE — 99999 PR PBB SHADOW E&M-EST. PATIENT-LVL III: ICD-10-PCS | Mod: PBBFAC,,,

## 2023-01-03 PROCEDURE — 3008F BODY MASS INDEX DOCD: CPT | Mod: CPTII,,,

## 2023-01-03 PROCEDURE — 99999 PR PBB SHADOW E&M-EST. PATIENT-LVL III: CPT | Mod: PBBFAC,,,

## 2023-01-03 PROCEDURE — 63600175 PHARM REV CODE 636 W HCPCS: Mod: JG,PN | Performed by: INTERNAL MEDICINE

## 2023-01-03 PROCEDURE — 99213 OFFICE O/P EST LOW 20 MIN: CPT | Mod: PBBFAC,25,PN

## 2023-01-03 PROCEDURE — 3078F DIAST BP <80 MM HG: CPT | Mod: CPTII,,,

## 2023-01-03 PROCEDURE — 96372 THER/PROPH/DIAG INJ SC/IM: CPT | Mod: PN,59

## 2023-01-03 PROCEDURE — 96523 IRRIG DRUG DELIVERY DEVICE: CPT | Mod: PN

## 2023-01-03 PROCEDURE — 99214 OFFICE O/P EST MOD 30 MIN: CPT | Mod: S$PBB,,,

## 2023-01-03 PROCEDURE — 3078F PR MOST RECENT DIASTOLIC BLOOD PRESSURE < 80 MM HG: ICD-10-PCS | Mod: CPTII,,,

## 2023-01-03 PROCEDURE — 3074F SYST BP LT 130 MM HG: CPT | Mod: CPTII,,,

## 2023-01-03 PROCEDURE — 96402 CHEMO HORMON ANTINEOPL SQ/IM: CPT | Mod: PN

## 2023-01-03 PROCEDURE — A4216 STERILE WATER/SALINE, 10 ML: HCPCS | Mod: PN

## 2023-01-03 PROCEDURE — 3074F PR MOST RECENT SYSTOLIC BLOOD PRESSURE < 130 MM HG: ICD-10-PCS | Mod: CPTII,,,

## 2023-01-03 PROCEDURE — 25000003 PHARM REV CODE 250: Mod: PN

## 2023-01-03 PROCEDURE — 3008F PR BODY MASS INDEX (BMI) DOCUMENTED: ICD-10-PCS | Mod: CPTII,,,

## 2023-01-03 PROCEDURE — 63600175 PHARM REV CODE 636 W HCPCS: Mod: JG,PN

## 2023-01-03 PROCEDURE — 96401 CHEMO ANTI-NEOPL SQ/IM: CPT | Mod: PN

## 2023-01-03 RX ORDER — ANASTROZOLE 1 MG/1
1 TABLET ORAL DAILY
Qty: 90 TABLET | Refills: 6 | Status: SHIPPED | OUTPATIENT
Start: 2023-01-03 | End: 2024-01-08 | Stop reason: SDUPTHER

## 2023-01-03 RX ORDER — SODIUM CHLORIDE 0.9 % (FLUSH) 0.9 %
10 SYRINGE (ML) INJECTION
Status: DISCONTINUED | OUTPATIENT
Start: 2023-01-03 | End: 2023-01-03 | Stop reason: HOSPADM

## 2023-01-03 RX ORDER — SODIUM CHLORIDE 0.9 % (FLUSH) 0.9 %
10 SYRINGE (ML) INJECTION
Status: CANCELLED | OUTPATIENT
Start: 2023-01-03

## 2023-01-03 RX ORDER — HEPARIN 100 UNIT/ML
500 SYRINGE INTRAVENOUS
Status: CANCELLED | OUTPATIENT
Start: 2023-01-03

## 2023-01-03 RX ORDER — HEPARIN 100 UNIT/ML
500 SYRINGE INTRAVENOUS
Status: DISCONTINUED | OUTPATIENT
Start: 2023-01-03 | End: 2023-01-03 | Stop reason: HOSPADM

## 2023-01-03 RX ADMIN — GOSERELIN ACETATE 3.6 MG: 3.6 IMPLANT SUBCUTANEOUS at 02:01

## 2023-01-03 RX ADMIN — Medication 10 ML: at 02:01

## 2023-01-03 RX ADMIN — Medication 500 UNITS: at 02:01

## 2023-01-03 RX ADMIN — DENOSUMAB 120 MG: 120 INJECTION SUBCUTANEOUS at 02:01

## 2023-01-11 ENCOUNTER — SPECIALTY PHARMACY (OUTPATIENT)
Dept: PHARMACY | Facility: CLINIC | Age: 53
End: 2023-01-11
Payer: MEDICAID

## 2023-01-11 NOTE — TELEPHONE ENCOUNTER
Patient is unsure when her next cycle starts. Claim indicated RTS until 1/12. Patient is agreeable to a call on 1/12 for refill.

## 2023-01-12 ENCOUNTER — PATIENT MESSAGE (OUTPATIENT)
Dept: HEMATOLOGY/ONCOLOGY | Facility: CLINIC | Age: 53
End: 2023-01-12
Payer: MEDICAID

## 2023-01-17 ENCOUNTER — PATIENT MESSAGE (OUTPATIENT)
Dept: PHARMACY | Facility: CLINIC | Age: 53
End: 2023-01-17
Payer: MEDICAID

## 2023-01-23 ENCOUNTER — SPECIALTY PHARMACY (OUTPATIENT)
Dept: PHARMACY | Facility: CLINIC | Age: 53
End: 2023-01-23
Payer: MEDICAID

## 2023-01-23 DIAGNOSIS — Z17.0 MALIGNANT NEOPLASM OF CENTRAL PORTION OF LEFT BREAST IN FEMALE, ESTROGEN RECEPTOR POSITIVE: Primary | ICD-10-CM

## 2023-01-23 DIAGNOSIS — C50.112 MALIGNANT NEOPLASM OF CENTRAL PORTION OF LEFT BREAST IN FEMALE, ESTROGEN RECEPTOR POSITIVE: Primary | ICD-10-CM

## 2023-01-23 NOTE — TELEPHONE ENCOUNTER
Outgoing call: for refills. LVM      Specialty Pharmacy - Refill Coordination    Specialty Medication Orders Linked to Encounter      Flowsheet Row Most Recent Value   Medication #1 ribociclib (KISQALI) 400 mg/day (200 mg x 2) Tab (Order#[id number], Rx#[id number])          Refill Questions - Documented Responses      Flowsheet Row Most Recent Value   Patient Availability and HIPAA Verification    Does patient want to proceed with activity? Unable to Reach          We have had multiple attempts to the patient and have been unsuccessful to reach the patient. We will stop reaching out to the patient but in the event that the patient needs the med and contacts us, we will communicate and begin dispensing for the patient. At your next visit with the patient, please review the importance of being in contact with our specialty pharmacy as a part of our care team.    Interventions added this encounter   Closed: [facility] Provider Intervention - Drug therapy adherence: ribociclib (KISQALI) 400 mg/day (200 mg x 2) Tab     Gwyn Zaragoza, PharmD  [facility]  [address] [facility] [address]  Phone: [phone]  Fax: [phone]

## 2023-01-23 NOTE — TELEPHONE ENCOUNTER
Specialty Pharmacy - Refill Coordination    Specialty Medication Orders Linked to Encounter      Flowsheet Row Most Recent Value   Medication #1 ribociclib (KISQALI) 400 mg/day (200 mg x 2) Tab (Order#[id number], Rx#[id number])            Refill Questions - Documented Responses      Flowsheet Row Most Recent Value   Patient Availability and HIPAA Verification    Does patient want to proceed with activity? Yes   HIPAA/medical authority confirmed? Yes   Relationship to patient of person spoken to? Self   Refill Screening Questions    Changes to allergies? No   Changes to medications? No   New conditions since last clinic visit? No   Unplanned office visit, urgent care, ED, or hospital admission in the last 4 weeks? No   How does patient/caregiver feel medication is working? Excellent   Financial problems or insurance changes? No   How many doses of your specialty medications were missed in the last 4 weeks? 0   Would patient like to speak to a pharmacist? No   When does the patient need to receive the medication? 01/25/23   Refill Delivery Questions    How will the patient receive the medication? [facility]   When does the patient need to receive the medication? 01/25/23   Shipping Address Home   Address in [facility] confirmed and updated if neccessary? Yes   Expected Copay ($) 0   Is the patient able to afford the medication copay? Yes   Payment Method zero copay   Days supply of Refill 28   Supplies needed? No supplies needed   Refill activity completed? Yes   Refill activity plan Refill scheduled   Shipment/Pickup Date: 01/24/23            Current Outpatient Medications   Medication Sig    albuterol (PROVENTIL) 2.5 mg /3 mL (0.083 %) nebulizer solution SMARTSIG:3 Milliliter(s) Via Nebulizer Every 4 Hours PRN    ALBUTEROL INHL Inhale 2 puffs into the lungs as needed.    anastrozole (ARIMIDEX) 1 mg Tab Take 1 tablet (1 mg total) by mouth once daily.    ascorbic acid, vitamin C, (VITAMIN C) 500 MG tablet Take 1,000  mg by mouth once daily.    benzonatate (TESSALON) 100 MG capsule Take 200 mg by mouth 3 (three) times daily as needed.    buPROPion (WELLBUTRIN SR) 150 MG TBSR 12 hr tablet Take 1 tablet daily for 7 days then 1 tablet twice daily thereafter (Patient not taking: Reported on 11/14/2022)    cetirizine (ZYRTEC) 10 MG tablet Take 10 mg by mouth once daily.    COMP-AIR NEBULIZER COMPRESSOR Sherri use as directed    HYDROcodone-acetaminophen (NORCO)  mg per tablet Take 1 tablet by mouth every 6 (six) hours as needed for Pain. (Patient not taking: Reported on 12/23/2022)    ketorolac (TORADOL) 10 mg tablet Take 10 mg by mouth every 6 (six) hours as needed.    nebulizer and compressor Sherri Use as directed    nicotine (NICODERM CQ) 21 mg/24 hr Place 1 patch onto the skin once daily. (Patient not taking: Reported on 12/23/2022)    ondansetron (ZOFRAN-ODT) 4 MG TbDL Take 1 tablet by mouth every 8 (eight) hours as needed.    ribociclib (KISQALI) 400 mg/day (200 mg x 2) Tab Take 2 tablets (400mg) by mouth on days 1 to day 21 every 28 days   Last reviewed on 1/3/2023  3:08 PM by Brigid Root NP    Review of patient's allergies indicates:  No Known Allergies Last reviewed on  1/3/2023 3:08 PM by Brigid Root      Tasks added this encounter   2/15/2023 - Refill Call (Auto Added)   Tasks due within next 3 months   2/4/2023 - Clinical - Follow Up Assesement (180 day)     [facility]  [address] [place] [id number]  Phone: [phone]  Fax: [phone]

## 2023-01-26 ENCOUNTER — LAB VISIT (OUTPATIENT)
Dept: LAB | Facility: HOSPITAL | Age: 53
End: 2023-01-26
Attending: INTERNAL MEDICINE
Payer: MEDICAID

## 2023-01-26 DIAGNOSIS — Z17.0 MALIGNANT NEOPLASM OF CENTRAL PORTION OF LEFT BREAST IN FEMALE, ESTROGEN RECEPTOR POSITIVE: ICD-10-CM

## 2023-01-26 DIAGNOSIS — C50.112 MALIGNANT NEOPLASM OF CENTRAL PORTION OF LEFT BREAST IN FEMALE, ESTROGEN RECEPTOR POSITIVE: ICD-10-CM

## 2023-01-26 LAB
ALBUMIN SERPL BCP-MCNC: 4 G/DL (ref 3.5–5.2)
ALP SERPL-CCNC: 77 U/L (ref 55–135)
ALT SERPL W/O P-5'-P-CCNC: 8 U/L (ref 10–44)
ANION GAP SERPL CALC-SCNC: 12 MMOL/L (ref 8–16)
AST SERPL-CCNC: 12 U/L (ref 10–40)
BASOPHILS # BLD AUTO: 0.13 K/UL (ref 0–0.2)
BASOPHILS NFR BLD: 2 % (ref 0–1.9)
BILIRUB SERPL-MCNC: 0.2 MG/DL (ref 0.1–1)
BUN SERPL-MCNC: 10 MG/DL (ref 6–20)
CALCIUM SERPL-MCNC: 9.1 MG/DL (ref 8.7–10.5)
CHLORIDE SERPL-SCNC: 108 MMOL/L (ref 95–110)
CO2 SERPL-SCNC: 22 MMOL/L (ref 23–29)
CREAT SERPL-MCNC: 0.8 MG/DL (ref 0.5–1.4)
DIFFERENTIAL METHOD: ABNORMAL
EOSINOPHIL # BLD AUTO: 0.1 K/UL (ref 0–0.5)
EOSINOPHIL NFR BLD: 1.8 % (ref 0–8)
ERYTHROCYTE [DISTWIDTH] IN BLOOD BY AUTOMATED COUNT: 13.8 % (ref 11.5–14.5)
EST. GFR  (NO RACE VARIABLE): >60 ML/MIN/1.73 M^2
GLUCOSE SERPL-MCNC: 115 MG/DL (ref 70–110)
HCT VFR BLD AUTO: 43.5 % (ref 37–48.5)
HGB BLD-MCNC: 14.5 G/DL (ref 12–16)
IMM GRANULOCYTES # BLD AUTO: 0.02 K/UL (ref 0–0.04)
IMM GRANULOCYTES NFR BLD AUTO: 0.3 % (ref 0–0.5)
LYMPHOCYTES # BLD AUTO: 1.9 K/UL (ref 1–4.8)
LYMPHOCYTES NFR BLD: 28.9 % (ref 18–48)
MCH RBC QN AUTO: 30.9 PG (ref 27–31)
MCHC RBC AUTO-ENTMCNC: 33.3 G/DL (ref 32–36)
MCV RBC AUTO: 93 FL (ref 82–98)
MONOCYTES # BLD AUTO: 0.4 K/UL (ref 0.3–1)
MONOCYTES NFR BLD: 5.8 % (ref 4–15)
NEUTROPHILS # BLD AUTO: 4 K/UL (ref 1.8–7.7)
NEUTROPHILS NFR BLD: 61.2 % (ref 38–73)
NRBC BLD-RTO: 0 /100 WBC
PLATELET # BLD AUTO: 380 K/UL (ref 150–450)
PMV BLD AUTO: 8.4 FL (ref 9.2–12.9)
POTASSIUM SERPL-SCNC: 4 MMOL/L (ref 3.5–5.1)
PROT SERPL-MCNC: 7.3 G/DL (ref 6–8.4)
RBC # BLD AUTO: 4.7 M/UL (ref 4–5.4)
SODIUM SERPL-SCNC: 142 MMOL/L (ref 136–145)
WBC # BLD AUTO: 6.53 K/UL (ref 3.9–12.7)

## 2023-01-26 PROCEDURE — 80053 COMPREHEN METABOLIC PANEL: CPT | Mod: PN

## 2023-01-26 PROCEDURE — 86300 IMMUNOASSAY TUMOR CA 15-3: CPT | Mod: 91

## 2023-01-26 PROCEDURE — 85025 COMPLETE CBC W/AUTO DIFF WBC: CPT | Mod: PN

## 2023-01-26 PROCEDURE — 86300 IMMUNOASSAY TUMOR CA 15-3: CPT

## 2023-01-28 LAB — CANCER AG15-3 SERPL IA-ACNC: 68 U/ML

## 2023-01-30 ENCOUNTER — TELEPHONE (OUTPATIENT)
Dept: SMOKING CESSATION | Facility: CLINIC | Age: 53
End: 2023-01-30
Payer: MEDICAID

## 2023-01-30 ENCOUNTER — INFUSION (OUTPATIENT)
Dept: INFUSION THERAPY | Facility: HOSPITAL | Age: 53
End: 2023-01-30
Attending: INTERNAL MEDICINE
Payer: MEDICAID

## 2023-01-30 ENCOUNTER — OFFICE VISIT (OUTPATIENT)
Dept: HEMATOLOGY/ONCOLOGY | Facility: CLINIC | Age: 53
End: 2023-01-30
Payer: MEDICAID

## 2023-01-30 VITALS
TEMPERATURE: 98 F | HEIGHT: 64 IN | BODY MASS INDEX: 23.78 KG/M2 | SYSTOLIC BLOOD PRESSURE: 123 MMHG | RESPIRATION RATE: 16 BRPM | OXYGEN SATURATION: 98 % | HEART RATE: 84 BPM | WEIGHT: 139.31 LBS | DIASTOLIC BLOOD PRESSURE: 76 MMHG

## 2023-01-30 VITALS
OXYGEN SATURATION: 98 % | DIASTOLIC BLOOD PRESSURE: 79 MMHG | RESPIRATION RATE: 16 BRPM | WEIGHT: 139.31 LBS | HEART RATE: 84 BPM | SYSTOLIC BLOOD PRESSURE: 123 MMHG | TEMPERATURE: 98 F | BODY MASS INDEX: 23.78 KG/M2 | HEIGHT: 64 IN

## 2023-01-30 DIAGNOSIS — C50.112 MALIGNANT NEOPLASM OF CENTRAL PORTION OF LEFT BREAST IN FEMALE, ESTROGEN RECEPTOR POSITIVE: Primary | ICD-10-CM

## 2023-01-30 DIAGNOSIS — C79.51 BONE METASTASES: ICD-10-CM

## 2023-01-30 DIAGNOSIS — Z17.0 MALIGNANT NEOPLASM OF CENTRAL PORTION OF LEFT BREAST IN FEMALE, ESTROGEN RECEPTOR POSITIVE: Primary | ICD-10-CM

## 2023-01-30 DIAGNOSIS — Z79.811 USE OF ANASTROZOLE: ICD-10-CM

## 2023-01-30 DIAGNOSIS — C78.00 MALIGNANT NEOPLASM METASTATIC TO LUNG, UNSPECIFIED LATERALITY: ICD-10-CM

## 2023-01-30 DIAGNOSIS — F17.200 SMOKER: ICD-10-CM

## 2023-01-30 DIAGNOSIS — D70.1 CHEMOTHERAPY-INDUCED NEUTROPENIA: ICD-10-CM

## 2023-01-30 DIAGNOSIS — T45.1X5A CHEMOTHERAPY-INDUCED NEUTROPENIA: ICD-10-CM

## 2023-01-30 LAB — CANCER AG27-29 SERPL-ACNC: 93.3 U/ML

## 2023-01-30 PROCEDURE — 96401 CHEMO ANTI-NEOPL SQ/IM: CPT | Mod: PN

## 2023-01-30 PROCEDURE — 3078F PR MOST RECENT DIASTOLIC BLOOD PRESSURE < 80 MM HG: ICD-10-PCS | Mod: CPTII,,, | Performed by: INTERNAL MEDICINE

## 2023-01-30 PROCEDURE — 96402 CHEMO HORMON ANTINEOPL SQ/IM: CPT | Mod: PN

## 2023-01-30 PROCEDURE — 99215 OFFICE O/P EST HI 40 MIN: CPT | Mod: S$PBB,,, | Performed by: INTERNAL MEDICINE

## 2023-01-30 PROCEDURE — A4216 STERILE WATER/SALINE, 10 ML: HCPCS | Mod: PN

## 2023-01-30 PROCEDURE — 96523 IRRIG DRUG DELIVERY DEVICE: CPT | Mod: PN

## 2023-01-30 PROCEDURE — 1159F PR MEDICATION LIST DOCUMENTED IN MEDICAL RECORD: ICD-10-PCS | Mod: CPTII,,, | Performed by: INTERNAL MEDICINE

## 2023-01-30 PROCEDURE — 3008F PR BODY MASS INDEX (BMI) DOCUMENTED: ICD-10-PCS | Mod: CPTII,,, | Performed by: INTERNAL MEDICINE

## 2023-01-30 PROCEDURE — 99215 PR OFFICE/OUTPT VISIT, EST, LEVL V, 40-54 MIN: ICD-10-PCS | Mod: S$PBB,,, | Performed by: INTERNAL MEDICINE

## 2023-01-30 PROCEDURE — 63600175 PHARM REV CODE 636 W HCPCS: Mod: PN

## 2023-01-30 PROCEDURE — 99214 OFFICE O/P EST MOD 30 MIN: CPT | Mod: PBBFAC,PN | Performed by: INTERNAL MEDICINE

## 2023-01-30 PROCEDURE — 63600175 PHARM REV CODE 636 W HCPCS: Mod: JG,PN | Performed by: INTERNAL MEDICINE

## 2023-01-30 PROCEDURE — 3074F SYST BP LT 130 MM HG: CPT | Mod: CPTII,,, | Performed by: INTERNAL MEDICINE

## 2023-01-30 PROCEDURE — 3078F DIAST BP <80 MM HG: CPT | Mod: CPTII,,, | Performed by: INTERNAL MEDICINE

## 2023-01-30 PROCEDURE — 99999 PR PBB SHADOW E&M-EST. PATIENT-LVL IV: ICD-10-PCS | Mod: PBBFAC,,, | Performed by: INTERNAL MEDICINE

## 2023-01-30 PROCEDURE — 3008F BODY MASS INDEX DOCD: CPT | Mod: CPTII,,, | Performed by: INTERNAL MEDICINE

## 2023-01-30 PROCEDURE — 1159F MED LIST DOCD IN RCRD: CPT | Mod: CPTII,,, | Performed by: INTERNAL MEDICINE

## 2023-01-30 PROCEDURE — 25000003 PHARM REV CODE 250: Mod: PN

## 2023-01-30 PROCEDURE — 99999 PR PBB SHADOW E&M-EST. PATIENT-LVL IV: CPT | Mod: PBBFAC,,, | Performed by: INTERNAL MEDICINE

## 2023-01-30 PROCEDURE — 3074F PR MOST RECENT SYSTOLIC BLOOD PRESSURE < 130 MM HG: ICD-10-PCS | Mod: CPTII,,, | Performed by: INTERNAL MEDICINE

## 2023-01-30 RX ORDER — SODIUM CHLORIDE 0.9 % (FLUSH) 0.9 %
10 SYRINGE (ML) INJECTION
Status: CANCELLED | OUTPATIENT
Start: 2023-01-30

## 2023-01-30 RX ORDER — SODIUM CHLORIDE 0.9 % (FLUSH) 0.9 %
10 SYRINGE (ML) INJECTION
Status: DISCONTINUED | OUTPATIENT
Start: 2023-01-30 | End: 2023-01-30 | Stop reason: HOSPADM

## 2023-01-30 RX ORDER — HEPARIN 100 UNIT/ML
500 SYRINGE INTRAVENOUS
Status: CANCELLED | OUTPATIENT
Start: 2023-01-30

## 2023-01-30 RX ORDER — ONDANSETRON 8 MG/1
8 TABLET, ORALLY DISINTEGRATING ORAL EVERY 8 HOURS PRN
COMMUNITY
Start: 2023-01-09

## 2023-01-30 RX ORDER — HEPARIN 100 UNIT/ML
500 SYRINGE INTRAVENOUS
Status: DISCONTINUED | OUTPATIENT
Start: 2023-01-30 | End: 2023-01-30 | Stop reason: HOSPADM

## 2023-01-30 RX ORDER — OSELTAMIVIR PHOSPHATE 75 MG/1
75 CAPSULE ORAL 2 TIMES DAILY
COMMUNITY
Start: 2023-01-09 | End: 2023-02-27 | Stop reason: ALTCHOICE

## 2023-01-30 RX ORDER — ALBUTEROL SULFATE 90 UG/1
2 AEROSOL, METERED RESPIRATORY (INHALATION) EVERY 6 HOURS PRN
COMMUNITY
Start: 2022-10-13 | End: 2023-09-11

## 2023-01-30 RX ADMIN — Medication 500 UNITS: at 04:01

## 2023-01-30 RX ADMIN — Medication 10 ML: at 04:01

## 2023-01-30 RX ADMIN — GOSERELIN ACETATE 3.6 MG: 3.6 IMPLANT SUBCUTANEOUS at 04:01

## 2023-01-30 RX ADMIN — DENOSUMAB 120 MG: 120 INJECTION SUBCUTANEOUS at 04:01

## 2023-01-30 NOTE — PROGRESS NOTES
PROGRESS NOTE    Subjective:       Patient ID: Angeles Wright is a 52 y.o. female.  MRN:   : [id number]    Chief Complaint: Breast cancer       History of Present Illness:   Angeles Wright is a 52 y.o. female who presents with metastatic invasive ductal left breast cancer.       As previously documented by Dr. Mcgovern  she transferred care to [facility] in 2019. Labs showed her to be pre-menopausal thus she was treated with Ribociclib /Zoladex and Arimidex but she was off therapy from November to May 2020. In 2020 scans showed improvement thus she restarted Arimidex/ribociclib/zoladex.     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      Interim history:   She presents today to discuss her symptoms, labs, and further recommendations.      She has  continued dose reduced ribociclib at the 400 mg p.o. day 1-day 21 Q 28 days since 2020. She has missed doses in the past few months.      She has continued anastrozole 1 mg p.o. daily.  She is due for Zoladex and xgeva injection today.     Started the new cycle of Ribociclib today .     Oncology History:  Oncology History   Malignant neoplasm of central portion of left breast in female, estrogen receptor positive   9/3/2019 Initial Diagnosis    Malignant neoplasm of central portion of left breast in female, estrogen receptor positive     9/3/2019 - 9/3/2019 Chemotherapy    Treatment Summary   Plan Name: OP ANASTROZOLE PALBOCICLIB Q4W  Treatment Goal: Palliative  Status: Inactive  Start Date: 9/3/2019  End Date: 9/3/2019  Provider: Melania Mcgovern MD  Chemotherapy: [No matching medication found in this treatment plan]       2019 Cancer Staged    Staging form: Breast, AJCC 8th Edition  - Clinical: Stage IV (cT4b, cM1, ER+, UT+, HER2-)       2022 Cancer Staged    Staging form: Breast, AJCC 8th Edition  - Clinical stage from 2022: No Stage Recommended (ycT2(m), cN1(f),  cM1, G1, ER+, MS+, HER2-)       12/26/2022 Cancer Staged    Staging form: Breast, AJCC 8th Edition  - Pathologic stage from 12/26/2022: No Stage Recommended (ypT2, pNX, pM1, G1, ER+, MS+, HER2-)         5/4/22  Impression:     No significant interval changes.  Known malignancy in the left breast with metastatic disease to bone.  Additional stable bilateral breast nodules which have been evaluated previously with mammography and ultrasound.  Stable small pleural based lung nodules in the right hemithorax.     9/12/22  Impression:  Left  Mass: Left breast 22 mm x 22 mm x 15 mm mass at the upper middle 12 o'clock position. Assessment: 6 - Known biopsy, proven malignancy.   Mass: Left breast 12 mm x 9 mm x 5 mm mass at the upper posterior 12 o'clock position. Assessment: 4C - Suspicious finding. Biopsy is recommended.      Right  Mass: Right breast 10 mm x 10 mm x 7 mm mass at the upper outer 11 o'clock position. Assessment: 3 - Probably benign.   Mass: Right breast 14 mm x 7 mm x 11 mm mass at the 9 o'clock position. Assessment: 3 - Probably benign.      BI-RADS Category:   Overall: 4 - Suspicious    9/28/22  Impression:  Left  Mass: Left breast 22 mm x 22 mm x 15 mm mass at the upper middle 12 o'clock position. Assessment: 6 - Known biopsy, proven malignancy.   Mass: Left breast 12 mm x 9 mm x 5 mm mass at the upper posterior 12 o'clock position. Assessment: 4C - Suspicious finding. Biopsy is recommended.      Right  Mass: Right breast 10 mm x 10 mm x 7 mm mass at the upper outer 11 o'clock position. Assessment: 3 - Probably benign.   Mass: Right breast 14 mm x 7 mm x 11 mm mass at the 9 o'clock position. Assessment: 3 - Probably benign.      BI-RADS Category:   Overall: 4 - Suspicious     Recommendation:  Biopsy could be considered. There is a strong possibility that this represents progression of disease in this patient with known metastatic breast carcinoma.    10/21/22   Final Pathologic Diagnosis LEFT BREAST, 12  O'CLOCK MASS 1 CM FROM NIPPLE, BIOPSY:   Invasive ductal carcinoma, Arjun histologic grade 1 (tubule formation:   2, nuclear pleomorphism:  2, mitotic activity:  1).   Comment:  The biopsy reveals infiltrating ductal carcinoma with partial   mucinous features including abundant tubules and tumor nests with punched out   cribriform spaces containing mucin.  Tumor cells are positive with GATA3 and   negative with , in keeping with mammary ductal origin.  The largest   continuous focus of invasive carcinoma present measures 4 mm.  Dr. Zofia Saleem also reviewed this case and agrees with the diagnosis.   BREAST BIOMARKER RESULTS   Estrogen receptor (ER):  Positive (nearly 100%, strong)   Progesterone receptor (KY):  Positive (50%, weak to moderate)   HER2 IHC:  Negative (1+)   Ki-67 proliferation index:  10%        11/4/22  CT chest abd pelvis  Impression:     Stable diffuse osseous metastatic disease.     Left breast masses consistent with known malignancy and better evaluated on recent mammogram and ultrasound.     New left lower lobe linear consolidation, favored to be due to atelectasis versus infection/inflammation.  Recommend attention on follow-up exam as per clinical protocol.     Sigmoid colon diverticulosis without diverticulitis.     Pulmonary emphysema.    History:  Past Medical History:   Diagnosis Date    Breast cancer       Past Surgical History:   Procedure Laterality Date    BREAST BIOPSY Right     BREAST LUMPECTOMY Left     LUMPECTOMY, WITH RADAR LOCALIZATION USING COLETTE  Left 12/14/2022    Procedure: LUMPECTOMY,WITH RADAR LOCALIZATION USING COLETTE ;  Surgeon: Maday Amin MD;  Location: [facility];  Service: General;  Laterality: Left;    TUBAL LIGATION       Family History   Problem Relation Age of Onset    Lung cancer Mother     Heart attack Father     Suicide Brother     No Known Problems Maternal Aunt     No Known Problems Maternal Uncle     No Known Problems Paternal  "Aunt     No Known Problems Paternal Uncle     No Known Problems Maternal Grandmother     No Known Problems Maternal Grandfather     No Known Problems Paternal Grandmother     No Known Problems Paternal Grandfather     No Known Problems Son     No Known Problems Son     No Known Problems Daughter     Breast cancer Cousin     Breast cancer Cousin      Social History     Tobacco Use    Smoking status: Every Day     Packs/day: 1.00     Years: 33.00     Pack years: 33.00     Types: Cigarettes     Start date: 9/3/1994    Smokeless tobacco: Never   Substance and Sexual Activity    Alcohol use: Yes     Comment: occasionally    Drug use: Not Currently    Sexual activity: Yes     Partners: Male     Birth control/protection: None        ROS:   Review of Systems   Constitutional:  Negative for fever, malaise/fatigue and weight loss.   HENT:  Negative for congestion, hearing loss, nosebleeds and sore throat.    Eyes:  Negative for double vision and photophobia.   Respiratory:  Negative for cough, hemoptysis, sputum production, shortness of breath and wheezing.    Cardiovascular:  Negative for chest pain, palpitations, orthopnea and leg swelling.   Gastrointestinal:  Negative for abdominal pain, blood in stool, constipation, diarrhea, heartburn, nausea and vomiting.   Genitourinary:  Negative for dysuria, hematuria and urgency.   Musculoskeletal:  Positive for joint pain (left hip, mild ). Negative for back pain and myalgias.   Skin:  Negative for itching and rash.   Neurological:  Negative for dizziness, tingling, seizures and weakness.   Endo/Heme/Allergies:  Negative for polydipsia. Does not bruise/bleed easily.   Psychiatric/Behavioral:  Negative for depression and memory loss. The patient is not nervous/anxious and does not have insomnia.       Objective:     Vitals:    01/30/23 1545   BP: 123/79   Pulse: 84   Resp: 16   Temp: 97.7 °F (36.5 °C)   TempSrc: Temporal   SpO2: 98%   Weight: 63.2 kg (139 lb 5.3 oz)   Height: 5' 4" " (1.626 m)   PainSc: 0-No pain         Wt Readings from Last 10 Encounters:   01/30/23 63.2 kg (139 lb 5.3 oz)   01/03/23 64.5 kg (142 lb 4.9 oz)   01/03/23 64.5 kg (142 lb 4.9 oz)   12/23/22 62.6 kg (138 lb)   12/13/22 63 kg (138 lb 14.2 oz)   12/05/22 63.5 kg (139 lb 15.9 oz)   12/05/22 63.5 kg (139 lb 15.9 oz)   12/05/22 63.4 kg (139 lb 12.4 oz)   12/02/22 63.5 kg (140 lb)   11/14/22 64 kg (141 lb 1.5 oz)       Physical Examination:   Physical Exam  Vitals and nursing note reviewed.   Constitutional:       General: She is not in acute distress.     Appearance: She is not diaphoretic.   HENT:      Head: Normocephalic.      Mouth/Throat:      Pharynx: No oropharyngeal exudate.   Eyes:      General: No scleral icterus.     Conjunctiva/sclera: Conjunctivae normal.   Neck:      Thyroid: No thyromegaly.   Cardiovascular:      Rate and Rhythm: Normal rate and regular rhythm.      Heart sounds: Normal heart sounds. No murmur heard.  Pulmonary:      Effort: Pulmonary effort is normal. No respiratory distress.      Breath sounds: No stridor. No wheezing or rales.   Chest:      Chest wall: No tenderness.   Abdominal:      General: Bowel sounds are normal. There is no distension.      Palpations: Abdomen is soft. There is no mass.      Tenderness: There is no abdominal tenderness. There is no rebound.   Musculoskeletal:         General: No tenderness or deformity. Normal range of motion.      Cervical back: Neck supple.   Lymphadenopathy:      Cervical: No cervical adenopathy.   Skin:     General: Skin is warm and dry.      Findings: No erythema or rash.   Neurological:      Mental Status: She is alert and oriented to person, place, and time.      Cranial Nerves: No cranial nerve deficit.      Coordination: Coordination normal.      Gait: Gait is intact.   Psychiatric:         Mood and Affect: Affect normal.         Cognition and Memory: Memory normal.         Judgment: Judgment normal.        Diagnostic Tests:  Significant  Imaging: I have reviewed and interpreted all pertinent imaging results/findings.      Laboratory Data:  All pertinent labs have been reviewed.  Labs:   Lab Results   Component Value Date    WBC 6.53 01/26/2023    RBC 4.70 01/26/2023    HGB 14.5 01/26/2023    HCT 43.5 01/26/2023    MCV 93 01/26/2023     01/26/2023     (H) 01/26/2023     01/26/2023    K 4.0 01/26/2023    BUN 10 01/26/2023    CREATININE 0.8 01/26/2023    AST 12 01/26/2023    ALT 8 (L) 01/26/2023    BILITOT 0.2 01/26/2023       Assessment/Plan:   Malignant neoplasm of central portion of left breast in female, estrogen receptor positive  Stage IV (T3N2M2) invasive ductal carcinoma of left breast,  quadrant, ER 96%, AL 94%, Her2 neg, Grade 2, Ki67 30%  PIKC3A mutation positive      Recent mammogram shows slight increase in the size of the left breast mass, previously biopsied and a new mass.   Biopsy shows a Grade 1 IDC, strongly ER + tumor with Ki 67 10%, more favorable than prior biopsy.   There is no clear metastatic disease progression based on recent scans.      We discussed lumpectomy or mastectomy for solitary disease progression in the breast vs change in systemic therapy. Her case was discussed at our multi D tumor board and the consensus was to remove the site of isolated disease progression. She underwent left lumpectomy on 12/14 with Dr. Amin. Final pathology reviewed, she had a G 1 IDC 4 cm in size, margins negative. No adjuvant RT offered due to the metastatic nature of disease.     On ribociclib 400 mg p.o. day 1-day 21 Q 28 days and anastrozole 1 mg p.o.   Continue zoladex q 28 days.     Repeat scans prior to the next visit.     Bone metastases  Continue Xgeva Q 28 days.  Continue Caltrate b.i.d. at this time.    Malignant neoplasm metastatic to lung, unspecified laterality  Subcm pulmonary nodules are stable and benign appearing.  Lung nodules have never been biopsied.  Recent changes appear inflammatory.Continue  to monitor on subsequent scans.     Use of anastrozole  As above.    Smoker  She is cutting down. Was started on Wellbutrin but not taking it at this time.          ECOG SCORE    0 - Fully active-able to carry on all pre-disease performance without restriction         MDM includes  :    - Acute or chronic illness or injury that poses a threat to life or bodily function  - Independent review and explanation of 3+ results from unique tests  - Discussion of management and ordering 3+ unique tests  - Extensive discussion of treatment and management  - Prescription drug management  - Drug therapy requiring intensive monitoring for toxicity     Discussion:   No follow-ups on file.    Plan was discussed with the patient at length, and she verbalized understanding. Angeles was given an opportunity to ask questions that were answered to her satisfaction, and she was advised to call in the interval if any problems or questions arise.    Electronically signed by Shelby Thomas MD      Route Chart for Scheduling    Med Onc Chart Routing      Follow up with physician . 2/27 at 2:20 pm   Follow up with [facility]    Infusion scheduling note    Injection scheduling note    Labs CMP and CA 27.29   Lab interval:     Imaging CT chest abdomen pelvis   1-2 days before visit   Pharmacy appointment    Other referrals          Supportive Plan Information  OP BREAST GOSERELIN Q4W   Shelby Thomas MD   Upcoming Treatment Dates - OP BREAST GOSERELIN Q4W    1/31/2023       Chemotherapy       goserelin (ZOLADEX) injection 3.6 mg  2/27/2023       Chemotherapy       goserelin (ZOLADEX) injection 3.6 mg    Therapy Plan Information  PORT FLUSH  Flushes  heparin, porcine (PF) 100 unit/mL injection flush 500 Units  500 Units, Intravenous, Every visit  sodium chloride 0.9% flush 10 mL  10 mL, Intravenous, Every visit    DENOSUMAB (XGEVA) Q4W  Medications  denosumab (XGEVA) solution 120 mg  120 mg, Subcutaneous, Every 4 weeks

## 2023-01-30 NOTE — PLAN OF CARE
Problem: Adult Inpatient Plan of Care  Goal: Plan of Care Review  Outcome: Met   Tolerated injections times three without difficulty   Able to be d/c to home with instruction ambulated to private vehicle without difficulty  NAD

## 2023-02-07 ENCOUNTER — SPECIALTY PHARMACY (OUTPATIENT)
Dept: PHARMACY | Facility: CLINIC | Age: 53
End: 2023-02-07
Payer: MEDICAID

## 2023-02-07 NOTE — TELEPHONE ENCOUNTER
Patient Diagnosis   C50.112, Z17.0 - Malignant neoplasm of central portion of left breast in female, estrogen receptor positive    Angeles Wright is a 52 y.o. female, who is followed by the specialty pharmacy service for management and education of her Kisqali.  She has been on therapy with Kisqali for 3 years.  I have reviewed her electronic medical record and current medication list and determined that specialty medication adjustment Is not needed at this time.    Patient has not experienced adverse events.  She Is adherent reporting 0 missed doses since last review.  Adherence has been encouraged with the following mechanism(s): Habit.  She is on target to meet goals of therapy and will continue treatment.        1/23/2023 12/19/2022 11/15/2022 10/17/2022 9/12/2022 8/15/2022 5/12/2022   Follow Up Review   # of missed doses 0 0 0 0 1 > 5 0   Reason     Simply forgot Other (comment)       had trouble contacting [facility]    New Medications? No No No No No No No   New Conditions? No No No No No No No   New Allergies? No No No No No No No   Med Effective? Excellent Good Good Good Good Good Very good   Missed activities?      No    Urgent Care? No No No No No No No   Requested Pharmacist? No No No No No No No       Multiple values from one day are sorted in reverse-chronological order         Therapy is appropriate to continue.    Therapy is effective: Yes  On scale of 1 to 10, how does patient rank quality of life? (10 - Best): Unable to Assess  Recommendations: none at this time.  Review Method: Chart Review    Tasks added this encounter   No tasks added.   Tasks due within next 3 months   2/4/2023 - Clinical - Follow Up Assesement (180 day)  2/15/2023 - Refill Call (Auto Added)     Dillon Barnard, [facility]  [address]  [facility] [address]  Phone: [phone]  Fax: [phone]

## 2023-02-15 ENCOUNTER — PATIENT MESSAGE (OUTPATIENT)
Dept: PHARMACY | Facility: CLINIC | Age: 53
End: 2023-02-15
Payer: MEDICAID

## 2023-02-15 DIAGNOSIS — C50.112 MALIGNANT NEOPLASM OF CENTRAL PORTION OF LEFT BREAST IN FEMALE, ESTROGEN RECEPTOR POSITIVE: ICD-10-CM

## 2023-02-15 DIAGNOSIS — Z17.0 MALIGNANT NEOPLASM OF CENTRAL PORTION OF LEFT BREAST IN FEMALE, ESTROGEN RECEPTOR POSITIVE: ICD-10-CM

## 2023-02-15 RX ORDER — RIBOCICLIB 200 MG/1
TABLET, FILM COATED ORAL
Qty: 42 TABLET | Refills: 12 | Status: SHIPPED | OUTPATIENT
Start: 2023-02-15 | End: 2024-01-29 | Stop reason: SDUPTHER

## 2023-02-20 ENCOUNTER — SPECIALTY PHARMACY (OUTPATIENT)
Dept: PHARMACY | Facility: CLINIC | Age: 53
End: 2023-02-20
Payer: MEDICAID

## 2023-02-20 NOTE — TELEPHONE ENCOUNTER
Specialty Pharmacy - Refill Coordination    Specialty Medication Orders Linked to Encounter      Flowsheet Row Most Recent Value   Medication #1 ribociclib (KISQALI) 400 mg/day (200 mg x 2) Tab (Order#[id number], Rx#[id number])          Discussed the importance of taking medication. Pt reports it is part of her daily routine   Refill Questions - Documented Responses      Flowsheet Row Most Recent Value   Patient Availability and HIPAA Verification    Does patient want to proceed with activity? Yes   HIPAA/medical authority confirmed? Yes   Relationship to patient of person spoken to? Self   Refill Screening Questions    Changes to allergies? No   Changes to medications? No   New conditions since last clinic visit? No   Unplanned office visit, urgent care, ED, or hospital admission in the last 4 weeks? No   How does patient/caregiver feel medication is working? Good   Financial problems or insurance changes? No   How many doses of your specialty medications were missed in the last 4 weeks? 2   Would patient like to speak to a pharmacist? No   When does the patient need to receive the medication? 02/27/23   Refill Delivery Questions    How will the patient receive the medication? [facility]   When does the patient need to receive the medication? 02/27/23   Shipping Address Home   Address in [facility] confirmed and updated if neccessary? Yes   Expected Copay ($) 0   Is the patient able to afford the medication copay? Yes   Payment Method zero copay   Days supply of Refill 28   Supplies needed? No supplies needed   Refill activity completed? Yes   Refill activity plan Refill scheduled   Shipment/Pickup Date: 02/22/23            Current Outpatient Medications   Medication Sig Note    albuterol (PROVENTIL/VENTOLIN HFA) 90 mcg/actuation inhaler Inhale 2 puffs into the lungs every 6 (six) hours as needed.     anastrozole (ARIMIDEX) 1 mg Tab Take 1 tablet (1 mg total) by mouth once daily.     ascorbic acid, vitamin C,  (VITAMIN C) 500 MG tablet Take 1,000 mg by mouth once daily.     buPROPion (WELLBUTRIN SR) 150 MG TBSR 12 hr tablet Take 1 tablet daily for 7 days then 1 tablet twice daily thereafter     nicotine (NICODERM CQ) 21 mg/24 hr Place 1 patch onto the skin once daily.     ondansetron (ZOFRAN-ODT) 8 MG TbDL Take 8 mg by mouth every 8 (eight) hours as needed.     ribociclib (KISQALI) 400 mg/day (200 mg x 2) Tab Take 2 tablets (400mg) by mouth on days 1 to day 21 every 28 days     albuterol (PROVENTIL) 2.5 mg /3 mL (0.083 %) nebulizer solution SMARTSIG:3 Milliliter(s) Via Nebulizer Every 4 Hours PRN     ALBUTEROL INHL Inhale 2 puffs into the lungs as needed.     benzonatate (TESSALON) 100 MG capsule Take 200 mg by mouth 3 (three) times daily as needed. 2/20/2023: Pt reports to no longer taking    cetirizine (ZYRTEC) 10 MG tablet Take 10 mg by mouth once daily. 2/20/2023: Pt reports to no longer taking    COMP-AIR NEBULIZER COMPRESSOR Sherri use as directed     HYDROcodone-acetaminophen (NORCO)  mg per tablet Take 1 tablet by mouth every 6 (six) hours as needed for Pain. 2/20/2023: Pt reports to no longer taking    ketorolac (TORADOL) 10 mg tablet Take 10 mg by mouth every 6 (six) hours as needed. 2/20/2023: Pt reports to no longer taking    nebulizer and compressor Sherri Use as directed     ondansetron (ZOFRAN-ODT) 4 MG TbDL Take 1 tablet by mouth every 8 (eight) hours as needed.     oseltamivir (TAMIFLU) 75 MG capsule Take 75 mg by mouth 2 (two) times daily.    Last reviewed on 1/30/2023  4:18 PM by Pattie Sharpe RN    Review of patient's allergies indicates:  No Known Allergies Last reviewed on  1/30/2023 4:18 PM by Pattie Sharpe      Tasks added this encounter   3/20/2023 - Refill Call (Auto Added)   Tasks due within next 3 months   No tasks due.     Chantal Wallace, PharmD  [facility]  [address]  [facility] [address]  Phone: [phone]  Fax: [phone]

## 2023-02-23 ENCOUNTER — HOSPITAL ENCOUNTER (OUTPATIENT)
Dept: RADIOLOGY | Facility: HOSPITAL | Age: 53
Discharge: HOME | End: 2023-02-23
Attending: INTERNAL MEDICINE
Payer: MEDICAID

## 2023-02-23 DIAGNOSIS — Z17.0 MALIGNANT NEOPLASM OF CENTRAL PORTION OF LEFT BREAST IN FEMALE, ESTROGEN RECEPTOR POSITIVE: ICD-10-CM

## 2023-02-23 DIAGNOSIS — C50.112 MALIGNANT NEOPLASM OF CENTRAL PORTION OF LEFT BREAST IN FEMALE, ESTROGEN RECEPTOR POSITIVE: ICD-10-CM

## 2023-02-23 PROCEDURE — 71260 CT THORAX DX C+: CPT | Mod: 26,,, | Performed by: RADIOLOGY

## 2023-02-23 PROCEDURE — 74177 CT ABD & PELVIS W/CONTRAST: CPT | Mod: TC,PO

## 2023-02-23 PROCEDURE — 74177 CT CHEST ABDOMEN PELVIS WITH CONTRAST (XPD): ICD-10-PCS | Mod: 26,,, | Performed by: RADIOLOGY

## 2023-02-23 PROCEDURE — 25500020 PHARM REV CODE 255: Mod: PO | Performed by: INTERNAL MEDICINE

## 2023-02-23 PROCEDURE — A9698 NON-RAD CONTRAST MATERIALNOC: HCPCS | Mod: PO | Performed by: INTERNAL MEDICINE

## 2023-02-23 PROCEDURE — 71260 CT CHEST ABDOMEN PELVIS WITH CONTRAST (XPD): ICD-10-PCS | Mod: 26,,, | Performed by: RADIOLOGY

## 2023-02-23 PROCEDURE — 71260 CT THORAX DX C+: CPT | Mod: TC,PO

## 2023-02-23 PROCEDURE — 74177 CT ABD & PELVIS W/CONTRAST: CPT | Mod: 26,,, | Performed by: RADIOLOGY

## 2023-02-23 RX ADMIN — BARIUM SULFATE 900 ML: 20 SUSPENSION ORAL at 10:02

## 2023-02-23 RX ADMIN — IOHEXOL 75 ML: 350 INJECTION, SOLUTION INTRAVENOUS at 09:02

## 2023-02-27 ENCOUNTER — OFFICE VISIT (OUTPATIENT)
Dept: HEMATOLOGY/ONCOLOGY | Facility: CLINIC | Age: 53
End: 2023-02-27
Payer: MEDICAID

## 2023-02-27 ENCOUNTER — INFUSION (OUTPATIENT)
Dept: INFUSION THERAPY | Facility: HOSPITAL | Age: 53
End: 2023-02-27
Attending: INTERNAL MEDICINE
Payer: MEDICAID

## 2023-02-27 VITALS
DIASTOLIC BLOOD PRESSURE: 60 MMHG | RESPIRATION RATE: 16 BRPM | HEIGHT: 64 IN | OXYGEN SATURATION: 99 % | TEMPERATURE: 98 F | HEART RATE: 82 BPM | BODY MASS INDEX: 24.2 KG/M2 | WEIGHT: 141.75 LBS | SYSTOLIC BLOOD PRESSURE: 110 MMHG

## 2023-02-27 VITALS
BODY MASS INDEX: 24.2 KG/M2 | RESPIRATION RATE: 16 BRPM | OXYGEN SATURATION: 99 % | SYSTOLIC BLOOD PRESSURE: 110 MMHG | HEART RATE: 82 BPM | TEMPERATURE: 98 F | HEIGHT: 64 IN | DIASTOLIC BLOOD PRESSURE: 62 MMHG | WEIGHT: 141.75 LBS

## 2023-02-27 DIAGNOSIS — C79.51 BONE METASTASES: ICD-10-CM

## 2023-02-27 DIAGNOSIS — Z17.0 MALIGNANT NEOPLASM OF CENTRAL PORTION OF LEFT BREAST IN FEMALE, ESTROGEN RECEPTOR POSITIVE: Primary | ICD-10-CM

## 2023-02-27 DIAGNOSIS — C50.112 MALIGNANT NEOPLASM OF CENTRAL PORTION OF LEFT BREAST IN FEMALE, ESTROGEN RECEPTOR POSITIVE: Primary | ICD-10-CM

## 2023-02-27 PROCEDURE — 3074F SYST BP LT 130 MM HG: CPT | Mod: CPTII,,, | Performed by: INTERNAL MEDICINE

## 2023-02-27 PROCEDURE — 99999 PR PBB SHADOW E&M-EST. PATIENT-LVL IV: CPT | Mod: PBBFAC,,, | Performed by: INTERNAL MEDICINE

## 2023-02-27 PROCEDURE — 3074F PR MOST RECENT SYSTOLIC BLOOD PRESSURE < 130 MM HG: ICD-10-PCS | Mod: CPTII,,, | Performed by: INTERNAL MEDICINE

## 2023-02-27 PROCEDURE — 96401 CHEMO ANTI-NEOPL SQ/IM: CPT | Mod: PN

## 2023-02-27 PROCEDURE — 96523 IRRIG DRUG DELIVERY DEVICE: CPT | Mod: PN

## 2023-02-27 PROCEDURE — 96402 CHEMO HORMON ANTINEOPL SQ/IM: CPT | Mod: PN

## 2023-02-27 PROCEDURE — 99999 PR PBB SHADOW E&M-EST. PATIENT-LVL IV: ICD-10-PCS | Mod: PBBFAC,,, | Performed by: INTERNAL MEDICINE

## 2023-02-27 PROCEDURE — 3008F PR BODY MASS INDEX (BMI) DOCUMENTED: ICD-10-PCS | Mod: CPTII,,, | Performed by: INTERNAL MEDICINE

## 2023-02-27 PROCEDURE — A4216 STERILE WATER/SALINE, 10 ML: HCPCS | Mod: PN

## 2023-02-27 PROCEDURE — 99214 OFFICE O/P EST MOD 30 MIN: CPT | Mod: PBBFAC,PN | Performed by: INTERNAL MEDICINE

## 2023-02-27 PROCEDURE — 25000003 PHARM REV CODE 250: Mod: PN

## 2023-02-27 PROCEDURE — 63600175 PHARM REV CODE 636 W HCPCS: Mod: PN

## 2023-02-27 PROCEDURE — 3078F PR MOST RECENT DIASTOLIC BLOOD PRESSURE < 80 MM HG: ICD-10-PCS | Mod: CPTII,,, | Performed by: INTERNAL MEDICINE

## 2023-02-27 PROCEDURE — 3008F BODY MASS INDEX DOCD: CPT | Mod: CPTII,,, | Performed by: INTERNAL MEDICINE

## 2023-02-27 PROCEDURE — 63600175 PHARM REV CODE 636 W HCPCS: Mod: JG,PN | Performed by: INTERNAL MEDICINE

## 2023-02-27 PROCEDURE — 99215 OFFICE O/P EST HI 40 MIN: CPT | Mod: S$PBB,,, | Performed by: INTERNAL MEDICINE

## 2023-02-27 PROCEDURE — 1159F MED LIST DOCD IN RCRD: CPT | Mod: CPTII,,, | Performed by: INTERNAL MEDICINE

## 2023-02-27 PROCEDURE — 3078F DIAST BP <80 MM HG: CPT | Mod: CPTII,,, | Performed by: INTERNAL MEDICINE

## 2023-02-27 PROCEDURE — 1159F PR MEDICATION LIST DOCUMENTED IN MEDICAL RECORD: ICD-10-PCS | Mod: CPTII,,, | Performed by: INTERNAL MEDICINE

## 2023-02-27 PROCEDURE — 99215 PR OFFICE/OUTPT VISIT, EST, LEVL V, 40-54 MIN: ICD-10-PCS | Mod: S$PBB,,, | Performed by: INTERNAL MEDICINE

## 2023-02-27 RX ORDER — SODIUM CHLORIDE 0.9 % (FLUSH) 0.9 %
10 SYRINGE (ML) INJECTION
Status: DISCONTINUED | OUTPATIENT
Start: 2023-02-27 | End: 2023-02-27 | Stop reason: HOSPADM

## 2023-02-27 RX ORDER — SODIUM CHLORIDE 0.9 % (FLUSH) 0.9 %
10 SYRINGE (ML) INJECTION
Status: CANCELLED | OUTPATIENT
Start: 2023-02-27

## 2023-02-27 RX ORDER — HEPARIN 100 UNIT/ML
500 SYRINGE INTRAVENOUS
Status: DISCONTINUED | OUTPATIENT
Start: 2023-02-27 | End: 2023-02-27 | Stop reason: HOSPADM

## 2023-02-27 RX ORDER — HEPARIN 100 UNIT/ML
500 SYRINGE INTRAVENOUS
Status: CANCELLED | OUTPATIENT
Start: 2023-02-27

## 2023-02-27 RX ADMIN — DENOSUMAB 120 MG: 120 INJECTION SUBCUTANEOUS at 02:02

## 2023-02-27 RX ADMIN — Medication 10 ML: at 02:02

## 2023-02-27 RX ADMIN — Medication 500 UNITS: at 02:02

## 2023-02-27 RX ADMIN — GOSERELIN ACETATE 3.6 MG: 3.6 IMPLANT SUBCUTANEOUS at 02:02

## 2023-02-27 NOTE — PROGRESS NOTES
PROGRESS NOTE    Subjective:       Patient ID: Angeles Wright is a 52 y.o. female.  MRN:   : [id number]    Chief Complaint: Breast cancer       History of Present Illness:   Angeles Wright is a 52 y.o. female who presents with metastatic invasive ductal left breast cancer.       As previously documented by Dr. Mcgovern  she transferred care to [facility] in 2019. Labs showed her to be pre-menopausal thus she was treated with Ribociclib /Zoladex and Arimidex but she was off therapy from November to May 2020. In 2020 scans showed improvement thus she restarted Arimidex/ribociclib/zoladex.     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      Interim history:   She presents today to discuss her symptoms, labs, and further recommendations.      She has  continued dose reduced ribociclib at the 400 mg p.o. day 1-day 21 Q 28 days since 2020. She has missed doses in the past few months.     She underwent left lumpectomy for isolated progression in the breast on 22.      She has continued anastrozole 1 mg p.o. daily.  She is due for Zoladex and xgeva injection today.     Started the new cycle of Ribociclib today 23  CT   Impression:     Prior an interval left breast surgery for neoplasm.     Chronic appearing bone lesions compatible with bony metastatic disease.  These are grossly stable on imaging.     No evidence of new parenchymal or dagmar metastatic disease.     Diverticulosis without evidence of diverticulitis.    Oncology History:  Oncology History   Malignant neoplasm of central portion of left breast in female, estrogen receptor positive   9/3/2019 Initial Diagnosis    Malignant neoplasm of central portion of left breast in female, estrogen receptor positive     9/3/2019 - 9/3/2019 Chemotherapy    Treatment Summary   Plan Name: OP ANASTROZOLE PALBOCICLIB Q4W  Treatment Goal: Palliative  Status: Inactive  Start Date:  9/3/2019  End Date: 9/3/2019  Provider: Melania Mcgovern MD  Chemotherapy: [No matching medication found in this treatment plan]       9/18/2019 Cancer Staged    Staging form: Breast, AJCC 8th Edition  - Clinical: Stage IV (cT4b, cM1, ER+, OK+, HER2-)       11/13/2022 Cancer Staged    Staging form: Breast, AJCC 8th Edition  - Clinical stage from 11/13/2022: No Stage Recommended (ycT2(m), cN1(f), cM1, G1, ER+, OK+, HER2-)       12/26/2022 Cancer Staged    Staging form: Breast, AJCC 8th Edition  - Pathologic stage from 12/26/2022: No Stage Recommended (ypT2, pNX, pM1, G1, ER+, OK+, HER2-)         5/4/22  Impression:     No significant interval changes.  Known malignancy in the left breast with metastatic disease to bone.  Additional stable bilateral breast nodules which have been evaluated previously with mammography and ultrasound.  Stable small pleural based lung nodules in the right hemithorax.     9/12/22  Impression:  Left  Mass: Left breast 22 mm x 22 mm x 15 mm mass at the upper middle 12 o'clock position. Assessment: 6 - Known biopsy, proven malignancy.   Mass: Left breast 12 mm x 9 mm x 5 mm mass at the upper posterior 12 o'clock position. Assessment: 4C - Suspicious finding. Biopsy is recommended.      Right  Mass: Right breast 10 mm x 10 mm x 7 mm mass at the upper outer 11 o'clock position. Assessment: 3 - Probably benign.   Mass: Right breast 14 mm x 7 mm x 11 mm mass at the 9 o'clock position. Assessment: 3 - Probably benign.      BI-RADS Category:   Overall: 4 - Suspicious    9/28/22  Impression:  Left  Mass: Left breast 22 mm x 22 mm x 15 mm mass at the upper middle 12 o'clock position. Assessment: 6 - Known biopsy, proven malignancy.   Mass: Left breast 12 mm x 9 mm x 5 mm mass at the upper posterior 12 o'clock position. Assessment: 4C - Suspicious finding. Biopsy is recommended.      Right  Mass: Right breast 10 mm x 10 mm x 7 mm mass at the upper outer 11 o'clock position. Assessment: 3 - Probably  benign.   Mass: Right breast 14 mm x 7 mm x 11 mm mass at the 9 o'clock position. Assessment: 3 - Probably benign.      BI-RADS Category:   Overall: 4 - Suspicious     Recommendation:  Biopsy could be considered. There is a strong possibility that this represents progression of disease in this patient with known metastatic breast carcinoma.    10/21/22   Final Pathologic Diagnosis LEFT BREAST, 12 O'CLOCK MASS 1 CM FROM NIPPLE, BIOPSY:   Invasive ductal carcinoma, Arjun histologic grade 1 (tubule formation:   2, nuclear pleomorphism:  2, mitotic activity:  1).   Comment:  The biopsy reveals infiltrating ductal carcinoma with partial   mucinous features including abundant tubules and tumor nests with punched out   cribriform spaces containing mucin.  Tumor cells are positive with GATA3 and   negative with , in keeping with mammary ductal origin.  The largest   continuous focus of invasive carcinoma present measures 4 mm.  Dr. Zofia Saleem also reviewed this case and agrees with the diagnosis.   BREAST BIOMARKER RESULTS   Estrogen receptor (ER):  Positive (nearly 100%, strong)   Progesterone receptor (DC):  Positive (50%, weak to moderate)   HER2 IHC:  Negative (1+)   Ki-67 proliferation index:  10%        11/4/22  CT chest abd pelvis  Impression:     Stable diffuse osseous metastatic disease.     Left breast masses consistent with known malignancy and better evaluated on recent mammogram and ultrasound.     New left lower lobe linear consolidation, favored to be due to atelectasis versus infection/inflammation.  Recommend attention on follow-up exam as per clinical protocol.     Sigmoid colon diverticulosis without diverticulitis.     Pulmonary emphysema.    12/14/22  1.  BREAST, LEFT, 12:00, LUMPECTOMY:   --INVASIVE CARCINOMA OF NO SPECIAL TYPE (DUCTAL), ARJUN HISTOLOGIC    GRADE 1 OUT OF 3.        --INVASIVE CARCINOMA MEASURES 40 MILLIMETERS IN MAXIMUM DIMENSION.   --TUMOR IS PRESENT IN  SUBEPIDERMAL TISSUE BUT DOES NOT INVOLVE    EPIDERMIS PROPER.        --BIOPSY SITE CHANGES.   --RECEPTOR STUDIES REPORTED PREVIOUSLY AT OUTSIDE FACILITY ON BIOPSY    MATERIAL (SEE COMMENT #1).        --NO UNEQUIVOCAL LYMPHOVASCULAR INVASION IDENTIFIED.   --PART 1 SPECIMEN MARGINS ARE NEGATIVE FOR MALIGNANCY; ADDITIONAL    MARGINS SENT SEPARATELY (SEE   PARTS 2 AND 3 BELOW); PLEASE SEE ALSO BELOW CASE SUMMARY FOR ADDITIONAL    MARGIN           DATA/PARAMETERS.   --MICROCALCIFICATIONS ASSOCIATED WITH NEOPLASTIC TISSUE AND    NON-NEOPLASTIC TISSUE.   --SKIN WITH MICROORGANISMS MORPHOLOGICALLY COMPATIBLE WITH DEMODEX.        --ypT2/ypN NOT ASSIGNED (NO NODES SUBMITTED OR FOUND).   2/23/23  Impression:     Prior an interval left breast surgery for neoplasm.     Chronic appearing bone lesions compatible with bony metastatic disease.  These are grossly stable on imaging.     No evidence of new parenchymal or dagmar metastatic disease.     Diverticulosis without evidence of diverticulitis.    History:  Past Medical History:   Diagnosis Date    Breast cancer       Past Surgical History:   Procedure Laterality Date    BREAST BIOPSY Right     BREAST LUMPECTOMY Left     LUMPECTOMY, WITH RADAR LOCALIZATION USING COLETTE  Left 12/14/2022    Procedure: LUMPECTOMY,WITH RADAR LOCALIZATION USING COLETTE ;  Surgeon: Maday Amin MD;  Location: [facility];  Service: General;  Laterality: Left;    TUBAL LIGATION       Family History   Problem Relation Age of Onset    Lung cancer Mother     Heart attack Father     Suicide Brother     No Known Problems Maternal Aunt     No Known Problems Maternal Uncle     No Known Problems Paternal Aunt     No Known Problems Paternal Uncle     No Known Problems Maternal Grandmother     No Known Problems Maternal Grandfather     No Known Problems Paternal Grandmother     No Known Problems Paternal Grandfather     No Known Problems Son     No Known Problems Son     No Known Problems Daughter      "Breast cancer Cousin     Breast cancer Cousin      Social History     Tobacco Use    Smoking status: Every Day     Packs/day: 1.00     Years: 33.00     Pack years: 33.00     Types: Cigarettes     Start date: 9/3/1994    Smokeless tobacco: Never   Substance and Sexual Activity    Alcohol use: Yes     Comment: occasionally    Drug use: Not Currently    Sexual activity: Yes     Partners: Male     Birth control/protection: None        ROS:   Review of Systems   Constitutional:  Negative for fever, malaise/fatigue and weight loss.   HENT:  Negative for congestion, hearing loss, nosebleeds and sore throat.    Eyes:  Negative for double vision and photophobia.   Respiratory:  Negative for cough, hemoptysis, sputum production, shortness of breath and wheezing.    Cardiovascular:  Negative for chest pain, palpitations, orthopnea and leg swelling.   Gastrointestinal:  Negative for abdominal pain, blood in stool, constipation, diarrhea, heartburn, nausea and vomiting.   Genitourinary:  Negative for dysuria, hematuria and urgency.   Musculoskeletal:  Positive for joint pain (left hip, mild ). Negative for back pain and myalgias.   Skin:  Negative for itching and rash.   Neurological:  Negative for dizziness, tingling, seizures and weakness.   Endo/Heme/Allergies:  Negative for polydipsia. Does not bruise/bleed easily.   Psychiatric/Behavioral:  Negative for depression and memory loss. The patient is not nervous/anxious and does not have insomnia.       Objective:     Vitals:    02/27/23 1354   BP: 110/62   Pulse: 82   Resp: 16   Temp: 97.5 °F (36.4 °C)   TempSrc: Temporal   SpO2: 99%   Weight: 64.3 kg (141 lb 12.1 oz)   Height: 5' 4" (1.626 m)   PainSc: 0-No pain         Wt Readings from Last 10 Encounters:   02/27/23 64.3 kg (141 lb 12.1 oz)   01/30/23 63.2 kg (139 lb 5.3 oz)   01/30/23 63.2 kg (139 lb 5.3 oz)   01/03/23 64.5 kg (142 lb 4.9 oz)   01/03/23 64.5 kg (142 lb 4.9 oz)   12/23/22 62.6 kg (138 lb)   12/13/22 63 kg " (138 lb 14.2 oz)   12/05/22 63.5 kg (139 lb 15.9 oz)   12/05/22 63.5 kg (139 lb 15.9 oz)   12/05/22 63.4 kg (139 lb 12.4 oz)       Physical Examination:   Physical Exam  Vitals and nursing note reviewed.   Constitutional:       General: She is not in acute distress.     Appearance: She is not diaphoretic.   HENT:      Head: Normocephalic.      Mouth/Throat:      Pharynx: No oropharyngeal exudate.   Eyes:      General: No scleral icterus.     Conjunctiva/sclera: Conjunctivae normal.   Neck:      Thyroid: No thyromegaly.   Cardiovascular:      Rate and Rhythm: Normal rate and regular rhythm.      Heart sounds: Normal heart sounds. No murmur heard.  Pulmonary:      Effort: Pulmonary effort is normal. No respiratory distress.      Breath sounds: No stridor. No wheezing or rales.   Chest:      Chest wall: No tenderness.   Abdominal:      General: Bowel sounds are normal. There is no distension.      Palpations: Abdomen is soft. There is no mass.      Tenderness: There is no abdominal tenderness. There is no rebound.   Musculoskeletal:         General: No tenderness or deformity. Normal range of motion.      Cervical back: Neck supple.   Lymphadenopathy:      Cervical: No cervical adenopathy.   Skin:     General: Skin is warm and dry.      Findings: No erythema or rash.   Neurological:      Mental Status: She is alert and oriented to person, place, and time.      Cranial Nerves: No cranial nerve deficit.      Coordination: Coordination normal.      Gait: Gait is intact.   Psychiatric:         Mood and Affect: Affect normal.         Cognition and Memory: Memory normal.         Judgment: Judgment normal.        Diagnostic Tests:  Significant Imaging: I have reviewed and interpreted all pertinent imaging results/findings.      Laboratory Data:  All pertinent labs have been reviewed.  Labs:   Lab Results   Component Value Date    WBC 4.74 02/23/2023    RBC 4.47 02/23/2023    HGB 14.2 02/23/2023    HCT 42.0 02/23/2023    MCV  94 02/23/2023     02/23/2023     02/23/2023     02/23/2023    K 4.0 02/23/2023    BUN 13 02/23/2023    CREATININE 0.8 02/23/2023    AST 13 02/23/2023    ALT 9 (L) 02/23/2023    BILITOT 0.2 02/23/2023       Assessment/Plan:   Malignant neoplasm of central portion of left breast in female, estrogen receptor positive  Stage IV (T3N2M2) invasive ductal carcinoma of left breast,  quadrant, ER 96%, GA 94%, Her2 neg, Grade 2, Ki67 30%  PIKC3A mutation positive      Recent mammogram shows slight increase in the size of the left breast mass, previously biopsied and a new mass.   Biopsy shows a Grade 1 IDC, strongly ER + tumor with Ki 67 10%, more favorable than prior biopsy.   There is no clear metastatic disease progression based on recent scans.       Her case was discussed at our multi D tumor board and the consensus was to remove the site of isolated disease progression. She underwent left lumpectomy on 12/14 with Dr. Amin. Final pathology reviewed, she had a G 1 IDC 4 cm in size, margins negative. No adjuvant RT offered due to the metastatic nature of disease.     Follow up scans are discussed today. Interval removal of left breast noted, otherwise stable osseous mets. Continue current therapy.     On ribociclib 400 mg p.o. day 1-day 21 Q 28 days and anastrozole 1 mg p.o.   Continue zoladex q 28 days.     Bone metastases  Continue Xgeva Q 28 days.  Continue Caltrate b.i.d. at this time.    Malignant neoplasm metastatic to lung, unspecified laterality  Subcm pulmonary nodules are stable and benign appearing.  Lung nodules have never been biopsied.  Recent changes appear inflammatory.Continue to monitor on subsequent scans.     Use of anastrozole  As above.    Smoker  She is cutting down.          ECOG SCORE             MDM includes  :    - Acute or chronic illness or injury that poses a threat to life or bodily function  - Independent review and explanation of 3+ results from unique tests  -  Discussion of management and ordering 3+ unique tests  - Extensive discussion of treatment and management  - Prescription drug management  - Drug therapy requiring intensive monitoring for toxicity     Discussion:   No follow-ups on file.    Plan was discussed with the patient at length, and she verbalized understanding. Angeles was given an opportunity to ask questions that were answered to her satisfaction, and she was advised to call in the interval if any problems or questions arise.    Electronically signed by Shelby Thomas MD      Route Chart for Scheduling    Med Onc Chart Routing      Follow up with physician . 3/27,4/24   Follow up with LEÓN    Infusion scheduling note    Injection scheduling note 3/27,4/24   Labs CBC, CMP and CA 27.29   Lab interval:  3/27,4/24   Imaging    Pharmacy appointment    Other referrals          Supportive Plan Information  OP BREAST GOSERELIN Q4W   Shelby Thomas MD   Upcoming Treatment Dates - OP BREAST GOSERELIN Q4W    2/27/2023       Chemotherapy       goserelin (ZOLADEX) injection 3.6 mg  3/28/2023       Chemotherapy       goserelin (ZOLADEX) injection 3.6 mg  4/26/2023       Chemotherapy       goserelin (ZOLADEX) injection 3.6 mg    Therapy Plan Information  PORT FLUSH  Flushes  heparin, porcine (PF) 100 unit/mL injection flush 500 Units  500 Units, Intravenous, Every visit  sodium chloride 0.9% flush 10 mL  10 mL, Intravenous, Every visit    DENOSUMAB (XGEVA) Q4W  Medications  denosumab (XGEVA) solution 120 mg  120 mg, Subcutaneous, Every 4 weeks

## 2023-02-27 NOTE — PLAN OF CARE
Problem: Adult Inpatient Plan of Care  Goal: Patient-Specific Goal (Individualized)  Outcome: Ongoing, Progressing  Goal: Absence of Hospital-Acquired Illness or Injury  Outcome: Ongoing, Progressing  Goal: Optimal Comfort and Wellbeing  Outcome: Ongoing, Progressing  Goal: Readiness for Transition of Care  Outcome: Ongoing, Progressing     Problem: Fatigue  Goal: Improved Activity Tolerance  Outcome: Ongoing, Progressing   .Patient tolerated zoladex/xgeva injection well, d/c in no acute distress.

## 2023-02-28 DIAGNOSIS — D70.1 CHEMOTHERAPY-INDUCED NEUTROPENIA: ICD-10-CM

## 2023-02-28 DIAGNOSIS — T45.1X5A CHEMOTHERAPY-INDUCED NEUTROPENIA: ICD-10-CM

## 2023-02-28 DIAGNOSIS — C50.112 MALIGNANT NEOPLASM OF CENTRAL PORTION OF LEFT BREAST IN FEMALE, ESTROGEN RECEPTOR POSITIVE: Primary | ICD-10-CM

## 2023-02-28 DIAGNOSIS — Z17.0 MALIGNANT NEOPLASM OF CENTRAL PORTION OF LEFT BREAST IN FEMALE, ESTROGEN RECEPTOR POSITIVE: Primary | ICD-10-CM

## 2023-03-16 ENCOUNTER — CLINICAL SUPPORT (OUTPATIENT)
Dept: SMOKING CESSATION | Facility: CLINIC | Age: 53
End: 2023-03-16
Payer: COMMERCIAL

## 2023-03-16 DIAGNOSIS — F17.200 NICOTINE DEPENDENCE: Primary | ICD-10-CM

## 2023-03-16 PROCEDURE — 99407 PR TOBACCO USE CESSATION INTENSIVE >10 MINUTES: ICD-10-PCS | Mod: S$GLB,,,

## 2023-03-16 PROCEDURE — 99407 BEHAV CHNG SMOKING > 10 MIN: CPT | Mod: S$GLB,,,

## 2023-03-16 NOTE — PROGRESS NOTES
Spoke with patient today in regard to smoking cessation progress for 6 month phone follow up on quit 1. Patient not tobacco free at this time. Patient has scheduled an appointment to return to the program for Quit attempt #2. Informed patient of benefit period, future follow ups, and contact information if any further help or support is needed.  Will complete smart form on Quit attempt #1.

## 2023-03-20 ENCOUNTER — CLINICAL SUPPORT (OUTPATIENT)
Dept: SMOKING CESSATION | Facility: CLINIC | Age: 53
End: 2023-03-20
Payer: COMMERCIAL

## 2023-03-20 ENCOUNTER — SPECIALTY PHARMACY (OUTPATIENT)
Dept: PHARMACY | Facility: CLINIC | Age: 53
End: 2023-03-20
Payer: MEDICAID

## 2023-03-20 DIAGNOSIS — F17.200 TOBACCO USE DISORDER: ICD-10-CM

## 2023-03-20 DIAGNOSIS — F17.200 NICOTINE DEPENDENCE: Primary | ICD-10-CM

## 2023-03-20 PROCEDURE — 99404 PR PREVENT COUNSEL,INDIV,60 MIN: ICD-10-PCS | Mod: S$GLB,,, | Performed by: GENERAL PRACTICE

## 2023-03-20 PROCEDURE — 99999 PR PBB SHADOW E&M-EST. PATIENT-LVL III: ICD-10-PCS | Mod: PBBFAC,,,

## 2023-03-20 PROCEDURE — 99999 PR PBB SHADOW E&M-EST. PATIENT-LVL III: CPT | Mod: PBBFAC,,,

## 2023-03-20 PROCEDURE — 99404 PREV MED CNSL INDIV APPRX 60: CPT | Mod: S$GLB,,, | Performed by: GENERAL PRACTICE

## 2023-03-20 RX ORDER — IBUPROFEN 200 MG
1 TABLET ORAL DAILY
Qty: 28 PATCH | Refills: 0 | Status: SHIPPED | OUTPATIENT
Start: 2023-03-20

## 2023-03-20 RX ORDER — BUPROPION HYDROCHLORIDE 150 MG/1
TABLET, EXTENDED RELEASE ORAL
Qty: 60 TABLET | Refills: 0 | Status: SHIPPED | OUTPATIENT
Start: 2023-03-20 | End: 2023-09-11

## 2023-03-20 NOTE — TELEPHONE ENCOUNTER
Incoming call from pt regarding Kisqali refill.  Informed pt that new PA is required and will notify assigned pharmacist.  Pt voiced understanding.  Pt stated she begins next cycle on 3/27.

## 2023-03-20 NOTE — Clinical Note
Patient will be participating in weekly tobacco cessation meetings and will begin the prescribed tobacco cessation medication regime of the Wellbutrin and 21 mg patches. Patient has used Wellbutrin and patches before.

## 2023-03-21 NOTE — TELEPHONE ENCOUNTER
Incoming call from patient, patient states she was calling [facility] but informed patient re-authorization is still needed. Patient is currently in her off week and will restart cycle next week.

## 2023-03-22 ENCOUNTER — LAB VISIT (OUTPATIENT)
Dept: LAB | Facility: HOSPITAL | Age: 53
End: 2023-03-22
Attending: INTERNAL MEDICINE
Payer: MEDICAID

## 2023-03-22 DIAGNOSIS — Z17.0 MALIGNANT NEOPLASM OF CENTRAL PORTION OF LEFT BREAST IN FEMALE, ESTROGEN RECEPTOR POSITIVE: ICD-10-CM

## 2023-03-22 DIAGNOSIS — C50.112 MALIGNANT NEOPLASM OF CENTRAL PORTION OF LEFT BREAST IN FEMALE, ESTROGEN RECEPTOR POSITIVE: ICD-10-CM

## 2023-03-22 LAB
ALBUMIN SERPL BCP-MCNC: 3.9 G/DL (ref 3.5–5.2)
ALP SERPL-CCNC: 74 U/L (ref 55–135)
ALT SERPL W/O P-5'-P-CCNC: 10 U/L (ref 10–44)
ANION GAP SERPL CALC-SCNC: 11 MMOL/L (ref 8–16)
AST SERPL-CCNC: 13 U/L (ref 10–40)
BASOPHILS # BLD AUTO: 0.08 K/UL (ref 0–0.2)
BASOPHILS NFR BLD: 2.9 % (ref 0–1.9)
BILIRUB SERPL-MCNC: 0.2 MG/DL (ref 0.1–1)
BUN SERPL-MCNC: 12 MG/DL (ref 6–20)
CALCIUM SERPL-MCNC: 8.9 MG/DL (ref 8.7–10.5)
CHLORIDE SERPL-SCNC: 108 MMOL/L (ref 95–110)
CO2 SERPL-SCNC: 21 MMOL/L (ref 23–29)
CREAT SERPL-MCNC: 0.9 MG/DL (ref 0.5–1.4)
DIFFERENTIAL METHOD: ABNORMAL
EOSINOPHIL # BLD AUTO: 0 K/UL (ref 0–0.5)
EOSINOPHIL NFR BLD: 1.5 % (ref 0–8)
ERYTHROCYTE [DISTWIDTH] IN BLOOD BY AUTOMATED COUNT: 14.8 % (ref 11.5–14.5)
EST. GFR  (NO RACE VARIABLE): >60 ML/MIN/1.73 M^2
GLUCOSE SERPL-MCNC: 125 MG/DL (ref 70–110)
HCT VFR BLD AUTO: 39.8 % (ref 37–48.5)
HGB BLD-MCNC: 13.4 G/DL (ref 12–16)
IMM GRANULOCYTES # BLD AUTO: 0.01 K/UL (ref 0–0.04)
IMM GRANULOCYTES NFR BLD AUTO: 0.4 % (ref 0–0.5)
LYMPHOCYTES # BLD AUTO: 1.1 K/UL (ref 1–4.8)
LYMPHOCYTES NFR BLD: 40.1 % (ref 18–48)
MCH RBC QN AUTO: 31.7 PG (ref 27–31)
MCHC RBC AUTO-ENTMCNC: 33.7 G/DL (ref 32–36)
MCV RBC AUTO: 94 FL (ref 82–98)
MONOCYTES # BLD AUTO: 0.3 K/UL (ref 0.3–1)
MONOCYTES NFR BLD: 9.6 % (ref 4–15)
NEUTROPHILS # BLD AUTO: 1.2 K/UL (ref 1.8–7.7)
NEUTROPHILS NFR BLD: 45.5 % (ref 38–73)
NRBC BLD-RTO: 0 /100 WBC
PLATELET # BLD AUTO: 209 K/UL (ref 150–450)
PMV BLD AUTO: 8.5 FL (ref 9.2–12.9)
POTASSIUM SERPL-SCNC: 4.1 MMOL/L (ref 3.5–5.1)
PROT SERPL-MCNC: 6.5 G/DL (ref 6–8.4)
RBC # BLD AUTO: 4.23 M/UL (ref 4–5.4)
SODIUM SERPL-SCNC: 140 MMOL/L (ref 136–145)
WBC # BLD AUTO: 2.72 K/UL (ref 3.9–12.7)

## 2023-03-22 PROCEDURE — 86300 IMMUNOASSAY TUMOR CA 15-3: CPT | Mod: 91 | Performed by: INTERNAL MEDICINE

## 2023-03-22 PROCEDURE — 85025 COMPLETE CBC W/AUTO DIFF WBC: CPT | Mod: PO | Performed by: INTERNAL MEDICINE

## 2023-03-22 PROCEDURE — 86300 IMMUNOASSAY TUMOR CA 15-3: CPT | Performed by: INTERNAL MEDICINE

## 2023-03-22 PROCEDURE — 80053 COMPREHEN METABOLIC PANEL: CPT | Mod: PO | Performed by: INTERNAL MEDICINE

## 2023-03-22 PROCEDURE — 36415 COLL VENOUS BLD VENIPUNCTURE: CPT | Mod: PO | Performed by: INTERNAL MEDICINE

## 2023-03-23 ENCOUNTER — SPECIALTY PHARMACY (OUTPATIENT)
Dept: PHARMACY | Facility: CLINIC | Age: 53
End: 2023-03-23
Payer: MEDICAID

## 2023-03-23 ENCOUNTER — PATIENT MESSAGE (OUTPATIENT)
Dept: PHARMACY | Facility: CLINIC | Age: 53
End: 2023-03-23
Payer: MEDICAID

## 2023-03-23 LAB — CANCER AG15-3 SERPL IA-ACNC: 52 U/ML

## 2023-03-23 NOTE — TELEPHONE ENCOUNTER
Specialty Pharmacy - Refill Coordination    Specialty Medication Orders Linked to Encounter      Flowsheet Row Most Recent Value   Medication #1 ribociclib (KISQALI) 400 mg/day (200 mg x 2) Tab (Order#[id number], Rx#[id number])            Refill Questions - Documented Responses      Flowsheet Row Most Recent Value   Patient Availability and HIPAA Verification    Does patient want to proceed with activity? Yes   HIPAA/medical authority confirmed? Yes   Relationship to patient of person spoken to? Self   Refill Screening Questions    Changes to allergies? No   Changes to medications? No   New conditions since last clinic visit? No   Unplanned office visit, urgent care, ED, or hospital admission in the last 4 weeks? No   How does patient/caregiver feel medication is working? Very good   Financial problems or insurance changes? No   How many doses of your specialty medications were missed in the last 4 weeks? 0   Would patient like to speak to a pharmacist? No   When does the patient need to receive the medication? 03/27/23   Refill Delivery Questions    How will the patient receive the medication? [facility]   When does the patient need to receive the medication? 03/27/23   Shipping Address Home   Address in [facility] confirmed and updated if neccessary? Yes   Expected Copay ($) 0   Is the patient able to afford the medication copay? Yes   Payment Method zero copay   Days supply of Refill 28   Supplies needed? No supplies needed   Refill activity completed? Yes   Refill activity plan Refill scheduled   Shipment/Pickup Date: 03/24/23            Current Outpatient Medications   Medication Sig    albuterol (PROVENTIL) 2.5 mg /3 mL (0.083 %) nebulizer solution SMARTSIG:3 Milliliter(s) Via Nebulizer Every 4 Hours PRN    albuterol (PROVENTIL/VENTOLIN HFA) 90 mcg/actuation inhaler Inhale 2 puffs into the lungs every 6 (six) hours as needed.    ALBUTEROL INHL Inhale 2 puffs into the lungs as needed.    anastrozole  (ARIMIDEX) 1 mg Tab Take 1 tablet (1 mg total) by mouth once daily.    ascorbic acid, vitamin C, (VITAMIN C) 500 MG tablet Take 1,000 mg by mouth once daily.    benzonatate (TESSALON) 100 MG capsule Take 200 mg by mouth 3 (three) times daily as needed.    buPROPion (WELLBUTRIN SR) 150 MG TBSR 12 hr tablet Take 1 tablet daily for 7 days then 1 tablet twice daily thereafter    cetirizine (ZYRTEC) 10 MG tablet Take 10 mg by mouth once daily.    COMP-AIR NEBULIZER COMPRESSOR Sherri use as directed    HYDROcodone-acetaminophen (NORCO)  mg per tablet Take 1 tablet by mouth every 6 (six) hours as needed for Pain. (Patient not taking: Reported on 3/15/2023)    ketorolac (TORADOL) 10 mg tablet Take 10 mg by mouth every 6 (six) hours as needed.    nebulizer and compressor Sherri Use as directed    nicotine (NICODERM CQ) 21 mg/24 hr Place 1 patch onto the skin once daily.    ondansetron (ZOFRAN-ODT) 4 MG TbDL Take 1 tablet by mouth every 8 (eight) hours as needed.    ondansetron (ZOFRAN-ODT) 8 MG TbDL Take 8 mg by mouth every 8 (eight) hours as needed.    ribociclib (KISQALI) 400 mg/day (200 mg x 2) Tab Take 2 tablets (400mg) by mouth on days 1 to day 21 every 28 days   Last reviewed on 3/20/2023  6:03 PM by Bhumika Amezquita    Review of patient's allergies indicates:  No Known Allergies Last reviewed on  3/20/2023 6:01 PM by Bhumika Amezquita      Tasks added this encounter   4/17/2023 - Refill Call (Auto Added)   Tasks due within next 3 months   No tasks due.     Stephen Ugalde, PharmD  [facility]  [address] [place] [address]  Phone: [phone]  Fax: [phone]

## 2023-03-24 LAB — CANCER AG27-29 SERPL-ACNC: 76.9 U/ML

## 2023-03-27 ENCOUNTER — TELEPHONE (OUTPATIENT)
Dept: HEMATOLOGY/ONCOLOGY | Facility: CLINIC | Age: 53
End: 2023-03-27
Payer: MEDICAID

## 2023-03-27 NOTE — TELEPHONE ENCOUNTER
----- Message from Shelia Alfaro sent at 3/27/2023  2:41 PM CDT -----  Regarding: Dr. Thomas  Type: Needs Medical Advice  Who Called:  Patient   Symptoms (please be specific):    How long has patient had these symptoms:    Pharmacy name and phone #:    Best Call Back Number: [phone]  Additional Information: Patient is requesting a call back to reschedule her appt..

## 2023-04-04 ENCOUNTER — TELEPHONE (OUTPATIENT)
Dept: SMOKING CESSATION | Facility: CLINIC | Age: 53
End: 2023-04-04
Payer: MEDICAID

## 2023-04-11 ENCOUNTER — CLINICAL SUPPORT (OUTPATIENT)
Dept: SMOKING CESSATION | Facility: CLINIC | Age: 53
End: 2023-04-11
Payer: COMMERCIAL

## 2023-04-11 DIAGNOSIS — F17.200 NICOTINE DEPENDENCE: Primary | ICD-10-CM

## 2023-04-11 PROCEDURE — 99402 PREV MED CNSL INDIV APPRX 30: CPT | Mod: 95,,, | Performed by: GENERAL PRACTICE

## 2023-04-11 PROCEDURE — 99402 PR PREVENT COUNSEL,INDIV,30 MIN: ICD-10-PCS | Mod: 95,,, | Performed by: GENERAL PRACTICE

## 2023-04-11 NOTE — Clinical Note
Patient is currently smoking 8-10 cpd and taking Wellbutrin and patches with no negative side effects at this time. Patient is driving and smoking and we discussed using toothpicks or gum to help avoid smoking in the car. We also discussed reduction, habitual behaviors and triggers. Patient has a follow up in 2 weeks.

## 2023-04-11 NOTE — PROGRESS NOTES
Individual Follow-Up Form    4/11/2023    Quit Date:     Clinical Status of Patient: Outpatient    Length of Service: 30 minutes    Continuing Medication: yes  Wellbutrin    Other Medications: patches     Target Symptoms: Withdrawal and medication side effects. The following were  rated moderate (3) to severe (4) on TCRS:  Moderate (3): none  Severe (4): none    Comments: Patient is currently smoking 8-10 cpd and taking Wellbutrin and patches with no negative side effects at this time. Patient is driving and smoking and we discussed using toothpicks or gum to help avoid smoking in the car. We also discussed reduction, habitual behaviors and triggers. Patient has a follow up in 2 weeks.    Diagnosis: F17.200    Next Visit: 2 weeks

## 2023-04-17 ENCOUNTER — PATIENT MESSAGE (OUTPATIENT)
Dept: PHARMACY | Facility: CLINIC | Age: 53
End: 2023-04-17
Payer: MEDICAID

## 2023-04-20 ENCOUNTER — PATIENT MESSAGE (OUTPATIENT)
Dept: PHARMACY | Facility: CLINIC | Age: 53
End: 2023-04-20
Payer: MEDICAID

## 2023-04-20 ENCOUNTER — LAB VISIT (OUTPATIENT)
Dept: LAB | Facility: HOSPITAL | Age: 53
End: 2023-04-20
Attending: INTERNAL MEDICINE
Payer: MEDICAID

## 2023-04-20 DIAGNOSIS — C50.112 MALIGNANT NEOPLASM OF CENTRAL PORTION OF LEFT BREAST IN FEMALE, ESTROGEN RECEPTOR POSITIVE: ICD-10-CM

## 2023-04-20 DIAGNOSIS — T45.1X5A CHEMOTHERAPY-INDUCED NEUTROPENIA: ICD-10-CM

## 2023-04-20 DIAGNOSIS — Z17.0 MALIGNANT NEOPLASM OF CENTRAL PORTION OF LEFT BREAST IN FEMALE, ESTROGEN RECEPTOR POSITIVE: ICD-10-CM

## 2023-04-20 DIAGNOSIS — D70.1 CHEMOTHERAPY-INDUCED NEUTROPENIA: ICD-10-CM

## 2023-04-20 LAB
ALBUMIN SERPL BCP-MCNC: 3.8 G/DL (ref 3.5–5.2)
ALP SERPL-CCNC: 77 U/L (ref 55–135)
ALT SERPL W/O P-5'-P-CCNC: 8 U/L (ref 10–44)
ANION GAP SERPL CALC-SCNC: 8 MMOL/L (ref 8–16)
AST SERPL-CCNC: 11 U/L (ref 10–40)
BASOPHILS # BLD AUTO: 0.13 K/UL (ref 0–0.2)
BASOPHILS NFR BLD: 2.2 % (ref 0–1.9)
BILIRUB SERPL-MCNC: 0.3 MG/DL (ref 0.1–1)
BUN SERPL-MCNC: 13 MG/DL (ref 6–20)
CALCIUM SERPL-MCNC: 8.7 MG/DL (ref 8.7–10.5)
CHLORIDE SERPL-SCNC: 110 MMOL/L (ref 95–110)
CO2 SERPL-SCNC: 21 MMOL/L (ref 23–29)
CREAT SERPL-MCNC: 0.8 MG/DL (ref 0.5–1.4)
DIFFERENTIAL METHOD: ABNORMAL
EOSINOPHIL # BLD AUTO: 0.1 K/UL (ref 0–0.5)
EOSINOPHIL NFR BLD: 2.2 % (ref 0–8)
ERYTHROCYTE [DISTWIDTH] IN BLOOD BY AUTOMATED COUNT: 14.6 % (ref 11.5–14.5)
EST. GFR  (NO RACE VARIABLE): >60 ML/MIN/1.73 M^2
GLUCOSE SERPL-MCNC: 104 MG/DL (ref 70–110)
HCT VFR BLD AUTO: 40.9 % (ref 37–48.5)
HGB BLD-MCNC: 13.6 G/DL (ref 12–16)
IMM GRANULOCYTES # BLD AUTO: 0.02 K/UL (ref 0–0.04)
IMM GRANULOCYTES NFR BLD AUTO: 0.3 % (ref 0–0.5)
LYMPHOCYTES # BLD AUTO: 2.1 K/UL (ref 1–4.8)
LYMPHOCYTES NFR BLD: 35.2 % (ref 18–48)
MCH RBC QN AUTO: 31.5 PG (ref 27–31)
MCHC RBC AUTO-ENTMCNC: 33.3 G/DL (ref 32–36)
MCV RBC AUTO: 95 FL (ref 82–98)
MONOCYTES # BLD AUTO: 0.5 K/UL (ref 0.3–1)
MONOCYTES NFR BLD: 7.7 % (ref 4–15)
NEUTROPHILS # BLD AUTO: 3 K/UL (ref 1.8–7.7)
NEUTROPHILS NFR BLD: 52.4 % (ref 38–73)
NRBC BLD-RTO: 0 /100 WBC
PLATELET # BLD AUTO: 296 K/UL (ref 150–450)
PMV BLD AUTO: 8.7 FL (ref 9.2–12.9)
POTASSIUM SERPL-SCNC: 4.4 MMOL/L (ref 3.5–5.1)
PROT SERPL-MCNC: 6.6 G/DL (ref 6–8.4)
RBC # BLD AUTO: 4.32 M/UL (ref 4–5.4)
SODIUM SERPL-SCNC: 139 MMOL/L (ref 136–145)
WBC # BLD AUTO: 5.82 K/UL (ref 3.9–12.7)

## 2023-04-20 PROCEDURE — 86300 IMMUNOASSAY TUMOR CA 15-3: CPT | Performed by: INTERNAL MEDICINE

## 2023-04-20 PROCEDURE — 85025 COMPLETE CBC W/AUTO DIFF WBC: CPT | Mod: PN | Performed by: INTERNAL MEDICINE

## 2023-04-20 PROCEDURE — 80053 COMPREHEN METABOLIC PANEL: CPT | Mod: PN | Performed by: INTERNAL MEDICINE

## 2023-04-24 ENCOUNTER — OFFICE VISIT (OUTPATIENT)
Dept: HEMATOLOGY/ONCOLOGY | Facility: CLINIC | Age: 53
End: 2023-04-24
Payer: MEDICAID

## 2023-04-24 ENCOUNTER — SPECIALTY PHARMACY (OUTPATIENT)
Dept: PHARMACY | Facility: CLINIC | Age: 53
End: 2023-04-24
Payer: MEDICAID

## 2023-04-24 ENCOUNTER — INFUSION (OUTPATIENT)
Dept: INFUSION THERAPY | Facility: HOSPITAL | Age: 53
End: 2023-04-24
Attending: INTERNAL MEDICINE
Payer: MEDICAID

## 2023-04-24 VITALS
TEMPERATURE: 97 F | RESPIRATION RATE: 16 BRPM | DIASTOLIC BLOOD PRESSURE: 66 MMHG | HEART RATE: 84 BPM | WEIGHT: 140.19 LBS | SYSTOLIC BLOOD PRESSURE: 124 MMHG | BODY MASS INDEX: 24.07 KG/M2

## 2023-04-24 VITALS
BODY MASS INDEX: 23.93 KG/M2 | HEIGHT: 64 IN | OXYGEN SATURATION: 99 % | WEIGHT: 140.19 LBS | SYSTOLIC BLOOD PRESSURE: 124 MMHG | HEART RATE: 84 BPM | TEMPERATURE: 97 F | RESPIRATION RATE: 16 BRPM | DIASTOLIC BLOOD PRESSURE: 66 MMHG

## 2023-04-24 DIAGNOSIS — F17.200 SMOKER: ICD-10-CM

## 2023-04-24 DIAGNOSIS — C50.112 MALIGNANT NEOPLASM OF CENTRAL PORTION OF LEFT BREAST IN FEMALE, ESTROGEN RECEPTOR POSITIVE: Primary | ICD-10-CM

## 2023-04-24 DIAGNOSIS — N63.10 MASS OF RIGHT BREAST, UNSPECIFIED QUADRANT: ICD-10-CM

## 2023-04-24 DIAGNOSIS — Z79.811 USE OF ANASTROZOLE: ICD-10-CM

## 2023-04-24 DIAGNOSIS — C79.51 MALIGNANT NEOPLASM METASTATIC TO BONE: ICD-10-CM

## 2023-04-24 DIAGNOSIS — D70.1 CHEMOTHERAPY-INDUCED NEUTROPENIA: ICD-10-CM

## 2023-04-24 DIAGNOSIS — Z17.0 MALIGNANT NEOPLASM OF CENTRAL PORTION OF LEFT BREAST IN FEMALE, ESTROGEN RECEPTOR POSITIVE: Primary | ICD-10-CM

## 2023-04-24 DIAGNOSIS — C78.00 MALIGNANT NEOPLASM METASTATIC TO LUNG, UNSPECIFIED LATERALITY: ICD-10-CM

## 2023-04-24 DIAGNOSIS — T45.1X5A CHEMOTHERAPY-INDUCED NEUTROPENIA: ICD-10-CM

## 2023-04-24 LAB — CANCER AG27-29 SERPL-ACNC: 80.3 U/ML

## 2023-04-24 PROCEDURE — 99214 PR OFFICE/OUTPT VISIT, EST, LEVL IV, 30-39 MIN: ICD-10-PCS | Mod: S$PBB,,, | Performed by: INTERNAL MEDICINE

## 2023-04-24 PROCEDURE — 3074F PR MOST RECENT SYSTOLIC BLOOD PRESSURE < 130 MM HG: ICD-10-PCS | Mod: CPTII,,, | Performed by: INTERNAL MEDICINE

## 2023-04-24 PROCEDURE — 1159F MED LIST DOCD IN RCRD: CPT | Mod: CPTII,,, | Performed by: INTERNAL MEDICINE

## 2023-04-24 PROCEDURE — 96402 CHEMO HORMON ANTINEOPL SQ/IM: CPT | Mod: PN

## 2023-04-24 PROCEDURE — 3078F DIAST BP <80 MM HG: CPT | Mod: CPTII,,, | Performed by: INTERNAL MEDICINE

## 2023-04-24 PROCEDURE — 99214 OFFICE O/P EST MOD 30 MIN: CPT | Mod: PBBFAC,25,PN | Performed by: INTERNAL MEDICINE

## 2023-04-24 PROCEDURE — 99999 PR PBB SHADOW E&M-EST. PATIENT-LVL IV: ICD-10-PCS | Mod: PBBFAC,,, | Performed by: INTERNAL MEDICINE

## 2023-04-24 PROCEDURE — 3078F PR MOST RECENT DIASTOLIC BLOOD PRESSURE < 80 MM HG: ICD-10-PCS | Mod: CPTII,,, | Performed by: INTERNAL MEDICINE

## 2023-04-24 PROCEDURE — 3074F SYST BP LT 130 MM HG: CPT | Mod: CPTII,,, | Performed by: INTERNAL MEDICINE

## 2023-04-24 PROCEDURE — 3008F PR BODY MASS INDEX (BMI) DOCUMENTED: ICD-10-PCS | Mod: CPTII,,, | Performed by: INTERNAL MEDICINE

## 2023-04-24 PROCEDURE — 63600175 PHARM REV CODE 636 W HCPCS: Mod: JZ,JG,PN | Performed by: INTERNAL MEDICINE

## 2023-04-24 PROCEDURE — 99214 OFFICE O/P EST MOD 30 MIN: CPT | Mod: S$PBB,,, | Performed by: INTERNAL MEDICINE

## 2023-04-24 PROCEDURE — 1159F PR MEDICATION LIST DOCUMENTED IN MEDICAL RECORD: ICD-10-PCS | Mod: CPTII,,, | Performed by: INTERNAL MEDICINE

## 2023-04-24 PROCEDURE — 3008F BODY MASS INDEX DOCD: CPT | Mod: CPTII,,, | Performed by: INTERNAL MEDICINE

## 2023-04-24 PROCEDURE — 99999 PR PBB SHADOW E&M-EST. PATIENT-LVL IV: CPT | Mod: PBBFAC,,, | Performed by: INTERNAL MEDICINE

## 2023-04-24 RX ORDER — SODIUM CHLORIDE 0.9 % (FLUSH) 0.9 %
10 SYRINGE (ML) INJECTION
Status: DISCONTINUED | OUTPATIENT
Start: 2023-04-24 | End: 2023-04-24 | Stop reason: HOSPADM

## 2023-04-24 RX ORDER — HEPARIN 100 UNIT/ML
500 SYRINGE INTRAVENOUS
Status: CANCELLED | OUTPATIENT
Start: 2023-04-24

## 2023-04-24 RX ORDER — HEPARIN 100 UNIT/ML
500 SYRINGE INTRAVENOUS
Status: DISCONTINUED | OUTPATIENT
Start: 2023-04-24 | End: 2023-04-24 | Stop reason: HOSPADM

## 2023-04-24 RX ORDER — SODIUM CHLORIDE 0.9 % (FLUSH) 0.9 %
10 SYRINGE (ML) INJECTION
Status: CANCELLED | OUTPATIENT
Start: 2023-04-24

## 2023-04-24 RX ADMIN — GOSERELIN ACETATE 3.6 MG: 3.6 IMPLANT SUBCUTANEOUS at 02:04

## 2023-04-24 NOTE — TELEPHONE ENCOUNTER
Specialty Pharmacy - Refill Coordination    Specialty Medication Orders Linked to Encounter      Flowsheet Row Most Recent Value   Medication #1 ribociclib (KISQALI) 400 mg/day (200 mg x 2) Tab (Order#[id number], Rx#[id number])            Refill Questions - Documented Responses      Flowsheet Row Most Recent Value   Refill Screening Questions    Changes to allergies? No   Changes to medications? No   New conditions since last clinic visit? No   Unplanned office visit, urgent care, ED, or hospital admission in the last 4 weeks? No   How does patient/caregiver feel medication is working? Very good   Financial problems or insurance changes? No   How many doses of your specialty medications were missed in the last 4 weeks? 0   Would patient like to speak to a pharmacist? No   When does the patient need to receive the medication? 04/25/23   Refill Delivery Questions    How will the patient receive the medication? MEDRx   When does the patient need to receive the medication? 04/25/23   Shipping Address Home   Address in [facility] confirmed and updated if neccessary? Yes   Expected Copay ($) 0   Is the patient able to afford the medication copay? Yes   Payment Method zero copay   Days supply of Refill 28   Supplies needed? No supplies needed   Refill activity completed? Yes   Refill activity plan Refill scheduled   Shipment/Pickup Date: 04/24/23            Current Outpatient Medications   Medication Sig    albuterol (PROVENTIL) 2.5 mg /3 mL (0.083 %) nebulizer solution SMARTSIG:3 Milliliter(s) Via Nebulizer Every 4 Hours PRN    albuterol (PROVENTIL/VENTOLIN HFA) 90 mcg/actuation inhaler Inhale 2 puffs into the lungs every 6 (six) hours as needed.    ALBUTEROL INHL Inhale 2 puffs into the lungs as needed.    anastrozole (ARIMIDEX) 1 mg Tab Take 1 tablet (1 mg total) by mouth once daily.    ascorbic acid, vitamin C, (VITAMIN C) 500 MG tablet Take 1,000 mg by mouth once daily.    benzonatate (TESSALON) 100 MG capsule  Take 200 mg by mouth 3 (three) times daily as needed.    buPROPion (WELLBUTRIN SR) 150 MG TBSR 12 hr tablet Take 1 tablet daily for 7 days then 1 tablet twice daily thereafter    cetirizine (ZYRTEC) 10 MG tablet Take 10 mg by mouth once daily.    COMP-AIR NEBULIZER COMPRESSOR Sehrri use as directed    HYDROcodone-acetaminophen (NORCO)  mg per tablet Take 1 tablet by mouth every 6 (six) hours as needed for Pain. (Patient not taking: Reported on 3/15/2023)    ketorolac (TORADOL) 10 mg tablet Take 10 mg by mouth every 6 (six) hours as needed.    nebulizer and compressor Sherri Use as directed    nicotine (NICODERM CQ) 21 mg/24 hr Place 1 patch onto the skin once daily.    ondansetron (ZOFRAN-ODT) 4 MG TbDL Take 1 tablet by mouth every 8 (eight) hours as needed.    ondansetron (ZOFRAN-ODT) 8 MG TbDL Take 8 mg by mouth every 8 (eight) hours as needed.    ribociclib (KISQALI) 400 mg/day (200 mg x 2) Tab Take 2 tablets (400mg) by mouth on days 1 to day 21 every 28 days   Last reviewed on 4/11/2023 10:45 AM by Bhumika Amezquita    Review of patient's allergies indicates:  No Known Allergies Last reviewed on  4/11/2023 10:45 AM by Bhumika Amezquita      Tasks added this encounter   No tasks added.   Tasks due within next 3 months   4/17/2023 - Refill Coordination Outreach (1 time occurrence)     Stephen Ugalde, PharmD  [facility]  [address] [place] [id number]  Phone: [phone]  Fax: [phone]

## 2023-04-24 NOTE — PROGRESS NOTES
PROGRESS NOTE    Subjective:       Patient ID: Angeles Wright is a 52 y.o. female.  MRN:   : [id number]    Chief Complaint: Breast cancer       History of Present Illness:   Angeles Wright is a 52 y.o. female who presents with metastatic invasive ductal left breast cancer.       As previously documented by Dr. Mcgovern  [place] [facility] Labs showed her to be pre-menopausal thus she was treated with Ribociclib /Zoladex and Arimidex but she was off therapy from November to May 2020. In 2020 scans showed improvement thus she restarted Arimidex/ribociclib/zoladex.     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      Interim history:   She presents today to discuss her symptoms, labs, and further recommendations.      She has  continued dose reduced ribociclib at the 400 mg p.o. day 1-day 21 Q 28 days since 2020.     She underwent left lumpectomy for isolated progression in the breast on 22. Follow up scans in February show stable disease.      She has continued anastrozole 1 mg p.o. daily.    Started the new cycle of Ribociclib 23    Has noticed dental pain, will be seeing the dentist tomorrow, may need a root canal.Last Xgeva 2723. Missed both zoladex and xgeva last month.       Oncology History:  Oncology History   Malignant neoplasm of central portion of left breast in female, estrogen receptor positive   9/3/2019 Initial Diagnosis    Malignant neoplasm of central portion of left breast in female, estrogen receptor positive       9/3/2019 - 9/3/2019 Chemotherapy    Treatment Summary   Plan Name: OP ANASTROZOLE PALBOCICLIB Q4W  Treatment Goal: Palliative  Status: Inactive  Start Date: 9/3/2019  End Date: 9/3/2019  Provider: Melania Mcgovern MD  Chemotherapy: [No matching medication found in this treatment plan]       2019 Cancer Staged    Staging form: Breast, AJCC 8th Edition  - Clinical:  Stage IV (cT4b, cM1, ER+, MD+, HER2-)       11/13/2022 Cancer Staged    Staging form: Breast, AJCC 8th Edition  - Clinical stage from 11/13/2022: No Stage Recommended (ycT2(m), cN1(f), cM1, G1, ER+, MD+, HER2-)       12/26/2022 Cancer Staged    Staging form: Breast, AJCC 8th Edition  - Pathologic stage from 12/26/2022: No Stage Recommended (ypT2, pNX, pM1, G1, ER+, MD+, HER2-)         5/4/22  Impression:     No significant interval changes.  Known malignancy in the left breast with metastatic disease to bone.  Additional stable bilateral breast nodules which have been evaluated previously with mammography and ultrasound.  Stable small pleural based lung nodules in the right hemithorax.     9/12/22  Impression:  Left  Mass: Left breast 22 mm x 22 mm x 15 mm mass at the upper middle 12 o'clock position. Assessment: 6 - Known biopsy, proven malignancy.   Mass: Left breast 12 mm x 9 mm x 5 mm mass at the upper posterior 12 o'clock position. Assessment: 4C - Suspicious finding. Biopsy is recommended.      Right  Mass: Right breast 10 mm x 10 mm x 7 mm mass at the upper outer 11 o'clock position. Assessment: 3 - Probably benign.   Mass: Right breast 14 mm x 7 mm x 11 mm mass at the 9 o'clock position. Assessment: 3 - Probably benign.      BI-RADS Category:   Overall: 4 - Suspicious    9/28/22  Impression:  Left  Mass: Left breast 22 mm x 22 mm x 15 mm mass at the upper middle 12 o'clock position. Assessment: 6 - Known biopsy, proven malignancy.   Mass: Left breast 12 mm x 9 mm x 5 mm mass at the upper posterior 12 o'clock position. Assessment: 4C - Suspicious finding. Biopsy is recommended.      Right  Mass: Right breast 10 mm x 10 mm x 7 mm mass at the upper outer 11 o'clock position. Assessment: 3 - Probably benign.   Mass: Right breast 14 mm x 7 mm x 11 mm mass at the 9 o'clock position. Assessment: 3 - Probably benign.      BI-RADS Category:   Overall: 4 - Suspicious     Recommendation:  Biopsy could be considered.  There is a strong possibility that this represents progression of disease in this patient with known metastatic breast carcinoma.    10/21/22   Final Pathologic Diagnosis LEFT BREAST, 12 O'CLOCK MASS 1 CM FROM NIPPLE, BIOPSY:   Invasive ductal carcinoma, Arjun histologic grade 1 (tubule formation:   2, nuclear pleomorphism:  2, mitotic activity:  1).   Comment:  The biopsy reveals infiltrating ductal carcinoma with partial   mucinous features including abundant tubules and tumor nests with punched out   cribriform spaces containing mucin.  Tumor cells are positive with GATA3 and   negative with , in keeping with mammary ductal origin.  The largest   continuous focus of invasive carcinoma present measures 4 mm.  Dr. Zofia Saleem also reviewed this case and agrees with the diagnosis.   BREAST BIOMARKER RESULTS   Estrogen receptor (ER):  Positive (nearly 100%, strong)   Progesterone receptor (IA):  Positive (50%, weak to moderate)   HER2 IHC:  Negative (1+)   Ki-67 proliferation index:  10%        11/4/22  CT chest abd pelvis  Impression:     Stable diffuse osseous metastatic disease.     Left breast masses consistent with known malignancy and better evaluated on recent mammogram and ultrasound.     New left lower lobe linear consolidation, favored to be due to atelectasis versus infection/inflammation.  Recommend attention on follow-up exam as per clinical protocol.     Sigmoid colon diverticulosis without diverticulitis.     Pulmonary emphysema.    12/14/22  1.  BREAST, LEFT, 12:00, LUMPECTOMY:   --INVASIVE CARCINOMA OF NO SPECIAL TYPE (DUCTAL), ARJUN HISTOLOGIC    GRADE 1 OUT OF 3.        --INVASIVE CARCINOMA MEASURES 40 MILLIMETERS IN MAXIMUM DIMENSION.   --TUMOR IS PRESENT IN SUBEPIDERMAL TISSUE BUT DOES NOT INVOLVE    EPIDERMIS PROPER.        --BIOPSY SITE CHANGES.   --RECEPTOR STUDIES REPORTED PREVIOUSLY AT OUTSIDE FACILITY ON BIOPSY    MATERIAL (SEE COMMENT #1).        --NO UNEQUIVOCAL  LYMPHOVASCULAR INVASION IDENTIFIED.   --PART 1 SPECIMEN MARGINS ARE NEGATIVE FOR MALIGNANCY; ADDITIONAL    MARGINS SENT SEPARATELY (SEE   PARTS 2 AND 3 BELOW); PLEASE SEE ALSO BELOW CASE SUMMARY FOR ADDITIONAL    MARGIN           DATA/PARAMETERS.   --MICROCALCIFICATIONS ASSOCIATED WITH NEOPLASTIC TISSUE AND    NON-NEOPLASTIC TISSUE.   --SKIN WITH MICROORGANISMS MORPHOLOGICALLY COMPATIBLE WITH DEMODEX.        --ypT2/ypN NOT ASSIGNED (NO NODES SUBMITTED OR FOUND).   2/23/23  Impression:     Prior an interval left breast surgery for neoplasm.     Chronic appearing bone lesions compatible with bony metastatic disease.  These are grossly stable on imaging.     No evidence of new parenchymal or dagmar metastatic disease.     Diverticulosis without evidence of diverticulitis.    History:  Past Medical History:   Diagnosis Date    Breast cancer       Past Surgical History:   Procedure Laterality Date    BREAST BIOPSY Right     BREAST LUMPECTOMY Left     LUMPECTOMY, WITH RADAR LOCALIZATION USING COLETTE  Left 12/14/2022    Procedure: LUMPECTOMY,WITH RADAR LOCALIZATION USING COLETTE ;  Surgeon: Maday Amin MD;  Location: [facility];  Service: General;  Laterality: Left;    TUBAL LIGATION       Family History   Problem Relation Age of Onset    Lung cancer Mother     Heart attack Father     Suicide Brother     No Known Problems Maternal Aunt     No Known Problems Maternal Uncle     No Known Problems Paternal Aunt     No Known Problems Paternal Uncle     No Known Problems Maternal Grandmother     No Known Problems Maternal Grandfather     No Known Problems Paternal Grandmother     No Known Problems Paternal Grandfather     No Known Problems Son     No Known Problems Son     No Known Problems Daughter     Breast cancer Cousin     Breast cancer Cousin      Social History     Tobacco Use    Smoking status: Every Day     Packs/day: 1.00     Years: 34.00     Pack years: 34.00     Types: Cigarettes     Start date: 9/3/1994     "Smokeless tobacco: Never   Substance and Sexual Activity    Alcohol use: Yes     Comment: occasionally    Drug use: Not Currently    Sexual activity: Yes     Partners: Male     Birth control/protection: None        ROS:   Review of Systems   Constitutional:  Negative for fever, malaise/fatigue and weight loss.   HENT:  Negative for congestion, hearing loss, nosebleeds and sore throat.    Eyes:  Negative for double vision and photophobia.   Respiratory:  Negative for cough, hemoptysis, sputum production, shortness of breath and wheezing.    Cardiovascular:  Negative for chest pain, palpitations, orthopnea and leg swelling.   Gastrointestinal:  Negative for abdominal pain, blood in stool, constipation, diarrhea, heartburn, nausea and vomiting.   Genitourinary:  Negative for dysuria, hematuria and urgency.   Musculoskeletal:  Positive for joint pain (left hip, mild ). Negative for back pain and myalgias.   Skin:  Negative for itching and rash.   Neurological:  Negative for dizziness, tingling, seizures and weakness.   Endo/Heme/Allergies:  Negative for polydipsia. Does not bruise/bleed easily.   Psychiatric/Behavioral:  Negative for depression and memory loss. The patient is not nervous/anxious and does not have insomnia.       Objective:     Vitals:    04/24/23 1347   BP: 124/66   Pulse: 84   Resp: 16   Temp: 97 °F (36.1 °C)   TempSrc: Temporal   SpO2: 99%   Weight: 63.6 kg (140 lb 3.4 oz)   Height: 5' 4" (1.626 m)   PainSc: 0-No pain         Wt Readings from Last 10 Encounters:   04/24/23 63.6 kg (140 lb 3.4 oz)   03/15/23 64 kg (141 lb)   02/27/23 64.3 kg (141 lb 12.1 oz)   02/27/23 64.3 kg (141 lb 12.1 oz)   01/30/23 63.2 kg (139 lb 5.3 oz)   01/30/23 63.2 kg (139 lb 5.3 oz)   01/03/23 64.5 kg (142 lb 4.9 oz)   01/03/23 64.5 kg (142 lb 4.9 oz)   12/23/22 62.6 kg (138 lb)   12/13/22 63 kg (138 lb 14.2 oz)       Physical Examination:   Physical Exam  Vitals and nursing note reviewed.   Constitutional:       General: " She is not in acute distress.     Appearance: She is not diaphoretic.   HENT:      Head: Normocephalic.      Mouth/Throat:      Pharynx: No oropharyngeal exudate.   Eyes:      General: No scleral icterus.     Conjunctiva/sclera: Conjunctivae normal.   Neck:      Thyroid: No thyromegaly.   Cardiovascular:      Rate and Rhythm: Normal rate and regular rhythm.      Heart sounds: Normal heart sounds. No murmur heard.  Pulmonary:      Effort: Pulmonary effort is normal. No respiratory distress.      Breath sounds: No stridor. No wheezing or rales.   Chest:      Chest wall: No tenderness.   Abdominal:      General: Bowel sounds are normal. There is no distension.      Palpations: Abdomen is soft. There is no mass.      Tenderness: There is no abdominal tenderness. There is no rebound.   Musculoskeletal:         General: No tenderness or deformity. Normal range of motion.      Cervical back: Neck supple.   Lymphadenopathy:      Cervical: No cervical adenopathy.   Skin:     General: Skin is warm and dry.      Findings: No erythema or rash.   Neurological:      Mental Status: She is alert and oriented to person, place, and time.      Cranial Nerves: No cranial nerve deficit.      Coordination: Coordination normal.      Gait: Gait is intact.   Psychiatric:         Mood and Affect: Affect normal.         Cognition and Memory: Memory normal.         Judgment: Judgment normal.        Diagnostic Tests:  Significant Imaging: I have reviewed and interpreted all pertinent imaging results/findings.      Laboratory Data:  All pertinent labs have been reviewed.  Labs:   Lab Results   Component Value Date    WBC 5.82 04/20/2023    RBC 4.32 04/20/2023    HGB 13.6 04/20/2023    HCT 40.9 04/20/2023    MCV 95 04/20/2023     04/20/2023     04/20/2023     04/20/2023    K 4.4 04/20/2023    BUN 13 04/20/2023    CREATININE 0.8 04/20/2023    AST 11 04/20/2023    ALT 8 (L) 04/20/2023    BILITOT 0.3 04/20/2023        Assessment/Plan:   Malignant neoplasm of central portion of left breast in female, estrogen receptor positive  Stage IV (T3N2M2) invasive ductal carcinoma of left breast,  quadrant, ER 96%, OK 94%, Her2 neg, Grade 2, Ki67 30%  PIKC3A mutation positive      Recent mammogram shows slight increase in the size of the left breast mass, previously biopsied and a new mass.   Biopsy shows a Grade 1 IDC, strongly ER + tumor with Ki 67 10%, more favorable than prior biopsy.   There is no clear metastatic disease progression based on recent scans.       Her case was discussed at our multi D tumor board and the consensus was to remove the site of isolated disease progression. She underwent left lumpectomy on 12/14 with Dr. Amin. Final pathology reviewed, she had a G 1 IDC 4 cm in size, margins negative. No adjuvant RT offered due to the metastatic nature of disease.     Follow up scans are stable, repeat in June. Continue current therapy.     On ribociclib 400 mg p.o. day 1-day 21 Q 28 days and anastrozole 1 mg p.o.   Continue zoladex q 28 days.     Bone metastases  Continue Xgeva Q 28 days.  Continue Caltrate b.i.d. at this time. Last xgeva was 2/27, will hold in anticipation of dental work. Reassess next month.     Malignant neoplasm metastatic to lung, unspecified laterality  Subcm pulmonary nodules are stable and benign appearing.  Lung nodules have never been biopsied.  Recent changes appear inflammatory.Continue to monitor on subsequent scans.     Use of anastrozole  As above.    Smoker  She is cutting down. Follow up with smoking cessation program.          ECOG SCORE    0 - Fully active-able to carry on all pre-disease performance without restriction          Discussion:   No follow-ups on file.    Plan was discussed with the patient at length, and she verbalized understanding. Angeles was given an opportunity to ask questions that were answered to her satisfaction, and she was advised to call in the interval  if any problems or questions arise.    Electronically signed by Shelby Thomas MD      Route Chart for Scheduling    Med Onc Chart Routing      Follow up with physician . 5/22   Follow up with LEÓN    Infusion scheduling note    Injection scheduling note 5/22   Labs CBC, CMP and CA 27.29   Scheduling:  Preferred lab:  Lab interval:  5/19   Imaging    Pharmacy appointment    Other referrals             Supportive Plan Information  OP BREAST GOSERELIN Q4W   Shelby Thomas MD   Upcoming Treatment Dates - OP BREAST GOSERELIN Q4W    4/24/2023       Chemotherapy       goserelin (ZOLADEX) injection 3.6 mg  5/23/2023       Chemotherapy       goserelin (ZOLADEX) injection 3.6 mg  6/21/2023       Chemotherapy       goserelin (ZOLADEX) injection 3.6 mg  7/20/2023       Chemotherapy       goserelin (ZOLADEX) injection 3.6 mg    Therapy Plan Information  PORT FLUSH  Flushes  heparin, porcine (PF) 100 unit/mL injection flush 500 Units  500 Units, Intravenous, Every visit  sodium chloride 0.9% flush 10 mL  10 mL, Intravenous, Every visit    DENOSUMAB (XGEVA) Q4W  Medications  denosumab (XGEVA) solution 120 mg  120 mg, Subcutaneous, Every 4 weeks

## 2023-05-01 ENCOUNTER — TELEPHONE (OUTPATIENT)
Dept: SMOKING CESSATION | Facility: CLINIC | Age: 53
End: 2023-05-01
Payer: MEDICAID

## 2023-05-15 ENCOUNTER — PATIENT MESSAGE (OUTPATIENT)
Dept: PHARMACY | Facility: CLINIC | Age: 53
End: 2023-05-15
Payer: MEDICAID

## 2023-05-16 ENCOUNTER — LAB VISIT (OUTPATIENT)
Dept: LAB | Facility: HOSPITAL | Age: 53
End: 2023-05-16
Attending: INTERNAL MEDICINE
Payer: MEDICAID

## 2023-05-16 DIAGNOSIS — C50.112 MALIGNANT NEOPLASM OF CENTRAL PORTION OF LEFT BREAST IN FEMALE, ESTROGEN RECEPTOR POSITIVE: ICD-10-CM

## 2023-05-16 DIAGNOSIS — Z17.0 MALIGNANT NEOPLASM OF CENTRAL PORTION OF LEFT BREAST IN FEMALE, ESTROGEN RECEPTOR POSITIVE: ICD-10-CM

## 2023-05-16 LAB
ALBUMIN SERPL BCP-MCNC: 3.9 G/DL (ref 3.5–5.2)
ALP SERPL-CCNC: 74 U/L (ref 55–135)
ALT SERPL W/O P-5'-P-CCNC: 8 U/L (ref 10–44)
ANION GAP SERPL CALC-SCNC: 11 MMOL/L (ref 8–16)
AST SERPL-CCNC: 10 U/L (ref 10–40)
BASOPHILS # BLD AUTO: 0.12 K/UL (ref 0–0.2)
BASOPHILS NFR BLD: 2.4 % (ref 0–1.9)
BILIRUB SERPL-MCNC: 0.3 MG/DL (ref 0.1–1)
BUN SERPL-MCNC: 16 MG/DL (ref 6–20)
CALCIUM SERPL-MCNC: 9 MG/DL (ref 8.7–10.5)
CHLORIDE SERPL-SCNC: 108 MMOL/L (ref 95–110)
CO2 SERPL-SCNC: 20 MMOL/L (ref 23–29)
CREAT SERPL-MCNC: 1.2 MG/DL (ref 0.5–1.4)
DIFFERENTIAL METHOD: ABNORMAL
EOSINOPHIL # BLD AUTO: 0.1 K/UL (ref 0–0.5)
EOSINOPHIL NFR BLD: 1 % (ref 0–8)
ERYTHROCYTE [DISTWIDTH] IN BLOOD BY AUTOMATED COUNT: 14.9 % (ref 11.5–14.5)
EST. GFR  (NO RACE VARIABLE): 55.1 ML/MIN/1.73 M^2
GLUCOSE SERPL-MCNC: 118 MG/DL (ref 70–110)
HCT VFR BLD AUTO: 41.7 % (ref 37–48.5)
HGB BLD-MCNC: 14.1 G/DL (ref 12–16)
IMM GRANULOCYTES # BLD AUTO: 0.02 K/UL (ref 0–0.04)
IMM GRANULOCYTES NFR BLD AUTO: 0.4 % (ref 0–0.5)
LYMPHOCYTES # BLD AUTO: 2 K/UL (ref 1–4.8)
LYMPHOCYTES NFR BLD: 39.8 % (ref 18–48)
MCH RBC QN AUTO: 31.8 PG (ref 27–31)
MCHC RBC AUTO-ENTMCNC: 33.8 G/DL (ref 32–36)
MCV RBC AUTO: 94 FL (ref 82–98)
MONOCYTES # BLD AUTO: 0.4 K/UL (ref 0.3–1)
MONOCYTES NFR BLD: 7.6 % (ref 4–15)
NEUTROPHILS # BLD AUTO: 2.5 K/UL (ref 1.8–7.7)
NEUTROPHILS NFR BLD: 48.8 % (ref 38–73)
NRBC BLD-RTO: 0 /100 WBC
PLATELET # BLD AUTO: 254 K/UL (ref 150–450)
PMV BLD AUTO: 8.2 FL (ref 9.2–12.9)
POTASSIUM SERPL-SCNC: 4.1 MMOL/L (ref 3.5–5.1)
PROT SERPL-MCNC: 6.8 G/DL (ref 6–8.4)
RBC # BLD AUTO: 4.43 M/UL (ref 4–5.4)
SODIUM SERPL-SCNC: 139 MMOL/L (ref 136–145)
WBC # BLD AUTO: 5.02 K/UL (ref 3.9–12.7)

## 2023-05-16 PROCEDURE — 86300 IMMUNOASSAY TUMOR CA 15-3: CPT | Performed by: INTERNAL MEDICINE

## 2023-05-16 PROCEDURE — 80053 COMPREHEN METABOLIC PANEL: CPT | Mod: PN | Performed by: INTERNAL MEDICINE

## 2023-05-16 PROCEDURE — 36415 COLL VENOUS BLD VENIPUNCTURE: CPT | Mod: PN | Performed by: INTERNAL MEDICINE

## 2023-05-16 PROCEDURE — 85025 COMPLETE CBC W/AUTO DIFF WBC: CPT | Mod: PN | Performed by: INTERNAL MEDICINE

## 2023-05-18 ENCOUNTER — CLINICAL SUPPORT (OUTPATIENT)
Dept: SMOKING CESSATION | Facility: CLINIC | Age: 53
End: 2023-05-18
Payer: COMMERCIAL

## 2023-05-18 ENCOUNTER — SPECIALTY PHARMACY (OUTPATIENT)
Dept: PHARMACY | Facility: CLINIC | Age: 53
End: 2023-05-18
Payer: MEDICAID

## 2023-05-18 ENCOUNTER — TELEPHONE (OUTPATIENT)
Dept: SMOKING CESSATION | Facility: CLINIC | Age: 53
End: 2023-05-18
Payer: MEDICAID

## 2023-05-18 ENCOUNTER — PATIENT MESSAGE (OUTPATIENT)
Dept: PHARMACY | Facility: CLINIC | Age: 53
End: 2023-05-18
Payer: MEDICAID

## 2023-05-18 DIAGNOSIS — F17.200 NICOTINE DEPENDENCE: Primary | ICD-10-CM

## 2023-05-18 LAB — CANCER AG27-29 SERPL-ACNC: 83.3 U/ML

## 2023-05-18 PROCEDURE — 99402 PREV MED CNSL INDIV APPRX 30: CPT | Mod: S$GLB,,, | Performed by: GENERAL PRACTICE

## 2023-05-18 PROCEDURE — 99402 PR PREVENT COUNSEL,INDIV,30 MIN: ICD-10-PCS | Mod: S$GLB,,, | Performed by: GENERAL PRACTICE

## 2023-05-18 PROCEDURE — 99999 PR PBB SHADOW E&M-EST. PATIENT-LVL III: CPT | Mod: PBBFAC,,,

## 2023-05-18 PROCEDURE — 99999 PR PBB SHADOW E&M-EST. PATIENT-LVL III: ICD-10-PCS | Mod: PBBFAC,,,

## 2023-05-18 NOTE — Clinical Note
Patient not taking Wellbutrin or using patches as directed and smoking 10 cpd. Patient states she is not ready at this time to quit but will call me soon to resume her quit. We discussed stress management, new habits and habitual behaviors. We also discussed medications and dosages.

## 2023-05-18 NOTE — PROGRESS NOTES
Individual Follow-Up Form    5/18/2023    Quit Date:     Clinical Status of Patient: Outpatient    Length of Service: 30 minutes    Continuing Medication: no    Other Medications:      Target Symptoms: Withdrawal and medication side effects. The following were  rated moderate (3) to severe (4) on TCRS:  Moderate (3): none   Severe (4): none    Comments: Patient not taking Wellbutrin or using patches as directed and smoking 10 cpd. Patient states she is not ready at this time to quit but will call me soon to resume her quit. We discussed stress management, new habits and habitual behaviors. We also discussed medications and dosages.    Diagnosis: F17.200    Next Visit:

## 2023-05-18 NOTE — TELEPHONE ENCOUNTER
Specialty Pharmacy - Refill Coordination    Specialty Medication Orders Linked to Encounter      Flowsheet Row Most Recent Value   Medication #1 ribociclib (KISQALI) 400 mg/day (200 mg x 2) Tab (Order#[id number], Rx#[id number])            Refill Questions - Documented Responses      Flowsheet Row Most Recent Value   Refill Screening Questions    Changes to allergies? No   Changes to medications? No   New conditions since last clinic visit? No   Unplanned office visit, urgent care, ED, or hospital admission in the last 4 weeks? No   How does patient/caregiver feel medication is working? Good   Financial problems or insurance changes? No   How many doses of your specialty medications were missed in the last 4 weeks? 2   Would patient like to speak to a pharmacist? No   When does the patient need to receive the medication? 05/22/23   Refill Delivery Questions    How will the patient receive the medication? MEDRx   When does the patient need to receive the medication? 05/22/23   Shipping Address Home   Address in [facility] confirmed and updated if neccessary? Yes   Expected Copay ($) 0   Is the patient able to afford the medication copay? Yes   Payment Method zero copay   Days supply of Refill 28   Supplies needed? No supplies needed   Refill activity completed? Yes   Refill activity plan Refill scheduled   Shipment/Pickup Date: 05/19/23            Current Outpatient Medications   Medication Sig    albuterol (PROVENTIL) 2.5 mg /3 mL (0.083 %) nebulizer solution SMARTSIG:3 Milliliter(s) Via Nebulizer Every 4 Hours PRN    albuterol (PROVENTIL/VENTOLIN HFA) 90 mcg/actuation inhaler Inhale 2 puffs into the lungs every 6 (six) hours as needed.    ALBUTEROL INHL Inhale 2 puffs into the lungs as needed.    anastrozole (ARIMIDEX) 1 mg Tab Take 1 tablet (1 mg total) by mouth once daily.    ascorbic acid, vitamin C, (VITAMIN C) 500 MG tablet Take 1,000 mg by mouth once daily.    benzonatate (TESSALON) 100 MG capsule Take  200 mg by mouth 3 (three) times daily as needed.    buPROPion (WELLBUTRIN SR) 150 MG TBSR 12 hr tablet Take 1 tablet daily for 7 days then 1 tablet twice daily thereafter    cetirizine (ZYRTEC) 10 MG tablet Take 10 mg by mouth once daily.    COMP-AIR NEBULIZER COMPRESSOR Sherri use as directed    HYDROcodone-acetaminophen (NORCO)  mg per tablet Take 1 tablet by mouth every 6 (six) hours as needed for Pain.    ketorolac (TORADOL) 10 mg tablet Take 10 mg by mouth every 6 (six) hours as needed.    nebulizer and compressor Sherri Use as directed    nicotine (NICODERM CQ) 21 mg/24 hr Place 1 patch onto the skin once daily. (Patient not taking: Reported on 5/18/2023)    ondansetron (ZOFRAN-ODT) 4 MG TbDL Take 1 tablet by mouth every 8 (eight) hours as needed.    ondansetron (ZOFRAN-ODT) 8 MG TbDL Take 8 mg by mouth every 8 (eight) hours as needed.    ribociclib (KISQALI) 400 mg/day (200 mg x 2) Tab Take 2 tablets (400mg) by mouth on days 1 to day 21 every 28 days   Last reviewed on 5/18/2023  1:20 PM by Bhumika Amezquita    Review of patient's allergies indicates:  No Known Allergies Last reviewed on  5/18/2023 1:19 PM by Bhumika Amezquita      Tasks added this encounter   No tasks added.   Tasks due within next 3 months   7/30/2023 - Clinical Assessment (6 month recurrence)  5/21/2023 - Refill Coordination Outreach (1 time occurrence)     Chantal Wallace, PharmD  [facility]  [address] [place] [phone]  Phone: [phone]  Fax: [phone]

## 2023-05-22 ENCOUNTER — INFUSION (OUTPATIENT)
Dept: INFUSION THERAPY | Facility: HOSPITAL | Age: 53
End: 2023-05-22
Attending: INTERNAL MEDICINE
Payer: MEDICAID

## 2023-05-22 ENCOUNTER — OFFICE VISIT (OUTPATIENT)
Dept: HEMATOLOGY/ONCOLOGY | Facility: CLINIC | Age: 53
End: 2023-05-22
Payer: MEDICAID

## 2023-05-22 VITALS
BODY MASS INDEX: 24.32 KG/M2 | DIASTOLIC BLOOD PRESSURE: 72 MMHG | WEIGHT: 142.44 LBS | HEART RATE: 96 BPM | HEIGHT: 64 IN | RESPIRATION RATE: 16 BRPM | SYSTOLIC BLOOD PRESSURE: 122 MMHG | TEMPERATURE: 98 F | OXYGEN SATURATION: 99 %

## 2023-05-22 VITALS
WEIGHT: 142.44 LBS | BODY MASS INDEX: 24.32 KG/M2 | RESPIRATION RATE: 16 BRPM | TEMPERATURE: 98 F | DIASTOLIC BLOOD PRESSURE: 72 MMHG | OXYGEN SATURATION: 99 % | HEIGHT: 64 IN | HEART RATE: 96 BPM | SYSTOLIC BLOOD PRESSURE: 122 MMHG

## 2023-05-22 DIAGNOSIS — Z79.811 USE OF ANASTROZOLE: ICD-10-CM

## 2023-05-22 DIAGNOSIS — C79.51 MALIGNANT NEOPLASM METASTATIC TO BONE: ICD-10-CM

## 2023-05-22 DIAGNOSIS — D70.1 CHEMOTHERAPY-INDUCED NEUTROPENIA: ICD-10-CM

## 2023-05-22 DIAGNOSIS — T45.1X5A CHEMOTHERAPY-INDUCED NEUTROPENIA: ICD-10-CM

## 2023-05-22 DIAGNOSIS — F17.200 SMOKER: ICD-10-CM

## 2023-05-22 DIAGNOSIS — C50.112 MALIGNANT NEOPLASM OF CENTRAL PORTION OF LEFT BREAST IN FEMALE, ESTROGEN RECEPTOR POSITIVE: Primary | ICD-10-CM

## 2023-05-22 DIAGNOSIS — Z17.0 MALIGNANT NEOPLASM OF CENTRAL PORTION OF LEFT BREAST IN FEMALE, ESTROGEN RECEPTOR POSITIVE: Primary | ICD-10-CM

## 2023-05-22 DIAGNOSIS — C78.00 MALIGNANT NEOPLASM METASTATIC TO LUNG, UNSPECIFIED LATERALITY: ICD-10-CM

## 2023-05-22 DIAGNOSIS — R74.01 TRANSAMINITIS: ICD-10-CM

## 2023-05-22 PROCEDURE — 63600175 PHARM REV CODE 636 W HCPCS: Mod: JZ,JG,PN | Performed by: INTERNAL MEDICINE

## 2023-05-22 PROCEDURE — 25000003 PHARM REV CODE 250: Mod: PN

## 2023-05-22 PROCEDURE — 3078F DIAST BP <80 MM HG: CPT | Mod: CPTII,,, | Performed by: INTERNAL MEDICINE

## 2023-05-22 PROCEDURE — 3074F PR MOST RECENT SYSTOLIC BLOOD PRESSURE < 130 MM HG: ICD-10-PCS | Mod: CPTII,,, | Performed by: INTERNAL MEDICINE

## 2023-05-22 PROCEDURE — A4216 STERILE WATER/SALINE, 10 ML: HCPCS | Mod: PN

## 2023-05-22 PROCEDURE — 63600175 PHARM REV CODE 636 W HCPCS: Mod: PN

## 2023-05-22 PROCEDURE — 99214 OFFICE O/P EST MOD 30 MIN: CPT | Mod: S$PBB,,, | Performed by: INTERNAL MEDICINE

## 2023-05-22 PROCEDURE — 3008F PR BODY MASS INDEX (BMI) DOCUMENTED: ICD-10-PCS | Mod: CPTII,,, | Performed by: INTERNAL MEDICINE

## 2023-05-22 PROCEDURE — 99999 PR PBB SHADOW E&M-EST. PATIENT-LVL IV: ICD-10-PCS | Mod: PBBFAC,,, | Performed by: INTERNAL MEDICINE

## 2023-05-22 PROCEDURE — 99214 PR OFFICE/OUTPT VISIT, EST, LEVL IV, 30-39 MIN: ICD-10-PCS | Mod: S$PBB,,, | Performed by: INTERNAL MEDICINE

## 2023-05-22 PROCEDURE — 3074F SYST BP LT 130 MM HG: CPT | Mod: CPTII,,, | Performed by: INTERNAL MEDICINE

## 2023-05-22 PROCEDURE — 99999 PR PBB SHADOW E&M-EST. PATIENT-LVL IV: CPT | Mod: PBBFAC,,, | Performed by: INTERNAL MEDICINE

## 2023-05-22 PROCEDURE — 3008F BODY MASS INDEX DOCD: CPT | Mod: CPTII,,, | Performed by: INTERNAL MEDICINE

## 2023-05-22 PROCEDURE — 96372 THER/PROPH/DIAG INJ SC/IM: CPT | Mod: PN

## 2023-05-22 PROCEDURE — 3078F PR MOST RECENT DIASTOLIC BLOOD PRESSURE < 80 MM HG: ICD-10-PCS | Mod: CPTII,,, | Performed by: INTERNAL MEDICINE

## 2023-05-22 PROCEDURE — 99214 OFFICE O/P EST MOD 30 MIN: CPT | Mod: PBBFAC,PN | Performed by: INTERNAL MEDICINE

## 2023-05-22 RX ORDER — HEPARIN 100 UNIT/ML
500 SYRINGE INTRAVENOUS
Status: CANCELLED | OUTPATIENT
Start: 2023-05-22

## 2023-05-22 RX ORDER — METHYLPREDNISOLONE 4 MG/1
TABLET ORAL
COMMUNITY
Start: 2023-03-27 | End: 2023-09-11 | Stop reason: ALTCHOICE

## 2023-05-22 RX ORDER — CLINDAMYCIN HYDROCHLORIDE 150 MG/1
CAPSULE ORAL
COMMUNITY
Start: 2023-04-14 | End: 2023-06-19

## 2023-05-22 RX ORDER — HEPARIN 100 UNIT/ML
500 SYRINGE INTRAVENOUS
Status: DISCONTINUED | OUTPATIENT
Start: 2023-05-22 | End: 2023-05-22 | Stop reason: HOSPADM

## 2023-05-22 RX ORDER — BENZONATATE 200 MG/1
200 CAPSULE ORAL
COMMUNITY
Start: 2023-04-11 | End: 2023-07-17

## 2023-05-22 RX ORDER — SODIUM CHLORIDE 0.9 % (FLUSH) 0.9 %
10 SYRINGE (ML) INJECTION
Status: DISCONTINUED | OUTPATIENT
Start: 2023-05-22 | End: 2023-05-22 | Stop reason: HOSPADM

## 2023-05-22 RX ORDER — PROMETHAZINE HYDROCHLORIDE AND DEXTROMETHORPHAN HYDROBROMIDE 6.25; 15 MG/5ML; MG/5ML
5 SYRUP ORAL
COMMUNITY
Start: 2023-03-27 | End: 2023-09-11

## 2023-05-22 RX ORDER — AMOXICILLIN 875 MG/1
875 TABLET, FILM COATED ORAL EVERY 12 HOURS
COMMUNITY
Start: 2023-03-16 | End: 2023-06-19

## 2023-05-22 RX ORDER — SODIUM CHLORIDE 0.9 % (FLUSH) 0.9 %
10 SYRINGE (ML) INJECTION
Status: CANCELLED | OUTPATIENT
Start: 2023-05-22

## 2023-05-22 RX ADMIN — Medication 500 UNITS: at 03:05

## 2023-05-22 RX ADMIN — Medication 10 ML: at 03:05

## 2023-05-22 RX ADMIN — GOSERELIN ACETATE 3.6 MG: 3.6 IMPLANT SUBCUTANEOUS at 03:05

## 2023-05-22 NOTE — PLAN OF CARE
Problem: Adult Inpatient Plan of Care  Goal: Plan of Care Review  Outcome: Ongoing, Progressing  Goal: Patient-Specific Goal (Individualized)  Outcome: Ongoing, Progressing     Problem: Fatigue  Goal: Improved Activity Tolerance  Outcome: Ongoing, Progressing   Pt tolerated zoladex injection well.   Xgeva was held this time by MD.  No adverse reaction noted.  All questions answered.  Pt tolerated port flush well.   No adverse reaction noted.  PAC flushed with heparin and de-accessed per protocol.   Pt left clinic in no acute distress.

## 2023-05-22 NOTE — PROGRESS NOTES
PROGRESS NOTE    Subjective:       Patient ID: Angeles Wright is a 52 y.o. female.  MRN:   : [id number]    Chief Complaint: Breast cancer       History of Present Illness:   Angeles Wright is a 52 y.o. female who presents with metastatic invasive ductal left breast cancer.       As previously documented by Dr. Mcgovern  she transferred care to [facility] [place] in 2019. Labs showed her to be pre-menopausal thus she was treated with Ribociclib /Zoladex and Arimidex but she was off therapy from November to May 2020. In 2020 scans showed improvement thus she restarted Arimidex/ribociclib/zoladex.     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      Interim history:   She presents today to discuss her symptoms, labs, and further recommendations.      She has  continued dose reduced ribociclib at the 400 mg p.o. day 1-day 21 Q 28 days since 2020.     She underwent left lumpectomy for isolated progression in the breast on 22. Follow up scans in February show stable disease.      She has continued anastrozole 1 mg p.o. daily.    Started the new cycle of Ribociclib 23    Has noticed dental pain, will be seeing the dentist tomorrow, may need a root canal.Last Xgeva 2723. Missed both zoladex and xgeva last month. Saw the dentist, may need some teeth pulled, has not had an oral surgeon follow up.       Oncology History:  Oncology History   Malignant neoplasm of central portion of left breast in female, estrogen receptor positive   9/3/2019 Initial Diagnosis    Malignant neoplasm of central portion of left breast in female, estrogen receptor positive     9/3/2019 - 9/3/2019 Chemotherapy    Treatment Summary   Plan Name: OP ANASTROZOLE PALBOCICLIB Q4W  Treatment Goal: Palliative  Status: Inactive  Start Date: 9/3/2019  End Date: 9/3/2019  Provider: Melania Mcgovern MD  Chemotherapy: [No matching medication found in this treatment plan]      9/18/2019 Cancer Staged    Staging form: Breast, AJCC 8th Edition  - Clinical: Stage IV (cT4b, cM1, ER+, TN+, HER2-)     11/13/2022 Cancer Staged    Staging form: Breast, AJCC 8th Edition  - Clinical stage from 11/13/2022: No Stage Recommended (ycT2(m), cN1(f), cM1, G1, ER+, TN+, HER2-)     12/26/2022 Cancer Staged    Staging form: Breast, AJCC 8th Edition  - Pathologic stage from 12/26/2022: No Stage Recommended (ypT2, pNX, pM1, G1, ER+, TN+, HER2-)       5/4/22  Impression:     No significant interval changes.  Known malignancy in the left breast with metastatic disease to bone.  Additional stable bilateral breast nodules which have been evaluated previously with mammography and ultrasound.  Stable small pleural based lung nodules in the right hemithorax.     9/12/22  Impression:  Left  Mass: Left breast 22 mm x 22 mm x 15 mm mass at the upper middle 12 o'clock position. Assessment: 6 - Known biopsy, proven malignancy.   Mass: Left breast 12 mm x 9 mm x 5 mm mass at the upper posterior 12 o'clock position. Assessment: 4C - Suspicious finding. Biopsy is recommended.      Right  Mass: Right breast 10 mm x 10 mm x 7 mm mass at the upper outer 11 o'clock position. Assessment: 3 - Probably benign.   Mass: Right breast 14 mm x 7 mm x 11 mm mass at the 9 o'clock position. Assessment: 3 - Probably benign.      BI-RADS Category:   Overall: 4 - Suspicious    9/28/22  Impression:  Left  Mass: Left breast 22 mm x 22 mm x 15 mm mass at the upper middle 12 o'clock position. Assessment: 6 - Known biopsy, proven malignancy.   Mass: Left breast 12 mm x 9 mm x 5 mm mass at the upper posterior 12 o'clock position. Assessment: 4C - Suspicious finding. Biopsy is recommended.      Right  Mass: Right breast 10 mm x 10 mm x 7 mm mass at the upper outer 11 o'clock position. Assessment: 3 - Probably benign.   Mass: Right breast 14 mm x 7 mm x 11 mm mass at the 9 o'clock position. Assessment: 3 - Probably benign.      BI-RADS Category:    Overall: 4 - Suspicious     Recommendation:  Biopsy could be considered. There is a strong possibility that this represents progression of disease in this patient with known metastatic breast carcinoma.    10/21/22   Final Pathologic Diagnosis LEFT BREAST, 12 O'CLOCK MASS 1 CM FROM NIPPLE, BIOPSY:   Invasive ductal carcinoma, [place] histologic grade 1 (tubule formation:   2, nuclear pleomorphism:  2, mitotic activity:  1).   Comment:  The biopsy reveals infiltrating ductal carcinoma with partial   mucinous features including abundant tubules and tumor nests with punched out   cribriform spaces containing mucin.  Tumor cells are positive with GATA3 and   negative with , in keeping with mammary ductal origin.  The largest   continuous focus of invasive carcinoma present measures 4 mm.  Dr. Zofia Saleem also reviewed this case and agrees with the diagnosis.   BREAST BIOMARKER RESULTS   Estrogen receptor (ER):  Positive (nearly 100%, strong)   Progesterone receptor (CO):  Positive (50%, weak to moderate)   HER2 IHC:  Negative (1+)   Ki-67 proliferation index:  10%        11/4/22  CT chest abd pelvis  Impression:     Stable diffuse osseous metastatic disease.     Left breast masses consistent with known malignancy and better evaluated on recent mammogram and ultrasound.     New left lower lobe linear consolidation, favored to be due to atelectasis versus infection/inflammation.  Recommend attention on follow-up exam as per clinical protocol.     Sigmoid colon diverticulosis without diverticulitis.     Pulmonary emphysema.    12/14/22  1.  BREAST, LEFT, 12:00, LUMPECTOMY:   --INVASIVE CARCINOMA OF NO SPECIAL TYPE (DUCTAL), OLEGARIO HISTOLOGIC    GRADE 1 OUT OF 3.        --INVASIVE CARCINOMA MEASURES 40 MILLIMETERS IN MAXIMUM DIMENSION.   --TUMOR IS PRESENT IN SUBEPIDERMAL TISSUE BUT DOES NOT INVOLVE    EPIDERMIS PROPER.        --BIOPSY SITE CHANGES.   --RECEPTOR STUDIES REPORTED PREVIOUSLY AT OUTSIDE  FACILITY ON BIOPSY    MATERIAL (SEE COMMENT #1).        --NO UNEQUIVOCAL LYMPHOVASCULAR INVASION IDENTIFIED.   --PART 1 SPECIMEN MARGINS ARE NEGATIVE FOR MALIGNANCY; ADDITIONAL    MARGINS SENT SEPARATELY (SEE   PARTS 2 AND 3 BELOW); PLEASE SEE ALSO BELOW CASE SUMMARY FOR ADDITIONAL    MARGIN           DATA/PARAMETERS.   --MICROCALCIFICATIONS ASSOCIATED WITH NEOPLASTIC TISSUE AND    NON-NEOPLASTIC TISSUE.   --SKIN WITH MICROORGANISMS MORPHOLOGICALLY COMPATIBLE WITH DEMODEX.        --ypT2/ypN NOT ASSIGNED (NO NODES SUBMITTED OR FOUND).   2/23/23  Impression:     Prior an interval left breast surgery for neoplasm.     Chronic appearing bone lesions compatible with bony metastatic disease.  These are grossly stable on imaging.     No evidence of new parenchymal or dagmar metastatic disease.     Diverticulosis without evidence of diverticulitis.    History:  Past Medical History:   Diagnosis Date    Breast cancer       Past Surgical History:   Procedure Laterality Date    BREAST BIOPSY Right     BREAST LUMPECTOMY Left     LUMPECTOMY, WITH RADAR LOCALIZATION USING COLETTE  Left 12/14/2022    Procedure: LUMPECTOMY,WITH RADAR LOCALIZATION USING COLETTE ;  Surgeon: Maday Amin MD;  Location[facility];  Service: General;  Laterality: Left;    TUBAL LIGATION       Family History   Problem Relation Age of Onset    Lung cancer Mother     Heart attack Father     Suicide Brother     No Known Problems Maternal Aunt     No Known Problems Maternal Uncle     No Known Problems Paternal Aunt     No Known Problems Paternal Uncle     No Known Problems Maternal Grandmother     No Known Problems Maternal Grandfather     No Known Problems Paternal Grandmother     No Known Problems Paternal Grandfather     No Known Problems Son     No Known Problems Son     No Known Problems Daughter     Breast cancer Cousin     Breast cancer Cousin      Social History     Tobacco Use    Smoking status: Every Day      "Packs/day: 1.00     Years: 34.00     Pack years: 34.00     Types: Cigarettes     Start date: 9/3/1994    Smokeless tobacco: Never   Substance and Sexual Activity    Alcohol use: Yes     Comment: occasionally    Drug use: Not Currently    Sexual activity: Yes     Partners: Male     Birth control/protection: None        ROS:   Review of Systems   Constitutional:  Negative for fever, malaise/fatigue and weight loss.   HENT:  Negative for congestion, hearing loss, nosebleeds and sore throat.    Eyes:  Negative for double vision and photophobia.   Respiratory:  Negative for cough, hemoptysis, sputum production, shortness of breath and wheezing.    Cardiovascular:  Negative for chest pain, palpitations, orthopnea and leg swelling.   Gastrointestinal:  Negative for abdominal pain, blood in stool, constipation, diarrhea, heartburn, nausea and vomiting.   Genitourinary:  Negative for dysuria, hematuria and urgency.   Musculoskeletal:  Positive for joint pain (left hip, mild ). Negative for back pain and myalgias.   Skin:  Negative for itching and rash.   Neurological:  Negative for dizziness, tingling, seizures and weakness.   Endo/Heme/Allergies:  Negative for polydipsia. Does not bruise/bleed easily.   Psychiatric/Behavioral:  Negative for depression and memory loss. The patient is not nervous/anxious and does not have insomnia.       Objective:     Vitals:    05/22/23 1427   BP: 122/72   Pulse: 96   Resp: 16   Temp: 97.5 °F (36.4 °C)   TempSrc: Temporal   SpO2: 99%   Weight: 64.6 kg (142 lb 6.7 oz)   Height: 5' 4" (1.626 m)   PainSc: 0-No pain         Wt Readings from Last 10 Encounters:   05/22/23 64.6 kg (142 lb 6.7 oz)   04/24/23 63.6 kg (140 lb 3.4 oz)   04/24/23 63.6 kg (140 lb 3.4 oz)   03/15/23 64 kg (141 lb)   02/27/23 64.3 kg (141 lb 12.1 oz)   02/27/23 64.3 kg (141 lb 12.1 oz)   01/30/23 63.2 kg (139 lb 5.3 oz)   01/30/23 63.2 kg (139 lb 5.3 oz)   01/03/23 64.5 kg (142 lb 4.9 oz)   01/03/23 64.5 kg (142 lb " 4.9 oz)       Physical Examination:   Physical Exam  Vitals and nursing note reviewed.   Constitutional:       General: She is not in acute distress.     Appearance: She is not diaphoretic.   HENT:      Head: Normocephalic.      Mouth/Throat:      Pharynx: No oropharyngeal exudate.   Eyes:      General: No scleral icterus.     Conjunctiva/sclera: Conjunctivae normal.   Neck:      Thyroid: No thyromegaly.   Cardiovascular:      Rate and Rhythm: Normal rate and regular rhythm.      Heart sounds: Normal heart sounds. No murmur heard.  Pulmonary:      Effort: Pulmonary effort is normal. No respiratory distress.      Breath sounds: No stridor. No wheezing or rales.   Chest:      Chest wall: No tenderness.   Abdominal:      General: Bowel sounds are normal. There is no distension.      Palpations: Abdomen is soft. There is no mass.      Tenderness: There is no abdominal tenderness. There is no rebound.   Musculoskeletal:         General: No tenderness or deformity. Normal range of motion.      Cervical back: Neck supple.   Lymphadenopathy:      Cervical: No cervical adenopathy.   Skin:     General: Skin is warm and dry.      Findings: No erythema or rash.   Neurological:      Mental Status: She is alert and oriented to person, place, and time.      Cranial Nerves: No cranial nerve deficit.      Coordination: Coordination normal.      Gait: Gait is intact.   Psychiatric:         Mood and Affect: Affect normal.         Cognition and Memory: Memory normal.         Judgment: Judgment normal.        Diagnostic Tests:  Significant Imaging: I have reviewed and interpreted all pertinent imaging results/findings.      Laboratory Data:  All pertinent labs have been reviewed.  Labs:   Lab Results   Component Value Date    WBC 5.02 05/16/2023    RBC 4.43 05/16/2023    HGB 14.1 05/16/2023    HCT 41.7 05/16/2023    MCV 94 05/16/2023     05/16/2023     (H) 05/16/2023     05/16/2023    K 4.1 05/16/2023    BUN 16  05/16/2023    CREATININE 1.2 05/16/2023    AST 10 05/16/2023    ALT 8 (L) 05/16/2023    BILITOT 0.3 05/16/2023       Assessment/Plan:   Malignant neoplasm of central portion of left breast in female, estrogen receptor positive  Stage IV (T3N2M2) invasive ductal carcinoma of left breast,  quadrant, ER 96%, AR 94%, Her2 neg, Grade 2, Ki67 30%  PIKC3A mutation positive      Recent mammogram shows slight increase in the size of the left breast mass, previously biopsied and a new mass.   Biopsy shows a Grade 1 IDC, strongly ER + tumor with Ki 67 10%, more favorable than prior biopsy.   There is no clear metastatic disease progression based on recent scans.       Her case was discussed at our multi D tumor board and the consensus was to remove the site of isolated disease progression. She underwent left lumpectomy on 12/14 with Dr. Amin. Final pathology reviewed, she had a G 1 IDC 4 cm in size, margins negative. No adjuvant RT offered due to the metastatic nature of disease.     Follow up scans are stable, repeat in June. Continue current therapy.     On ribociclib 400 mg p.o. day 1-day 21 Q 28 days and anastrozole 1 mg p.o.   Continue zoladex q 28 days.     Bone metastases  Continue Xgeva Q 28 days.  Continue Caltrate b.i.d. at this time. Last xgeva was 2/27, will hold in anticipation of dental work. Reassess next month.     Malignant neoplasm metastatic to lung, unspecified laterality  Subcm pulmonary nodules are stable and benign appearing.  Lung nodules have never been biopsied.  Recent changes appear inflammatory.Continue to monitor on subsequent scans.     Use of anastrozole  As above.    Smoker  She is cutting down. Follow up with smoking cessation program.          ECOG SCORE              Discussion:   No follow-ups on file.    Plan was discussed with the patient at length, and she verbalized understanding. Angeles was given an opportunity to ask questions that were answered to her satisfaction, and she was  advised to call in the interval if any problems or questions arise.    Electronically signed by Shelby Thomas MD      Route Chart for Scheduling    Med Onc Chart Routing      Follow up with physician . 6/19 afternoon appt   Follow up with [facility]    Infusion scheduling note    Injection scheduling note 6/19   Labs CBC, CMP and CA 27.29   Scheduling:  Preferred lab:  Lab interval:  ca 1-3, the day of visit.   Imaging CT chest abdomen pelvis   1-2 days before visit   Pharmacy appointment    Other referrals             Supportive Plan Information  OP BREAST GOSERELIN Q4W   Shelby Thomas MD   Upcoming Treatment Dates - OP BREAST GOSERELIN Q4W    5/22/2023       Chemotherapy       goserelin (ZOLADEX) injection 3.6 mg  6/20/2023       Chemotherapy       goserelin (ZOLADEX) injection 3.6 mg  7/19/2023       Chemotherapy       goserelin (ZOLADEX) injection 3.6 mg    Therapy Plan Information  PORT FLUSH  Flushes  heparin, porcine (PF) 100 unit/mL injection flush 500 Units  500 Units, Intravenous, Every visit  sodium chloride 0.9% flush 10 mL  10 mL, Intravenous, Every visit    DENOSUMAB (XGEVA) Q4W  Medications  denosumab (XGEVA) solution 120 mg  120 mg, Subcutaneous, Every 4 weeks

## 2023-06-08 ENCOUNTER — TELEPHONE (OUTPATIENT)
Dept: SMOKING CESSATION | Facility: CLINIC | Age: 53
End: 2023-06-08
Payer: MEDICAID

## 2023-06-09 ENCOUNTER — PATIENT MESSAGE (OUTPATIENT)
Dept: PHARMACY | Facility: CLINIC | Age: 53
End: 2023-06-09
Payer: MEDICAID

## 2023-06-12 ENCOUNTER — SPECIALTY PHARMACY (OUTPATIENT)
Dept: PHARMACY | Facility: CLINIC | Age: 53
End: 2023-06-12
Payer: MEDICAID

## 2023-06-12 ENCOUNTER — PATIENT MESSAGE (OUTPATIENT)
Dept: PHARMACY | Facility: CLINIC | Age: 53
End: 2023-06-12
Payer: MEDICAID

## 2023-06-12 NOTE — TELEPHONE ENCOUNTER
Specialty Pharmacy - Refill Coordination    Specialty Medication Orders Linked to Encounter      Flowsheet Row Most Recent Value   Medication #1 ribociclib (KISQALI) 400 mg/day (200 mg x 2) Tab (Order#[id number], Rx#[id number])            Refill Questions - Documented Responses      Flowsheet Row Most Recent Value   Patient Availability and HIPAA Verification    Does patient want to proceed with activity? Yes   HIPAA/medical authority confirmed? Yes   Relationship to patient of person spoken to? Self   Refill Screening Questions    Changes to allergies? No   Changes to medications? No   New conditions since last clinic visit? No   Unplanned office visit, urgent care, ED, or hospital admission in the last 4 weeks? No   How does patient/caregiver feel medication is working? Good   Financial problems or insurance changes? No   How many doses of your specialty medications were missed in the last 4 weeks? 0   Would patient like to speak to a pharmacist? No   When does the patient need to receive the medication? 06/19/23   Refill Delivery Questions    How will the patient receive the medication? [facility]   When does the patient need to receive the medication? 06/19/23   Shipping Address Home   Address in [facility] confirmed and updated if neccessary? Yes   Expected Copay ($) 0   Is the patient able to afford the medication copay? Yes   Payment Method zero copay   Days supply of Refill 28   Supplies needed? No supplies needed   Refill activity completed? Yes   Refill activity plan Refill scheduled   Shipment/Pickup Date: 06/15/23            Current Outpatient Medications   Medication Sig    albuterol (PROVENTIL) 2.5 mg /3 mL (0.083 %) nebulizer solution SMARTSIG:3 Milliliter(s) Via Nebulizer Every 4 Hours PRN    albuterol (PROVENTIL/VENTOLIN HFA) 90 mcg/actuation inhaler Inhale 2 puffs into the lungs every 6 (six) hours as needed.    ALBUTEROL INHL Inhale 2 puffs into the lungs as needed.    amoxicillin (AMOXIL) 013  MG tablet Take 875 mg by mouth every 12 (twelve) hours.    anastrozole (ARIMIDEX) 1 mg Tab Take 1 tablet (1 mg total) by mouth once daily.    ascorbic acid, vitamin C, (VITAMIN C) 500 MG tablet Take 1,000 mg by mouth once daily.    benzonatate (TESSALON) 100 MG capsule Take 200 mg by mouth 3 (three) times daily as needed.    benzonatate (TESSALON) 200 MG capsule Take 200 mg by mouth.    buPROPion (WELLBUTRIN SR) 150 MG TBSR 12 hr tablet Take 1 tablet daily for 7 days then 1 tablet twice daily thereafter    cetirizine (ZYRTEC) 10 MG tablet Take 10 mg by mouth once daily.    clindamycin (CLEOCIN) 150 MG capsule TAKE 2 CAPSULES BY MOUTH NOW THEN TAKE 1 CAPSULE BY MOUTH EVERY 6 HOURS TIL GONE.    COMP-AIR NEBULIZER COMPRESSOR Sherri use as directed    HYDROcodone-acetaminophen (NORCO)  mg per tablet Take 1 tablet by mouth every 6 (six) hours as needed for Pain. (Patient not taking: Reported on 5/22/2023)    ketorolac (TORADOL) 10 mg tablet Take 10 mg by mouth every 6 (six) hours as needed.    methylPREDNISolone (MEDROL DOSEPACK) 4 mg tablet follow package directions    nebulizer and compressor Sherri Use as directed    nicotine (NICODERM CQ) 21 mg/24 hr Place 1 patch onto the skin once daily. (Patient not taking: Reported on 5/18/2023)    ondansetron (ZOFRAN-ODT) 4 MG TbDL Take 1 tablet by mouth every 8 (eight) hours as needed.    ondansetron (ZOFRAN-ODT) 8 MG TbDL Take 8 mg by mouth every 8 (eight) hours as needed.    promethazine-dextromethorphan (PROMETHAZINE-DM) 6.25-15 mg/5 mL Syrp Take 5 mLs by mouth.    ribociclib (KISQALI) 400 mg/day (200 mg x 2) Tab Take 2 tablets (400mg) by mouth on days 1 to day 21 every 28 days   Last reviewed on 5/22/2023  3:07 PM by Marianne Hancock RN    Review of patient's allergies indicates:  No Known Allergies Last reviewed on  5/22/2023 3:07 PM by Marianne Karissa      Tasks added this encounter   No tasks added.   Tasks due within next 3 months   7/30/2023 - Clinical Assessment (6  month recurrence)     Pattie Thompson, PharmD  [facility]  [address]  [facility] [address]  Phone: [phone]  Fax: [phone]

## 2023-06-14 ENCOUNTER — HOSPITAL ENCOUNTER (OUTPATIENT)
Dept: RADIOLOGY | Facility: HOSPITAL | Age: 53
Discharge: HOME | End: 2023-06-14
Attending: INTERNAL MEDICINE
Payer: MEDICAID

## 2023-06-14 ENCOUNTER — PATIENT MESSAGE (OUTPATIENT)
Dept: HEMATOLOGY/ONCOLOGY | Facility: CLINIC | Age: 53
End: 2023-06-14
Payer: MEDICAID

## 2023-06-14 DIAGNOSIS — C50.112 MALIGNANT NEOPLASM OF CENTRAL PORTION OF LEFT BREAST IN FEMALE, ESTROGEN RECEPTOR POSITIVE: ICD-10-CM

## 2023-06-14 DIAGNOSIS — Z17.0 MALIGNANT NEOPLASM OF CENTRAL PORTION OF LEFT BREAST IN FEMALE, ESTROGEN RECEPTOR POSITIVE: ICD-10-CM

## 2023-06-14 PROCEDURE — 71260 CT THORAX DX C+: CPT | Mod: 26,,, | Performed by: RADIOLOGY

## 2023-06-14 PROCEDURE — 74177 CT ABD & PELVIS W/CONTRAST: CPT | Mod: TC,PO

## 2023-06-14 PROCEDURE — A9698 NON-RAD CONTRAST MATERIALNOC: HCPCS | Mod: PO | Performed by: INTERNAL MEDICINE

## 2023-06-14 PROCEDURE — 74177 CT CHEST ABDOMEN PELVIS WITH CONTRAST (XPD): ICD-10-PCS | Mod: 26,,, | Performed by: RADIOLOGY

## 2023-06-14 PROCEDURE — 71260 CT CHEST ABDOMEN PELVIS WITH CONTRAST (XPD): ICD-10-PCS | Mod: 26,,, | Performed by: RADIOLOGY

## 2023-06-14 PROCEDURE — 25500020 PHARM REV CODE 255: Mod: PO | Performed by: INTERNAL MEDICINE

## 2023-06-14 PROCEDURE — 71260 CT THORAX DX C+: CPT | Mod: TC,PO

## 2023-06-14 PROCEDURE — 74177 CT ABD & PELVIS W/CONTRAST: CPT | Mod: 26,,, | Performed by: RADIOLOGY

## 2023-06-14 RX ADMIN — IOHEXOL 75 ML: 350 INJECTION, SOLUTION INTRAVENOUS at 09:06

## 2023-06-14 RX ADMIN — BARIUM SULFATE 900 ML: 20 SUSPENSION ORAL at 10:06

## 2023-06-19 ENCOUNTER — INFUSION (OUTPATIENT)
Dept: INFUSION THERAPY | Facility: HOSPITAL | Age: 53
End: 2023-06-19
Attending: INTERNAL MEDICINE
Payer: MEDICAID

## 2023-06-19 ENCOUNTER — OFFICE VISIT (OUTPATIENT)
Dept: HEMATOLOGY/ONCOLOGY | Facility: CLINIC | Age: 53
End: 2023-06-19
Payer: MEDICAID

## 2023-06-19 VITALS
HEIGHT: 64 IN | HEART RATE: 80 BPM | RESPIRATION RATE: 18 BRPM | TEMPERATURE: 97 F | BODY MASS INDEX: 24.32 KG/M2 | OXYGEN SATURATION: 98 % | DIASTOLIC BLOOD PRESSURE: 77 MMHG | SYSTOLIC BLOOD PRESSURE: 118 MMHG | WEIGHT: 142.44 LBS

## 2023-06-19 VITALS
RESPIRATION RATE: 18 BRPM | BODY MASS INDEX: 24.32 KG/M2 | WEIGHT: 142.44 LBS | HEIGHT: 64 IN | TEMPERATURE: 97 F | SYSTOLIC BLOOD PRESSURE: 118 MMHG | DIASTOLIC BLOOD PRESSURE: 77 MMHG | HEART RATE: 80 BPM | OXYGEN SATURATION: 98 %

## 2023-06-19 DIAGNOSIS — Z79.811 USE OF ANASTROZOLE: ICD-10-CM

## 2023-06-19 DIAGNOSIS — Z17.0 MALIGNANT NEOPLASM OF CENTRAL PORTION OF LEFT BREAST IN FEMALE, ESTROGEN RECEPTOR POSITIVE: Primary | ICD-10-CM

## 2023-06-19 DIAGNOSIS — J40 BRONCHITIS: ICD-10-CM

## 2023-06-19 DIAGNOSIS — F17.200 SMOKER: ICD-10-CM

## 2023-06-19 DIAGNOSIS — C78.00 MALIGNANT NEOPLASM METASTATIC TO LUNG, UNSPECIFIED LATERALITY: Primary | ICD-10-CM

## 2023-06-19 DIAGNOSIS — R74.01 TRANSAMINITIS: ICD-10-CM

## 2023-06-19 DIAGNOSIS — C50.112 MALIGNANT NEOPLASM OF CENTRAL PORTION OF LEFT BREAST IN FEMALE, ESTROGEN RECEPTOR POSITIVE: Primary | ICD-10-CM

## 2023-06-19 DIAGNOSIS — N63.10 MASS OF RIGHT BREAST, UNSPECIFIED QUADRANT: ICD-10-CM

## 2023-06-19 DIAGNOSIS — Z17.0 MALIGNANT NEOPLASM OF CENTRAL PORTION OF LEFT BREAST IN FEMALE, ESTROGEN RECEPTOR POSITIVE: ICD-10-CM

## 2023-06-19 DIAGNOSIS — J06.9 UPPER RESPIRATORY TRACT INFECTION, UNSPECIFIED TYPE: ICD-10-CM

## 2023-06-19 DIAGNOSIS — C79.51 MALIGNANT NEOPLASM METASTATIC TO BONE: ICD-10-CM

## 2023-06-19 DIAGNOSIS — D70.1 CHEMOTHERAPY-INDUCED NEUTROPENIA: ICD-10-CM

## 2023-06-19 DIAGNOSIS — C50.112 MALIGNANT NEOPLASM OF CENTRAL PORTION OF LEFT BREAST IN FEMALE, ESTROGEN RECEPTOR POSITIVE: ICD-10-CM

## 2023-06-19 DIAGNOSIS — T45.1X5A CHEMOTHERAPY-INDUCED NEUTROPENIA: ICD-10-CM

## 2023-06-19 PROCEDURE — 96402 CHEMO HORMON ANTINEOPL SQ/IM: CPT | Mod: PN

## 2023-06-19 PROCEDURE — 99215 OFFICE O/P EST HI 40 MIN: CPT | Mod: S$PBB,,, | Performed by: INTERNAL MEDICINE

## 2023-06-19 PROCEDURE — 3008F BODY MASS INDEX DOCD: CPT | Mod: CPTII,,, | Performed by: INTERNAL MEDICINE

## 2023-06-19 PROCEDURE — 3078F PR MOST RECENT DIASTOLIC BLOOD PRESSURE < 80 MM HG: ICD-10-PCS | Mod: CPTII,,, | Performed by: INTERNAL MEDICINE

## 2023-06-19 PROCEDURE — 99214 OFFICE O/P EST MOD 30 MIN: CPT | Mod: PBBFAC,PN | Performed by: INTERNAL MEDICINE

## 2023-06-19 PROCEDURE — 3078F DIAST BP <80 MM HG: CPT | Mod: CPTII,,, | Performed by: INTERNAL MEDICINE

## 2023-06-19 PROCEDURE — 99999 PR PBB SHADOW E&M-EST. PATIENT-LVL IV: ICD-10-PCS | Mod: PBBFAC,,, | Performed by: INTERNAL MEDICINE

## 2023-06-19 PROCEDURE — 3074F SYST BP LT 130 MM HG: CPT | Mod: CPTII,,, | Performed by: INTERNAL MEDICINE

## 2023-06-19 PROCEDURE — 99999 PR PBB SHADOW E&M-EST. PATIENT-LVL IV: CPT | Mod: PBBFAC,,, | Performed by: INTERNAL MEDICINE

## 2023-06-19 PROCEDURE — 3008F PR BODY MASS INDEX (BMI) DOCUMENTED: ICD-10-PCS | Mod: CPTII,,, | Performed by: INTERNAL MEDICINE

## 2023-06-19 PROCEDURE — 3074F PR MOST RECENT SYSTOLIC BLOOD PRESSURE < 130 MM HG: ICD-10-PCS | Mod: CPTII,,, | Performed by: INTERNAL MEDICINE

## 2023-06-19 PROCEDURE — 63600175 PHARM REV CODE 636 W HCPCS: Mod: JZ,JG,PN | Performed by: INTERNAL MEDICINE

## 2023-06-19 PROCEDURE — 99215 PR OFFICE/OUTPT VISIT, EST, LEVL V, 40-54 MIN: ICD-10-PCS | Mod: S$PBB,,, | Performed by: INTERNAL MEDICINE

## 2023-06-19 RX ORDER — CIPROFLOXACIN 500 MG/1
500 TABLET ORAL 2 TIMES DAILY
Qty: 14 TABLET | Refills: 0 | Status: SHIPPED | OUTPATIENT
Start: 2023-06-19 | End: 2023-06-26

## 2023-06-19 RX ADMIN — GOSERELIN ACETATE 3.6 MG: 3.6 IMPLANT SUBCUTANEOUS at 02:06

## 2023-06-19 NOTE — PROGRESS NOTES
PROGRESS NOTE    Subjective:       Patient ID: Angeles Wright is a 52 y.o. female.  MRN:   : [id number]    Chief Complaint: Breast cancer       History of Present Illness:   Angeles Wright is a 52 y.o. female who presents with metastatic invasive ductal left breast cancer.       As previously documented by Dr. Mcgovern  she transferred care to [facility] in 2019. Labs showed her to be pre-menopausal thus she was treated with Ribociclib /Zoladex and Arimidex but she was off therapy from November to May 2020. In 2020 scans showed improvement thus she restarted Arimidex/ribociclib/zoladex.     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      Interim history:   She presents today to discuss her symptoms, labs, sans and further recommendations.      She has  continued dose reduced ribociclib at the 400 mg p.o. day 1-day 21 Q 28 days since 2020.     She underwent left lumpectomy for isolated progression in the breast on 22.   She has continued anastrozole 1 mg p.o. daily.    Started the new cycle of Ribociclib 23    Last Xgeva 2723. Saw the dentist, does need dental extractions, has seen oral surgeon as well. Does not have date yet.     Has had follow up scans. Had diarrhea after oral contrast, resolved in a day.   Has noticed a cough over the past one week, sometimes productive, has been using Tessalon Perls. No fever.       Oncology History:  Oncology History   Malignant neoplasm of central portion of left breast in female, estrogen receptor positive   9/3/2019 Initial Diagnosis    Malignant neoplasm of central portion of left breast in female, estrogen receptor positive     9/3/2019 - 9/3/2019 Chemotherapy    Treatment Summary   Plan Name: OP ANASTROZOLE PALBOCICLIB Q4W  Treatment Goal: Palliative  Status: Inactive  Start Date: 9/3/2019  End Date: 9/3/2019  Provider: Melania Mcgovern MD  Chemotherapy: [No matching  medication found in this treatment plan]     9/18/2019 Cancer Staged    Staging form: Breast, AJCC 8th Edition  - Clinical: Stage IV (cT4b, cM1, ER+, NY+, HER2-)     11/13/2022 Cancer Staged    Staging form: Breast, AJCC 8th Edition  - Clinical stage from 11/13/2022: No Stage Recommended (ycT2(m), cN1(f), cM1, G1, ER+, NY+, HER2-)     12/26/2022 Cancer Staged    Staging form: Breast, AJCC 8th Edition  - Pathologic stage from 12/26/2022: No Stage Recommended (ypT2, pNX, pM1, G1, ER+, NY+, HER2-)       5/4/22  Impression:     No significant interval changes.  Known malignancy in the left breast with metastatic disease to bone.  Additional stable bilateral breast nodules which have been evaluated previously with mammography and ultrasound.  Stable small pleural based lung nodules in the right hemithorax.     9/12/22  Impression:  Left  Mass: Left breast 22 mm x 22 mm x 15 mm mass at the upper middle 12 o'clock position. Assessment: 6 - Known biopsy, proven malignancy.   Mass: Left breast 12 mm x 9 mm x 5 mm mass at the upper posterior 12 o'clock position. Assessment: 4C - Suspicious finding. Biopsy is recommended.      Right  Mass: Right breast 10 mm x 10 mm x 7 mm mass at the upper outer 11 o'clock position. Assessment: 3 - Probably benign.   Mass: Right breast 14 mm x 7 mm x 11 mm mass at the 9 o'clock position. Assessment: 3 - Probably benign.      BI-RADS Category:   Overall: 4 - Suspicious    9/28/22  Impression:  Left  Mass: Left breast 22 mm x 22 mm x 15 mm mass at the upper middle 12 o'clock position. Assessment: 6 - Known biopsy, proven malignancy.   Mass: Left breast 12 mm x 9 mm x 5 mm mass at the upper posterior 12 o'clock position. Assessment: 4C - Suspicious finding. Biopsy is recommended.      Right  Mass: Right breast 10 mm x 10 mm x 7 mm mass at the upper outer 11 o'clock position. Assessment: 3 - Probably benign.   Mass: Right breast 14 mm x 7 mm x 11 mm mass at the 9 o'clock position. Assessment: 3 -  Probably benign.      BI-RADS Category:   Overall: 4 - Suspicious     Recommendation:  Biopsy could be considered. There is a strong possibility that this represents progression of disease in this patient with known metastatic breast carcinoma.    10/21/22   Final Pathologic Diagnosis LEFT BREAST, 12 O'CLOCK MASS 1 CM FROM NIPPLE, BIOPSY:   Invasive ductal carcinoma, [place] histologic grade 1 (tubule formation:   2, nuclear pleomorphism:  2, mitotic activity:  1).   Comment:  The biopsy reveals infiltrating ductal carcinoma with partial   mucinous features including abundant tubules and tumor nests with punched out   cribriform spaces containing mucin.  Tumor cells are positive with GATA3 and   negative with , in keeping with mammary ductal origin.  The largest   continuous focus of invasive carcinoma present measures 4 mm.  Dr. Zofia Saleem also reviewed this case and agrees with the diagnosis.   BREAST BIOMARKER RESULTS   Estrogen receptor (ER):  Positive (nearly 100%, strong)   Progesterone receptor ([place]):  Positive (50%, weak to moderate)   HER2 IHC:  Negative (1+)   Ki-67 proliferation index:  10%        11/4/22  CT chest abd pelvis  Impression:     Stable diffuse osseous metastatic disease.     Left breast masses consistent with known malignancy and better evaluated on recent mammogram and ultrasound.     New left lower lobe linear consolidation, favored to be due to atelectasis versus infection/inflammation.  Recommend attention on follow-up exam as per clinical protocol.     Sigmoid colon diverticulosis without diverticulitis.     Pulmonary emphysema.    12/14/22  1.  BREAST, LEFT, 12:00, LUMPECTOMY:   --INVASIVE CARCINOMA OF NO SPECIAL TYPE (DUCTAL), OLEGARIO HISTOLOGIC    GRADE 1 OUT OF 3.        --INVASIVE CARCINOMA MEASURES 40 MILLIMETERS IN MAXIMUM DIMENSION.   --TUMOR IS PRESENT IN SUBEPIDERMAL TISSUE BUT DOES NOT INVOLVE    EPIDERMIS PROPER.        --BIOPSY SITE CHANGES.   --RECEPTOR  STUDIES REPORTED PREVIOUSLY AT OUTSIDE FACILITY ON BIOPSY    MATERIAL (SEE COMMENT #1).        --NO UNEQUIVOCAL LYMPHOVASCULAR INVASION IDENTIFIED.   --PART 1 SPECIMEN MARGINS ARE NEGATIVE FOR MALIGNANCY; ADDITIONAL    MARGINS SENT SEPARATELY (SEE   PARTS 2 AND 3 BELOW); PLEASE SEE ALSO BELOW CASE SUMMARY FOR ADDITIONAL    MARGIN           DATA/PARAMETERS.   --MICROCALCIFICATIONS ASSOCIATED WITH NEOPLASTIC TISSUE AND    NON-NEOPLASTIC TISSUE.   --SKIN WITH MICROORGANISMS MORPHOLOGICALLY COMPATIBLE WITH DEMODEX.        --ypT2/ypN NOT ASSIGNED (NO NODES SUBMITTED OR FOUND).   2/23/23  Impression:     Prior an interval left breast surgery for neoplasm.     Chronic appearing bone lesions compatible with bony metastatic disease.  These are grossly stable on imaging.     No evidence of new parenchymal or dagmar metastatic disease.     Diverticulosis without evidence of diverticulitis.    CT chest abd pelvis:  6/14/23  Impression:     1. Stable exam compared to prior CT 02/23/2023.  No acute findings.  Osseous metastatic lesions are grossly stable.  No evidence of new parenchymal or dagmar metastatic disease within the chest, abdomen, or pelvis.  2. Small focus of ground-glass density within the right upper lobe favoring incidental small airways disease/inflammatory changes or atelectasis which can be reassessed on routine follow-up.  3. Diverticulosis coli.  4. Additional incidental/stable findings as above.    History:  Past Medical History:   Diagnosis Date    Breast cancer       Past Surgical History:   Procedure Laterality Date    BREAST BIOPSY Right     BREAST LUMPECTOMY Left     LUMPECTOMY, WITH RADAR LOCALIZATION USING COLETTE  Left 12/14/2022    Procedure: LUMPECTOMY,WITH RADAR LOCALIZATION USING COLETTE ;  Surgeon: Maday Amin MD;  [facility];  Service: General;  Laterality: Left;    TUBAL LIGATION       Family History   Problem Relation Age of Onset    Lung cancer Mother     Heart attack Father      Suicide Brother     No Known Problems Maternal Aunt     No Known Problems Maternal Uncle     No Known Problems Paternal Aunt     No Known Problems Paternal Uncle     No Known Problems Maternal Grandmother     No Known Problems Maternal Grandfather     No Known Problems Paternal Grandmother     No Known Problems Paternal Grandfather     No Known Problems Son     No Known Problems Son     No Known Problems Daughter     Breast cancer Cousin     Breast cancer Cousin      Social History     Tobacco Use    Smoking status: Every Day     Packs/day: 1.00     Years: 34.00     Pack years: 34.00     Types: Cigarettes     Start date: 9/3/1994    Smokeless tobacco: Never   Substance and Sexual Activity    Alcohol use: Yes     Comment: occasionally    Drug use: Not Currently    Sexual activity: Yes     Partners: Male     Birth control/protection: None        ROS:   Review of Systems   Constitutional:  Negative for fever, malaise/fatigue and weight loss.   HENT:  Negative for congestion, hearing loss, nosebleeds and sore throat.    Eyes:  Negative for double vision and photophobia.   Respiratory:  Positive for cough and sputum production. Negative for hemoptysis, shortness of breath and wheezing.    Cardiovascular:  Negative for chest pain, palpitations, orthopnea and leg swelling.   Gastrointestinal:  Negative for abdominal pain, blood in stool, constipation, diarrhea, heartburn, nausea and vomiting.   Genitourinary:  Negative for dysuria, hematuria and urgency.   Musculoskeletal:  Positive for joint pain (left hip, mild ). Negative for back pain and myalgias.   Skin:  Negative for itching and rash.   Neurological:  Negative for dizziness, tingling, seizures and weakness.   Endo/Heme/Allergies:  Negative for polydipsia. Does not bruise/bleed easily.   Psychiatric/Behavioral:  Negative for depression and memory loss. The patient is not nervous/anxious and does not have insomnia.       Objective:     Vitals:    06/19/23 1311  "  BP: 118/77   Pulse: 80   Resp: 18   Temp: 97 °F (36.1 °C)   TempSrc: Temporal   SpO2: 98%   Weight: 64.6 kg (142 lb 6.7 oz)   Height: 5' 4" (1.626 m)   PainSc: 0-No pain           Wt Readings from Last 10 Encounters:   06/19/23 64.6 kg (142 lb 6.7 oz)   05/22/23 64.6 kg (142 lb 6.7 oz)   05/22/23 64.6 kg (142 lb 6.7 oz)   04/24/23 63.6 kg (140 lb 3.4 oz)   04/24/23 63.6 kg (140 lb 3.4 oz)   03/15/23 64 kg (141 lb)   02/27/23 64.3 kg (141 lb 12.1 oz)   02/27/23 64.3 kg (141 lb 12.1 oz)   01/30/23 63.2 kg (139 lb 5.3 oz)   01/30/23 63.2 kg (139 lb 5.3 oz)       Physical Examination:   Physical Exam  Vitals and nursing note reviewed.   Constitutional:       General: She is not in acute distress.     Appearance: She is not diaphoretic.   HENT:      Head: Normocephalic.      Mouth/Throat:      Pharynx: No oropharyngeal exudate.   Eyes:      General: No scleral icterus.     Conjunctiva/sclera: Conjunctivae normal.   Neck:      Thyroid: No thyromegaly.   Cardiovascular:      Rate and Rhythm: Normal rate and regular rhythm.      Heart sounds: Normal heart sounds. No murmur heard.  Pulmonary:      Effort: Pulmonary effort is normal. No respiratory distress.      Breath sounds: No stridor. No wheezing or rales.   Chest:      Chest wall: No tenderness.   Abdominal:      General: Bowel sounds are normal. There is no distension.      Palpations: Abdomen is soft. There is no mass.      Tenderness: There is no abdominal tenderness. There is no rebound.   Musculoskeletal:         General: No tenderness or deformity. Normal range of motion.      Cervical back: Neck supple.   Lymphadenopathy:      Cervical: No cervical adenopathy.   Skin:     General: Skin is warm and dry.      Findings: No erythema or rash.   Neurological:      Mental Status: She is alert and oriented to person, place, and time.      Cranial Nerves: No cranial nerve deficit.      Coordination: Coordination normal.      Gait: Gait is intact.   Psychiatric:         " Mood and Affect: Affect normal.         Cognition and Memory: Memory normal.         Judgment: Judgment normal.        Diagnostic Tests:  Significant Imaging: I have reviewed and interpreted all pertinent imaging results/findings.      Laboratory Data:  All pertinent labs have been reviewed.  Labs:   Lab Results   Component Value Date    WBC 5.20 06/14/2023    RBC 4.52 06/14/2023    HGB 14.5 06/14/2023    HCT 42.8 06/14/2023    MCV 95 06/14/2023     06/14/2023     06/14/2023     06/14/2023    K 4.5 06/14/2023    BUN 12 06/14/2023    CREATININE 0.8 06/14/2023    AST 12 06/14/2023    ALT 7 (L) 06/14/2023    BILITOT 0.2 06/14/2023       Assessment/Plan:   Malignant neoplasm of central portion of left breast in female, estrogen receptor positive  Stage IV (T3N2M2) invasive ductal carcinoma of left breast,  quadrant, ER 96%, PA 94%, Her2 neg, Grade 2, Ki67 30%  PIKC3A mutation positive      Recent mammogram shows slight increase in the size of the left breast mass, previously biopsied and a new mass.   Biopsy shows a Grade 1 IDC, strongly ER + tumor with Ki 67 10%, more favorable than prior biopsy.   There is no clear metastatic disease progression based on recent scans.       Her case was discussed at our multi D tumor board and the consensus was to remove the site of isolated disease progression. She underwent left lumpectomy on 12/14 with Dr. Amin. Final pathology reviewed, she had a G 1 IDC 4 cm in size, margins negative. No adjuvant RT offered due to the metastatic nature of disease.     Follow up scans reviewed with patient, stable except small are of lung inflammation, see below.  Continue current therapy.     On ribociclib 400 mg p.o. day 1-day 21 Q 28 days and anastrozole 1 mg p.o.   Continue zoladex q 28 days.     Bone metastases  Continue Xgeva Q 28 days.  Continue Caltrate b.i.d. at this time. Last xgeva was 2/27, will hold in anticipation of dental work. Reassess next month.      Malignant neoplasm metastatic to lung, unspecified laterality  Subcm pulmonary nodules are stable and benign appearing.  Lung nodules have never been biopsied.  Recent changes appear inflammatory.Continue to monitor on subsequent scans.     Bronchitis  Cough as well as inflammatory changes in the lung, will treat with ciprofloxacin.     Use of anastrozole  As above.    Smoker  She is cutting down. Follow up with smoking cessation program.          ECOG SCORE              Discussion:   No follow-ups on file.    Plan was discussed with the patient at length, and she verbalized understanding. Angeles was given an opportunity to ask questions that were answered to her satisfaction, and she was advised to call in the interval if any problems or questions arise.    Electronically signed by Shelby Thomas MD      Route Chart for Scheduling    Med Onc Chart Routing      Follow up with physician . 7/17, ok to overbook 8/14 instead of 8/21   Follow up with LEÓN . Cancel Tori's appt   Infusion scheduling note    Injection scheduling note 7/17,8/14, reschedule pls   Labs CBC, CMP and CA 27.29   Scheduling:  Preferred lab:  Lab interval:  7/17,8/14, cancel other   Imaging    Pharmacy appointment    Other referrals              Supportive Plan Information  OP BREAST GOSERELIN Q4W   Shelby Thomas MD   Upcoming Treatment Dates - OP BREAST GOSERELIN Q4W    6/20/2023       Chemotherapy       goserelin (ZOLADEX) injection 3.6 mg  7/19/2023       Chemotherapy       goserelin (ZOLADEX) injection 3.6 mg  8/17/2023       Chemotherapy       goserelin (ZOLADEX) injection 3.6 mg  9/15/2023       Chemotherapy       goserelin (ZOLADEX) injection 3.6 mg    Therapy Plan Information  PORT FLUSH  Flushes  heparin, porcine (PF) 100 unit/mL injection flush 500 Units  500 Units, Intravenous, Every visit  sodium chloride 0.9% flush 10 mL  10 mL, Intravenous, Every visit    DENOSUMAB (XGEVA) Q4W  Medications  denosumab (XGEVA) solution 120 mg  120  mg, Subcutaneous, Every 4 weeks

## 2023-06-19 NOTE — PLAN OF CARE
Problem: Adult Inpatient Plan of Care  Goal: Plan of Care Review  Outcome: Ongoing, Progressing  Goal: Patient-Specific Goal (Individualized)  Outcome: Ongoing, Progressing     Problem: Fatigue  Goal: Improved Activity Tolerance  Outcome: Ongoing, Progressing   .Patient tolerated zoledex injection well, d/c in no acute distress.

## 2023-07-06 ENCOUNTER — PATIENT MESSAGE (OUTPATIENT)
Dept: PHARMACY | Facility: CLINIC | Age: 53
End: 2023-07-06
Payer: MEDICAID

## 2023-07-10 ENCOUNTER — PATIENT MESSAGE (OUTPATIENT)
Dept: PHARMACY | Facility: CLINIC | Age: 53
End: 2023-07-10
Payer: MEDICAID

## 2023-07-10 ENCOUNTER — SPECIALTY PHARMACY (OUTPATIENT)
Dept: PHARMACY | Facility: CLINIC | Age: 53
End: 2023-07-10
Payer: MEDICAID

## 2023-07-10 NOTE — TELEPHONE ENCOUNTER
Incoming call from pt to refill Chelsie as she is on her off week. Will need med by 7/17/23. Infomred pt I tried to run claim but unfortunately we are having issues with processing any insurance company claims related to [facility] as their computer system must be down. Pt verbalized understanding and will call [facility] tomorrow.

## 2023-07-13 NOTE — TELEPHONE ENCOUNTER
Specialty Pharmacy - Refill Coordination    Specialty Medication Orders Linked to Encounter      Flowsheet Row Most Recent Value   Medication #1 ribociclib (KISQALI) 400 mg/day (200 mg x 2) Tab (Order#[id number], Rx#[id number])            Refill Questions - Documented Responses      Flowsheet Row Most Recent Value   Patient Availability and HIPAA Verification    Does patient want to proceed with activity? Yes   HIPAA/medical authority confirmed? Yes   Relationship to patient of person spoken to? Self   Refill Screening Questions    Changes to allergies? No   Changes to medications? No   New conditions since last clinic visit? No   Unplanned office visit, urgent care, ED, or hospital admission in the last 4 weeks? No   How does patient/caregiver feel medication is working? Good   Financial problems or insurance changes? No   How many doses of your specialty medications were missed in the last 4 weeks? 0   Would patient like to speak to a pharmacist? No   When does the patient need to receive the medication? 07/17/23   Refill Delivery Questions    How will the patient receive the medication? [facility]   When does the patient need to receive the medication? 07/17/23   Shipping Address Home   Address in [facility] confirmed and updated if neccessary? Yes   Expected Copay ($) 0   Is the patient able to afford the medication copay? Yes   Payment Method zero copay   Days supply of Refill 28   Supplies needed? No supplies needed   Refill activity completed? Yes   Refill activity plan Refill scheduled   Shipment/Pickup Date: 07/13/23            Current Outpatient Medications   Medication Sig    albuterol (PROVENTIL) 2.5 mg /3 mL (0.083 %) nebulizer solution SMARTSIG:3 Milliliter(s) Via Nebulizer Every 4 Hours PRN    albuterol (PROVENTIL/VENTOLIN HFA) 90 mcg/actuation inhaler Inhale 2 puffs into the lungs every 6 (six) hours as needed.    ALBUTEROL INHL Inhale 2 puffs into the lungs as needed.    anastrozole (ARIMIDEX) 1  mg Tab Take 1 tablet (1 mg total) by mouth once daily.    ascorbic acid, vitamin C, (VITAMIN C) 500 MG tablet Take 1,000 mg by mouth once daily.    benzonatate (TESSALON) 200 MG capsule Take 200 mg by mouth.    buPROPion (WELLBUTRIN SR) 150 MG TBSR 12 hr tablet Take 1 tablet daily for 7 days then 1 tablet twice daily thereafter    cetirizine (ZYRTEC) 10 MG tablet Take 10 mg by mouth once daily.    COMP-AIR NEBULIZER COMPRESSOR Sherri use as directed    methylPREDNISolone (MEDROL DOSEPACK) 4 mg tablet follow package directions    nicotine (NICODERM CQ) 21 mg/24 hr Place 1 patch onto the skin once daily. (Patient not taking: Reported on 5/18/2023)    ondansetron (ZOFRAN-ODT) 4 MG TbDL Take 1 tablet by mouth every 8 (eight) hours as needed.    ondansetron (ZOFRAN-ODT) 8 MG TbDL Take 8 mg by mouth every 8 (eight) hours as needed.    promethazine-dextromethorphan (PROMETHAZINE-DM) 6.25-15 mg/5 mL Syrp Take 5 mLs by mouth.    ribociclib (KISQALI) 400 mg/day (200 mg x 2) Tab Take 2 tablets (400mg) by mouth on days 1 to day 21 every 28 days   Last reviewed on 6/19/2023  2:04 PM by Huey Ortiz, RN    Review of patient's allergies indicates:  No Known Allergies Last reviewed on  6/19/2023 2:04 PM by Huey Ortiz      Tasks added this encounter   No tasks added.   Tasks due within next 3 months   7/30/2023 - Clinical Assessment (6 month recurrence)  7/13/2023 - Refill Coordination Outreach (1 time occurrence)     Chantal Bean, PharmD  [facility]  [address] [place] [id number]  Phone: [phone]  Fax: [phone]

## 2023-07-17 ENCOUNTER — INFUSION (OUTPATIENT)
Dept: INFUSION THERAPY | Facility: HOSPITAL | Age: 53
End: 2023-07-17
Attending: INTERNAL MEDICINE
Payer: MEDICAID

## 2023-07-17 ENCOUNTER — OFFICE VISIT (OUTPATIENT)
Dept: HEMATOLOGY/ONCOLOGY | Facility: CLINIC | Age: 53
End: 2023-07-17
Payer: MEDICAID

## 2023-07-17 VITALS
HEIGHT: 64 IN | BODY MASS INDEX: 24.05 KG/M2 | HEART RATE: 88 BPM | DIASTOLIC BLOOD PRESSURE: 75 MMHG | TEMPERATURE: 98 F | WEIGHT: 140.88 LBS | OXYGEN SATURATION: 96 % | SYSTOLIC BLOOD PRESSURE: 115 MMHG | RESPIRATION RATE: 16 BRPM

## 2023-07-17 VITALS — RESPIRATION RATE: 16 BRPM | HEART RATE: 88 BPM | SYSTOLIC BLOOD PRESSURE: 115 MMHG | DIASTOLIC BLOOD PRESSURE: 75 MMHG

## 2023-07-17 DIAGNOSIS — C79.51 MALIGNANT NEOPLASM METASTATIC TO BONE: Primary | ICD-10-CM

## 2023-07-17 DIAGNOSIS — J40 BRONCHITIS: ICD-10-CM

## 2023-07-17 DIAGNOSIS — Z17.0 MALIGNANT NEOPLASM OF CENTRAL PORTION OF LEFT BREAST IN FEMALE, ESTROGEN RECEPTOR POSITIVE: ICD-10-CM

## 2023-07-17 DIAGNOSIS — C78.00 MALIGNANT NEOPLASM METASTATIC TO LUNG, UNSPECIFIED LATERALITY: ICD-10-CM

## 2023-07-17 DIAGNOSIS — D70.1 CHEMOTHERAPY-INDUCED NEUTROPENIA: ICD-10-CM

## 2023-07-17 DIAGNOSIS — Z17.0 MALIGNANT NEOPLASM OF CENTRAL PORTION OF LEFT BREAST IN FEMALE, ESTROGEN RECEPTOR POSITIVE: Primary | ICD-10-CM

## 2023-07-17 DIAGNOSIS — C50.112 MALIGNANT NEOPLASM OF CENTRAL PORTION OF LEFT BREAST IN FEMALE, ESTROGEN RECEPTOR POSITIVE: Primary | ICD-10-CM

## 2023-07-17 DIAGNOSIS — F17.200 SMOKER: ICD-10-CM

## 2023-07-17 DIAGNOSIS — R74.01 TRANSAMINITIS: ICD-10-CM

## 2023-07-17 DIAGNOSIS — C50.112 MALIGNANT NEOPLASM OF CENTRAL PORTION OF LEFT BREAST IN FEMALE, ESTROGEN RECEPTOR POSITIVE: ICD-10-CM

## 2023-07-17 DIAGNOSIS — T45.1X5A CHEMOTHERAPY-INDUCED NEUTROPENIA: ICD-10-CM

## 2023-07-17 DIAGNOSIS — Z79.811 USE OF ANASTROZOLE: ICD-10-CM

## 2023-07-17 PROCEDURE — 99215 OFFICE O/P EST HI 40 MIN: CPT | Mod: S$PBB,,, | Performed by: INTERNAL MEDICINE

## 2023-07-17 PROCEDURE — 63600175 PHARM REV CODE 636 W HCPCS: Mod: PN

## 2023-07-17 PROCEDURE — 3074F PR MOST RECENT SYSTOLIC BLOOD PRESSURE < 130 MM HG: ICD-10-PCS | Mod: CPTII,,, | Performed by: INTERNAL MEDICINE

## 2023-07-17 PROCEDURE — 3008F BODY MASS INDEX DOCD: CPT | Mod: CPTII,,, | Performed by: INTERNAL MEDICINE

## 2023-07-17 PROCEDURE — 99213 OFFICE O/P EST LOW 20 MIN: CPT | Mod: PBBFAC,PN | Performed by: INTERNAL MEDICINE

## 2023-07-17 PROCEDURE — 3074F SYST BP LT 130 MM HG: CPT | Mod: CPTII,,, | Performed by: INTERNAL MEDICINE

## 2023-07-17 PROCEDURE — 25000003 PHARM REV CODE 250: Mod: PN

## 2023-07-17 PROCEDURE — 96402 CHEMO HORMON ANTINEOPL SQ/IM: CPT | Mod: PN

## 2023-07-17 PROCEDURE — 3008F PR BODY MASS INDEX (BMI) DOCUMENTED: ICD-10-PCS | Mod: CPTII,,, | Performed by: INTERNAL MEDICINE

## 2023-07-17 PROCEDURE — 99999 PR PBB SHADOW E&M-EST. PATIENT-LVL III: CPT | Mod: PBBFAC,,, | Performed by: INTERNAL MEDICINE

## 2023-07-17 PROCEDURE — 63600175 PHARM REV CODE 636 W HCPCS: Mod: JZ,JG,PN | Performed by: INTERNAL MEDICINE

## 2023-07-17 PROCEDURE — 99215 PR OFFICE/OUTPT VISIT, EST, LEVL V, 40-54 MIN: ICD-10-PCS | Mod: S$PBB,,, | Performed by: INTERNAL MEDICINE

## 2023-07-17 PROCEDURE — A4216 STERILE WATER/SALINE, 10 ML: HCPCS | Mod: PN

## 2023-07-17 PROCEDURE — 99999 PR PBB SHADOW E&M-EST. PATIENT-LVL III: ICD-10-PCS | Mod: PBBFAC,,, | Performed by: INTERNAL MEDICINE

## 2023-07-17 PROCEDURE — 3078F DIAST BP <80 MM HG: CPT | Mod: CPTII,,, | Performed by: INTERNAL MEDICINE

## 2023-07-17 PROCEDURE — 3078F PR MOST RECENT DIASTOLIC BLOOD PRESSURE < 80 MM HG: ICD-10-PCS | Mod: CPTII,,, | Performed by: INTERNAL MEDICINE

## 2023-07-17 RX ORDER — HEPARIN 100 UNIT/ML
500 SYRINGE INTRAVENOUS
Status: CANCELLED | OUTPATIENT
Start: 2023-07-17

## 2023-07-17 RX ORDER — HEPARIN 100 UNIT/ML
500 SYRINGE INTRAVENOUS
Status: DISCONTINUED | OUTPATIENT
Start: 2023-07-17 | End: 2023-07-17 | Stop reason: HOSPADM

## 2023-07-17 RX ORDER — SODIUM CHLORIDE 0.9 % (FLUSH) 0.9 %
10 SYRINGE (ML) INJECTION
Status: CANCELLED | OUTPATIENT
Start: 2023-07-17

## 2023-07-17 RX ORDER — SODIUM CHLORIDE 0.9 % (FLUSH) 0.9 %
10 SYRINGE (ML) INJECTION
Status: DISCONTINUED | OUTPATIENT
Start: 2023-07-17 | End: 2023-07-17 | Stop reason: HOSPADM

## 2023-07-17 RX ORDER — BENZONATATE 100 MG/1
200 CAPSULE ORAL 3 TIMES DAILY PRN
Qty: 60 CAPSULE | Refills: 1 | Status: SHIPPED | OUTPATIENT
Start: 2023-07-17 | End: 2023-09-11 | Stop reason: ALTCHOICE

## 2023-07-17 RX ADMIN — Medication 10 ML: at 03:07

## 2023-07-17 RX ADMIN — HEPARIN 500 UNITS: 100 SYRINGE at 03:07

## 2023-07-17 RX ADMIN — GOSERELIN ACETATE 3.6 MG: 3.6 IMPLANT SUBCUTANEOUS at 03:07

## 2023-07-17 NOTE — PROGRESS NOTES
PROGRESS NOTE    Subjective:       Patient ID: Angeles Wright is a 53 y.o. female.  MRN:   : [id number]    Chief Complaint: Breast cancer     History of Present Illness:   Angeles Wright is a 53 y.o. female who presents with metastatic invasive ductal left breast cancer.       As previously documented by Dr. Mcgovern  she transferred care to [facility] in 2019. Labs showed her to be pre-menopausal thus she was treated with Ribociclib /Zoladex and Arimidex but she was off therapy from November to May 2020. In 2020 scans showed improvement thus she restarted Arimidex/ribociclib/zoladex.     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      Interim history:   She presents today to discuss her symptoms, labs, sans and further recommendations.      She has  continued dose reduced ribociclib at the 400 mg p.o. day 1-day 21 Q 28 days since 2020.     She underwent left lumpectomy for isolated progression in the breast on 22.   She has continued anastrozole 1 mg p.o. daily.    Started the new cycle of Ribociclib 23    Last Xgeva 2723. Saw the dentist, s/p dental extractions, has seen oral surgeon and a bone fragment was shaved and sent to pathology.     Has completed Ciprofloxacin for bronchitis, still has cough, using OTC medications.        Oncology History:  Oncology History   Malignant neoplasm of central portion of left breast in female, estrogen receptor positive   9/3/2019 Initial Diagnosis    Malignant neoplasm of central portion of left breast in female, estrogen receptor positive     9/3/2019 - 9/3/2019 Chemotherapy    Treatment Summary   Plan Name: OP ANASTROZOLE PALBOCICLIB Q4W  Treatment Goal: Palliative  Status: Inactive  Start Date: 9/3/2019  End Date: 9/3/2019  Provider: Melania Mcgovern MD  Chemotherapy: [No matching medication found in this treatment plan]     2019 Cancer Staged    Staging form: Breast, AJCC  8th Edition  - Clinical: Stage IV (cT4b, cM1, ER+, NM+, HER2-)     11/13/2022 Cancer Staged    Staging form: Breast, AJCC 8th Edition  - Clinical stage from 11/13/2022: No Stage Recommended (ycT2(m), cN1(f), cM1, G1, ER+, NM+, HER2-)     12/26/2022 Cancer Staged    Staging form: Breast, AJCC 8th Edition  - Pathologic stage from 12/26/2022: No Stage Recommended (ypT2, pNX, pM1, G1, ER+, NM+, HER2-)       5/4/22  Impression:     No significant interval changes.  Known malignancy in the left breast with metastatic disease to bone.  Additional stable bilateral breast nodules which have been evaluated previously with mammography and ultrasound.  Stable small pleural based lung nodules in the right hemithorax.     9/12/22  Impression:  Left  Mass: Left breast 22 mm x 22 mm x 15 mm mass at the upper middle 12 o'clock position. Assessment: 6 - Known biopsy, proven malignancy.   Mass: Left breast 12 mm x 9 mm x 5 mm mass at the upper posterior 12 o'clock position. Assessment: 4C - Suspicious finding. Biopsy is recommended.      Right  Mass: Right breast 10 mm x 10 mm x 7 mm mass at the upper outer 11 o'clock position. Assessment: 3 - Probably benign.   Mass: Right breast 14 mm x 7 mm x 11 mm mass at the 9 o'clock position. Assessment: 3 - Probably benign.      BI-RADS Category:   Overall: 4 - Suspicious    9/28/22  Impression:  Left  Mass: Left breast 22 mm x 22 mm x 15 mm mass at the upper middle 12 o'clock position. Assessment: 6 - Known biopsy, proven malignancy.   Mass: Left breast 12 mm x 9 mm x 5 mm mass at the upper posterior 12 o'clock position. Assessment: 4C - Suspicious finding. Biopsy is recommended.      Right  Mass: Right breast 10 mm x 10 mm x 7 mm mass at the upper outer 11 o'clock position. Assessment: 3 - Probably benign.   Mass: Right breast 14 mm x 7 mm x 11 mm mass at the 9 o'clock position. Assessment: 3 - Probably benign.      BI-RADS Category:   Overall: 4 - Suspicious     Recommendation:  Biopsy  could be considered. There is a strong possibility that this represents progression of disease in this patient with known metastatic breast carcinoma.    10/21/22   Final Pathologic Diagnosis LEFT BREAST, 12 O'CLOCK MASS 1 CM FROM NIPPLE, BIOPSY:   Invasive ductal carcinoma, Arjun histologic grade 1 (tubule formation:   2, nuclear pleomorphism:  2, mitotic activity:  1).   Comment:  The biopsy reveals infiltrating ductal carcinoma with partial   mucinous features including abundant tubules and tumor nests with punched out   cribriform spaces containing mucin.  Tumor cells are positive with GATA3 and   negative with , in keeping with mammary ductal origin.  The largest   continuous focus of invasive carcinoma present measures 4 mm.  Dr. Zofia Saleem also reviewed this case and agrees with the diagnosis.   BREAST BIOMARKER RESULTS   Estrogen receptor (ER):  Positive (nearly 100%, strong)   Progesterone receptor (ME):  Positive (50%, weak to moderate)   HER2 IHC:  Negative (1+)   Ki-67 proliferation index:  10%        11/4/22  CT chest abd pelvis  Impression:     Stable diffuse osseous metastatic disease.     Left breast masses consistent with known malignancy and better evaluated on recent mammogram and ultrasound.     New left lower lobe linear consolidation, favored to be due to atelectasis versus infection/inflammation.  Recommend attention on follow-up exam as per clinical protocol.     Sigmoid colon diverticulosis without diverticulitis.     Pulmonary emphysema.    12/14/22  1.  BREAST, LEFT, 12:00, LUMPECTOMY:   --INVASIVE CARCINOMA OF NO SPECIAL TYPE (DUCTAL), ARJUN HISTOLOGIC    GRADE 1 OUT OF 3.        --INVASIVE CARCINOMA MEASURES 40 MILLIMETERS IN MAXIMUM DIMENSION.   --TUMOR IS PRESENT IN SUBEPIDERMAL TISSUE BUT DOES NOT INVOLVE    EPIDERMIS PROPER.        --BIOPSY SITE CHANGES.   --RECEPTOR STUDIES REPORTED PREVIOUSLY AT OUTSIDE FACILITY ON BIOPSY    MATERIAL (SEE COMMENT #1).         --NO UNEQUIVOCAL LYMPHOVASCULAR INVASION IDENTIFIED.   --PART 1 SPECIMEN MARGINS ARE NEGATIVE FOR MALIGNANCY; ADDITIONAL    MARGINS SENT SEPARATELY (SEE   PARTS 2 AND 3 BELOW); PLEASE SEE ALSO BELOW CASE SUMMARY FOR ADDITIONAL    MARGIN           DATA/PARAMETERS.   --MICROCALCIFICATIONS ASSOCIATED WITH NEOPLASTIC TISSUE AND    NON-NEOPLASTIC TISSUE.   --SKIN WITH MICROORGANISMS MORPHOLOGICALLY COMPATIBLE WITH DEMODEX.        --ypT2/ypN NOT ASSIGNED (NO NODES SUBMITTED OR FOUND).   2/23/23  Impression:     Prior an interval left breast surgery for neoplasm.     Chronic appearing bone lesions compatible with bony metastatic disease.  These are grossly stable on imaging.     No evidence of new parenchymal or dagmar metastatic disease.     Diverticulosis without evidence of diverticulitis.    CT chest abd pelvis:  6/14/23  Impression:     1. Stable exam compared to prior CT 02/23/2023.  No acute findings.  Osseous metastatic lesions are grossly stable.  No evidence of new parenchymal or dagmar metastatic disease within the chest, abdomen, or pelvis.  2. Small focus of ground-glass density within the right upper lobe favoring incidental small airways disease/inflammatory changes or atelectasis which can be reassessed on routine follow-up.  3. Diverticulosis coli.  4. Additional incidental/stable findings as above.    History:  Past Medical History:   Diagnosis Date    Breast cancer       Past Surgical History:   Procedure Laterality Date    BREAST BIOPSY Right     BREAST LUMPECTOMY Left 12/14/2022    LUMPECTOMY, WITH RADAR LOCALIZATION USING COLETTE  Left 12/14/2022    Procedure: LUMPECTOMY,WITH RADAR LOCALIZATION USING COLETTE ;  Surgeon: Maday Amin MD;  Location: [facility];  Service: General;  Laterality: Left;    TUBAL LIGATION       Family History   Problem Relation Age of Onset    Lung cancer Mother     Heart attack Father     Suicide Brother     No Known Problems Maternal Aunt     No Known Problems  Maternal Uncle     No Known Problems Paternal Aunt     No Known Problems Paternal Uncle     No Known Problems Maternal Grandmother     No Known Problems Maternal Grandfather     No Known Problems Paternal Grandmother     No Known Problems Paternal Grandfather     No Known Problems Son     No Known Problems Son     No Known Problems Daughter     Breast cancer Cousin     Breast cancer Cousin      Social History     Tobacco Use    Smoking status: Every Day     Packs/day: 1.00     Years: 34.00     Pack years: 34.00     Types: Cigarettes     Start date: 9/3/1994    Smokeless tobacco: Never   Substance and Sexual Activity    Alcohol use: Yes     Comment: occasionally    Drug use: Not Currently    Sexual activity: Yes     Partners: Male     Birth control/protection: None        ROS:   Review of Systems   Constitutional:  Negative for fever, malaise/fatigue and weight loss.   HENT:  Negative for congestion, hearing loss, nosebleeds and sore throat.    Eyes:  Negative for double vision and photophobia.   Respiratory:  Positive for cough and sputum production. Negative for hemoptysis, shortness of breath and wheezing.    Cardiovascular:  Negative for chest pain, palpitations, orthopnea and leg swelling.   Gastrointestinal:  Negative for abdominal pain, blood in stool, constipation, diarrhea, heartburn, nausea and vomiting.   Genitourinary:  Negative for dysuria, hematuria and urgency.   Musculoskeletal:  Positive for joint pain (left hip, mild ). Negative for back pain and myalgias.   Skin:  Negative for itching and rash.   Neurological:  Negative for dizziness, tingling, seizures and weakness.   Endo/Heme/Allergies:  Negative for polydipsia. Does not bruise/bleed easily.   Psychiatric/Behavioral:  Negative for depression and memory loss. The patient is not nervous/anxious and does not have insomnia.       Objective:     Vitals:    07/17/23 1443   BP: 115/75   Pulse: 88   Resp: 16   Temp: 97.8 °F (36.6 °C)   TempSrc: Temporal  "  SpO2: 96%   Weight: 63.9 kg (140 lb 14 oz)   Height: 5' 4" (1.626 m)   PainSc: 0-No pain       Wt Readings from Last 10 Encounters:   07/17/23 63.9 kg (140 lb 14 oz)   07/17/23 64.4 kg (142 lb)   06/19/23 64.6 kg (142 lb 6.7 oz)   06/19/23 64.6 kg (142 lb 6.7 oz)   05/22/23 64.6 kg (142 lb 6.7 oz)   05/22/23 64.6 kg (142 lb 6.7 oz)   04/24/23 63.6 kg (140 lb 3.4 oz)   04/24/23 63.6 kg (140 lb 3.4 oz)   03/15/23 64 kg (141 lb)   02/27/23 64.3 kg (141 lb 12.1 oz)       Physical Examination:   Physical Exam  Vitals and nursing note reviewed.   Constitutional:       General: She is not in acute distress.     Appearance: She is not diaphoretic.   HENT:      Head: Normocephalic.      Mouth/Throat:      Pharynx: No oropharyngeal exudate.   Eyes:      General: No scleral icterus.     Conjunctiva/sclera: Conjunctivae normal.   Neck:      Thyroid: No thyromegaly.   Cardiovascular:      Rate and Rhythm: Normal rate and regular rhythm.      Heart sounds: Normal heart sounds. No murmur heard.  Pulmonary:      Effort: Pulmonary effort is normal. No respiratory distress.      Breath sounds: No stridor. No wheezing or rales.   Chest:      Chest wall: No tenderness.   Abdominal:      General: Bowel sounds are normal. There is no distension.      Palpations: Abdomen is soft. There is no mass.      Tenderness: There is no abdominal tenderness. There is no rebound.   Musculoskeletal:         General: No tenderness or deformity. Normal range of motion.      Cervical back: Neck supple.   Lymphadenopathy:      Cervical: No cervical adenopathy.   Skin:     General: Skin is warm and dry.      Findings: No erythema or rash.   Neurological:      Mental Status: She is alert and oriented to person, place, and time.      Cranial Nerves: No cranial nerve deficit.      Coordination: Coordination normal.      Gait: Gait is intact.   Psychiatric:         Mood and Affect: Affect normal.         Cognition and Memory: Memory normal.         Judgment: " Judgment normal.        Diagnostic Tests:  Significant Imaging: I have reviewed and interpreted all pertinent imaging results/findings.      Laboratory Data:  All pertinent labs have been reviewed.  Labs:   Lab Results   Component Value Date    WBC 4.37 07/17/2023    RBC 4.36 07/17/2023    HGB 13.7 07/17/2023    HCT 41.0 07/17/2023    MCV 94 07/17/2023     07/17/2023     (H) 07/17/2023     07/17/2023    K 3.7 07/17/2023    BUN 10 07/17/2023    CREATININE 0.9 07/17/2023    AST 12 07/17/2023    ALT 8 (L) 07/17/2023    BILITOT 0.2 07/17/2023       Assessment/Plan:   Malignant neoplasm of central portion of left breast in female, estrogen receptor positive  Stage IV (T3N2M2) invasive ductal carcinoma of left breast,  quadrant, ER 96%, SD 94%, Her2 neg, Grade 2, Ki67 30%  PIKC3A mutation positive      Recent mammogram shows slight increase in the size of the left breast mass, previously biopsied and a new mass.   Biopsy shows a Grade 1 IDC, strongly ER + tumor with Ki 67 10%, more favorable than prior biopsy.   There is no clear metastatic disease progression based on recent scans.       Her case was discussed at our multi D tumor board and the consensus was to remove the site of isolated disease progression. She underwent left lumpectomy on 12/14 with Dr. Amin. Final pathology reviewed, she had a G 1 IDC 4 cm in size, margins negative. No adjuvant RT offered due to the metastatic nature of disease.     Follow up scans reviewed with patient, stable except small are of lung inflammation, see below.  Continue current therapy.     On ribociclib 400 mg p.o. day 1-day 21 Q 28 days and anastrozole 1 mg p.o.   Continue zoladex q 28 days.     Bone metastases  Continue Xgeva Q 28 days.  Continue Caltrate b.i.d. at this time. Last xgeva was 2/27, will hold in anticipation of dental work. Reassess next month.     Malignant neoplasm metastatic to lung, unspecified laterality  Subcm pulmonary nodules are  stable and benign appearing.  Lung nodules have never been biopsied.  Recent changes appear inflammatory.Continue to monitor on subsequent scans.     Bronchitis  Cough as well as inflammatory changes in the lung, completed ciprofloxacin.   Renewed Tessalon perls, discussed pulmonary referral, wants to hold off at this time.     Use of anastrozole  As above.    Smoker  She is cutting down. Follow up with smoking cessation program.          ECOG SCORE              Discussion:   No follow-ups on file.    Plan was discussed with the patient at length, and she verbalized understanding. Angeles was given an opportunity to ask questions that were answered to her satisfaction, and she was advised to call in the interval if any problems or questions arise.    Electronically signed by Shelby Thomas MD      Route Chart for Scheduling    Med Onc Chart Routing      Follow up with physician . 8/14,10/9   Follow up with LEÓN . 9/11   Infusion scheduling note    Injection scheduling note zoladex same day as visits, hold xgeva   Labs CBC, CMP and CA 27.29   Scheduling:  Preferred lab:  Lab interval:  same day as md visits   Imaging    Pharmacy appointment    Other referrals              Supportive Plan Information  OP BREAST GOSERELIN Q4W   Shelby Thomas MD   Upcoming Treatment Dates - OP BREAST GOSERELIN Q4W    7/19/2023       Chemotherapy       goserelin (ZOLADEX) injection 3.6 mg  8/17/2023       Chemotherapy       goserelin (ZOLADEX) injection 3.6 mg  9/15/2023       Chemotherapy       goserelin (ZOLADEX) injection 3.6 mg    Therapy Plan Information  PORT FLUSH  Flushes  heparin, porcine (PF) 100 unit/mL injection flush 500 Units  500 Units, Intravenous, Every visit  sodium chloride 0.9% flush 10 mL  10 mL, Intravenous, Every visit    DENOSUMAB (XGEVA) Q4W  Medications  denosumab (XGEVA) solution 120 mg  120 mg, Subcutaneous, Every 4 weeks

## 2023-07-17 NOTE — PLAN OF CARE
Problem: Fatigue  Goal: Improved Activity Tolerance  Outcome: Ongoing, Progressing     Problem: Adult Inpatient Plan of Care  Goal: Plan of Care Review  Outcome: Met   Tolerated ZOLADEX injection and port flush well todaY  Discharge instructions given and pt d/c to home per w/c  NAD

## 2023-07-31 ENCOUNTER — SPECIALTY PHARMACY (OUTPATIENT)
Dept: PHARMACY | Facility: CLINIC | Age: 53
End: 2023-07-31
Payer: MEDICAID

## 2023-07-31 DIAGNOSIS — Z17.0 MALIGNANT NEOPLASM OF CENTRAL PORTION OF LEFT BREAST IN FEMALE, ESTROGEN RECEPTOR POSITIVE: Primary | ICD-10-CM

## 2023-07-31 DIAGNOSIS — C50.112 MALIGNANT NEOPLASM OF CENTRAL PORTION OF LEFT BREAST IN FEMALE, ESTROGEN RECEPTOR POSITIVE: Primary | ICD-10-CM

## 2023-07-31 NOTE — TELEPHONE ENCOUNTER
Specialty Pharmacy - Clinical Reassessment    Specialty Medication Orders Linked to Encounter      Flowsheet Row Most Recent Value   Medication #1 ribociclib (KISQALI) 400 mg/day (200 mg x 2) Tab (Order#[id number], Rx#[id number])          Patient Diagnosis   C50.112, Z17.0 - Malignant neoplasm of central portion of left breast in female, estrogen receptor positive    Angeles Wright is a 53 y.o. female, who is followed by the specialty pharmacy service for management and education of her Kisqali.  She has been on therapy with Kisqali for 3 years 5 months.  I have reviewed her electronic medical record and current medication list and determined that specialty medication adjustment Is not needed at this time.    Patient has not experienced adverse events.  She Is adherent reporting 0 missed doses since last review.  Adherence has been encouraged with the following mechanism(s): Habit- on bedside table and patient sees every morning.  She is on target to meet goals of therapy and will continue treatment.        7/13/2023 6/12/2023 5/18/2023 4/24/2023 3/23/2023 2/20/2023 1/23/2023   Follow Up Review   # of missed doses 0 0 2 0 0 2 0   New Medications? No No No No No No No   New Conditions? No No No No No No No   New Allergies? No No No No No No No   Med Effective? Good Good Good Very good Very good Good Excellent   Urgent Care? No No No No No No No   Requested Pharmacist? No No No No No No No         Therapy is appropriate to continue.    Therapy is effective: Yes  On scale of 1 to 10, how does patient rank quality of life? (10 - Best): Unable to Assess  Recommendations: none at this time.  Review Method: Patient Contact    Kisqali dose adjusted 12/20 due to transaminitis. Has been stable of Kisqali 400 mg PO Qday days 1-21 of 28 day supply.    Reports no missed doses and started cycle on date documented. 7/17 next cycle would be due 8/14.  Tasks added this encounter   No tasks added.   Tasks due within next 3  months   7/30/2023 - Clinical Assessment (6 month recurrence)  8/3/2023 - Refill Coordination Outreach (1 time occurrence)     Chantal Bean, PharmD  [facility]  [address]  [facility] [address]  Phone: [phone]  Fax: [phone]

## 2023-08-07 ENCOUNTER — SPECIALTY PHARMACY (OUTPATIENT)
Dept: PHARMACY | Facility: CLINIC | Age: 53
End: 2023-08-07
Payer: MEDICAID

## 2023-08-07 NOTE — TELEPHONE ENCOUNTER
Specialty Pharmacy - Refill Coordination    Specialty Medication Orders Linked to Encounter      Flowsheet Row Most Recent Value   Medication #1 ribociclib (KISQALI) 400 mg/day (200 mg x 2) Tab (Order#[id number], Rx#[id number])            Refill Questions - Documented Responses      Flowsheet Row Most Recent Value   Refill Screening Questions    Changes to allergies? No   Changes to medications? No   New conditions since last clinic visit? No   Unplanned office visit, urgent care, ED, or hospital admission in the last 4 weeks? No   How does patient/caregiver feel medication is working? Good   Financial problems or insurance changes? No   How many doses of your specialty medications were missed in the last 4 weeks? 0   Would patient like to speak to a pharmacist? No   When does the patient need to receive the medication? 08/14/23   Refill Delivery Questions    How will the patient receive the medication? MEDRx   When does the patient need to receive the medication? 08/14/23   Shipping Address Home   Address in [facility] confirmed and updated if neccessary? Yes   Expected Copay ($) 0   Is the patient able to afford the medication copay? Yes   Payment Method zero copay   Days supply of Refill 28   Supplies needed? No supplies needed   Refill activity completed? Yes   Refill activity plan Refill scheduled   Shipment/Pickup Date: 08/09/23            Current Outpatient Medications   Medication Sig    albuterol (PROVENTIL) 2.5 mg /3 mL (0.083 %) nebulizer solution SMARTSIG:3 Milliliter(s) Via Nebulizer Every 4 Hours PRN    albuterol (PROVENTIL/VENTOLIN HFA) 90 mcg/actuation inhaler Inhale 2 puffs into the lungs every 6 (six) hours as needed.    ALBUTEROL INHL Inhale 2 puffs into the lungs as needed.    amoxicillin (AMOXIL) 875 MG tablet TAKE TWO TABLETS BY MOUTH EVERY TWELVE HOURS UNTIL FINISHED    anastrozole (ARIMIDEX) 1 mg Tab Take 1 tablet (1 mg total) by mouth once daily.    ascorbic acid, vitamin C, (VITAMIN  C) 500 MG tablet Take 1,000 mg by mouth once daily.    benzonatate (TESSALON PERLES) 100 MG capsule Take 2 capsules (200 mg total) by mouth 3 (three) times daily as needed for Cough.    buPROPion (WELLBUTRIN SR) 150 MG TBSR 12 hr tablet Take 1 tablet daily for 7 days then 1 tablet twice daily thereafter    cetirizine (ZYRTEC) 10 MG tablet Take 10 mg by mouth once daily.    COMP-AIR NEBULIZER COMPRESSOR Sherri use as directed    methylPREDNISolone (MEDROL DOSEPACK) 4 mg tablet follow package directions    nicotine (NICODERM CQ) 21 mg/24 hr Place 1 patch onto the skin once daily.    ondansetron (ZOFRAN-ODT) 4 MG TbDL Take 1 tablet by mouth every 8 (eight) hours as needed.    ondansetron (ZOFRAN-ODT) 8 MG TbDL Take 8 mg by mouth every 8 (eight) hours as needed.    promethazine-dextromethorphan (PROMETHAZINE-DM) 6.25-15 mg/5 mL Syrp Take 5 mLs by mouth.    ribociclib (KISQALI) 400 mg/day (200 mg x 2) Tab Take 2 tablets (400mg) by mouth on days 1 to day 21 every 28 days   Last reviewed on 7/31/2023  2:25 PM by Chantal Bean, Karina    Review of patient's allergies indicates:  No Known Allergies Last reviewed on  7/31/2023 2:25 PM by Chantal Bean      Tasks added this encounter   No tasks added.   Tasks due within next 3 months   8/10/2023 - Refill Coordination Outreach (1 time occurrence)     Geoffrey Wilson, PharmD  [facility]  [address] [place] [address]  Phone: [phone]  Fax: [phone]

## 2023-09-11 ENCOUNTER — INFUSION (OUTPATIENT)
Dept: INFUSION THERAPY | Facility: HOSPITAL | Age: 53
End: 2023-09-11
Attending: INTERNAL MEDICINE
Payer: MEDICAID

## 2023-09-11 ENCOUNTER — OFFICE VISIT (OUTPATIENT)
Dept: HEMATOLOGY/ONCOLOGY | Facility: CLINIC | Age: 53
End: 2023-09-11
Payer: MEDICAID

## 2023-09-11 VITALS
TEMPERATURE: 97 F | HEIGHT: 64 IN | BODY MASS INDEX: 23.37 KG/M2 | SYSTOLIC BLOOD PRESSURE: 110 MMHG | OXYGEN SATURATION: 95 % | WEIGHT: 136.88 LBS | HEART RATE: 73 BPM | DIASTOLIC BLOOD PRESSURE: 72 MMHG

## 2023-09-11 VITALS
SYSTOLIC BLOOD PRESSURE: 110 MMHG | BODY MASS INDEX: 23.37 KG/M2 | RESPIRATION RATE: 16 BRPM | OXYGEN SATURATION: 95 % | WEIGHT: 136.88 LBS | HEIGHT: 64 IN | TEMPERATURE: 97 F | HEART RATE: 73 BPM | DIASTOLIC BLOOD PRESSURE: 72 MMHG

## 2023-09-11 DIAGNOSIS — C50.112 MALIGNANT NEOPLASM OF CENTRAL PORTION OF LEFT BREAST IN FEMALE, ESTROGEN RECEPTOR POSITIVE: Primary | ICD-10-CM

## 2023-09-11 DIAGNOSIS — Z17.0 MALIGNANT NEOPLASM OF CENTRAL PORTION OF LEFT BREAST IN FEMALE, ESTROGEN RECEPTOR POSITIVE: Primary | ICD-10-CM

## 2023-09-11 DIAGNOSIS — C78.00 MALIGNANT NEOPLASM METASTATIC TO LUNG, UNSPECIFIED LATERALITY: ICD-10-CM

## 2023-09-11 DIAGNOSIS — C79.51 MALIGNANT NEOPLASM METASTATIC TO BONE: ICD-10-CM

## 2023-09-11 PROCEDURE — 3008F BODY MASS INDEX DOCD: CPT | Mod: CPTII,,, | Performed by: NURSE PRACTITIONER

## 2023-09-11 PROCEDURE — 3074F PR MOST RECENT SYSTOLIC BLOOD PRESSURE < 130 MM HG: ICD-10-PCS | Mod: CPTII,,, | Performed by: NURSE PRACTITIONER

## 2023-09-11 PROCEDURE — 99999 PR PBB SHADOW E&M-EST. PATIENT-LVL IV: CPT | Mod: PBBFAC,,, | Performed by: NURSE PRACTITIONER

## 2023-09-11 PROCEDURE — 99214 OFFICE O/P EST MOD 30 MIN: CPT | Mod: PBBFAC,PN | Performed by: NURSE PRACTITIONER

## 2023-09-11 PROCEDURE — 96372 THER/PROPH/DIAG INJ SC/IM: CPT | Mod: 59,PN

## 2023-09-11 PROCEDURE — 96523 IRRIG DRUG DELIVERY DEVICE: CPT | Mod: PN

## 2023-09-11 PROCEDURE — A4216 STERILE WATER/SALINE, 10 ML: HCPCS | Mod: PN | Performed by: NURSE PRACTITIONER

## 2023-09-11 PROCEDURE — 3078F PR MOST RECENT DIASTOLIC BLOOD PRESSURE < 80 MM HG: ICD-10-PCS | Mod: CPTII,,, | Performed by: NURSE PRACTITIONER

## 2023-09-11 PROCEDURE — 3078F DIAST BP <80 MM HG: CPT | Mod: CPTII,,, | Performed by: NURSE PRACTITIONER

## 2023-09-11 PROCEDURE — 99999 PR PBB SHADOW E&M-EST. PATIENT-LVL IV: ICD-10-PCS | Mod: PBBFAC,,, | Performed by: NURSE PRACTITIONER

## 2023-09-11 PROCEDURE — 3008F PR BODY MASS INDEX (BMI) DOCUMENTED: ICD-10-PCS | Mod: CPTII,,, | Performed by: NURSE PRACTITIONER

## 2023-09-11 PROCEDURE — 99213 PR OFFICE/OUTPT VISIT, EST, LEVL III, 20-29 MIN: ICD-10-PCS | Mod: S$PBB,,, | Performed by: NURSE PRACTITIONER

## 2023-09-11 PROCEDURE — 63600175 PHARM REV CODE 636 W HCPCS: Mod: PN | Performed by: NURSE PRACTITIONER

## 2023-09-11 PROCEDURE — 99213 OFFICE O/P EST LOW 20 MIN: CPT | Mod: S$PBB,,, | Performed by: NURSE PRACTITIONER

## 2023-09-11 PROCEDURE — 3074F SYST BP LT 130 MM HG: CPT | Mod: CPTII,,, | Performed by: NURSE PRACTITIONER

## 2023-09-11 PROCEDURE — 96402 CHEMO HORMON ANTINEOPL SQ/IM: CPT | Mod: PN

## 2023-09-11 PROCEDURE — 25000003 PHARM REV CODE 250: Mod: PN | Performed by: NURSE PRACTITIONER

## 2023-09-11 RX ORDER — HEPARIN 100 UNIT/ML
500 SYRINGE INTRAVENOUS
Status: DISCONTINUED | OUTPATIENT
Start: 2023-09-11 | End: 2023-09-11 | Stop reason: HOSPADM

## 2023-09-11 RX ORDER — SODIUM CHLORIDE 0.9 % (FLUSH) 0.9 %
10 SYRINGE (ML) INJECTION
Status: DISCONTINUED | OUTPATIENT
Start: 2023-09-11 | End: 2023-09-11 | Stop reason: HOSPADM

## 2023-09-11 RX ORDER — HEPARIN 100 UNIT/ML
500 SYRINGE INTRAVENOUS
Status: CANCELLED | OUTPATIENT
Start: 2023-09-11

## 2023-09-11 RX ORDER — SODIUM CHLORIDE 0.9 % (FLUSH) 0.9 %
10 SYRINGE (ML) INJECTION
Status: CANCELLED | OUTPATIENT
Start: 2023-09-11

## 2023-09-11 RX ADMIN — GOSERELIN ACETATE 3.6 MG: 3.6 IMPLANT SUBCUTANEOUS at 11:09

## 2023-09-11 RX ADMIN — Medication 500 UNITS: at 11:09

## 2023-09-11 RX ADMIN — DENOSUMAB 120 MG: 120 INJECTION SUBCUTANEOUS at 11:09

## 2023-09-11 RX ADMIN — Medication 10 ML: at 11:09

## 2023-09-11 NOTE — PLAN OF CARE
Problem: Adult Inpatient Plan of Care  Goal: Plan of Care Review  Outcome: Met   Tolerated port flush, xgeva and zoladex well today  Able to be d/c to home with instructions ambulated to private vehicle without difficulty NAD  Problem: Electrolyte Imbalance  Goal: Electrolyte Balance  Outcome: Ongoing, Progressing   Discussed upcoming or recent dental procedures  Denies any at this time  Discussed chelsea and vit d supplements

## 2023-09-11 NOTE — PROGRESS NOTES
PROGRESS NOTE    Subjective:       Patient ID: Angeles Wright is a 53 y.o. female.  MRN:   : [id number]    Chief Complaint: Breast cancer     History of Present Illness:   Angeles Wright is a 53 y.o. female who presents with metastatic invasive ductal left breast cancer.       As previously documented by Dr. Mcgovern  she transferred care [place] [facility] [place]. Labs showed her to be pre-menopausal thus she was treated with Ribociclib /Zoladex and Arimidex but she was off therapy from November to May 2020. In 2020 scans showed improvement thus she restarted Arimidex/ribociclib/zoladex.     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     She underwent left lumpectomy for isolated progression in the breast on 22.   She has continued anastrozole 1 mg p.o. daily.    Started the new cycle of Ribociclib 23    Last Xgeva 2723. Saw the dentist, s/p dental extractions, has seen oral surgeon and a bone fragment was shaved and sent to pathology.      Interim history:  She presents to the clinic today for review of labs & evaluation.    She has  continued dose reduced ribociclib at the 400 mg p.o. day 1-day 21 Q 28 days since 2020.   -today is Day 1 of new cycle.  She is compliant with Arimidex daily.  Tolerating treatment well.    No fevers, chills, abdominal discomfort, N/V/D, constipation, rashes, new lumps or bumps, bleeding, etc.      Oncology History:  Oncology History   Malignant neoplasm of central portion of left breast in female, estrogen receptor positive   9/3/2019 Initial Diagnosis    Malignant neoplasm of central portion of left breast in female, estrogen receptor positive     9/3/2019 - 9/3/2019 Chemotherapy    Treatment Summary   Plan Name: OP ANASTROZOLE PALBOCICLIB Q4W  Treatment Goal: Palliative  Status: Inactive  Start Date: 9/3/2019  End Date: 9/3/2019  Provider: Melania Mcgovern MD  Chemotherapy: [No matching  medication found in this treatment plan]     9/18/2019 Cancer Staged    Staging form: Breast, AJCC 8th Edition  - Clinical: Stage IV (cT4b, cM1, ER+, IL+, HER2-)     11/13/2022 Cancer Staged    Staging form: Breast, AJCC 8th Edition  - Clinical stage from 11/13/2022: No Stage Recommended (ycT2(m), cN1(f), cM1, G1, ER+, IL+, HER2-)     12/26/2022 Cancer Staged    Staging form: Breast, AJCC 8th Edition  - Pathologic stage from 12/26/2022: No Stage Recommended (ypT2, pNX, pM1, G1, ER+, IL+, HER2-)       5/4/22  Impression:     No significant interval changes.  Known malignancy in the left breast with metastatic disease to bone.  Additional stable bilateral breast nodules which have been evaluated previously with mammography and ultrasound.  Stable small pleural based lung nodules in the right hemithorax.     9/12/22  Impression:  Left  Mass: Left breast 22 mm x 22 mm x 15 mm mass at the upper middle 12 o'clock position. Assessment: 6 - Known biopsy, proven malignancy.   Mass: Left breast 12 mm x 9 mm x 5 mm mass at the upper posterior 12 o'clock position. Assessment: 4C - Suspicious finding. Biopsy is recommended.      Right  Mass: Right breast 10 mm x 10 mm x 7 mm mass at the upper outer 11 o'clock position. Assessment: 3 - Probably benign.   Mass: Right breast 14 mm x 7 mm x 11 mm mass at the 9 o'clock position. Assessment: 3 - Probably benign.      BI-RADS Category:   Overall: 4 - Suspicious    9/28/22  Impression:  Left  Mass: Left breast 22 mm x 22 mm x 15 mm mass at the upper middle 12 o'clock position. Assessment: 6 - Known biopsy, proven malignancy.   Mass: Left breast 12 mm x 9 mm x 5 mm mass at the upper posterior 12 o'clock position. Assessment: 4C - Suspicious finding. Biopsy is recommended.      Right  Mass: Right breast 10 mm x 10 mm x 7 mm mass at the upper outer 11 o'clock position. Assessment: 3 - Probably benign.   Mass: Right breast 14 mm x 7 mm x 11 mm mass at the 9 o'clock position. Assessment: 3 -  Probably benign.      BI-RADS Category:   Overall: 4 - Suspicious     Recommendation:  Biopsy could be considered. There is a strong possibility that this represents progression of disease in this patient with known metastatic breast carcinoma.    10/21/22   Final Pathologic Diagnosis LEFT BREAST, 12 O'CLOCK MASS 1 CM FROM NIPPLE, BIOPSY:   Invasive ductal carcinoma, Aylett histologic grade 1 (tubule formation:   2, nuclear pleomorphism:  2, mitotic activity:  1).   Comment:  The biopsy reveals infiltrating ductal carcinoma with partial   mucinous features including abundant tubules and tumor nests with punched out   cribriform spaces containing mucin.  Tumor cells are positive with GATA3 and   negative with , in keeping with mammary ductal origin.  The largest   continuous focus of invasive carcinoma present measures 4 mm.  Dr. Zofia Saleem also reviewed this case and agrees with the diagnosis.   BREAST BIOMARKER RESULTS   Estrogen receptor (ER):  Positive (nearly 100%, strong)   Progesterone receptor (MD):  Positive (50%, weak to moderate)   HER2 IHC:  Negative (1+)   Ki-67 proliferation index:  10%        11/4/22  CT chest abd pelvis  Impression:     Stable diffuse osseous metastatic disease.     Left breast masses consistent with known malignancy and better evaluated on recent mammogram and ultrasound.     New left lower lobe linear consolidation, favored to be due to atelectasis versus infection/inflammation.  Recommend attention on follow-up exam as per clinical protocol.     Sigmoid colon diverticulosis without diverticulitis.     Pulmonary emphysema.    12/14/22  1.  BREAST, LEFT, 12:00, LUMPECTOMY:   --INVASIVE CARCINOMA OF NO SPECIAL TYPE (DUCTAL), OLEGARIO HISTOLOGIC    GRADE 1 OUT OF 3.        --INVASIVE CARCINOMA MEASURES 40 MILLIMETERS IN MAXIMUM DIMENSION.   --TUMOR IS PRESENT IN SUBEPIDERMAL TISSUE BUT DOES NOT INVOLVE    EPIDERMIS PROPER.        --BIOPSY SITE CHANGES.   --RECEPTOR  STUDIES REPORTED PREVIOUSLY AT OUTSIDE FACILITY ON BIOPSY    MATERIAL (SEE COMMENT #1).        --NO UNEQUIVOCAL LYMPHOVASCULAR INVASION IDENTIFIED.   --PART 1 SPECIMEN MARGINS ARE NEGATIVE FOR MALIGNANCY; ADDITIONAL    MARGINS SENT SEPARATELY (SEE   PARTS 2 AND 3 BELOW); PLEASE SEE ALSO BELOW CASE SUMMARY FOR ADDITIONAL    MARGIN           DATA/PARAMETERS.   --MICROCALCIFICATIONS ASSOCIATED WITH NEOPLASTIC TISSUE AND    NON-NEOPLASTIC TISSUE.   --SKIN WITH MICROORGANISMS MORPHOLOGICALLY COMPATIBLE WITH DEMODEX.        --ypT2/ypN NOT ASSIGNED (NO NODES SUBMITTED OR FOUND).   2/23/23  Impression:     Prior an interval left breast surgery for neoplasm.     Chronic appearing bone lesions compatible with bony metastatic disease.  These are grossly stable on imaging.     No evidence of new parenchymal or dagmar metastatic disease.     Diverticulosis without evidence of diverticulitis.    CT chest abd pelvis:  6/14/23  Impression:     1. Stable exam compared to prior CT 02/23/2023.  No acute findings.  Osseous metastatic lesions are grossly stable.  No evidence of new parenchymal or dagmar metastatic disease within the chest, abdomen, or pelvis.  2. Small focus of ground-glass density within the right upper lobe favoring incidental small airways disease/inflammatory changes or atelectasis which can be reassessed on routine follow-up.  3. Diverticulosis coli.  4. Additional incidental/stable findings as above.    History:  Past Medical History:   Diagnosis Date    Breast cancer       Past Surgical History:   Procedure Laterality Date    BREAST BIOPSY Right     BREAST LUMPECTOMY Left 12/14/2022    LUMPECTOMY, WITH RADAR LOCALIZATION USING COLETTE  Left 12/14/2022    Procedure: LUMPECTOMY,WITH RADAR LOCALIZATION USING COLETTE ;  Surgeon: Maday Amin MD;  Location: [facility];  Service: General;  Laterality: Left;    TUBAL LIGATION       Family History   Problem Relation Age of Onset    Lung cancer Mother     Heart  attack Father     Suicide Brother     No Known Problems Maternal Aunt     No Known Problems Maternal Uncle     No Known Problems Paternal Aunt     No Known Problems Paternal Uncle     No Known Problems Maternal Grandmother     No Known Problems Maternal Grandfather     No Known Problems Paternal Grandmother     No Known Problems Paternal Grandfather     No Known Problems Son     No Known Problems Son     No Known Problems Daughter     Breast cancer Cousin     Breast cancer Cousin      Social History     Tobacco Use    Smoking status: Every Day     Current packs/day: 1.00     Average packs/day: 1 pack/day for 34.0 years (34.0 ttl pk-yrs)     Types: Cigarettes     Start date: 9/3/1994    Smokeless tobacco: Never   Substance and Sexual Activity    Alcohol use: Yes     Comment: occasionally    Drug use: Not Currently    Sexual activity: Yes     Partners: Male     Birth control/protection: None        ROS:   Review of Systems   Constitutional:  Negative for fever, malaise/fatigue and weight loss.   HENT:  Negative for congestion, hearing loss, nosebleeds and sore throat.    Eyes:  Negative for double vision and photophobia.   Respiratory:  Negative for hemoptysis, shortness of breath and wheezing.    Cardiovascular:  Negative for chest pain, palpitations, orthopnea and leg swelling.   Gastrointestinal:  Negative for abdominal pain, blood in stool, constipation, diarrhea, heartburn, nausea and vomiting.   Genitourinary:  Negative for dysuria, hematuria and urgency.   Musculoskeletal:  Negative for back pain and myalgias.   Skin:  Negative for itching and rash.   Neurological:  Negative for dizziness, tingling, seizures and weakness.   Endo/Heme/Allergies:  Negative for polydipsia. Does not bruise/bleed easily.   Psychiatric/Behavioral:  Negative for depression and memory loss. The patient is not nervous/anxious and does not have insomnia.         Objective:   Weight:  Loss of 4 pounds in 2 months  Vitals:    09/11/23 1017  "  BP: 110/72   Pulse: 73   Temp: 97.4 °F (36.3 °C)   TempSrc: Temporal   SpO2: 95%   Weight: 62.1 kg (136 lb 14.5 oz)   Height: 5' 4" (1.626 m)   PainSc: 0-No pain       Wt Readings from Last 10 Encounters:   09/11/23 62.1 kg (136 lb 14.5 oz)   07/17/23 63.9 kg (140 lb 14 oz)   07/17/23 64.4 kg (142 lb)   06/19/23 64.6 kg (142 lb 6.7 oz)   06/19/23 64.6 kg (142 lb 6.7 oz)   05/22/23 64.6 kg (142 lb 6.7 oz)   05/22/23 64.6 kg (142 lb 6.7 oz)   04/24/23 63.6 kg (140 lb 3.4 oz)   04/24/23 63.6 kg (140 lb 3.4 oz)   03/15/23 64 kg (141 lb)       Physical Examination:   Physical Exam  Vitals and nursing note reviewed.   Constitutional:       General: She is not in acute distress.     Appearance: She is not diaphoretic.   HENT:      Head: Normocephalic.      Mouth/Throat:      Pharynx: No oropharyngeal exudate.   Eyes:      General: No scleral icterus.     Conjunctiva/sclera: Conjunctivae normal.   Neck:      Thyroid: No thyromegaly.   Cardiovascular:      Rate and Rhythm: Normal rate and regular rhythm.      Heart sounds: Normal heart sounds. No murmur heard.  Pulmonary:      Effort: Pulmonary effort is normal. No respiratory distress.      Breath sounds: No stridor. No wheezing or rales.   Chest:      Chest wall: No tenderness.   Abdominal:      General: Bowel sounds are normal. There is no distension.      Palpations: Abdomen is soft. There is no mass.      Tenderness: There is no abdominal tenderness. There is no rebound.   Musculoskeletal:         General: No tenderness or deformity. Normal range of motion.      Cervical back: Neck supple.   Lymphadenopathy:      Cervical: No cervical adenopathy.   Skin:     General: Skin is warm and dry.      Findings: No erythema or rash.   Neurological:      Mental Status: She is alert and oriented to person, place, and time.      Cranial Nerves: No cranial nerve deficit.      Coordination: Coordination normal.      Gait: Gait is intact.   Psychiatric:         Mood and Affect: " Affect normal.         Cognition and Memory: Memory normal.         Judgment: Judgment normal.        Laboratory Data:  All pertinent labs have been reviewed.  Labs:   Lab Results   Component Value Date    WBC 4.86 09/11/2023    RBC 4.58 09/11/2023    HGB 14.4 09/11/2023    HCT 44.3 09/11/2023    MCV 97 09/11/2023     09/11/2023    GLU 94 09/11/2023     09/11/2023    K 4.0 09/11/2023    BUN 9 09/11/2023    CREATININE 0.8 09/11/2023    AST 11 09/11/2023    ALT 9 (L) 09/11/2023    BILITOT 0.3 09/11/2023       Assessment/Plan:   Malignant neoplasm of central portion of left breast in female, estrogen receptor positive  Stage IV (T3N2M2) invasive ductal carcinoma of left breast,  quadrant, ER 96%, TX 94%, Her2 neg, Grade 2, Ki67 30%  PIKC3A mutation positive      Recent mammogram shows slight increase in the size of the left breast mass, previously biopsied and a new mass.   Biopsy shows a Grade 1 IDC, strongly ER + tumor with Ki 67 10%, more favorable than prior biopsy.   There is no clear metastatic disease progression based on recent scans.       Her case was discussed at our multi D tumor board and the consensus was to remove the site of isolated disease progression. She underwent left lumpectomy on 12/14 with Dr. Amin. Final pathology reviewed, she had a G 1 IDC 4 cm in size, margins negative. No adjuvant RT offered due to the metastatic nature of disease.     Follow up scans reviewed with patient, stable except small are of lung inflammation, see below.  Continue current therapy.     -On ribociclib 400 mg p.o. day 1-day 21 Q 28 days and anastrozole 1 mg p.o.   -Continue zoladex q 28 days.     Bone metastases  Continue Xgeva Q 28 days.  Continue Caltrate b.i.d. at this time. Last xgeva was 2/27, will hold in anticipation of dental work. Completed. Will resume Xgeva today.    Malignant neoplasm metastatic to lung, unspecified laterality  Subcm pulmonary nodules are stable and benign appearing.   Lung nodules have never been biopsied.  Recent changes appear inflammatory.Continue to monitor on subsequent scans.     Use of anastrozole  As above.    Smoker  She is cutting down. Follow up with smoking cessation program.         Discussion:   No follow-ups on file.    Plan was discussed with the patient at length, and she verbalized understanding. Angeles was given an opportunity to ask questions that were answered to her satisfaction, and she was advised to call in the interval if any problems or questions arise.    Electronically signed by Renny Velasquez NP      Route Chart for Scheduling    Med Onc Chart Routing      Follow up with physician . F/u with Dr. Thomas as scheduled on 10/09 with labs prior   Follow up with [facility]    Infusion scheduling note   Proceed with Zoladex/Xgeva   Injection scheduling note    Labs    Imaging    Pharmacy appointment    Other referrals              Supportive Plan Information  OP BREAST GOSERELIN Q4W   Shelby Thomas MD   Upcoming Treatment Dates - OP BREAST GOSERELIN Q4W    9/11/2023       Chemotherapy       goserelin (ZOLADEX) injection 3.6 mg  10/10/2023       Chemotherapy       goserelin (ZOLADEX) injection 3.6 mg    Therapy Plan Information  PORT FLUSH  Flushes  heparin, porcine (PF) 100 unit/mL injection flush 500 Units  500 Units, Intravenous, Every visit  sodium chloride 0.9% flush 10 mL  10 mL, Intravenous, Every visit    DENOSUMAB (XGEVA) Q4W  Medications  denosumab (XGEVA) solution 120 mg  120 mg, Subcutaneous, Every 4 weeks       20 minutes were spent in coordination of patient's care, record review and counseling.

## 2023-09-18 ENCOUNTER — OFFICE VISIT (OUTPATIENT)
Dept: URGENT CARE | Facility: CLINIC | Age: 53
End: 2023-09-18
Payer: MEDICAID

## 2023-09-18 VITALS
HEART RATE: 84 BPM | OXYGEN SATURATION: 98 % | TEMPERATURE: 97 F | RESPIRATION RATE: 16 BRPM | SYSTOLIC BLOOD PRESSURE: 101 MMHG | DIASTOLIC BLOOD PRESSURE: 63 MMHG

## 2023-09-18 DIAGNOSIS — R09.81 SINUS CONGESTION: ICD-10-CM

## 2023-09-18 DIAGNOSIS — U07.1 COVID-19: Primary | ICD-10-CM

## 2023-09-18 LAB
CTP QC/QA: YES
SARS-COV-2 AG RESP QL IA.RAPID: POSITIVE

## 2023-09-18 PROCEDURE — 99214 PR OFFICE/OUTPT VISIT, EST, LEVL IV, 30-39 MIN: ICD-10-PCS | Mod: S$GLB,,, | Performed by: PHYSICIAN ASSISTANT

## 2023-09-18 PROCEDURE — 87811 SARS-COV-2 COVID19 W/OPTIC: CPT | Mod: QW,S$GLB,, | Performed by: PHYSICIAN ASSISTANT

## 2023-09-18 PROCEDURE — 99214 OFFICE O/P EST MOD 30 MIN: CPT | Mod: S$GLB,,, | Performed by: PHYSICIAN ASSISTANT

## 2023-09-18 PROCEDURE — 87811 SARS CORONAVIRUS 2 ANTIGEN POCT, MANUAL READ: ICD-10-PCS | Mod: QW,S$GLB,, | Performed by: PHYSICIAN ASSISTANT

## 2023-09-18 RX ORDER — PROMETHAZINE HYDROCHLORIDE AND DEXTROMETHORPHAN HYDROBROMIDE 6.25; 15 MG/5ML; MG/5ML
5 SYRUP ORAL EVERY 4 HOURS PRN
Qty: 240 ML | Refills: 0 | Status: SHIPPED | OUTPATIENT
Start: 2023-09-18 | End: 2023-09-28

## 2023-09-18 RX ORDER — IPRATROPIUM BROMIDE 21 UG/1
2 SPRAY, METERED NASAL 3 TIMES DAILY
Qty: 30 ML | Refills: 0 | Status: SHIPPED | OUTPATIENT
Start: 2023-09-18

## 2023-09-18 RX ORDER — AZITHROMYCIN 250 MG/1
TABLET, FILM COATED ORAL
Qty: 6 TABLET | Refills: 0 | Status: SHIPPED | OUTPATIENT
Start: 2023-09-18 | End: 2023-09-23

## 2023-09-18 NOTE — PROGRESS NOTES
Subjective:      Patient ID: Angeles Wright is a 53 y.o. female.    Vitals:  temperature is 97.3 °F (36.3 °C). Her blood pressure is 101/63 and her pulse is 84. Her respiration is 16 and oxygen saturation is 98%.     Chief Complaint: Sinus Problem    Pt presents with bodyaches, cough, chills, sinus cass, fatigue, headache x 3 days    Sinus Problem  This is a new problem. The problem has been gradually worsening since onset. There has been no fever. She is experiencing no pain. Associated symptoms include congestion, coughing, headaches and sinus pressure. Pertinent negatives include no chills, diaphoresis, ear pain, neck pain, shortness of breath, sneezing or sore throat. Treatments tried: dayquil. The treatment provided mild relief.       Constitution: Negative for chills, sweating, fatigue and fever.   HENT:  Positive for congestion, postnasal drip, sinus pain and sinus pressure. Negative for ear pain, drooling, sore throat, trouble swallowing and voice change.    Neck: Negative for neck pain, neck stiffness, painful lymph nodes and neck swelling.   Cardiovascular:  Negative for chest pain, leg swelling, palpitations, sob on exertion and passing out.   Eyes:  Negative for eye pain, eye redness, photophobia, double vision, blurred vision and eyelid swelling.   Respiratory:  Positive for cough. Negative for chest tightness, sputum production, bloody sputum, shortness of breath, stridor and wheezing.    Gastrointestinal:  Negative for abdominal pain, abdominal bloating, nausea, vomiting, constipation, diarrhea and heartburn.   Musculoskeletal:  Negative for joint pain, joint swelling, abnormal ROM of joint, back pain, muscle cramps and muscle ache.   Skin:  Negative for rash and hives.   Allergic/Immunologic: Negative for seasonal allergies, food allergies, hives, itching and sneezing.   Neurological:  Positive for headaches. Negative for dizziness, light-headedness, passing out, loss of balance, altered  mental status, loss of consciousness and seizures.   Hematologic/Lymphatic: Negative for swollen lymph nodes.   Psychiatric/Behavioral:  Negative for altered mental status and nervous/anxious. The patient is not nervous/anxious.       Objective:     Physical Exam   Constitutional: She is oriented to person, place, and time. She appears well-developed. She is cooperative.  Non-toxic appearance. She does not appear ill. No distress.   HENT:   Head: Normocephalic and atraumatic.   Ears:   Right Ear: Hearing, tympanic membrane, external ear and ear canal normal.   Left Ear: Hearing, tympanic membrane, external ear and ear canal normal.   Nose: Mucosal edema and rhinorrhea present. No nasal deformity. No epistaxis. Right sinus exhibits no maxillary sinus tenderness and no frontal sinus tenderness. Left sinus exhibits no maxillary sinus tenderness and no frontal sinus tenderness.   Mouth/Throat: Uvula is midline and mucous membranes are normal. No trismus in the jaw. Normal dentition. No uvula swelling. Posterior oropharyngeal erythema and cobblestoning present. No oropharyngeal exudate, posterior oropharyngeal edema or tonsillar abscesses. No tonsillar exudate.   Eyes: Conjunctivae and lids are normal. No scleral icterus.   Neck: Trachea normal and phonation normal. Neck supple. No edema present. No erythema present. No neck rigidity present.   Cardiovascular: Normal rate, regular rhythm, normal heart sounds and normal pulses.   Pulmonary/Chest: Effort normal and breath sounds normal. No accessory muscle usage or stridor. No respiratory distress. She has no decreased breath sounds. She has no wheezes. She has no rhonchi. She has no rales.   Abdominal: Normal appearance.   Musculoskeletal: Normal range of motion.         General: No deformity or edema. Normal range of motion.   Lymphadenopathy:     She has no cervical adenopathy.   Neurological: She is alert and oriented to person, place, and time. She exhibits normal  muscle tone. Coordination normal.   Skin: Skin is warm, dry, intact, not diaphoretic, not pale and no rash. Capillary refill takes less than 2 seconds.   Psychiatric: Her speech is normal and behavior is normal. Judgment and thought content normal.   Nursing note and vitals reviewed.    Results for orders placed or performed in visit on 09/18/23   SARS Coronavirus 2 Antigen, POCT Manual Read   Result Value Ref Range    SARS Coronavirus 2 Antigen Positive (A) Negative     Acceptable Yes          Assessment:     1. COVID-19    2. Sinus congestion        Plan:       COVID-19    Sinus congestion  -     SARS Coronavirus 2 Antigen, POCT Manual Read    Other orders  -     promethazine-dextromethorphan (PROMETHAZINE-DM) 6.25-15 mg/5 mL Syrp; Take 5 mLs by mouth every 4 (four) hours as needed (cough).  Dispense: 240 mL; Refill: 0  -     ipratropium (ATROVENT) 21 mcg (0.03 %) nasal spray; 2 sprays by Each Nostril route 3 (three) times daily.  Dispense: 30 mL; Refill: 0  -     azithromycin (Z-KERRI) 250 MG tablet; Take 2 tablets by mouth on day 1; Take 1 tablet by mouth on days 2-5  Dispense: 6 tablet; Refill: 0        Patient Instructions   Stay home/quarantine until symptoms are resolved.  ER if worsening symptoms- call before entry.  IF NOT IMPROVING, FOLLOW UP WITH VIRTUAL ONLINE VISIT WITH A PROVIDER 24/7- FOR MORE INFORMATION OR TO DOWNLOAD THE [facility], VISIT [email or web address]    FOR 24/7 NURSE ADVICE, CALL [phone]  FOR COVID 19 RELATED QUESTIONS, CALL THE [facility] covid hotline: 560.194.4804    Virtual visit with provider - [facility] ANYWHERE CARE:  [email or web address]    [place] FOR UP TO DATE INFORMATION:  Text or dial 211, test keyword LACOVID [id number] OR DIAL 211    HELPFUL EXTERNAL RESOURCES:  OFFICE OF PUBLIC HEALTH: [place]   Http://ldh.la.gov/  1-497-610-5221    CENTER FOR DISEASE CONTROL  Https://www.cdc.gov/    WORLD HEALTH ORGANIZATION (WHO)  Https://www.who.int/   You must understand  that you've received an Urgent Care treatment only and that you may be released before all your medical problems are known or treated. You, the patient, will arrange for follow up care as instructed.  Follow up with your PCP or specialty clinic as directed if not improved or as needed. You can call [phone] to schedule an appointment with the appropriate provider.  If your condition worsens we recommend that you receive another evaluation at the Emergency Department for any concerns or worsening of condition.  Patient aware and verbalized understanding.

## 2023-09-18 NOTE — LETTER
September 18, 2023      Urgent Care - [place] [address], [address]  [facility] [address]  Phone: [phone]  Fax: [phone]       Patient: Angeles Wright   YOB: 1970  Date of Visit: 09/18/2023    To Whom It May Concern:    Haroon Wright  was at [facility] on 09/18/2023. She may return to work/school on 9/21/23 with mask restrictions. If you have any questions or concerns, or if I can be of further assistance, please do not hesitate to contact me.    Sincerely,     FRIEDA Nicole

## 2023-09-18 NOTE — PATIENT INSTRUCTIONS
Stay home/quarantine until symptoms are resolved.  ER if worsening symptoms- call before entry.  IF NOT IMPROVING, FOLLOW UP WITH VIRTUAL ONLINE VISIT WITH A PROVIDER 24/7- FOR MORE INFORMATION OR TO DOWNLOAD THE [facility], VISIT [email or web address]    FOR 24/7 NURSE ADVICE, CALL [phone]  FOR COVID 19 RELATED QUESTIONS, CALL THE [facility] covid hotline: [phone]    [facility]:  [email or web address]    [place] FOR UP TO DATE INFORMATION:  Text or dial 211, test keyword LACOVID [id number] OR DIAL 211    HELPFUL EXTERNAL RESOURCES:  OFFICE OF PUBLIC HEALTH: [place]   Http://ldh.la.gov/  1-794.175.6952    CENTER FOR DISEASE CONTROL  Https://www.cdc.gov/    WORLD HEALTH ORGANIZATION (WHO)  Https://www.who.int/   You must understand that you've received an Urgent Care treatment only and that you may be released before all your medical problems are known or treated. You, the patient, will arrange for follow up care as instructed.  Follow up with your PCP or specialty clinic as directed if not improved or as needed. You can call [phone] to schedule an appointment with the appropriate provider.  If your condition worsens we recommend that you receive another evaluation at the Emergency Department for any concerns or worsening of condition.  Patient aware and verbalized understanding.

## 2023-09-27 ENCOUNTER — CLINICAL SUPPORT (OUTPATIENT)
Dept: SMOKING CESSATION | Facility: CLINIC | Age: 53
End: 2023-09-27
Payer: COMMERCIAL

## 2023-09-27 ENCOUNTER — TELEPHONE (OUTPATIENT)
Dept: SMOKING CESSATION | Facility: CLINIC | Age: 53
End: 2023-09-27
Payer: MEDICAID

## 2023-09-27 DIAGNOSIS — F17.200 NICOTINE DEPENDENCE, UNCOMPLICATED: Primary | ICD-10-CM

## 2023-09-27 PROCEDURE — 99407 PR TOBACCO USE CESSATION INTENSIVE >10 MINUTES: ICD-10-PCS | Mod: S$GLB,,, | Performed by: GENERAL PRACTICE

## 2023-09-27 PROCEDURE — 99407 BEHAV CHNG SMOKING > 10 MIN: CPT | Mod: S$GLB,,, | Performed by: GENERAL PRACTICE

## 2023-09-27 NOTE — PROGRESS NOTES
Spoke with patient today in regard to smoking cessation progress for 12 month follow up. He She states she is not tobacco free. Patient has scheduled appointment to return to the program for Quit attempt # 3. Informed patient of benefit period, future follow ups and contact information if any further help is needed. Will complete/ resolve smart form for 12 month follow up for Quit # 1 and 3/6 month follow up for Quit # 2.

## 2023-09-28 ENCOUNTER — TELEPHONE (OUTPATIENT)
Dept: SMOKING CESSATION | Facility: CLINIC | Age: 53
End: 2023-09-28
Payer: MEDICAID

## 2023-10-05 ENCOUNTER — TELEPHONE (OUTPATIENT)
Dept: SMOKING CESSATION | Facility: CLINIC | Age: 53
End: 2023-10-05
Payer: MEDICAID

## 2023-10-09 ENCOUNTER — OFFICE VISIT (OUTPATIENT)
Dept: HEMATOLOGY/ONCOLOGY | Facility: CLINIC | Age: 53
End: 2023-10-09
Payer: MEDICAID

## 2023-10-09 ENCOUNTER — TELEPHONE (OUTPATIENT)
Dept: SMOKING CESSATION | Facility: CLINIC | Age: 53
End: 2023-10-09
Payer: MEDICAID

## 2023-10-09 ENCOUNTER — INFUSION (OUTPATIENT)
Dept: INFUSION THERAPY | Facility: HOSPITAL | Age: 53
End: 2023-10-09
Attending: INTERNAL MEDICINE
Payer: MEDICAID

## 2023-10-09 VITALS
OXYGEN SATURATION: 100 % | DIASTOLIC BLOOD PRESSURE: 76 MMHG | WEIGHT: 140.88 LBS | TEMPERATURE: 98 F | HEART RATE: 75 BPM | RESPIRATION RATE: 16 BRPM | HEIGHT: 64 IN | SYSTOLIC BLOOD PRESSURE: 112 MMHG | BODY MASS INDEX: 24.05 KG/M2

## 2023-10-09 VITALS
HEIGHT: 64 IN | RESPIRATION RATE: 16 BRPM | OXYGEN SATURATION: 100 % | SYSTOLIC BLOOD PRESSURE: 112 MMHG | TEMPERATURE: 98 F | WEIGHT: 140.88 LBS | HEART RATE: 75 BPM | BODY MASS INDEX: 24.05 KG/M2 | DIASTOLIC BLOOD PRESSURE: 76 MMHG

## 2023-10-09 DIAGNOSIS — Z79.811 USE OF ANASTROZOLE: ICD-10-CM

## 2023-10-09 DIAGNOSIS — R74.01 TRANSAMINITIS: ICD-10-CM

## 2023-10-09 DIAGNOSIS — T45.1X5A CHEMOTHERAPY-INDUCED NEUTROPENIA: ICD-10-CM

## 2023-10-09 DIAGNOSIS — C79.51 MALIGNANT NEOPLASM METASTATIC TO BONE: ICD-10-CM

## 2023-10-09 DIAGNOSIS — F17.200 SMOKER: ICD-10-CM

## 2023-10-09 DIAGNOSIS — D70.1 CHEMOTHERAPY-INDUCED NEUTROPENIA: ICD-10-CM

## 2023-10-09 DIAGNOSIS — C50.112 MALIGNANT NEOPLASM OF CENTRAL PORTION OF LEFT BREAST IN FEMALE, ESTROGEN RECEPTOR POSITIVE: Primary | ICD-10-CM

## 2023-10-09 DIAGNOSIS — Z17.0 MALIGNANT NEOPLASM OF CENTRAL PORTION OF LEFT BREAST IN FEMALE, ESTROGEN RECEPTOR POSITIVE: Primary | ICD-10-CM

## 2023-10-09 DIAGNOSIS — C78.00 MALIGNANT NEOPLASM METASTATIC TO LUNG, UNSPECIFIED LATERALITY: ICD-10-CM

## 2023-10-09 PROCEDURE — 63600175 PHARM REV CODE 636 W HCPCS: Mod: JZ,JG,PN | Performed by: INTERNAL MEDICINE

## 2023-10-09 PROCEDURE — 3074F SYST BP LT 130 MM HG: CPT | Mod: CPTII,,, | Performed by: INTERNAL MEDICINE

## 2023-10-09 PROCEDURE — 3078F DIAST BP <80 MM HG: CPT | Mod: CPTII,,, | Performed by: INTERNAL MEDICINE

## 2023-10-09 PROCEDURE — 3008F BODY MASS INDEX DOCD: CPT | Mod: CPTII,,, | Performed by: INTERNAL MEDICINE

## 2023-10-09 PROCEDURE — 99999 PR PBB SHADOW E&M-EST. PATIENT-LVL III: ICD-10-PCS | Mod: PBBFAC,,, | Performed by: INTERNAL MEDICINE

## 2023-10-09 PROCEDURE — A4216 STERILE WATER/SALINE, 10 ML: HCPCS | Mod: PN | Performed by: NURSE PRACTITIONER

## 2023-10-09 PROCEDURE — 96372 THER/PROPH/DIAG INJ SC/IM: CPT | Mod: 59,PN

## 2023-10-09 PROCEDURE — 99215 PR OFFICE/OUTPT VISIT, EST, LEVL V, 40-54 MIN: ICD-10-PCS | Mod: S$PBB,,, | Performed by: INTERNAL MEDICINE

## 2023-10-09 PROCEDURE — 3074F PR MOST RECENT SYSTOLIC BLOOD PRESSURE < 130 MM HG: ICD-10-PCS | Mod: CPTII,,, | Performed by: INTERNAL MEDICINE

## 2023-10-09 PROCEDURE — 63600175 PHARM REV CODE 636 W HCPCS: Mod: PN | Performed by: NURSE PRACTITIONER

## 2023-10-09 PROCEDURE — 25000003 PHARM REV CODE 250: Mod: PN | Performed by: NURSE PRACTITIONER

## 2023-10-09 PROCEDURE — 99999 PR PBB SHADOW E&M-EST. PATIENT-LVL III: CPT | Mod: PBBFAC,,, | Performed by: INTERNAL MEDICINE

## 2023-10-09 PROCEDURE — 3078F PR MOST RECENT DIASTOLIC BLOOD PRESSURE < 80 MM HG: ICD-10-PCS | Mod: CPTII,,, | Performed by: INTERNAL MEDICINE

## 2023-10-09 PROCEDURE — 96402 CHEMO HORMON ANTINEOPL SQ/IM: CPT | Mod: PN

## 2023-10-09 PROCEDURE — 99215 OFFICE O/P EST HI 40 MIN: CPT | Mod: S$PBB,,, | Performed by: INTERNAL MEDICINE

## 2023-10-09 PROCEDURE — 3008F PR BODY MASS INDEX (BMI) DOCUMENTED: ICD-10-PCS | Mod: CPTII,,, | Performed by: INTERNAL MEDICINE

## 2023-10-09 PROCEDURE — 99213 OFFICE O/P EST LOW 20 MIN: CPT | Mod: 25,PBBFAC,PN | Performed by: INTERNAL MEDICINE

## 2023-10-09 RX ORDER — HEPARIN 100 UNIT/ML
500 SYRINGE INTRAVENOUS
Status: DISCONTINUED | OUTPATIENT
Start: 2023-10-09 | End: 2023-10-09 | Stop reason: HOSPADM

## 2023-10-09 RX ORDER — HEPARIN 100 UNIT/ML
500 SYRINGE INTRAVENOUS
Status: CANCELLED | OUTPATIENT
Start: 2023-10-09

## 2023-10-09 RX ORDER — SODIUM CHLORIDE 0.9 % (FLUSH) 0.9 %
10 SYRINGE (ML) INJECTION
Status: DISCONTINUED | OUTPATIENT
Start: 2023-10-09 | End: 2023-10-09 | Stop reason: HOSPADM

## 2023-10-09 RX ORDER — SODIUM CHLORIDE 0.9 % (FLUSH) 0.9 %
10 SYRINGE (ML) INJECTION
Status: CANCELLED | OUTPATIENT
Start: 2023-10-09

## 2023-10-09 RX ADMIN — Medication 500 UNITS: at 11:10

## 2023-10-09 RX ADMIN — Medication 10 ML: at 11:10

## 2023-10-09 RX ADMIN — DENOSUMAB 120 MG: 120 INJECTION SUBCUTANEOUS at 11:10

## 2023-10-09 RX ADMIN — GOSERELIN ACETATE 3.6 MG: 3.6 IMPLANT SUBCUTANEOUS at 11:10

## 2023-10-09 NOTE — PROGRESS NOTES
PROGRESS NOTE    Subjective:       Patient ID: Angeles Wright is a 53 y.o. female.  MRN:   : [id number]    Chief Complaint: Breast cancer     History of Present Illness:   Angeles Wright is a 53 y.o. female who presents with metastatic invasive ductal left breast cancer.       As previously documented by Dr. Mcgovern  [place] [facility] [place] Labs showed her to be pre-menopausal thus she was treated with Ribociclib /Zoladex and Arimidex but she was off therapy from November to May 2020. In 2020 scans showed improvement thus she restarted Arimidex/ribociclib/zoladex.     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     She underwent left lumpectomy for isolated progression in the breast on 22.   She has continued anastrozole 1 mg p.o. daily.    Started the new cycle of Ribociclib 23    Last Xgeva 2723. Saw the dentist, s/p dental extractions, has seen oral surgeon and a bone fragment was shaved and sent to pathology.      Interim history:  She presents to the clinic today for review of labs & evaluation.    She has  continued dose reduced ribociclib at the 400 mg p.o. day 1-day 21 Q 28 days since 2020.   today is Day 1 of new cycle.  She is compliant with Arimidex daily.  Tolerating treatment well.      Had a mild case of COVID 2 weeks ago, has recovered fully, no complaints today.       Oncology History:  Oncology History   Malignant neoplasm of central portion of left breast in female, estrogen receptor positive   9/3/2019 Initial Diagnosis    Malignant neoplasm of central portion of left breast in female, estrogen receptor positive     9/3/2019 - 9/3/2019 Chemotherapy    Treatment Summary   Plan Name: OP ANASTROZOLE PALBOCICLIB Q4W  Treatment Goal: Palliative  Status: Inactive  Start Date: 9/3/2019  End Date: 9/3/2019  Provider: Melania Mcgovern MD  Chemotherapy: [No matching medication found in this  treatment plan]     9/18/2019 Cancer Staged    Staging form: Breast, AJCC 8th Edition  - Clinical: Stage IV (cT4b, cM1, ER+, NC+, HER2-)     11/13/2022 Cancer Staged    Staging form: Breast, AJCC 8th Edition  - Clinical stage from 11/13/2022: No Stage Recommended (ycT2(m), cN1(f), cM1, G1, ER+, NC+, HER2-)     12/26/2022 Cancer Staged    Staging form: Breast, AJCC 8th Edition  - Pathologic stage from 12/26/2022: No Stage Recommended (ypT2, pNX, pM1, G1, ER+, NC+, HER2-)       5/4/22  Impression:     No significant interval changes.  Known malignancy in the left breast with metastatic disease to bone.  Additional stable bilateral breast nodules which have been evaluated previously with mammography and ultrasound.  Stable small pleural based lung nodules in the right hemithorax.     9/12/22  Impression:  Left  Mass: Left breast 22 mm x 22 mm x 15 mm mass at the upper middle 12 o'clock position. Assessment: 6 - Known biopsy, proven malignancy.   Mass: Left breast 12 mm x 9 mm x 5 mm mass at the upper posterior 12 o'clock position. Assessment: 4C - Suspicious finding. Biopsy is recommended.      Right  Mass: Right breast 10 mm x 10 mm x 7 mm mass at the upper outer 11 o'clock position. Assessment: 3 - Probably benign.   Mass: Right breast 14 mm x 7 mm x 11 mm mass at the 9 o'clock position. Assessment: 3 - Probably benign.      BI-RADS Category:   Overall: 4 - Suspicious    9/28/22  Impression:  Left  Mass: Left breast 22 mm x 22 mm x 15 mm mass at the upper middle 12 o'clock position. Assessment: 6 - Known biopsy, proven malignancy.   Mass: Left breast 12 mm x 9 mm x 5 mm mass at the upper posterior 12 o'clock position. Assessment: 4C - Suspicious finding. Biopsy is recommended.      Right  Mass: Right breast 10 mm x 10 mm x 7 mm mass at the upper outer 11 o'clock position. Assessment: 3 - Probably benign.   Mass: Right breast 14 mm x 7 mm x 11 mm mass at the 9 o'clock position. Assessment: 3 - Probably benign.       BI-RADS Category:   Overall: 4 - Suspicious     Recommendation:  Biopsy could be considered. There is a strong possibility that this represents progression of disease in this patient with known metastatic breast carcinoma.    10/21/22   Final Pathologic Diagnosis LEFT BREAST, 12 O'CLOCK MASS 1 CM FROM NIPPLE, BIOPSY:   Invasive ductal carcinoma, [place] histologic grade 1 (tubule formation:   2, nuclear pleomorphism:  2, mitotic activity:  1).   Comment:  The biopsy reveals infiltrating ductal carcinoma with partial   mucinous features including abundant tubules and tumor nests with punched out   cribriform spaces containing mucin.  Tumor cells are positive with GATA3 and   negative with , in keeping with mammary ductal origin.  The largest   continuous focus of invasive carcinoma present measures 4 mm.  Dr. Zofia Saleem also reviewed this case and agrees with the diagnosis.   BREAST BIOMARKER RESULTS   Estrogen receptor (ER):  Positive (nearly 100%, strong)   Progesterone receptor (NC):  Positive (50%, weak to moderate)   HER2 IHC:  Negative (1+)   Ki-67 proliferation index:  10%        11/4/22  CT chest abd pelvis  Impression:     Stable diffuse osseous metastatic disease.     Left breast masses consistent with known malignancy and better evaluated on recent mammogram and ultrasound.     New left lower lobe linear consolidation, favored to be due to atelectasis versus infection/inflammation.  Recommend attention on follow-up exam as per clinical protocol.     Sigmoid colon diverticulosis without diverticulitis.     Pulmonary emphysema.    12/14/22  1.  BREAST, LEFT, 12:00, LUMPECTOMY:   --INVASIVE CARCINOMA OF NO SPECIAL TYPE (DUCTAL), OLEGARIO HISTOLOGIC    GRADE 1 OUT OF 3.        --INVASIVE CARCINOMA MEASURES 40 MILLIMETERS IN MAXIMUM DIMENSION.   --TUMOR IS PRESENT IN SUBEPIDERMAL TISSUE BUT DOES NOT INVOLVE    EPIDERMIS PROPER.        --BIOPSY SITE CHANGES.   --RECEPTOR STUDIES REPORTED  PREVIOUSLY AT OUTSIDE FACILITY ON BIOPSY    MATERIAL (SEE COMMENT #1).        --NO UNEQUIVOCAL LYMPHOVASCULAR INVASION IDENTIFIED.   --PART 1 SPECIMEN MARGINS ARE NEGATIVE FOR MALIGNANCY; ADDITIONAL    MARGINS SENT SEPARATELY (SEE   PARTS 2 AND 3 BELOW); PLEASE SEE ALSO BELOW CASE SUMMARY FOR ADDITIONAL    MARGIN           DATA/PARAMETERS.   --MICROCALCIFICATIONS ASSOCIATED WITH NEOPLASTIC TISSUE AND    NON-NEOPLASTIC TISSUE.   --SKIN WITH MICROORGANISMS MORPHOLOGICALLY COMPATIBLE WITH DEMODEX.        --ypT2/ypN NOT ASSIGNED (NO NODES SUBMITTED OR FOUND).   2/23/23  Impression:     Prior an interval left breast surgery for neoplasm.     Chronic appearing bone lesions compatible with bony metastatic disease.  These are grossly stable on imaging.     No evidence of new parenchymal or dagmar metastatic disease.     Diverticulosis without evidence of diverticulitis.    CT chest abd pelvis:  6/14/23  Impression:     1. Stable exam compared to prior CT 02/23/2023.  No acute findings.  Osseous metastatic lesions are grossly stable.  No evidence of new parenchymal or dagmar metastatic disease within the chest, abdomen, or pelvis.  2. Small focus of ground-glass density within the right upper lobe favoring incidental small airways disease/inflammatory changes or atelectasis which can be reassessed on routine follow-up.  3. Diverticulosis coli.  4. Additional incidental/stable findings as above.    History:  Past Medical History:   Diagnosis Date    Breast cancer       Past Surgical History:   Procedure Laterality Date    BREAST BIOPSY Right     BREAST LUMPECTOMY Left 12/14/2022    LUMPECTOMY, WITH RADAR LOCALIZATION USING COLETTE  Left 12/14/2022    Procedure: LUMPECTOMY,WITH RADAR LOCALIZATION USING COLETTE ;  Surgeon: Maday Amin MD;  Location: [facility];  Service: General;  Laterality: Left;    TUBAL LIGATION       Family History   Problem Relation Age of Onset    Lung cancer Mother     Heart attack Father      Suicide Brother     No Known Problems Maternal Aunt     No Known Problems Maternal Uncle     No Known Problems Paternal Aunt     No Known Problems Paternal Uncle     No Known Problems Maternal Grandmother     No Known Problems Maternal Grandfather     No Known Problems Paternal Grandmother     No Known Problems Paternal Grandfather     No Known Problems Son     No Known Problems Son     No Known Problems Daughter     Breast cancer Cousin     Breast cancer Cousin      Social History     Tobacco Use    Smoking status: Every Day     Current packs/day: 1.00     Average packs/day: 1 pack/day for 34.1 years (34.1 ttl pk-yrs)     Types: Cigarettes     Start date: 9/3/1994    Smokeless tobacco: Never   Substance and Sexual Activity    Alcohol use: Yes     Comment: occasionally    Drug use: Not Currently    Sexual activity: Yes     Partners: Male     Birth control/protection: None        ROS:   Review of Systems   Constitutional:  Negative for fever, malaise/fatigue and weight loss.   HENT:  Negative for congestion, hearing loss, nosebleeds and sore throat.    Eyes:  Negative for double vision and photophobia.   Respiratory:  Negative for hemoptysis, shortness of breath and wheezing.    Cardiovascular:  Negative for chest pain, palpitations, orthopnea and leg swelling.   Gastrointestinal:  Negative for abdominal pain, blood in stool, constipation, diarrhea, heartburn, nausea and vomiting.   Genitourinary:  Negative for dysuria, hematuria and urgency.   Musculoskeletal:  Negative for back pain and myalgias.   Skin:  Negative for itching and rash.   Neurological:  Negative for dizziness, tingling, seizures and weakness.   Endo/Heme/Allergies:  Negative for polydipsia. Does not bruise/bleed easily.   Psychiatric/Behavioral:  Negative for depression and memory loss. The patient is not nervous/anxious and does not have insomnia.         Objective:     Vitals:    10/09/23 1049   BP: 112/76   Pulse: 75   Resp: 16   Temp: 97.5 °F  "(36.4 °C)   TempSrc: Temporal   SpO2: 100%   Weight: 63.9 kg (140 lb 14 oz)   Height: 5' 4" (1.626 m)   PainSc: 0-No pain         Wt Readings from Last 10 Encounters:   10/09/23 63.9 kg (140 lb 14 oz)   09/11/23 62.1 kg (136 lb 14.5 oz)   09/11/23 62.1 kg (136 lb 14.5 oz)   07/17/23 63.9 kg (140 lb 14 oz)   07/17/23 64.4 kg (142 lb)   06/19/23 64.6 kg (142 lb 6.7 oz)   06/19/23 64.6 kg (142 lb 6.7 oz)   05/22/23 64.6 kg (142 lb 6.7 oz)   05/22/23 64.6 kg (142 lb 6.7 oz)   04/24/23 63.6 kg (140 lb 3.4 oz)       Physical Examination:   Physical Exam  Vitals and nursing note reviewed.   Constitutional:       General: She is not in acute distress.     Appearance: She is not diaphoretic.   HENT:      Head: Normocephalic.      Mouth/Throat:      Pharynx: No oropharyngeal exudate.   Eyes:      General: No scleral icterus.     Conjunctiva/sclera: Conjunctivae normal.   Neck:      Thyroid: No thyromegaly.   Cardiovascular:      Rate and Rhythm: Normal rate and regular rhythm.      Heart sounds: Normal heart sounds. No murmur heard.  Pulmonary:      Effort: Pulmonary effort is normal. No respiratory distress.      Breath sounds: No stridor. No wheezing or rales.   Chest:      Chest wall: No tenderness.   Abdominal:      General: Bowel sounds are normal. There is no distension.      Palpations: Abdomen is soft. There is no mass.      Tenderness: There is no abdominal tenderness. There is no rebound.   Musculoskeletal:         General: No tenderness or deformity. Normal range of motion.      Cervical back: Neck supple.   Lymphadenopathy:      Cervical: No cervical adenopathy.   Skin:     General: Skin is warm and dry.      Findings: No erythema or rash.   Neurological:      Mental Status: She is alert and oriented to person, place, and time.      Cranial Nerves: No cranial nerve deficit.      Coordination: Coordination normal.      Gait: Gait is intact.   Psychiatric:         Mood and Affect: Affect normal.         Cognition " and Memory: Memory normal.         Judgment: Judgment normal.          Laboratory Data:  All pertinent labs have been reviewed.  Labs:   Lab Results   Component Value Date    WBC 5.67 10/09/2023    RBC 4.45 10/09/2023    HGB 14.3 10/09/2023    HCT 43.3 10/09/2023    MCV 97 10/09/2023     10/09/2023    GLU 94 10/09/2023     10/09/2023    K 3.9 10/09/2023    BUN 7 10/09/2023    CREATININE 0.8 10/09/2023    AST 10 10/09/2023    ALT 5 (L) 10/09/2023    BILITOT 0.3 10/09/2023       Assessment/Plan:   Malignant neoplasm of central portion of left breast in female, estrogen receptor positive  Stage IV (T3N2M2) invasive ductal carcinoma of left breast,  quadrant, ER 96%, MO 94%, Her2 neg, Grade 2, Ki67 30%  PIKC3A mutation positive      Recent mammogram shows slight increase in the size of the left breast mass, previously biopsied and a new mass.   Biopsy shows a Grade 1 IDC, strongly ER + tumor with Ki 67 10%, more favorable than prior biopsy.   There is no clear metastatic disease progression based on recent scans.       Her case was discussed at our multi D tumor board and the consensus was to remove the site of isolated disease progression. She underwent left lumpectomy on 12/14 with Dr. Amin. Final pathology reviewed, she had a G 1 IDC 4 cm in size, margins negative. No adjuvant RT offered due to the metastatic nature of disease.     -On ribociclib 400 mg p.o. day 1-day 21 Q 28 days and anastrozole 1 mg p.o.   -Continue zoladex q 28 days.     Will repeat staging scans prior to next visit.     Bone metastases  Continue Xgeva Q 28 days.  Continue Caltrate b.i.d. at this time.     Malignant neoplasm metastatic to lung, unspecified laterality  Subcm pulmonary nodules are stable and benign appearing.  Lung nodules have never been biopsied.  Recent changes appear inflammatory.Continue to monitor on subsequent scans.     Use of anastrozole  As above.    Smoker  She is cutting down. Follow up with smoking  cessation program.         Discussion:   No follow-ups on file.    Plan was discussed with the patient at length, and she verbalized understanding. Angeles was given an opportunity to ask questions that were answered to her satisfaction, and she was advised to call in the interval if any problems or questions arise.    Electronically signed by Shelby Thomas MD      Route Chart for Scheduling    Med Onc Chart Routing      Follow up with physician . 11/6, overbook for 11 am   Follow up with LE[facility] . 12/4   Infusion scheduling note    Injection scheduling note    Labs CBC, CMP and CA 27.29   Scheduling:  Preferred lab:  Lab interval:     Imaging CT chest abdomen pelvis   11/3   Pharmacy appointment    Other referrals                    Supportive Plan Information  OP BREAST GOSERELIN Q4W   Shelby Thomas MD   Upcoming Treatment Dates - OP BREAST GOSERELIN Q4W    10/9/2023       Chemotherapy       goserelin (ZOLADEX) injection 3.6 mg  11/7/2023       Chemotherapy       goserelin (ZOLADEX) injection 3.6 mg    Therapy Plan Information  PORT FLUSH  Flushes  heparin, porcine (PF) 100 unit/mL injection flush 500 Units  500 Units, Intravenous, Every visit  sodium chloride 0.9% flush 10 mL  10 mL, Intravenous, Every visit    DENOSUMAB (XGEVA) Q4W  Medications  denosumab (XGEVA) solution 120 mg  120 mg, Subcutaneous, Every 4 weeks    MDM includes  :    - Acute or chronic illness or injury that poses a threat to life or bodily function  - Independent review and explanation of 3+ results from unique tests  - Discussion of management and ordering 3+ unique tests  - Extensive discussion of treatment and management  - Prescription drug management  - Drug therapy requiring intensive monitoring for toxicity

## 2023-10-16 ENCOUNTER — PATIENT MESSAGE (OUTPATIENT)
Dept: HEMATOLOGY/ONCOLOGY | Facility: CLINIC | Age: 53
End: 2023-10-16
Payer: MEDICAID

## 2023-10-18 ENCOUNTER — TELEPHONE (OUTPATIENT)
Dept: SMOKING CESSATION | Facility: CLINIC | Age: 53
End: 2023-10-18
Payer: MEDICAID

## 2023-10-23 ENCOUNTER — TELEPHONE (OUTPATIENT)
Dept: SMOKING CESSATION | Facility: CLINIC | Age: 53
End: 2023-10-23
Payer: MEDICAID

## 2023-10-30 ENCOUNTER — TELEPHONE (OUTPATIENT)
Dept: SMOKING CESSATION | Facility: CLINIC | Age: 53
End: 2023-10-30
Payer: MEDICAID

## 2023-11-03 ENCOUNTER — HOSPITAL ENCOUNTER (OUTPATIENT)
Dept: RADIOLOGY | Facility: HOSPITAL | Age: 53
Discharge: HOME | End: 2023-11-03
Attending: INTERNAL MEDICINE
Payer: MEDICAID

## 2023-11-03 DIAGNOSIS — Z17.0 MALIGNANT NEOPLASM OF CENTRAL PORTION OF LEFT BREAST IN FEMALE, ESTROGEN RECEPTOR POSITIVE: ICD-10-CM

## 2023-11-03 DIAGNOSIS — C50.112 MALIGNANT NEOPLASM OF CENTRAL PORTION OF LEFT BREAST IN FEMALE, ESTROGEN RECEPTOR POSITIVE: ICD-10-CM

## 2023-11-03 PROCEDURE — 25500020 PHARM REV CODE 255: Mod: PO | Performed by: INTERNAL MEDICINE

## 2023-11-03 PROCEDURE — 71260 CT CHEST ABDOMEN PELVIS WITH CONTRAST (XPD): ICD-10-PCS | Mod: 26,,, | Performed by: RADIOLOGY

## 2023-11-03 PROCEDURE — A9698 NON-RAD CONTRAST MATERIALNOC: HCPCS | Mod: PO | Performed by: INTERNAL MEDICINE

## 2023-11-03 PROCEDURE — 74177 CT ABD & PELVIS W/CONTRAST: CPT | Mod: TC,PO

## 2023-11-03 PROCEDURE — 71260 CT THORAX DX C+: CPT | Mod: 26,,, | Performed by: RADIOLOGY

## 2023-11-03 PROCEDURE — 71260 CT THORAX DX C+: CPT | Mod: TC,PO

## 2023-11-03 PROCEDURE — 74177 CT ABD & PELVIS W/CONTRAST: CPT | Mod: 26,,, | Performed by: RADIOLOGY

## 2023-11-03 PROCEDURE — 74177 CT CHEST ABDOMEN PELVIS WITH CONTRAST (XPD): ICD-10-PCS | Mod: 26,,, | Performed by: RADIOLOGY

## 2023-11-03 RX ADMIN — IOHEXOL 1000 ML: 9 SOLUTION ORAL at 09:11

## 2023-11-03 RX ADMIN — IOHEXOL 75 ML: 350 INJECTION, SOLUTION INTRAVENOUS at 09:11

## 2023-11-06 ENCOUNTER — INFUSION (OUTPATIENT)
Dept: INFUSION THERAPY | Facility: HOSPITAL | Age: 53
End: 2023-11-06
Attending: INTERNAL MEDICINE
Payer: MEDICAID

## 2023-11-06 ENCOUNTER — OFFICE VISIT (OUTPATIENT)
Dept: HEMATOLOGY/ONCOLOGY | Facility: CLINIC | Age: 53
End: 2023-11-06
Payer: MEDICAID

## 2023-11-06 VITALS
WEIGHT: 142.63 LBS | SYSTOLIC BLOOD PRESSURE: 132 MMHG | BODY MASS INDEX: 24.35 KG/M2 | DIASTOLIC BLOOD PRESSURE: 78 MMHG | HEIGHT: 64 IN | TEMPERATURE: 98 F | OXYGEN SATURATION: 99 % | HEART RATE: 70 BPM | RESPIRATION RATE: 14 BRPM

## 2023-11-06 VITALS
RESPIRATION RATE: 16 BRPM | HEART RATE: 70 BPM | DIASTOLIC BLOOD PRESSURE: 78 MMHG | BODY MASS INDEX: 24.48 KG/M2 | WEIGHT: 142.63 LBS | SYSTOLIC BLOOD PRESSURE: 132 MMHG | TEMPERATURE: 98 F

## 2023-11-06 DIAGNOSIS — Z17.0 MALIGNANT NEOPLASM OF CENTRAL PORTION OF LEFT BREAST IN FEMALE, ESTROGEN RECEPTOR POSITIVE: ICD-10-CM

## 2023-11-06 DIAGNOSIS — C50.112 MALIGNANT NEOPLASM OF CENTRAL PORTION OF LEFT BREAST IN FEMALE, ESTROGEN RECEPTOR POSITIVE: Primary | ICD-10-CM

## 2023-11-06 DIAGNOSIS — Z17.0 MALIGNANT NEOPLASM OF CENTRAL PORTION OF LEFT BREAST IN FEMALE, ESTROGEN RECEPTOR POSITIVE: Primary | ICD-10-CM

## 2023-11-06 DIAGNOSIS — Z79.811 USE OF ANASTROZOLE: ICD-10-CM

## 2023-11-06 DIAGNOSIS — C79.51 MALIGNANT NEOPLASM METASTATIC TO BONE: Primary | ICD-10-CM

## 2023-11-06 DIAGNOSIS — T45.1X5A CHEMOTHERAPY-INDUCED NEUTROPENIA: ICD-10-CM

## 2023-11-06 DIAGNOSIS — N63.10 MASS OF RIGHT BREAST, UNSPECIFIED QUADRANT: ICD-10-CM

## 2023-11-06 DIAGNOSIS — C78.00 MALIGNANT NEOPLASM METASTATIC TO LUNG, UNSPECIFIED LATERALITY: ICD-10-CM

## 2023-11-06 DIAGNOSIS — C79.51 MALIGNANT NEOPLASM METASTATIC TO BONE: ICD-10-CM

## 2023-11-06 DIAGNOSIS — C50.112 MALIGNANT NEOPLASM OF CENTRAL PORTION OF LEFT BREAST IN FEMALE, ESTROGEN RECEPTOR POSITIVE: ICD-10-CM

## 2023-11-06 DIAGNOSIS — D70.1 CHEMOTHERAPY-INDUCED NEUTROPENIA: ICD-10-CM

## 2023-11-06 PROCEDURE — 96372 THER/PROPH/DIAG INJ SC/IM: CPT | Mod: 59,PN

## 2023-11-06 PROCEDURE — 96523 IRRIG DRUG DELIVERY DEVICE: CPT | Mod: PN

## 2023-11-06 PROCEDURE — A4216 STERILE WATER/SALINE, 10 ML: HCPCS | Mod: PN | Performed by: NURSE PRACTITIONER

## 2023-11-06 PROCEDURE — 1159F PR MEDICATION LIST DOCUMENTED IN MEDICAL RECORD: ICD-10-PCS | Mod: CPTII,,, | Performed by: INTERNAL MEDICINE

## 2023-11-06 PROCEDURE — 3008F BODY MASS INDEX DOCD: CPT | Mod: CPTII,,, | Performed by: INTERNAL MEDICINE

## 2023-11-06 PROCEDURE — 99999 PR PBB SHADOW E&M-EST. PATIENT-LVL III: ICD-10-PCS | Mod: PBBFAC,,, | Performed by: INTERNAL MEDICINE

## 2023-11-06 PROCEDURE — 99215 PR OFFICE/OUTPT VISIT, EST, LEVL V, 40-54 MIN: ICD-10-PCS | Mod: S$PBB,,, | Performed by: INTERNAL MEDICINE

## 2023-11-06 PROCEDURE — 3075F SYST BP GE 130 - 139MM HG: CPT | Mod: CPTII,,, | Performed by: INTERNAL MEDICINE

## 2023-11-06 PROCEDURE — 3075F PR MOST RECENT SYSTOLIC BLOOD PRESS GE 130-139MM HG: ICD-10-PCS | Mod: CPTII,,, | Performed by: INTERNAL MEDICINE

## 2023-11-06 PROCEDURE — 1159F MED LIST DOCD IN RCRD: CPT | Mod: CPTII,,, | Performed by: INTERNAL MEDICINE

## 2023-11-06 PROCEDURE — 99215 OFFICE O/P EST HI 40 MIN: CPT | Mod: S$PBB,,, | Performed by: INTERNAL MEDICINE

## 2023-11-06 PROCEDURE — 3078F DIAST BP <80 MM HG: CPT | Mod: CPTII,,, | Performed by: INTERNAL MEDICINE

## 2023-11-06 PROCEDURE — 3008F PR BODY MASS INDEX (BMI) DOCUMENTED: ICD-10-PCS | Mod: CPTII,,, | Performed by: INTERNAL MEDICINE

## 2023-11-06 PROCEDURE — 3078F PR MOST RECENT DIASTOLIC BLOOD PRESSURE < 80 MM HG: ICD-10-PCS | Mod: CPTII,,, | Performed by: INTERNAL MEDICINE

## 2023-11-06 PROCEDURE — 99213 OFFICE O/P EST LOW 20 MIN: CPT | Mod: PBBFAC,PN | Performed by: INTERNAL MEDICINE

## 2023-11-06 PROCEDURE — 63600175 PHARM REV CODE 636 W HCPCS: Mod: PN | Performed by: NURSE PRACTITIONER

## 2023-11-06 PROCEDURE — 25000003 PHARM REV CODE 250: Mod: PN | Performed by: NURSE PRACTITIONER

## 2023-11-06 PROCEDURE — 99999 PR PBB SHADOW E&M-EST. PATIENT-LVL III: CPT | Mod: PBBFAC,,, | Performed by: INTERNAL MEDICINE

## 2023-11-06 PROCEDURE — 96402 CHEMO HORMON ANTINEOPL SQ/IM: CPT | Mod: PN

## 2023-11-06 PROCEDURE — 63600175 PHARM REV CODE 636 W HCPCS: Mod: JZ,JG,PN | Performed by: INTERNAL MEDICINE

## 2023-11-06 RX ORDER — SODIUM CHLORIDE 0.9 % (FLUSH) 0.9 %
10 SYRINGE (ML) INJECTION
Status: DISCONTINUED | OUTPATIENT
Start: 2023-11-06 | End: 2023-11-06 | Stop reason: HOSPADM

## 2023-11-06 RX ORDER — HEPARIN 100 UNIT/ML
500 SYRINGE INTRAVENOUS
Status: DISCONTINUED | OUTPATIENT
Start: 2023-11-06 | End: 2023-11-06 | Stop reason: HOSPADM

## 2023-11-06 RX ORDER — HEPARIN 100 UNIT/ML
500 SYRINGE INTRAVENOUS
Status: CANCELLED | OUTPATIENT
Start: 2023-11-06

## 2023-11-06 RX ORDER — SODIUM CHLORIDE 0.9 % (FLUSH) 0.9 %
10 SYRINGE (ML) INJECTION
Status: CANCELLED | OUTPATIENT
Start: 2023-11-06

## 2023-11-06 RX ADMIN — GOSERELIN ACETATE 3.6 MG: 3.6 IMPLANT SUBCUTANEOUS at 11:11

## 2023-11-06 RX ADMIN — DENOSUMAB 120 MG: 120 INJECTION SUBCUTANEOUS at 11:11

## 2023-11-06 RX ADMIN — Medication 10 ML: at 11:11

## 2023-11-06 RX ADMIN — Medication 500 UNITS: at 11:11

## 2023-11-06 NOTE — PROGRESS NOTES
PROGRESS NOTE    Subjective:       Patient ID: Angeles Wright is a 53 y.o. female.  MRN:   : [id number]    Chief Complaint: Breast cancer     History of Present Illness:   Angeles Wright is a 53 y.o. female who presents with metastatic invasive ductal left breast cancer.       As previously documented by Dr. Mcgovern  she transferred care to [facility] [place]. Labs showed her to be pre-menopausal thus she was treated with Ribociclib /Zoladex and Arimidex but she was off therapy from November to May 2020. In 2020 scans showed improvement thus she restarted Arimidex/ribociclib/zoladex.     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     She underwent left lumpectomy for isolated progression in the breast on 22.   She has continued anastrozole 1 mg p.o. daily.    Started the new cycle of Ribociclib 23    Last Xgeva 2723. Saw the dentist, s/p dental extractions, has seen oral surgeon and a bone fragment was shaved and sent to pathology.      Interim history:  She presents to the clinic today for review of labs & evaluation.    She has  continued dose reduced ribociclib at the 400 mg p.o. day 1-day 21 Q 28 days since 2020.   today is Day 1 of the new cycle.  She is compliant with Arimidex daily.  Tolerating treatment well.      Had abdominal pain a few weeks ago, related to constipation, now relieved. Has had follow up scans, we reviewed results.       Oncology History:  Oncology History   Malignant neoplasm of central portion of left breast in female, estrogen receptor positive   9/3/2019 Initial Diagnosis    Malignant neoplasm of central portion of left breast in female, estrogen receptor positive     9/3/2019 - 9/3/2019 Chemotherapy    Treatment Summary   Plan Name: OP ANASTROZOLE PALBOCICLIB Q4W  Treatment Goal: Palliative  Status: Inactive  Start Date: 9/3/2019  End Date: 9/3/2019  Provider: Melania Mcgovern,  MD  Chemotherapy: [No matching medication found in this treatment plan]     9/18/2019 Cancer Staged    Staging form: Breast, AJCC 8th Edition  - Clinical: Stage IV (cT4b, cM1, ER+, NM+, HER2-)     11/13/2022 Cancer Staged    Staging form: Breast, AJCC 8th Edition  - Clinical stage from 11/13/2022: No Stage Recommended (ycT2(m), cN1(f), cM1, G1, ER+, NM+, HER2-)     12/26/2022 Cancer Staged    Staging form: Breast, AJCC 8th Edition  - Pathologic stage from 12/26/2022: No Stage Recommended (ypT2, pNX, pM1, G1, ER+, NM+, HER2-)       5/4/22  Impression:     No significant interval changes.  Known malignancy in the left breast with metastatic disease to bone.  Additional stable bilateral breast nodules which have been evaluated previously with mammography and ultrasound.  Stable small pleural based lung nodules in the right hemithorax.     9/12/22  Impression:  Left  Mass: Left breast 22 mm x 22 mm x 15 mm mass at the upper middle 12 o'clock position. Assessment: 6 - Known biopsy, proven malignancy.   Mass: Left breast 12 mm x 9 mm x 5 mm mass at the upper posterior 12 o'clock position. Assessment: 4C - Suspicious finding. Biopsy is recommended.      Right  Mass: Right breast 10 mm x 10 mm x 7 mm mass at the upper outer 11 o'clock position. Assessment: 3 - Probably benign.   Mass: Right breast 14 mm x 7 mm x 11 mm mass at the 9 o'clock position. Assessment: 3 - Probably benign.      BI-RADS Category:   Overall: 4 - Suspicious    9/28/22  Impression:  Left  Mass: Left breast 22 mm x 22 mm x 15 mm mass at the upper middle 12 o'clock position. Assessment: 6 - Known biopsy, proven malignancy.   Mass: Left breast 12 mm x 9 mm x 5 mm mass at the upper posterior 12 o'clock position. Assessment: 4C - Suspicious finding. Biopsy is recommended.      Right  Mass: Right breast 10 mm x 10 mm x 7 mm mass at the upper outer 11 o'clock position. Assessment: 3 - Probably benign.   Mass: Right breast 14 mm x 7 mm x 11 mm mass at the 9  o'clock position. Assessment: 3 - Probably benign.      BI-RADS Category:   Overall: 4 - Suspicious     Recommendation:  Biopsy could be considered. There is a strong possibility that this represents progression of disease in this patient with known metastatic breast carcinoma.    10/21/22   Final Pathologic Diagnosis LEFT BREAST, 12 O'CLOCK MASS 1 CM FROM NIPPLE, BIOPSY:   Invasive ductal carcinoma, Arjun histologic grade 1 (tubule formation:   2, nuclear pleomorphism:  2, mitotic activity:  1).   Comment:  The biopsy reveals infiltrating ductal carcinoma with partial   mucinous features including abundant tubules and tumor nests with punched out   cribriform spaces containing mucin.  Tumor cells are positive with GATA3 and   negative with , in keeping with mammary ductal origin.  The largest   continuous focus of invasive carcinoma present measures 4 mm.  Dr. Zofia Saleem also reviewed this case and agrees with the diagnosis.   BREAST BIOMARKER RESULTS   Estrogen receptor (ER):  Positive (nearly 100%, strong)   Progesterone receptor (AK):  Positive (50%, weak to moderate)   HER2 IHC:  Negative (1+)   Ki-67 proliferation index:  10%        11/4/22  CT chest abd pelvis  Impression:     Stable diffuse osseous metastatic disease.     Left breast masses consistent with known malignancy and better evaluated on recent mammogram and ultrasound.     New left lower lobe linear consolidation, favored to be due to atelectasis versus infection/inflammation.  Recommend attention on follow-up exam as per clinical protocol.     Sigmoid colon diverticulosis without diverticulitis.     Pulmonary emphysema.    12/14/22  1.  BREAST, LEFT, 12:00, LUMPECTOMY:   --INVASIVE CARCINOMA OF NO SPECIAL TYPE (DUCTAL), ARJUN HISTOLOGIC    GRADE 1 OUT OF 3.        --INVASIVE CARCINOMA MEASURES 40 MILLIMETERS IN MAXIMUM DIMENSION.   --TUMOR IS PRESENT IN SUBEPIDERMAL TISSUE BUT DOES NOT INVOLVE    EPIDERMIS PROPER.         --BIOPSY SITE CHANGES.   --RECEPTOR STUDIES REPORTED PREVIOUSLY AT OUTSIDE FACILITY ON BIOPSY    MATERIAL (SEE COMMENT #1).        --NO UNEQUIVOCAL LYMPHOVASCULAR INVASION IDENTIFIED.   --PART 1 SPECIMEN MARGINS ARE NEGATIVE FOR MALIGNANCY; ADDITIONAL    MARGINS SENT SEPARATELY (SEE   PARTS 2 AND 3 BELOW); PLEASE SEE ALSO BELOW CASE SUMMARY FOR ADDITIONAL    MARGIN           DATA/PARAMETERS.   --MICROCALCIFICATIONS ASSOCIATED WITH NEOPLASTIC TISSUE AND    NON-NEOPLASTIC TISSUE.   --SKIN WITH MICROORGANISMS MORPHOLOGICALLY COMPATIBLE WITH DEMODEX.        --ypT2/ypN NOT ASSIGNED (NO NODES SUBMITTED OR FOUND).   2/23/23  Impression:     Prior an interval left breast surgery for neoplasm.     Chronic appearing bone lesions compatible with bony metastatic disease.  These are grossly stable on imaging.     No evidence of new parenchymal or dagmar metastatic disease.     Diverticulosis without evidence of diverticulitis.    CT chest abd pelvis:  6/14/23  Impression:     1. Stable exam compared to prior CT 02/23/2023.  No acute findings.  Osseous metastatic lesions are grossly stable.  No evidence of new parenchymal or dagmar metastatic disease within the chest, abdomen, or pelvis.  2. Small focus of ground-glass density within the right upper lobe favoring incidental small airways disease/inflammatory changes or atelectasis which can be reassessed on routine follow-up.  3. Diverticulosis coli.  4. Additional incidental/stable findings as above.    11/3/23  CT chest abd pelvis:  Impression:     1. Interval disappearance of a ground-glass opacity in the right upper lobe.  2. Small left lower lobe pulmonary nodule which remains unchanged.  No new nodules have developed.  3. Chronic mild centrilobular pulmonary emphysema.  4. Multiple lytic osseous metastases which remain unchanged from the previous study.  No new metastatic lesions have developed and no pathologic fractures are evident.  5. Colonic diverticulosis with no  evidence of diverticulitis.    History:  Past Medical History:   Diagnosis Date    Breast cancer       Past Surgical History:   Procedure Laterality Date    BREAST BIOPSY Right     BREAST LUMPECTOMY Left 12/14/2022    LUMPECTOMY, WITH RADAR LOCALIZATION USING COLETTE  Left 12/14/2022    Procedure: LUMPECTOMY,WITH RADAR LOCALIZATION USING COLETTE ;  Surgeon: Maday Amin MD;  Location: [facility];  Service: General;  Laterality: Left;    TUBAL LIGATION       Family History   Problem Relation Age of Onset    Lung cancer Mother     Heart attack Father     Suicide Brother     No Known Problems Maternal Aunt     No Known Problems Maternal Uncle     No Known Problems Paternal Aunt     No Known Problems Paternal Uncle     No Known Problems Maternal Grandmother     No Known Problems Maternal Grandfather     No Known Problems Paternal Grandmother     No Known Problems Paternal Grandfather     No Known Problems Son     No Known Problems Son     No Known Problems Daughter     Breast cancer Cousin     Breast cancer Cousin      Social History     Tobacco Use    Smoking status: Every Day     Current packs/day: 1.00     Average packs/day: 1 pack/day for 34.2 years (34.2 ttl pk-yrs)     Types: Cigarettes     Start date: 9/3/1994    Smokeless tobacco: Never   Substance and Sexual Activity    Alcohol use: Yes     Comment: occasionally    Drug use: Not Currently    Sexual activity: Yes     Partners: Male     Birth control/protection: None        ROS:   Review of Systems   Constitutional:  Negative for fever, malaise/fatigue and weight loss.   HENT:  Negative for congestion, hearing loss, nosebleeds and sore throat.    Eyes:  Negative for double vision and photophobia.   Respiratory:  Negative for hemoptysis, shortness of breath and wheezing.    Cardiovascular:  Negative for chest pain, palpitations, orthopnea and leg swelling.   Gastrointestinal:  Negative for abdominal pain, blood in stool, constipation, diarrhea, heartburn,  "nausea and vomiting.   Genitourinary:  Negative for dysuria, hematuria and urgency.   Musculoskeletal:  Negative for back pain and myalgias.   Skin:  Negative for itching and rash.   Neurological:  Negative for dizziness, tingling, seizures and weakness.   Endo/Heme/Allergies:  Negative for polydipsia. Does not bruise/bleed easily.   Psychiatric/Behavioral:  Negative for depression and memory loss. The patient is not nervous/anxious and does not have insomnia.         Objective:     Vitals:    11/06/23 1031   BP: 132/78   Pulse: 70   Resp: 14   Temp: 97.7 °F (36.5 °C)   TempSrc: Temporal   SpO2: 99%   Weight: 64.7 kg (142 lb 10.2 oz)   Height: 5' 4" (1.626 m)   PainSc: 0-No pain         Wt Readings from Last 10 Encounters:   11/06/23 64.7 kg (142 lb 10.2 oz)   10/09/23 63.9 kg (140 lb 14 oz)   10/09/23 63.9 kg (140 lb 14 oz)   09/11/23 62.1 kg (136 lb 14.5 oz)   09/11/23 62.1 kg (136 lb 14.5 oz)   07/17/23 63.9 kg (140 lb 14 oz)   07/17/23 64.4 kg (142 lb)   06/19/23 64.6 kg (142 lb 6.7 oz)   06/19/23 64.6 kg (142 lb 6.7 oz)   05/22/23 64.6 kg (142 lb 6.7 oz)       Physical Examination:   Physical Exam  Vitals and nursing note reviewed.   Constitutional:       General: She is not in acute distress.     Appearance: She is not diaphoretic.   HENT:      Head: Normocephalic.      Mouth/Throat:      Pharynx: No oropharyngeal exudate.   Eyes:      General: No scleral icterus.     Conjunctiva/sclera: Conjunctivae normal.   Neck:      Thyroid: No thyromegaly.   Cardiovascular:      Rate and Rhythm: Normal rate and regular rhythm.      Heart sounds: Normal heart sounds. No murmur heard.  Pulmonary:      Effort: Pulmonary effort is normal. No respiratory distress.      Breath sounds: No stridor. No wheezing or rales.   Chest:      Chest wall: No tenderness.   Abdominal:      General: Bowel sounds are normal. There is no distension.      Palpations: Abdomen is soft. There is no mass.      Tenderness: There is no abdominal " tenderness. There is no rebound.   Musculoskeletal:         General: No tenderness or deformity. Normal range of motion.      Cervical back: Neck supple.   Lymphadenopathy:      Cervical: No cervical adenopathy.   Skin:     General: Skin is warm and dry.      Findings: No erythema or rash.   Neurological:      Mental Status: She is alert and oriented to person, place, and time.      Cranial Nerves: No cranial nerve deficit.      Coordination: Coordination normal.      Gait: Gait is intact.   Psychiatric:         Mood and Affect: Affect normal.         Cognition and Memory: Memory normal.         Judgment: Judgment normal.          Laboratory Data:  All pertinent labs have been reviewed.  Labs:   Lab Results   Component Value Date    WBC 5.67 10/09/2023    RBC 4.45 10/09/2023    HGB 14.3 10/09/2023    HCT 43.3 10/09/2023    MCV 97 10/09/2023     10/09/2023    GLU 94 11/06/2023     11/06/2023    K 4.0 11/06/2023    BUN 11 11/06/2023    CREATININE 0.8 11/06/2023    AST 12 11/06/2023    ALT 8 (L) 11/06/2023    BILITOT 0.3 11/06/2023       Assessment/Plan:   Malignant neoplasm of central portion of left breast in female, estrogen receptor positive  Stage IV (T3N2M2) invasive ductal carcinoma of left breast,  quadrant, ER 96%, AK 94%, Her2 neg, Grade 2, Ki67 30%  PIKC3A mutation positive      Recent mammogram shows slight increase in the size of the left breast mass, previously biopsied and a new mass.   Biopsy shows a Grade 1 IDC, strongly ER + tumor with Ki 67 10%, more favorable than prior biopsy.   There is no clear metastatic disease progression based on recent scans.       Her case was discussed at our multi D tumor board and the consensus was to remove the site of isolated disease progression. She underwent left lumpectomy on 12/14 with Dr. Amin. Final pathology reviewed, she had a G 1 IDC 4 cm in size, margins negative. No adjuvant RT offered due to the metastatic nature of disease.     -On  ribociclib 400 mg p.o. day 1-day 21 Q 28 days and anastrozole 1 mg p.o.   -Continue zoladex q 28 days.     Restaging scans reviewed, stable, continue to monitor.     Bone metastases  Continue Xgeva Q 28 days.  Continue Caltrate b.i.d. at this time.     Malignant neoplasm metastatic to lung, unspecified laterality  Subcm pulmonary nodules are stable and benign appearing.  Lung nodules have never been biopsied.  Ground glass opacities have resolved. Continue to monitor on subsequent scans.     Use of anastrozole  As above.    Smoker  She is cutting down. Follow up with smoking cessation program.         Discussion:   No follow-ups on file.    Plan was discussed with the patient at length, and she verbalized understanding. Angeles was given an opportunity to ask questions that were answered to her satisfaction, and she was advised to call in the interval if any problems or questions arise.    Electronically signed by Shelby Thomas MD      Route Chart for Scheduling    Med Onc Chart Routing      Follow up with physician . Scheduled   Follow up with [facility]    Infusion scheduling note    Injection scheduling note    Labs    Imaging    Pharmacy appointment    Other referrals                    Supportive Plan Information  OP BREAST GOSERELIN Q4W   Shelby Thomas MD   Upcoming Treatment Dates - OP BREAST GOSERELIN Q4W    11/6/2023       Chemotherapy       goserelin (ZOLADEX) injection 3.6 mg    Therapy Plan Information  PORT FLUSH  Flushes  heparin, porcine (PF) 100 unit/mL injection flush 500 Units  500 Units, Intravenous, Every visit  sodium chloride 0.9% flush 10 mL  10 mL, Intravenous, Every visit    DENOSUMAB (XGEVA) Q4W  Medications  denosumab (XGEVA) solution 120 mg  120 mg, Subcutaneous, Every 4 weeks    MDM includes  :    - Acute or chronic illness or injury that poses a threat to life or bodily function  - Independent review and explanation of 3+ results from unique tests  - Discussion of management and ordering 3+  unique tests  - Extensive discussion of treatment and management  - Prescription drug management  - Drug therapy requiring intensive monitoring for toxicity

## 2023-11-29 ENCOUNTER — LAB VISIT (OUTPATIENT)
Dept: LAB | Facility: HOSPITAL | Age: 53
End: 2023-11-29
Attending: INTERNAL MEDICINE
Payer: MEDICAID

## 2023-11-29 DIAGNOSIS — C50.112 MALIGNANT NEOPLASM OF CENTRAL PORTION OF LEFT BREAST IN FEMALE, ESTROGEN RECEPTOR POSITIVE: ICD-10-CM

## 2023-11-29 DIAGNOSIS — Z17.0 MALIGNANT NEOPLASM OF CENTRAL PORTION OF LEFT BREAST IN FEMALE, ESTROGEN RECEPTOR POSITIVE: ICD-10-CM

## 2023-11-29 DIAGNOSIS — D70.1 CHEMOTHERAPY-INDUCED NEUTROPENIA: ICD-10-CM

## 2023-11-29 DIAGNOSIS — T45.1X5A CHEMOTHERAPY-INDUCED NEUTROPENIA: ICD-10-CM

## 2023-11-29 LAB
ALBUMIN SERPL BCP-MCNC: 3.9 G/DL (ref 3.5–5.2)
ALP SERPL-CCNC: 84 U/L (ref 55–135)
ALT SERPL W/O P-5'-P-CCNC: 5 U/L (ref 10–44)
ANION GAP SERPL CALC-SCNC: 8 MMOL/L (ref 8–16)
AST SERPL-CCNC: 11 U/L (ref 10–40)
BASOPHILS # BLD AUTO: 0.11 K/UL (ref 0–0.2)
BASOPHILS NFR BLD: 2.1 % (ref 0–1.9)
BILIRUB SERPL-MCNC: 0.2 MG/DL (ref 0.1–1)
BUN SERPL-MCNC: 14 MG/DL (ref 6–20)
CALCIUM SERPL-MCNC: 8.6 MG/DL (ref 8.7–10.5)
CHLORIDE SERPL-SCNC: 112 MMOL/L (ref 95–110)
CO2 SERPL-SCNC: 22 MMOL/L (ref 23–29)
CREAT SERPL-MCNC: 0.8 MG/DL (ref 0.5–1.4)
DIFFERENTIAL METHOD: ABNORMAL
EOSINOPHIL # BLD AUTO: 0.1 K/UL (ref 0–0.5)
EOSINOPHIL NFR BLD: 1.3 % (ref 0–8)
ERYTHROCYTE [DISTWIDTH] IN BLOOD BY AUTOMATED COUNT: 13.8 % (ref 11.5–14.5)
EST. GFR  (NO RACE VARIABLE): >60 ML/MIN/1.73 M^2
GLUCOSE SERPL-MCNC: 84 MG/DL (ref 70–110)
HCT VFR BLD AUTO: 42 % (ref 37–48.5)
HGB BLD-MCNC: 14.2 G/DL (ref 12–16)
IMM GRANULOCYTES # BLD AUTO: 0.02 K/UL (ref 0–0.04)
IMM GRANULOCYTES NFR BLD AUTO: 0.4 % (ref 0–0.5)
LYMPHOCYTES # BLD AUTO: 1.9 K/UL (ref 1–4.8)
LYMPHOCYTES NFR BLD: 36 % (ref 18–48)
MCH RBC QN AUTO: 32 PG (ref 27–31)
MCHC RBC AUTO-ENTMCNC: 33.8 G/DL (ref 32–36)
MCV RBC AUTO: 95 FL (ref 82–98)
MONOCYTES # BLD AUTO: 0.4 K/UL (ref 0.3–1)
MONOCYTES NFR BLD: 7.9 % (ref 4–15)
NEUTROPHILS # BLD AUTO: 2.8 K/UL (ref 1.8–7.7)
NEUTROPHILS NFR BLD: 52.3 % (ref 38–73)
NRBC BLD-RTO: 0 /100 WBC
PLATELET # BLD AUTO: 247 K/UL (ref 150–450)
PMV BLD AUTO: 8.9 FL (ref 9.2–12.9)
POTASSIUM SERPL-SCNC: 4 MMOL/L (ref 3.5–5.1)
PROT SERPL-MCNC: 6.7 G/DL (ref 6–8.4)
RBC # BLD AUTO: 4.44 M/UL (ref 4–5.4)
SODIUM SERPL-SCNC: 142 MMOL/L (ref 136–145)
WBC # BLD AUTO: 5.3 K/UL (ref 3.9–12.7)

## 2023-11-29 PROCEDURE — 85025 COMPLETE CBC W/AUTO DIFF WBC: CPT | Mod: PN | Performed by: INTERNAL MEDICINE

## 2023-11-29 PROCEDURE — 80053 COMPREHEN METABOLIC PANEL: CPT | Mod: PN | Performed by: INTERNAL MEDICINE

## 2023-11-29 PROCEDURE — 36415 COLL VENOUS BLD VENIPUNCTURE: CPT | Mod: PN | Performed by: INTERNAL MEDICINE

## 2023-11-29 PROCEDURE — 86300 IMMUNOASSAY TUMOR CA 15-3: CPT | Performed by: INTERNAL MEDICINE

## 2023-12-01 ENCOUNTER — TELEPHONE (OUTPATIENT)
Dept: HEMATOLOGY/ONCOLOGY | Facility: CLINIC | Age: 53
End: 2023-12-01
Payer: MEDICAID

## 2023-12-01 LAB — CANCER AG27-29 SERPL-ACNC: 71.2 U/ML

## 2023-12-04 ENCOUNTER — TELEPHONE (OUTPATIENT)
Dept: INFUSION THERAPY | Facility: HOSPITAL | Age: 53
End: 2023-12-04
Payer: MEDICAID

## 2024-02-01 PROBLEM — K02.9 DENTAL CARIES: Status: ACTIVE | Noted: 2019-03-19

## 2024-02-01 PROBLEM — Z17.0 MALIGNANT NEOPLASM OF LEFT BREAST IN FEMALE, ESTROGEN RECEPTOR POSITIVE: Status: ACTIVE | Noted: 2019-01-29

## 2024-02-01 PROBLEM — C50.912 MALIGNANT NEOPLASM OF LEFT BREAST IN FEMALE, ESTROGEN RECEPTOR POSITIVE: Status: ACTIVE | Noted: 2019-01-29

## 2024-02-01 PROBLEM — Z72.0 TOBACCO USE: Status: ACTIVE | Noted: 2024-02-01
